# Patient Record
Sex: FEMALE | Race: WHITE | Employment: OTHER | URBAN - METROPOLITAN AREA
[De-identification: names, ages, dates, MRNs, and addresses within clinical notes are randomized per-mention and may not be internally consistent; named-entity substitution may affect disease eponyms.]

---

## 2017-01-12 RX ORDER — ALPRAZOLAM 0.5 MG/1
TABLET ORAL
Qty: 25 TAB | Refills: 0 | OUTPATIENT
Start: 2017-01-12 | End: 2017-03-13 | Stop reason: SDUPTHER

## 2017-01-12 NOTE — TELEPHONE ENCOUNTER
Last date seen:10/7/16  Last date filled:12/6/16     report was pulled on 60 y.o. Nicolas Bars, No discrepancies were found.

## 2017-01-19 RX ORDER — BUTALBITAL, ACETAMINOPHEN AND CAFFEINE 50; 325; 40 MG/1; MG/1; MG/1
TABLET ORAL
Qty: 60 TAB | Refills: 0 | OUTPATIENT
Start: 2017-01-19 | End: 2017-02-15 | Stop reason: SDUPTHER

## 2017-01-19 NOTE — TELEPHONE ENCOUNTER
From: Tanisha Bertrand  To: Mariangel Campbell MD  Sent: 1/19/2017 11:44 AM EST  Subject: Medication Renewal Request    Original authorizing provider: MD Tanisha Zhong would like a refill of the following medications:  butalbital-acetaminophen-caffeine (FIORICET, ESGIC) -40 mg per tablet Mariangel Campbell MD]    Preferred pharmacy: [address] [place] [address] [address] AT [address]    Comment:  Thought I would try filling through my chart since I have so much trouble filling through the pharmacy. [place] [address] [facility] [place] .  #[phone]

## 2017-01-19 NOTE — TELEPHONE ENCOUNTER
Reviewed report generated by the [facility]. Does not demonstrate aberrancies or inconsistencies with regard to the prescribing of controlled medications to this patient by other providers.   Last filled 12/22/16

## 2017-02-15 RX ORDER — BUTALBITAL, ACETAMINOPHEN AND CAFFEINE 50; 325; 40 MG/1; MG/1; MG/1
TABLET ORAL
Qty: 60 TAB | Refills: 0 | Status: SHIPPED | OUTPATIENT
Start: 2017-02-15 | End: 2017-03-13 | Stop reason: SDUPTHER

## 2017-03-02 RX ORDER — IPRATROPIUM BROMIDE 21 UG/1
2 SPRAY, METERED NASAL 3 TIMES DAILY
Qty: 30 ML | Refills: 5 | Status: SHIPPED | OUTPATIENT
Start: 2017-03-02 | End: 2018-10-27 | Stop reason: ALTCHOICE

## 2017-03-13 RX ORDER — BUTALBITAL, ACETAMINOPHEN AND CAFFEINE 50; 325; 40 MG/1; MG/1; MG/1
TABLET ORAL
Qty: 60 TAB | Refills: 0 | OUTPATIENT
Start: 2017-03-13 | End: 2017-04-11 | Stop reason: SDUPTHER

## 2017-03-13 RX ORDER — ALPRAZOLAM 0.5 MG/1
TABLET ORAL
Qty: 25 TAB | Refills: 0 | OUTPATIENT
Start: 2017-03-13 | End: 2017-04-28 | Stop reason: SDUPTHER

## 2017-03-13 NOTE — TELEPHONE ENCOUNTER
[facility] does not allow PAs to prescribe fioricet. Route to other or wait for Dr. Bonner Peer tomorrow.

## 2017-03-13 NOTE — TELEPHONE ENCOUNTER
Reviewed report generated by the [facility]. Does not demonstrate aberrancies or inconsistencies with regard to the prescribing of controlled medications to this patient by other providers. Per , Xanax last filled 1/13/2017. As per chart, Fioricet last prescribed 2/15/2017.

## 2017-04-06 ENCOUNTER — HOSPITAL ENCOUNTER (OUTPATIENT)
Dept: LAB | Age: 57
Discharge: HOME | End: 2017-04-06
Payer: COMMERCIAL

## 2017-04-06 ENCOUNTER — LAB ONLY (OUTPATIENT)
Dept: INTERNAL MEDICINE CLINIC | Age: 57
End: 2017-04-06

## 2017-04-06 DIAGNOSIS — E55.9 VITAMIN D DEFICIENCY: ICD-10-CM

## 2017-04-06 DIAGNOSIS — E78.5 HYPERLIPIDEMIA, UNSPECIFIED HYPERLIPIDEMIA TYPE: ICD-10-CM

## 2017-04-06 DIAGNOSIS — I10 ESSENTIAL HYPERTENSION: ICD-10-CM

## 2017-04-06 DIAGNOSIS — E78.5 HYPERLIPIDEMIA, UNSPECIFIED HYPERLIPIDEMIA TYPE: Primary | ICD-10-CM

## 2017-04-06 LAB
ALBUMIN SERPL BCP-MCNC: 4 G/DL (ref 3.4–5)
ALBUMIN/GLOB SERPL: 1.4 {RATIO} (ref 0.8–1.7)
ALP SERPL-CCNC: 80 U/L (ref 45–117)
ALT SERPL-CCNC: 138 U/L (ref 13–56)
ANION GAP BLD CALC-SCNC: 9 MMOL/L (ref 3–18)
AST SERPL W P-5'-P-CCNC: 70 U/L (ref 15–37)
BILIRUB SERPL-MCNC: 0.3 MG/DL (ref 0.2–1)
BUN SERPL-MCNC: 23 MG/DL (ref 7–18)
BUN/CREAT SERPL: 41 (ref 12–20)
CALCIUM SERPL-MCNC: 9.3 MG/DL (ref 8.5–10.1)
CHLORIDE SERPL-SCNC: 103 MMOL/L (ref 100–108)
CHOLEST SERPL-MCNC: 142 MG/DL
CO2 SERPL-SCNC: 30 MMOL/L (ref 21–32)
CREAT SERPL-MCNC: 0.56 MG/DL (ref 0.6–1.3)
GLOBULIN SER CALC-MCNC: 2.9 G/DL (ref 2–4)
GLUCOSE SERPL-MCNC: 86 MG/DL (ref 74–99)
HDLC SERPL-MCNC: 53 MG/DL (ref 40–60)
HDLC SERPL: 2.7 {RATIO} (ref 0–5)
LDLC SERPL CALC-MCNC: 75.8 MG/DL (ref 0–100)
LIPID PROFILE,FLP: NORMAL
POTASSIUM SERPL-SCNC: 3.8 MMOL/L (ref 3.5–5.5)
PROT SERPL-MCNC: 6.9 G/DL (ref 6.4–8.2)
SODIUM SERPL-SCNC: 142 MMOL/L (ref 136–145)
TRIGL SERPL-MCNC: 66 MG/DL (ref ?–150)
VLDLC SERPL CALC-MCNC: 13.2 MG/DL

## 2017-04-06 PROCEDURE — 80053 COMPREHEN METABOLIC PANEL: CPT | Performed by: INTERNAL MEDICINE

## 2017-04-06 PROCEDURE — 82306 VITAMIN D 25 HYDROXY: CPT | Performed by: INTERNAL MEDICINE

## 2017-04-06 PROCEDURE — 36415 COLL VENOUS BLD VENIPUNCTURE: CPT | Performed by: INTERNAL MEDICINE

## 2017-04-06 PROCEDURE — 80061 LIPID PANEL: CPT | Performed by: INTERNAL MEDICINE

## 2017-04-07 LAB — 25(OH)D3 SERPL-MCNC: 49.1 NG/ML (ref 30–100)

## 2017-04-11 NOTE — TELEPHONE ENCOUNTER
From: Maribel Danielle  To:  Yahaira Santo MD  Sent: 4/11/2017 9:14 AM EDT  Subject: Medication Renewal Request    Original authorizing provider: MD Maribel Fernando would like a refill of the following medications:  butalbital-acetaminophen-caffeine (FIORICET, ESGIC) -40 mg per tablet Yahaira Santo MD]    Preferred pharmacy: [address] [place] [address] RD AT [address]    Comment:

## 2017-04-12 RX ORDER — BUTALBITAL, ACETAMINOPHEN AND CAFFEINE 50; 325; 40 MG/1; MG/1; MG/1
TABLET ORAL
Qty: 60 TAB | Refills: 0 | OUTPATIENT
Start: 2017-04-12 | End: 2017-05-10 | Stop reason: SDUPTHER

## 2017-04-13 ENCOUNTER — TELEPHONE (OUTPATIENT)
Dept: INTERNAL MEDICINE CLINIC | Age: 57
End: 2017-04-13

## 2017-04-13 ENCOUNTER — OFFICE VISIT (OUTPATIENT)
Dept: INTERNAL MEDICINE CLINIC | Age: 57
End: 2017-04-13

## 2017-04-13 VITALS
TEMPERATURE: 98.4 F | DIASTOLIC BLOOD PRESSURE: 76 MMHG | BODY MASS INDEX: 22.4 KG/M2 | HEIGHT: 64 IN | SYSTOLIC BLOOD PRESSURE: 120 MMHG | HEART RATE: 80 BPM | OXYGEN SATURATION: 98 % | WEIGHT: 131.2 LBS

## 2017-04-13 DIAGNOSIS — R79.89 ELEVATED LFTS: ICD-10-CM

## 2017-04-13 DIAGNOSIS — R55 VASOMOTOR INSTABILITY: ICD-10-CM

## 2017-04-13 DIAGNOSIS — R51.9 CHRONIC NONINTRACTABLE HEADACHE, UNSPECIFIED HEADACHE TYPE: ICD-10-CM

## 2017-04-13 DIAGNOSIS — E78.5 HYPERLIPIDEMIA, UNSPECIFIED HYPERLIPIDEMIA TYPE: ICD-10-CM

## 2017-04-13 DIAGNOSIS — K52.9 COLITIS: ICD-10-CM

## 2017-04-13 DIAGNOSIS — R73.09 ABNORMAL GLUCOSE: ICD-10-CM

## 2017-04-13 DIAGNOSIS — E55.9 VITAMIN D DEFICIENCY: ICD-10-CM

## 2017-04-13 DIAGNOSIS — M48.061 LUMBAR SPINAL STENOSIS: ICD-10-CM

## 2017-04-13 DIAGNOSIS — G89.29 CHRONIC NONINTRACTABLE HEADACHE, UNSPECIFIED HEADACHE TYPE: ICD-10-CM

## 2017-04-13 DIAGNOSIS — G47.33 OBSTRUCTIVE SLEEP APNEA: ICD-10-CM

## 2017-04-13 DIAGNOSIS — I10 ESSENTIAL HYPERTENSION: Primary | ICD-10-CM

## 2017-04-13 RX ORDER — BUTALBITAL, ACETAMINOPHEN AND CAFFEINE 50; 325; 40 MG/1; MG/1; MG/1
TABLET ORAL
Qty: 60 TAB | Refills: 0 | OUTPATIENT
Start: 2017-04-13

## 2017-04-13 NOTE — MR AVS SNAPSHOT
Visit Information Date & Time Provider Department Dept. Phone Encounter #  
 4/13/2017 12:30 PM Eva Blanca MD Internist of [facility] [phone] [id number] Follow-up Instructions Return in about 6 months (around 10/13/2017), or if symptoms worsen or fail to improve. Your Appointments 5/11/2017  9:05 AM  
LAB with [facility] NURSE VISIT Internist of [facility] Appt Note: lab  
 [address], [address] [id number] [address] [place]  
  
   
 [address], [address]  
  
    
 10/12/2017  8:45 AM  
LAB with [facility] NURSE VISIT Internist of [facility] ([facility]) Appt Note: lab  
 [address], [address] [facility]  
[phone]  
  
    
 10/19/2017  9:30 AM  
Office Visit with Eva Blanca MD  
Internist of [facility] Appt Note: 6 month follow up  
 [address], [address] [id number] [address] [place]  
  
   
 [address], [address] Upcoming Health Maintenance Date Due Pneumococcal 19-64 Medium Risk (1 of 1 - PPSV23) 12/1/1977 PAP AKA CERVICAL CYTOLOGY 12/1/1979 COLONOSCOPY 6/3/2014 INFLUENZA AGE 9 TO ADULT 8/1/2016 BREAST CANCER SCRN MAMMOGRAM 11/16/2018 DTaP/Tdap/Td series (2 - Td) 10/7/2026 Allergies as of 4/13/2017  Review Complete On: 4/13/2017 By: Joshua Kirkpatrick Severity Noted Reaction Type Reactions Other Plant, Animal, Environmental  09/07/2016    Itching, Sneezing \"Everything but feathers and horses. \" Current Immunizations  Reviewed on 10/7/2016 Name Date Influenza Vaccine 10/8/2014 TD Vaccine 1/1/2006 Tdap 10/7/2016  1:04 PM  
  
 Not reviewed this visit Vitals BP Pulse Temp Height(growth percentile) Weight(growth percentile) LMP  
 120/76 80 98.4 °F (36.9 °C) (Oral) 5' 4\" (1.626 m) 131 lb 3.2 oz (59.5 kg) 01/10/2012 SpO2 BMI OB Status Smoking Status 98% 22.52 kg/m2 Postmenopausal Current Some Day Smoker Vitals History BMI and BSA Data Body Mass Index Body Surface Area  
 22.52 kg/m 2 1.64 m 2 Preferred Pharmacy Pharmacy Name Phone [place] [id number] [address], [address] [facility] [address] AT [address] [phone] Your Updated Medication List  
  
   
This list is accurate as of: 4/13/17  1:30 PM.  Always use your most recent med list.  
  
  
  
  
 ALPRAZolam 0.5 mg tablet Commonly known as:  XANAX  
TAKE 1 TABLET BY MOUTH THREE TIMES DAILY AS NEEDED FOR ANXIETY  
  
 amLODIPine 10 mg tablet Commonly known as:  Arthur [place] Take 1 Tab by mouth daily. butalbital-acetaminophen-caffeine -40 mg per tablet Commonly known as:  FIORICET, ESGIC  
TAKE 1 TABLET BY MOUTH EVERY 6 HOURS AS NEEDED. captopril 50 mg tablet Commonly known as:  CAPOTEN  
TAKE 1 TABLET BY MOUTH THREE TIMES DAILY Cholecalciferol (Vitamin D3) 2,000 unit Cap capsule Commonly known as:  VITAMIN D3 Take 4,000 Units by mouth daily. gabapentin 300 mg capsule Commonly known as:  NEURONTIN  
1 po in the am and 2 in the pm -- taper up as directed  Indications: NEUROPATHIC PAIN  
  
 hydroCHLOROthiazide 25 mg tablet Commonly known as:  HYDRODIURIL  
TAKE 1 TABLET BY MOUTH EVERY DAY  
  
 ipratropium 0.03 % nasal spray Commonly known as:  ATROVENT  
2 Sprays by Both Nostrils route three (3) times daily. multivitamin tablet Commonly known as:  ONE A DAY Take 1 Tab by mouth daily. OTHER Allergy shot weekly  
  
 potassium chloride 20 mEq tablet Commonly known as:  K-DUR, KLOR-CON Take 2 tablets po qday for 2 days, then 1 tablet po daily. rosuvastatin 10 mg tablet Commonly known as:  CRESTOR Take 1 Tab by mouth nightly. Indications: hyperlipidemia  
  
 venlafaxine-SR 75 mg capsule Commonly known as:  EFFEXOR-XR  
 TAKE 3 CAPSULES BY MOUTH DAILY  
  
 VITAMIN B-1 100 mg tablet Generic drug:  thiamine Take 50 mg by mouth daily. Follow-up Instructions Return in about 6 months (around 10/13/2017), or if symptoms worsen or fail to improve. Patient Instructions Stop rosuvastatin (Crestor) Advance Directives: Care Instructions Your Care Instructions An advance directive is a legal way to state your wishes at the end of your life. It tells your family and your doctor what to do if you can no longer say what you want. There are two main types of advance directives. You can change them any time that your wishes change. · A living will tells your family and your doctor your wishes about life support and other treatment. · A durable power of  for health care lets you name a person to make treatment decisions for you when you can't speak for yourself. This person is called a health care agent. If you do not have an advance directive, decisions about your medical care may be made by a doctor or a  who doesn't know you. It may help to think of an advance directive as a gift to the people who care for you. If you have one, they won't have to make tough decisions by themselves. Follow-up care is a key part of your treatment and safety. Be sure to make and go to all appointments, and call your doctor if you are having problems. It's also a good idea to know your test results and keep a list of the medicines you take. How can you care for yourself at home? · Discuss your wishes with your loved ones and your doctor. This way, there are no surprises. · Many states have a unique form. Or you might use a universal form that has been approved by many states. This kind of form can sometimes be completed and stored online. Your electronic copy will then be available wherever you have a connection to the Internet.  In most cases, doctors will respect your wishes even if you have a form from a different state. · You don't need a  to do an advance directive. But you may want to get legal advice. · Think about these questions when you prepare an advance directive: ¨ Who do you want to make decisions about your medical care if you are not able to? Many people choose a family member or close friend. ¨ Do you know enough about life support methods that might be used? If not, talk to your doctor so you understand. ¨ What are you most afraid of that might happen? You might be afraid of having pain, losing your independence, or being kept alive by machines. ¨ Where would you prefer to die? Choices include your home, a hospital, or a nursing home. ¨ Would you like to have information about hospice care to support you and your family? ¨ Do you want to donate organs when you die? ¨ Do you want certain Rastafari practices performed before you die? If so, put your wishes in the advance directive. · Read your advance directive every year, and make changes as needed. When should you call for help? Be sure to contact your doctor if you have any questions. Where can you learn more? Go to http://pooja-real.info/. Enter R264 in the search box to learn more about \"Advance Directives: Care Instructions. \" Current as of: November 17, 2016 Content Version: 11.2 © 1300-5495 [employer]. Care instructions adapted under license by [employer] (which disclaims liability or warranty for this information). If you have questions about a medical condition or this instruction, always ask your healthcare professional. [place] [address] any warranty or liability for your use of this information. Introducing [facility]! Dear Nasreen Arreguin: 
Thank you for requesting a [employer] account. Our records indicate that you already have an active [employer] account.   You can access your account anytime at [email or web address] Did you know that you can access your hospital and ER discharge instructions at any time in [employer]? You can also review all of your test results from your hospital stay or ER visit. Additional Information If you have questions, please visit the Frequently Asked Questions section of the [employer] website at [email or web address] Remember, [employer] is NOT to be used for urgent needs. For medical emergencies, dial 911. Now available from your iPhone and Android! Please provide this summary of care documentation to your next provider. Your primary care clinician is listed as Sajan Greco. If you have any questions after today's visit, please call [phone].

## 2017-04-13 NOTE — PROGRESS NOTES
1. Have you been to the ER, urgent care clinic or hospitalized since your last visit? YES.     2. Have you seen or consulted any other health care providers outside of the [address] [place] [address] since your last visit (Include any pap smears or colon screening)? NO      Do you have an Advanced Directive? NO    Would you like information on Advanced Directives? YES  [facility] index finger.

## 2017-04-13 NOTE — TELEPHONE ENCOUNTER
Patient stating the pharmacy did not receive the Rx for [id number]. She is asking us to call that in again for her.

## 2017-04-13 NOTE — PATIENT INSTRUCTIONS
Stop rosuvastatin (Crestor)      Advance Directives: Care Instructions  Your Care Instructions  An advance directive is a legal way to state your wishes at the end of your life. It tells your family and your doctor what to do if you can no longer say what you want. There are two main types of advance directives. You can change them any time that your wishes change. · A living will tells your family and your doctor your wishes about life support and other treatment. · A durable power of  for health care lets you name a person to make treatment decisions for you when you can't speak for yourself. This person is called a health care agent. If you do not have an advance directive, decisions about your medical care may be made by a doctor or a  who doesn't know you. It may help to think of an advance directive as a gift to the people who care for you. If you have one, they won't have to make tough decisions by themselves. Follow-up care is a key part of your treatment and safety. Be sure to make and go to all appointments, and call your doctor if you are having problems. It's also a good idea to know your test results and keep a list of the medicines you take. How can you care for yourself at home? · Discuss your wishes with your loved ones and your doctor. This way, there are no surprises. · Many states have a unique form. Or you might use a universal form that has been approved by many states. This kind of form can sometimes be completed and stored online. Your electronic copy will then be available wherever you have a connection to the Internet. In most cases, doctors will respect your wishes even if you have a form from a different state. · You don't need a  to do an advance directive. But you may want to get legal advice. · Think about these questions when you prepare an advance directive:  ¨ Who do you want to make decisions about your medical care if you are not able to?  Many people choose a family member or close friend. ¨ Do you know enough about life support methods that might be used? If not, talk to your doctor so you understand. ¨ What are you most afraid of that might happen? You might be afraid of having pain, losing your independence, or being kept alive by machines. ¨ Where would you prefer to die? Choices include your home, a hospital, or a nursing home. ¨ Would you like to have information about hospice care to support you and your family? ¨ Do you want to donate organs when you die? ¨ Do you want certain Pentecostal practices performed before you die? If so, put your wishes in the advance directive. · Read your advance directive every year, and make changes as needed. When should you call for help? Be sure to contact your doctor if you have any questions. Where can you learn more? Go to http://pooja-real.info/. Enter R264 in the search box to learn more about \"Advance Directives: Care Instructions. \"  Current as of: November 17, 2016  Content Version: 11.2  © 0915-7679 [employer]. Care instructions adapted under license by [employer] (which disclaims liability or warranty for this information). If you have questions about a medical condition or this instruction, always ask your healthcare professional. [address] any warranty or liability for your use of this information.

## 2017-04-16 ENCOUNTER — TELEPHONE (OUTPATIENT)
Dept: INTERNAL MEDICINE CLINIC | Age: 57
End: 2017-04-16

## 2017-04-16 PROBLEM — R79.89 ELEVATED LFTS: Status: ACTIVE | Noted: 2017-04-16

## 2017-04-17 NOTE — PROGRESS NOTES
HPI:   Janet Myers is a 60y.o. year old female who presents today for evaluation of hypertension, hyperlipidemia, obstructive sleep apnea, allergic rhinitis, vasomotor instability, and irritable bowel syndrome. She reports that she is doing well and is currently without complaints. She states that she has been following a low carbohydrate diet and has successfully lost fifteen pounds since 10/2016. On 8/2/2016, she was evaluated by PETERSON Ahmadi for left sided crampy abdominal pain, diarrhea, and hematochezia. She was treated for presumed diverticulitis with Cipro and Flagyl, and underwent evaluation showing WBC 11.2, ALT 59, alkaline phosphatase 121, and lipase 427. Abdominal CT scan (8/2/2016) showed localized bowel wall thickening with inflammatory change involving the splenic flexure and proximal descending colon; no colonic diverticuli are seen so the presence of diverticulitis is unlikely; would be a typical location for bowel ischemia, but the patient's vascular structures are essentially normal so bowel ischemia unlikely; favor infectious or inflammatory colitis. She was evaluated by Dr. Tian Yeager and admitted for bowel rest, IVF, and IV antibiotics. She improved significantly in 36 hours and was discharged home. She completed the course of oral antibiotics and states that her symptoms have completely resolved. She did not return for her follow-up appointment with Dr. Tian Yeager. She denies any abdominal pain, nausea, vomiting, melena, hematochezia, or change in bowel movements. She underwent screening colonoscopy by Dr. Alanis Mcclure in 6/2011, which found a polyp in the proximal descending colon (pathology: hyperplastic). Because she could not intubate the cecum, follow-up was recommended for 2 years. In 4/2015, she underwent repeat colonoscopy with Dr. Addi Veliz, which showed left diverticular disease, hypertrophied anal papilla, and no polyps. Follow-up was recommended for 5 years.      She has a history of hypertension, treated with amlodipine, hydrochlorothiazide, and captopril. She has resumed exercising and she purchased an elliptical machine for her home. She denies any chest pain, shortness of breath at rest or with exertion, palpitations, lightheadedness, or edema. She also has hyperlipidemia, treated with fenofibrate and fish oil in the past, but these were discontinued and she was changed to rosuvastatin in 6/2016. She states that she has tolerated this without any difficulty or side effects. She has a history of mild-severe obstructive sleep apnea, diagnosed by Dr. David Damon, and was prescribed nasal CPAP. She states that she is no longer using her machine because it was making her headaches worse. She suffers from chronic headaches, considered to be migraines in the past as they were associated with menstruation. Since she entered menopause, the severity of her headaches has decreased and the character has changed. She no longer considers them to be due to migraines, but rather they are secondary to allergies. She has severe allergic rhinitis, and awakens with morning headaches when her allergies are severe. She is followed by Dr. Lucinda Torres and has been receiving allergy shots with some improvement. She also uses ipratropium nasal spray, fexofenadine, and Fioricet as needed. She denies aura or visual changes associated with the headache. Denies cough or wheezing. She also has vasomotor instability, which has responded to venlafaxine. She attempted to wean from taking venlafaxine, but resumed due to life stressors. She states that she will attempt again in the near future. In 6/2016, she presented with complaints of right sciatica and she underwent a lumbar MRI (7/8/2016) showing multilevel mild disc bulges, mild facet arthropathy, and multilevel mild foraminal stenosis; L3-L4 with mild to moderate spinal canal stenosis, no more than mild spinal canal stenosis elsewhere, and no cord compression.  She was evaluated by Dr. Claudean Fries on 9/1/2016 and started on gabapentin. She also was referred to physical therapy and has been undergoing dry needling, which she reports has resulted in significant improvement. She reports today that she is no longer experiencing any pain in her back or right leg. She states that she is no longer taking the gabapentin as she did not find it to be helpful. She also has a history of an anterior cervical discectomy and fusion (C5-C6) in 2/2011 by Dr. Ismael Varghese for a herniated disc and right radiculopathy. She reports that her bilateral hand numbness has improved since her last visit. Past Medical History:   Diagnosis Date    Allergic rhinitis     Chronic headaches     Hx of colonic polyp 6/2011    Hyperplastic; Dr. Maxi Gonzalez.  Hyperlipidemia     Hypertension     IBS (irritable bowel syndrome)     Obstructive sleep apnea     Moderate-severe; not using nasal CPAP    S/P cervical discectomy 2/2011    Anterior C5-C6 discectomy for herniated disc and right radiculopathy; Dr. Ismael Varghese     Past Surgical History:   Procedure Laterality Date    ENDOSCOPY, COLON, DIAGNOSTIC      HX BACK SURGERY  02/23/11    neck surgery; ruptured cervical discs    HX COLONOSCOPY  2011 2015    HX GYN      dilation and curretage     Current Outpatient Prescriptions   Medication Sig    butalbital-acetaminophen-caffeine (FIORICET, ESGIC) -40 mg per tablet TAKE 1 TABLET BY MOUTH EVERY 6 HOURS AS NEEDED.  ALPRAZolam (XANAX) 0.5 mg tablet TAKE 1 TABLET BY MOUTH THREE TIMES DAILY AS NEEDED FOR ANXIETY    ipratropium (ATROVENT) 0.03 % nasal spray 2 Sprays by Both Nostrils route three (3) times daily.  rosuvastatin (CRESTOR) 10 mg tablet Take 1 Tab by mouth nightly. Indications: hyperlipidemia    potassium chloride (K-DUR, KLOR-CON) 20 mEq tablet Take 2 tablets po qday for 2 days, then 1 tablet po daily.  amLODIPine (NORVASC) 10 mg tablet Take 1 Tab by mouth daily.     captopril (CAPOTEN) 50 mg tablet TAKE 1 TABLET BY MOUTH THREE TIMES DAILY    hydrochlorothiazide (HYDRODIURIL) 25 mg tablet TAKE 1 TABLET BY MOUTH EVERY DAY    venlafaxine-SR (EFFEXOR-XR) 75 mg capsule TAKE 3 CAPSULES BY MOUTH DAILY    thiamine (VITAMIN B-1) 100 mg tablet Take 50 mg by mouth daily.  multivitamin (ONE A DAY) tablet Take 1 Tab by mouth daily.  OTHER Allergy shot weekly    Cholecalciferol, Vitamin D3, 2,000 unit cap Take 4,000 Units by mouth daily.  gabapentin (NEURONTIN) 300 mg capsule 1 po in the am and 2 in the pm -- taper up as directed  Indications: NEUROPATHIC PAIN     No current facility-administered medications for this visit. Allergies and Intolerances: Allergies   Allergen Reactions    Other Plant, Animal, Environmental Itching and Sneezing     \"Everything but feathers and horses. \"        Family History: Her mother had hypertension, but  from a melanoma. Her father  from a DVT. Her brother has CAD and underwent CABG at the age of 40. Family History   Problem Relation Age of Onset    Heart Disease Brother     Other Mother      melanoma    Arthritis-osteo Mother     Cancer Mother     Bleeding Prob Father     Heart Disease Father     Breast Cancer Other      grandmother    Stroke Other      family history     Social History:   She  reports that she has been smoking. She has been smoking about 0.50 packs per day. She has never used smokeless tobacco. She reports that she smoked 0.5 ppd for 30 years, stopping in . She is  and lives with her . She recently retired from the [employer] department of [employer]; her  sells eyeglass frames to [email or web address].     History   Alcohol Use    0.0 oz/week    0 Standard drinks or equivalent per week     Comment: Very Rarely     Immunization History:  Immunization History   Administered Date(s) Administered    Influenza Vaccine 10/08/2014    TD Vaccine 2006    Tdap 10/07/2016       Review of Systems:   As above included in HPI.  Otherwise 11 point review of systems negative including constitutional, skin, HENT, eyes, respiratory, cardiovascular, gastrointestinal, genitourinary, musculoskeletal, endo/heme/aller, neurological.    Physical:   Vitals:   BP: 120/76   HR: 80  WT: 131 lb 3.2 oz (59.5 kg)  BMI:  22.52 kg/m2    Exam: Pt appears well; alert and oriented x 3; appropriate affect. HEENT: PERRLA, anicteric, oropharynx clear, no JVD, adenopathy or thyromegaly. No carotid bruits or radiated murmur. Lungs: clear to auscultation, no wheezes, rhonchi, or rales. Heart: regular rate and rhythm. No murmur, rubs, gallops  Abdomen: soft, nontender, nondistended, normal bowel sounds, no hepatosplenomegaly or masses. Extremities: without edema. Pulses 1-2+ bilaterally. Review of Data:  Labs:  Hospital Outpatient Visit on 04/06/2017   Component Date Value Ref Range Status    Sodium 04/06/2017 142  136 - 145 mmol/L Final    Potassium 04/06/2017 3.8  3.5 - 5.5 mmol/L Final    Chloride 04/06/2017 103  100 - 108 mmol/L Final    CO2 04/06/2017 30  21 - 32 mmol/L Final    Anion gap 04/06/2017 9  3.0 - 18 mmol/L Final    Glucose 04/06/2017 86  74 - 99 mg/dL Final    BUN 04/06/2017 23* 7.0 - 18 MG/DL Final    Creatinine 04/06/2017 0.56* 0.6 - 1.3 MG/DL Final    BUN/Creatinine ratio 04/06/2017 41* 12 - 20   Final    GFR est AA 04/06/2017 >60  >60 ml/min/1.73m2 Final    GFR est non-AA 04/06/2017 >60  >60 ml/min/1.73m2 Final    Calcium 04/06/2017 9.3  8.5 - 10.1 MG/DL Final    Bilirubin, total 04/06/2017 0.3  0.2 - 1.0 MG/DL Final    ALT (SGPT) 04/06/2017 138* 13 - 56 U/L Final    AST (SGOT) 04/06/2017 70* 15 - 37 U/L Final    Alk.  phosphatase 04/06/2017 80  45 - 117 U/L Final    Protein, total 04/06/2017 6.9  6.4 - 8.2 g/dL Final    Albumin 04/06/2017 4.0  3.4 - 5.0 g/dL Final    Globulin 04/06/2017 2.9  2.0 - 4.0 g/dL Final    A-G Ratio 04/06/2017 1.4  0.8 - 1.7   Final    LIPID PROFILE 04/06/2017        Final    Cholesterol, total 04/06/2017 142  <200 MG/DL Final    Triglyceride 04/06/2017 66  <150 MG/DL Final    HDL Cholesterol 04/06/2017 53  40 - 60 MG/DL Final    LDL, calculated 04/06/2017 75.8  0 - 100 MG/DL Final    VLDL, calculated 04/06/2017 13.2  MG/DL Final    CHOL/HDL Ratio 04/06/2017 2.7  0 - 5.0   Final    Vitamin D 25-Hydroxy 04/06/2017 49.1  30 - 100 ng/mL Final       Health Maintenance:  Screening:    Mammogram: negative (11/2016)   PAP smear: well women exams by Dr. Yo Franz. Colorectal: colonoscopy (4/2015) no polyps. Dr. Yumiko Silverio. Due 2020. Depression: Uses Xanax prn for anxiety. On Effexor for vasomotor instability. DM (HbA1c/FPG): HbA1c 5.5 (9/2016)   Hepatitis C: negative (12/2015)   Falls: none   DEXA: N/A   Glaucoma: has regular eye exams with Dr. Elijah Mina (last 12/2015)   Smoking: stopped 2011; 15 pack year   Vitamin D: 49.1 (4/2017)   Medicare Wellness: N/A    Impression:  Patient Active Problem List   Diagnosis Code    Hypertension I10    Hyperlipidemia E78.5    Obstructive sleep apnea G47.33    Chronic headaches R51    IBS (irritable bowel syndrome) K58.9    Colon polyp K63.5    Vasomotor instability R55    Vitamin D deficiency E55.9    Sciatica of right side M54.31    Abnormal glucose R73.09    Colitis K52.9    Lumbar spinal stenosis M48.06    Neuritis of lower extremity G57.90    HNP (herniated nucleus pulposus), lumbar M51.26    Lumbar facet arthropathy (HCC) M12.88       Plan:  1. Hypertension. Blood pressure control significantly improved on current regimen of amlodipine 10 mg, hydrochlorothiazide, and captopril. Renal function remains normal with creatinine 0.56/ eGFR >60. Follow closely. 2. Hyperlipidemia. On moderate intensity dose statin with rosuvastatin 10 mg with LDL 75, indicative of excellent control. Emphasized importance of lifestyle modifications, including diet, exercise, and weight loss. 3. Elevated transaminases.  Review of record shows elevation noted since 8/2016. Iron studies, hepatitis panel, KIEL, SPEP, CK, aldolase all negative. Two results were given for anti-smooth muscle antibody (15 and 20). Unclear significance. RUQ ultrasound (10/2016) showing fatty infiltration of liver. Appears that elevation followed the initiation of rosuvastatin. Will give trial off statin to see if transaminases improve. Repeat in one month. 4. Colitis. Most likely bacterial in origin as improved rapidly with IV antibiotics and bowel rest. Reports no further symptoms. 4. Right leg sciatica. Evidence of lumbar spinal stenosis and facet arthropathy, and lumbar herniated disc. Significant improvement with dry needling therapy and reports no further pain. Continue to follow and follow-up with Dr. Epifanio Benz if recurs. 5. Abnormal glucose. Glucose normal today. Emphasized importance of lifestyle modifications, including diet, exercise, and weight loss. 6. Chronic headaches. Appear to be related to allergic rhinitis and sinus congestion. Currently being followed by Dr. Julia Shay and receiving allergy shots. Instructed to continue Allegra and Atrovent nasal spray. Fiorecet as needed. Follow. 7. Obstructive sleep apnea. Not using CPAP as exacerbated headaches. Follow. May have improved with weight loss. 8. Vasomotor dysfunction. Continuing on venlafaxine for now. Will attempt to decrease dose in future. Follow. 9. Health maintenance. Patient not wishing to obtain influenza vaccine. Received Tdap last visit. Well woman exam with Dr. Pavan Dalton in 11/2016. Will request report. Mammogram up to date. Continue regular eye exams with Dr. Sanya Lantigua. Vitamin D level normal. Continue maintenance dose supplement. Patient understands recommendations and agrees with plan. Follow-up in 6 months.

## 2017-04-17 NOTE — TELEPHONE ENCOUNTER
Please request last well woman exam and pap from Dr. Brittany Carvalho (patient seen 11/2016). Thank you.

## 2017-04-20 RX ORDER — AMLODIPINE BESYLATE 10 MG/1
10 TABLET ORAL DAILY
Qty: 30 TAB | Refills: 5 | Status: SHIPPED | OUTPATIENT
Start: 2017-04-20 | End: 2017-10-30 | Stop reason: SDUPTHER

## 2017-04-20 RX ORDER — AMLODIPINE BESYLATE 10 MG/1
TABLET ORAL
Qty: 90 TAB | Refills: 5 | OUTPATIENT
Start: 2017-04-20

## 2017-04-25 DIAGNOSIS — E87.6 HYPOKALEMIA: ICD-10-CM

## 2017-04-25 RX ORDER — POTASSIUM CHLORIDE 20 MEQ/1
TABLET, EXTENDED RELEASE ORAL
Qty: 96 TAB | Refills: 5 | Status: SHIPPED | OUTPATIENT
Start: 2017-04-25 | End: 2017-06-22 | Stop reason: SDUPTHER

## 2017-04-27 ENCOUNTER — OFFICE VISIT (OUTPATIENT)
Dept: SURGERY | Age: 57
End: 2017-04-27

## 2017-04-27 VITALS
RESPIRATION RATE: 16 BRPM | WEIGHT: 130 LBS | OXYGEN SATURATION: 97 % | BODY MASS INDEX: 22.2 KG/M2 | DIASTOLIC BLOOD PRESSURE: 88 MMHG | TEMPERATURE: 98.9 F | SYSTOLIC BLOOD PRESSURE: 132 MMHG | HEIGHT: 64 IN | HEART RATE: 80 BPM

## 2017-04-27 DIAGNOSIS — K55.9 ISCHEMIC COLITIS (HCC): Primary | ICD-10-CM

## 2017-04-27 RX ORDER — FEXOFENADINE HCL AND PSEUDOEPHEDRINE HCI 180; 240 MG/1; MG/1
1 TABLET, EXTENDED RELEASE ORAL DAILY
COMMUNITY
End: 2017-05-10

## 2017-04-28 ENCOUNTER — HOSPITAL ENCOUNTER (OUTPATIENT)
Dept: CT IMAGING | Age: 57
Discharge: HOME | End: 2017-04-28
Attending: COLON & RECTAL SURGERY
Payer: COMMERCIAL

## 2017-04-28 DIAGNOSIS — K55.9 ISCHEMIC COLITIS (HCC): ICD-10-CM

## 2017-04-28 PROCEDURE — 74177 CT ABD & PELVIS W/CONTRAST: CPT

## 2017-04-28 PROCEDURE — 74011636320 HC RX REV CODE- 636/320: Performed by: COLON & RECTAL SURGERY

## 2017-04-28 RX ORDER — ALPRAZOLAM 0.5 MG/1
TABLET ORAL
Qty: 25 TAB | Refills: 0 | OUTPATIENT
Start: 2017-04-28 | End: 2017-06-08 | Stop reason: SDUPTHER

## 2017-04-28 RX ADMIN — IOPAMIDOL 80 ML: 612 INJECTION, SOLUTION INTRAVENOUS at 13:14

## 2017-05-01 RX ORDER — CAPTOPRIL 50 MG/1
TABLET ORAL
Qty: 270 TAB | Refills: 3 | Status: SHIPPED | OUTPATIENT
Start: 2017-05-01 | End: 2017-06-06 | Stop reason: SDUPTHER

## 2017-05-03 NOTE — PROGRESS NOTES
Result noted. Will have patient come in for follow up appointment. May need workup to rule out embolic disease.

## 2017-05-10 RX ORDER — BUTALBITAL, ACETAMINOPHEN AND CAFFEINE 50; 325; 40 MG/1; MG/1; MG/1
TABLET ORAL
Qty: 60 TAB | Refills: 0 | OUTPATIENT
Start: 2017-05-10 | End: 2017-06-08 | Stop reason: SDUPTHER

## 2017-05-10 RX ORDER — HYDROCHLOROTHIAZIDE 25 MG/1
TABLET ORAL
Qty: 30 TAB | Refills: 11 | Status: SHIPPED | OUTPATIENT
Start: 2017-05-10 | End: 2018-06-05 | Stop reason: SDUPTHER

## 2017-05-10 NOTE — TELEPHONE ENCOUNTER
From: Claribel Su  To:  Richard Thomas MD  Sent: 5/10/2017 3:26 PM EDT  Subject: Medication Renewal Request    Original authorizing provider: MD Claribel Herman would like a refill of the following medications:  butalbital-acetaminophen-caffeine (FIORICET, ESGIC) -40 mg per tablet Richard Thomas MD]    Preferred pharmacy: [address] [place] [address] [address]    Comment:

## 2017-05-11 ENCOUNTER — TELEPHONE (OUTPATIENT)
Dept: INTERNAL MEDICINE CLINIC | Age: 57
End: 2017-05-11

## 2017-05-11 ENCOUNTER — HOSPITAL ENCOUNTER (OUTPATIENT)
Dept: LAB | Age: 57
Discharge: HOME | End: 2017-05-11
Payer: COMMERCIAL

## 2017-05-11 ENCOUNTER — OFFICE VISIT (OUTPATIENT)
Dept: SURGERY | Age: 57
End: 2017-05-11

## 2017-05-11 VITALS
WEIGHT: 130.07 LBS | HEIGHT: 64 IN | TEMPERATURE: 98.1 F | RESPIRATION RATE: 18 BRPM | HEART RATE: 80 BPM | BODY MASS INDEX: 22.21 KG/M2 | DIASTOLIC BLOOD PRESSURE: 72 MMHG | SYSTOLIC BLOOD PRESSURE: 124 MMHG

## 2017-05-11 DIAGNOSIS — Z87.19 HISTORY OF ISCHEMIC COLITIS: Primary | ICD-10-CM

## 2017-05-11 DIAGNOSIS — E78.5 HYPERLIPIDEMIA, UNSPECIFIED HYPERLIPIDEMIA TYPE: ICD-10-CM

## 2017-05-11 DIAGNOSIS — R79.89 ELEVATED LFTS: ICD-10-CM

## 2017-05-11 DIAGNOSIS — K55.9 ISCHEMIC COLITIS (HCC): Primary | ICD-10-CM

## 2017-05-11 LAB
ALBUMIN SERPL BCP-MCNC: 4.1 G/DL (ref 3.4–5)
ALBUMIN/GLOB SERPL: 1.4 {RATIO} (ref 0.8–1.7)
ALP SERPL-CCNC: 79 U/L (ref 45–117)
ALT SERPL-CCNC: 48 U/L (ref 13–56)
ANION GAP BLD CALC-SCNC: 8 MMOL/L (ref 3–18)
AST SERPL W P-5'-P-CCNC: 23 U/L (ref 15–37)
BILIRUB SERPL-MCNC: 0.3 MG/DL (ref 0.2–1)
BUN SERPL-MCNC: 25 MG/DL (ref 7–18)
BUN/CREAT SERPL: 42 (ref 12–20)
CALCIUM SERPL-MCNC: 9.4 MG/DL (ref 8.5–10.1)
CHLORIDE SERPL-SCNC: 103 MMOL/L (ref 100–108)
CHOLEST SERPL-MCNC: 211 MG/DL
CO2 SERPL-SCNC: 30 MMOL/L (ref 21–32)
CREAT SERPL-MCNC: 0.59 MG/DL (ref 0.6–1.3)
GLOBULIN SER CALC-MCNC: 2.9 G/DL (ref 2–4)
GLUCOSE SERPL-MCNC: 95 MG/DL (ref 74–99)
HDLC SERPL-MCNC: 56 MG/DL (ref 40–60)
HDLC SERPL: 3.8 {RATIO} (ref 0–5)
LDLC SERPL CALC-MCNC: 137.4 MG/DL (ref 0–100)
LIPID PROFILE,FLP: ABNORMAL
POTASSIUM SERPL-SCNC: 3.8 MMOL/L (ref 3.5–5.5)
PROT SERPL-MCNC: 7 G/DL (ref 6.4–8.2)
SODIUM SERPL-SCNC: 141 MMOL/L (ref 136–145)
TRIGL SERPL-MCNC: 88 MG/DL (ref ?–150)
VLDLC SERPL CALC-MCNC: 17.6 MG/DL

## 2017-05-11 PROCEDURE — 80061 LIPID PANEL: CPT | Performed by: INTERNAL MEDICINE

## 2017-05-11 PROCEDURE — 36415 COLL VENOUS BLD VENIPUNCTURE: CPT | Performed by: INTERNAL MEDICINE

## 2017-05-11 PROCEDURE — 80053 COMPREHEN METABOLIC PANEL: CPT | Performed by: INTERNAL MEDICINE

## 2017-05-11 PROCEDURE — 83516 IMMUNOASSAY NONANTIBODY: CPT | Performed by: INTERNAL MEDICINE

## 2017-05-11 RX ORDER — BUTALBITAL, ACETAMINOPHEN AND CAFFEINE 50; 325; 40 MG/1; MG/1; MG/1
TABLET ORAL
Qty: 60 TAB | Refills: 0 | OUTPATIENT
Start: 2017-05-11

## 2017-05-11 RX ORDER — ALPRAZOLAM 0.5 MG/1
TABLET ORAL
Qty: 25 TAB | Refills: 0 | OUTPATIENT
Start: 2017-05-11

## 2017-05-11 NOTE — PROGRESS NOTES
Subjective: She is seen here in follow-up. Her abdominal pain is essentially resolved although she has some minor epigastric discomfort. Past medical history and ROS were reviewed and unchanged. Abdomen: Mild epigastric discomfort, ND    CT personally visualized by me; minimal thickening of the distal transverse colon and jejunal loop      Assessment / Plan    Ischemic colitis, possible ischemic enteritis, resolved  No plan for surgery but would like to institute a hypercoagulable workup  We will order echo  Discussed with her primary care doctor, Dr. Chikis Zapata, who will complete this workup and referred to hematology as necessary  We will follow-up here as needed and for colonoscopy in 2020    A total of 15 minutes was spent with the patient, with >50% of time spent on counseling and coordination of care. The diagnoses and plan were discussed with patient. All questions answered. Plan of care agreed to by all concerned.

## 2017-05-11 NOTE — MR AVS SNAPSHOT
Visit Information Date & Time Provider Department Dept. Phone Encounter #  
 5/11/2017 11:30 AM Eusebio Doyle  [address] [id number] Follow-up Instructions Return if symptoms worsen or fail to improve. Follow-up and Disposition History Your Appointments 10/12/2017  8:45 AM  
LAB with IOC NURSE VISIT [facility] Appt Note: lab  
 [address], [address] [facility] [address]  
  
   
 [address], [address]  
  
    
 10/19/2017  9:30 AM  
Office Visit with Cynthia Dillon MD  
[facility]) Appt Note: 6 month follow up  
 [address], [address] Krystina [address]  
  
   
 [address], [address] Upcoming Health Maintenance Date Due Pneumococcal 19-64 Medium Risk (1 of 1 - PPSV23) [birth year]1/1977 INFLUENZA AGE 9 TO ADULT 8/1/2017 BREAST CANCER SCRN MAMMOGRAM 11/16/2018 PAP AKA CERVICAL CYTOLOGY 12/28/2019 COLONOSCOPY 4/17/2020 DTaP/Tdap/Td series (2 - Td) 10/7/2026 Allergies as of 5/11/2017  Review Complete On: 5/11/2017 By: Eusebio Doyle MD  
  
 Severity Noted Reaction Type Reactions Other Plant, Animal, Environmental  09/07/2016    Itching, Sneezing \"Everything but feathers and horses. \" Current Immunizations  Reviewed on 10/7/2016 Name Date Influenza Vaccine 10/8/2014 TD Vaccine 1/1/2006 Tdap 10/7/2016  1:04 PM  
  
 Not reviewed this visit You Were Diagnosed With   
  
 Codes Comments [facility]schemic [facility]    -  Primary ICD-10-CM: K55.9 ICD-9-CM: 557.9 Vitals BP Pulse Temp Resp Height(growth percentile) Weight(growth percentile) 124/72 80 98.1 °F (36.7 °C) 18 5' 4\" (1.626 m) 130 lb 1.1 oz (59 kg) LMP BMI OB Status Smoking Status 01/10/2012 22.33 kg/m2 Postmenopausal Current Some Day Smoker BMI and BSA Data Body Mass Index Body Surface Area  
 22.33 kg/m 2 1.63 m 2 Preferred Pharmacy Pharmacy Name Phone [facility] [phone] [address] [facility] [address] AT [address] [phone] Your Updated Medication List  
  
   
This list is accurate as of: 5/11/17  3:05 PM.  Always use your most recent med list.  
  
  
  
  
 ALPRAZolam 0.5 mg tablet Commonly known as:  XANAX  
TAKE 1 TABLET BY MOUTH THREE TIMES DAILY AS NEEDED FOR ANXIETY  
  
 amLODIPine 10 mg tablet Commonly known as:  Corinne Kevan Take 1 Tab by mouth daily. butalbital-acetaminophen-caffeine -40 mg per tablet Commonly known as:  FIORICET, ESGIC  
TAKE 1 TABLET BY MOUTH EVERY 6 HOURS AS NEEDED. captopril 50 mg tablet Commonly known as:  CAPOTEN  
TAKE 1 TABLET BY MOUTH THREE TIMES DAILY Cholecalciferol (Vitamin D3) 2,000 unit Cap capsule Commonly known as:  VITAMIN D3 Take 4,000 Units by mouth daily. GLUCOSAMINE-CHONDR (BOSWELLIA) PO Take 500 mg by mouth daily. hydroCHLOROthiazide 25 mg tablet Commonly known as:  HYDRODIURIL  
TAKE 1 TABLET BY MOUTH EVERY DAY  
  
 ipratropium 0.03 % nasal spray Commonly known as:  ATROVENT  
2 Sprays by Both Nostrils route three (3) times daily. multivitamin tablet Commonly known as:  ONE A DAY Take 1 Tab by mouth daily. OTHER Allergy shot weekly  
  
 potassium chloride 20 mEq tablet Commonly known as:  K-DUR, KLOR-CON  
TAKE 2 TABLETS BY MOUTH EVERY DAY FOR 2 DAYS THEN TAKE 1 TABLET BY MOUTH DAILY  
  
 rosuvastatin 10 mg tablet Commonly known as:  CRESTOR Take 1 Tab by mouth nightly. Indications: hyperlipidemia TURMERIC ROOT EXTRACT PO Take 1,000 mg by mouth. venlafaxine-SR 75 mg capsule Commonly known as:  EFFEXOR-XR  
TAKE 3 CAPSULES BY MOUTH DAILY  
  
 VITAMIN B-1 100 mg tablet Generic drug:  thiamine Take 50 mg by mouth daily. Follow-up Instructions Return if symptoms worsen or fail to improve. To-Do List   
 05/11/2017 ECHO:  2D ECHO COMPLETE ADULT (TTE) W OR WO CONTR Introducing [facility]! Dear Saul Bowers: 
Thank you for requesting a [facility] account. Our records indicate that you already have an active [facility] account. You can access your account anytime at [email or web address] Did you know that you can access your hospital and ER discharge instructions at any time in [facility]? You can also review all of your test results from your hospital stay or ER visit. Additional Information If you have questions, please visit the Frequently Asked Questions section of the [facility] website at [email or web address] Remember, [facility] is NOT to be used for urgent needs. For medical emergencies, dial 911. Now available from your iPhone and Android! Please provide this summary of care documentation to your next provider. Your primary care clinician is listed as Jose Luis Hancock. If you have any questions after today's visit, please call [phone].

## 2017-05-11 NOTE — TELEPHONE ENCOUNTER
Pt changed her mind, she wanted them called in, the xanax never was called in from 4/28 apparently.  I called in the fioricet and the xanax from 4/28

## 2017-05-12 LAB — ACTIN IGG SERPL-ACNC: 17 UNITS (ref 0–19)

## 2017-05-12 NOTE — TELEPHONE ENCOUNTER
Received call from Dr. Steve Pickens. Patient presented with recurrent abdominal pain and rectal bleeding, and abdominal CT scan with thickening in jejunum and transverse colon concerning for possible ischemic colitis +/- enteritis. Recommending hypercoaguable workup. Echocardiogram ordered by Dr. Steve Pickens. Will proceed with laboratory evaluation. Also, repeat hepatic panel with normalization of AST and ALT with discontinuation of rosuvastatin. Patient was always asymptomatic. Lipid panel improved with weight loss despite discontinuing statin. Calculated 10 year ASCVD risk is now 3.3 %, which does not place her in one of the four statin benefit groups as per new AHA/ACC guidelines. Thus, statin treatment not indicated at this time. Attempted to call patient to discuss lab results and need for hypercoaguable work up. Left message to call back.

## 2017-05-25 LAB
ADDITIONAL INFORMATION 480501: NORMAL
APCR PPP: 2.8 RATIO (ref 2–3.5)
APTT PPP: 26 SEC (ref 24–33)
AT III ACT/NOR PPP CHRO: 141 % (ref 75–135)
B2 GLYCOPROT1 IGG SER-ACNC: <9 GPI IGG UNITS (ref 0–20)
B2 GLYCOPROT1 IGM SER-ACNC: <9 GPI IGM UNITS (ref 0–32)
CARDIOLIPIN IGA SER IA-ACNC: <9 APL U/ML (ref 0–11)
CARDIOLIPIN IGG SER IA-ACNC: <9 GPL U/ML (ref 0–14)
CARDIOLIPIN IGM SER IA-ACNC: <9 MPL U/ML (ref 0–12)
COMMENT: NORMAL
F2 GENE MUT ANL BLD/T: NORMAL
F5 GENE MUT ANL BLD/T: NORMAL
LA PPP-IMP: NORMAL
PROT C ACT/NOR PPP CHRO: 162 %
PROT C AG ACT/NOR PPP IA: 143 % (ref 60–150)
PROT S ACT/NOR PPP: 90 % (ref 63–140)
PROT S AG ACT/NOR PPP IA: 146 % (ref 60–150)
PROT S FREE AG ACT/NOR PPP IA: 118 % (ref 57–157)
SCREEN APTT: 30.5 SEC (ref 0–43.6)
SCREEN DRVVT: 33.1 SEC (ref 0–47)

## 2017-05-26 ENCOUNTER — HOSPITAL ENCOUNTER (OUTPATIENT)
Dept: NON INVASIVE DIAGNOSTICS | Age: 57
Discharge: HOME | End: 2017-05-26
Attending: COLON & RECTAL SURGERY
Payer: COMMERCIAL

## 2017-05-26 DIAGNOSIS — K55.9 ISCHEMIC COLITIS (HCC): ICD-10-CM

## 2017-05-26 PROCEDURE — 93306 TTE W/DOPPLER COMPLETE: CPT

## 2017-05-29 ENCOUNTER — TELEPHONE (OUTPATIENT)
Dept: INTERNAL MEDICINE CLINIC | Age: 57
End: 2017-05-29

## 2017-05-30 NOTE — TELEPHONE ENCOUNTER
Please let the patient know that all of the blood work drawn did not find any evidence that she was at an increased risk of clotting. No abnormalities were found.

## 2017-06-06 RX ORDER — CAPTOPRIL 50 MG/1
TABLET ORAL
Qty: 270 TAB | Refills: 3 | Status: SHIPPED | OUTPATIENT
Start: 2017-06-06 | End: 2018-05-09 | Stop reason: SDUPTHER

## 2017-06-08 RX ORDER — ALPRAZOLAM 0.5 MG/1
TABLET ORAL
Qty: 25 TAB | Refills: 0 | OUTPATIENT
Start: 2017-06-08 | End: 2017-07-03 | Stop reason: SDUPTHER

## 2017-06-08 RX ORDER — BUTALBITAL, ACETAMINOPHEN AND CAFFEINE 50; 325; 40 MG/1; MG/1; MG/1
TABLET ORAL
Qty: 60 TAB | Refills: 0 | OUTPATIENT
Start: 2017-06-08 | End: 2017-07-03 | Stop reason: SDUPTHER

## 2017-06-08 NOTE — TELEPHONE ENCOUNTER
Reviewed report generated by the [facility]. Does not demonstrate aberrancies or inconsistencies with regard to the prescribing of controlled medications to this patient by other providers. Last filled Xanax 5/11/2017 per .

## 2017-06-08 NOTE — TELEPHONE ENCOUNTER
From: Kelly Parker  To:  Jose Luis Hancock MD  Sent: 6/8/2017 2:40 PM EDT  Subject: Medication Renewal Request    Original authorizing provider: MD Kelly Perez would like a refill of the following medications:  ALPRAZolam Cary Cull) 0.5 mg tablet Jose Luis Hancock MD]  butalbital-acetaminophen-caffeine (FIORICET, ESGIC) -40 mg per tablet Jose Luis Hancock MD]    Preferred pharmacy: [address] [place] [address] [address]    Comment:

## 2017-06-22 DIAGNOSIS — E87.6 HYPOKALEMIA: ICD-10-CM

## 2017-06-22 RX ORDER — POTASSIUM CHLORIDE 20 MEQ/1
TABLET, EXTENDED RELEASE ORAL
Qty: 30 TAB | Refills: 0 | Status: SHIPPED | OUTPATIENT
Start: 2017-06-22 | End: 2018-07-01 | Stop reason: SDUPTHER

## 2017-07-03 RX ORDER — BUTALBITAL, ACETAMINOPHEN AND CAFFEINE 50; 325; 40 MG/1; MG/1; MG/1
TABLET ORAL
Qty: 60 TAB | Refills: 0 | OUTPATIENT
Start: 2017-07-03 | End: 2017-07-27 | Stop reason: SDUPTHER

## 2017-07-03 RX ORDER — ALPRAZOLAM 0.5 MG/1
TABLET ORAL
Qty: 25 TAB | Refills: 0 | OUTPATIENT
Start: 2017-07-03 | End: 2017-08-10 | Stop reason: SDUPTHER

## 2017-07-03 NOTE — TELEPHONE ENCOUNTER
From: Rocael Browning  To: Babatunde Dalton MD  Sent: 7/3/2017 10:33 AM EDT  Subject: Medication Renewal Request    Original authorizing provider: Pegge Postin, MD Tyrus Aschoff.  Madison Constantino would like a refill of the following medications:  ALPRAZolam (XANAX) 0.5 mg tablet Babatunde Dalton MD]  butalbital-acetaminophen-caffeine (FIORICET, ESGIC) -40 mg per tablet Babatunde Dalton MD]    Preferred pharmacy: [address] [place] [address] [address] AT [address] [place] [address] [address]    Comment:

## 2017-07-27 RX ORDER — BUTALBITAL, ACETAMINOPHEN AND CAFFEINE 50; 325; 40 MG/1; MG/1; MG/1
TABLET ORAL
Qty: 60 TAB | Refills: 0 | OUTPATIENT
Start: 2017-07-27 | End: 2017-08-23 | Stop reason: SDUPTHER

## 2017-07-27 NOTE — TELEPHONE ENCOUNTER
From: Rocael Browning  To: Babatunde Dalton MD  Sent: 7/27/2017 7:28 AM EDT  Subject: Medication Renewal Request    Original authorizing provider: Pegge Postin, MD Tyrus Aschoff.  Madison Constantino would like a refill of the following medications:  butalbital-acetaminophen-caffeine (FIORICET, ESGIC) -40 mg per tablet Babatunde Dalton MD]    Preferred pharmacy: [address] [place] [address] [address] AT [address] [place] [address] [address]    Comment:

## 2017-08-10 RX ORDER — ALPRAZOLAM 0.5 MG/1
TABLET ORAL
Qty: 25 TAB | Refills: 0 | OUTPATIENT
Start: 2017-08-10 | End: 2017-09-11 | Stop reason: SDUPTHER

## 2017-08-10 NOTE — TELEPHONE ENCOUNTER
From: Tania Haynes  To: Sayda Robles MD  Sent: 8/10/2017 1:30 PM EDT  Subject: Medication Renewal Request    Original authorizing provider: MD Patricio Nesbitt.  Kandice Pierre would like a refill of the following medications:  ALPRAZolam (XANAX) 0.5 mg tablet Sayda Robles MD]    Preferred pharmacy: [address] [place] [address] [address] [place] [address] [address]    Comment:

## 2017-08-10 NOTE — TELEPHONE ENCOUNTER
Reviewed report generated by the [facility]. Does not demonstrate aberrancies or inconsistencies with regard to the prescribing of controlled medications to this patient by other providers. Last filled Xanax 7/3/2017 per .

## 2017-08-21 RX ORDER — VENLAFAXINE HYDROCHLORIDE 75 MG/1
CAPSULE, EXTENDED RELEASE ORAL
Qty: 270 CAP | Refills: 3 | Status: SHIPPED | OUTPATIENT
Start: 2017-08-21 | End: 2018-04-22 | Stop reason: ALTCHOICE

## 2017-08-23 RX ORDER — BUTALBITAL, ACETAMINOPHEN AND CAFFEINE 50; 325; 40 MG/1; MG/1; MG/1
TABLET ORAL
Qty: 60 TAB | Refills: 0 | OUTPATIENT
Start: 2017-08-23 | End: 2017-09-19 | Stop reason: SDUPTHER

## 2017-08-23 NOTE — TELEPHONE ENCOUNTER
From: Amanda Gaines  To: Brant Irby MD  Sent: 8/23/2017 8:00 AM EDT  Subject: Medication Renewal Request    Original authorizing provider: MD Angie Esqueda.  Mickie Escobar would like a refill of the following medications:  butalbital-acetaminophen-caffeine (FIORICET, ESGIC) -40 mg per tablet Brant Irby MD]    Preferred pharmacy: [address] [place] [address] [address] AT [address] [place] [address] RD [address]    Comment:

## 2017-09-12 RX ORDER — ALPRAZOLAM 0.5 MG/1
TABLET ORAL
Qty: 25 TAB | Refills: 0 | OUTPATIENT
Start: 2017-09-12 | End: 2017-10-16 | Stop reason: SDUPTHER

## 2017-09-12 NOTE — TELEPHONE ENCOUNTER
From: Sonja Ohara  To: Kristin Ibanez MD  Sent: 9/11/2017 9:13 PM EDT  Subject: Medication Renewal Request    Original authorizing provider: Kristin Ibanez MD    [facility].  Astrid Pilling would like a refill of the following medications:  ALPRAZolam (XANAX) 0.5 mg tablet Kristin Ibanez MD]    Preferred pharmacy: [address] [place] [address] [address] AT [address] [place] [address] [address]    Comment:

## 2017-09-12 NOTE — TELEPHONE ENCOUNTER
Reviewed report generated by the [facility]. Does not demonstrate aberrancies or inconsistencies with regard to the prescribing of controlled medications to this patient by other providers. Last filled Xanax 8/10/2017 per .

## 2017-09-19 RX ORDER — BUTALBITAL, ACETAMINOPHEN AND CAFFEINE 50; 325; 40 MG/1; MG/1; MG/1
TABLET ORAL
Qty: 60 TAB | Refills: 0 | OUTPATIENT
Start: 2017-09-19 | End: 2017-09-20 | Stop reason: SDUPTHER

## 2017-09-19 NOTE — TELEPHONE ENCOUNTER
From: Chelle Bourgeois  To: Ruiz Solitario MD  Sent: 9/19/2017 7:42 AM EDT  Subject: Medication Renewal Request    Original authorizing provider: MD Roger Tristan.  Anni Powell would like a refill of the following medications:  butalbital-acetaminophen-caffeine (FIORICET, ESGIC) -40 mg per tablet Ruiz Solitario MD]    Preferred pharmacy: [address] [place] [address] [address] AT [address] [place] [address] RD [address]    Comment:

## 2017-09-20 RX ORDER — BUTALBITAL, ACETAMINOPHEN AND CAFFEINE 50; 325; 40 MG/1; MG/1; MG/1
TABLET ORAL
Qty: 60 TAB | Refills: 0 | OUTPATIENT
Start: 2017-09-20 | End: 2017-10-16 | Stop reason: SDUPTHER

## 2017-10-12 ENCOUNTER — HOSPITAL ENCOUNTER (OUTPATIENT)
Dept: LAB | Age: 57
Discharge: HOME | End: 2017-10-12
Payer: COMMERCIAL

## 2017-10-12 DIAGNOSIS — E55.9 VITAMIN D DEFICIENCY: ICD-10-CM

## 2017-10-12 DIAGNOSIS — I10 ESSENTIAL HYPERTENSION: ICD-10-CM

## 2017-10-12 DIAGNOSIS — R79.89 ELEVATED LFTS: ICD-10-CM

## 2017-10-12 DIAGNOSIS — E78.5 HYPERLIPIDEMIA, UNSPECIFIED HYPERLIPIDEMIA TYPE: ICD-10-CM

## 2017-10-12 LAB
ALBUMIN SERPL-MCNC: 3.6 G/DL (ref 3.4–5)
ALBUMIN/GLOB SERPL: 1.2 {RATIO} (ref 0.8–1.7)
ALP SERPL-CCNC: 96 U/L (ref 45–117)
ALT SERPL-CCNC: 39 U/L (ref 13–56)
ANION GAP SERPL CALC-SCNC: 9 MMOL/L (ref 3–18)
APPEARANCE UR: CLEAR
AST SERPL-CCNC: 24 U/L (ref 15–37)
BACTERIA URNS QL MICRO: NEGATIVE /HPF
BASOPHILS # BLD: 0 K/UL (ref 0–0.06)
BASOPHILS NFR BLD: 0 % (ref 0–2)
BILIRUB SERPL-MCNC: 0.4 MG/DL (ref 0.2–1)
BILIRUB UR QL: NEGATIVE
BUN SERPL-MCNC: 24 MG/DL (ref 7–18)
BUN/CREAT SERPL: 50 (ref 12–20)
CALCIUM SERPL-MCNC: 9 MG/DL (ref 8.5–10.1)
CHLORIDE SERPL-SCNC: 104 MMOL/L (ref 100–108)
CHOLEST SERPL-MCNC: 199 MG/DL
CO2 SERPL-SCNC: 29 MMOL/L (ref 21–32)
COLOR UR: YELLOW
CREAT SERPL-MCNC: 0.48 MG/DL (ref 0.6–1.3)
DIFFERENTIAL METHOD BLD: ABNORMAL
EOSINOPHIL # BLD: 0.2 K/UL (ref 0–0.4)
EOSINOPHIL NFR BLD: 3 % (ref 0–5)
EPITH CASTS URNS QL MICRO: NORMAL /LPF (ref 0–5)
ERYTHROCYTE [DISTWIDTH] IN BLOOD BY AUTOMATED COUNT: 13.6 % (ref 11.6–14.5)
GLOBULIN SER CALC-MCNC: 3.1 G/DL (ref 2–4)
GLUCOSE SERPL-MCNC: 88 MG/DL (ref 74–99)
GLUCOSE UR STRIP.AUTO-MCNC: NEGATIVE MG/DL
HCT VFR BLD AUTO: 45.2 % (ref 35–45)
HDLC SERPL-MCNC: 52 MG/DL (ref 40–60)
HDLC SERPL: 3.8 {RATIO} (ref 0–5)
HGB BLD-MCNC: 14.9 G/DL (ref 12–16)
HGB UR QL STRIP: NEGATIVE
KETONES UR QL STRIP.AUTO: NEGATIVE MG/DL
LDLC SERPL CALC-MCNC: 123.2 MG/DL (ref 0–100)
LEUKOCYTE ESTERASE UR QL STRIP.AUTO: NEGATIVE
LIPID PROFILE,FLP: ABNORMAL
LYMPHOCYTES # BLD: 2.2 K/UL (ref 0.9–3.6)
LYMPHOCYTES NFR BLD: 33 % (ref 21–52)
MCH RBC QN AUTO: 30.4 PG (ref 24–34)
MCHC RBC AUTO-ENTMCNC: 33 G/DL (ref 31–37)
MCV RBC AUTO: 92.2 FL (ref 74–97)
MONOCYTES # BLD: 0.5 K/UL (ref 0.05–1.2)
MONOCYTES NFR BLD: 8 % (ref 3–10)
NEUTS SEG # BLD: 3.7 K/UL (ref 1.8–8)
NEUTS SEG NFR BLD: 56 % (ref 40–73)
NITRITE UR QL STRIP.AUTO: NEGATIVE
PH UR STRIP: 6.5 [PH] (ref 5–8)
PLATELET # BLD AUTO: 271 K/UL (ref 135–420)
PMV BLD AUTO: 10 FL (ref 9.2–11.8)
POTASSIUM SERPL-SCNC: 4.1 MMOL/L (ref 3.5–5.5)
PROT SERPL-MCNC: 6.7 G/DL (ref 6.4–8.2)
PROT UR STRIP-MCNC: NEGATIVE MG/DL
RBC # BLD AUTO: 4.9 M/UL (ref 4.2–5.3)
RBC #/AREA URNS HPF: NORMAL /HPF (ref 0–5)
SODIUM SERPL-SCNC: 142 MMOL/L (ref 136–145)
SP GR UR REFRACTOMETRY: 1.02 (ref 1–1.03)
TRIGL SERPL-MCNC: 119 MG/DL (ref ?–150)
TSH SERPL DL<=0.05 MIU/L-ACNC: 0.85 UIU/ML (ref 0.36–3.74)
UROBILINOGEN UR QL STRIP.AUTO: 1 EU/DL (ref 0.2–1)
VLDLC SERPL CALC-MCNC: 23.8 MG/DL
WBC # BLD AUTO: 6.7 K/UL (ref 4.6–13.2)
WBC URNS QL MICRO: NORMAL /HPF (ref 0–4)

## 2017-10-12 PROCEDURE — 82306 VITAMIN D 25 HYDROXY: CPT | Performed by: INTERNAL MEDICINE

## 2017-10-12 PROCEDURE — 84443 ASSAY THYROID STIM HORMONE: CPT | Performed by: INTERNAL MEDICINE

## 2017-10-12 PROCEDURE — 80053 COMPREHEN METABOLIC PANEL: CPT | Performed by: INTERNAL MEDICINE

## 2017-10-12 PROCEDURE — 80061 LIPID PANEL: CPT | Performed by: INTERNAL MEDICINE

## 2017-10-12 PROCEDURE — 85025 COMPLETE CBC W/AUTO DIFF WBC: CPT | Performed by: INTERNAL MEDICINE

## 2017-10-12 PROCEDURE — 36415 COLL VENOUS BLD VENIPUNCTURE: CPT | Performed by: INTERNAL MEDICINE

## 2017-10-12 PROCEDURE — 81001 URINALYSIS AUTO W/SCOPE: CPT | Performed by: INTERNAL MEDICINE

## 2017-10-13 LAB — 25(OH)D3 SERPL-MCNC: 40.9 NG/ML (ref 30–100)

## 2017-10-16 RX ORDER — ALPRAZOLAM 0.5 MG/1
TABLET ORAL
Qty: 25 TAB | Refills: 0 | OUTPATIENT
Start: 2017-10-16 | End: 2017-11-19 | Stop reason: SDUPTHER

## 2017-10-16 RX ORDER — BUTALBITAL, ACETAMINOPHEN AND CAFFEINE 50; 325; 40 MG/1; MG/1; MG/1
1 TABLET ORAL
Qty: 60 TAB | Refills: 0 | OUTPATIENT
Start: 2017-10-16 | End: 2017-11-13 | Stop reason: SDUPTHER

## 2017-10-16 NOTE — TELEPHONE ENCOUNTER
From: Saul Colon  To: Flores Whitfield MD  Sent: 10/16/2017 8:39 AM EDT  Subject: Medication Renewal Request    Original authorizing provider: MD Thong Philippe.  Paullidia Steven would like a refill of the following medications:  ALPRAZolam (XANAX) 0.5 mg tablet Flores Whitfield MD]  butalbital-acetaminophen-caffeine (FIORICET, ESGIC) -40 mg per tablet Flores Whitfield MD]    Preferred pharmacy: [address]  [address] [place] [address] [address]    Comment:

## 2017-10-19 ENCOUNTER — OFFICE VISIT (OUTPATIENT)
Dept: INTERNAL MEDICINE CLINIC | Age: 57
End: 2017-10-19

## 2017-10-19 VITALS
DIASTOLIC BLOOD PRESSURE: 82 MMHG | OXYGEN SATURATION: 99 % | BODY MASS INDEX: 22.88 KG/M2 | RESPIRATION RATE: 16 BRPM | SYSTOLIC BLOOD PRESSURE: 142 MMHG | TEMPERATURE: 98.3 F | WEIGHT: 134 LBS | HEIGHT: 64 IN | HEART RATE: 71 BPM

## 2017-10-19 DIAGNOSIS — R51.9 CHRONIC NONINTRACTABLE HEADACHE, UNSPECIFIED HEADACHE TYPE: ICD-10-CM

## 2017-10-19 DIAGNOSIS — E78.5 HYPERLIPIDEMIA, UNSPECIFIED HYPERLIPIDEMIA TYPE: ICD-10-CM

## 2017-10-19 DIAGNOSIS — M48.061 SPINAL STENOSIS OF LUMBAR REGION, UNSPECIFIED WHETHER NEUROGENIC CLAUDICATION PRESENT: ICD-10-CM

## 2017-10-19 DIAGNOSIS — G89.29 CHRONIC NONINTRACTABLE HEADACHE, UNSPECIFIED HEADACHE TYPE: ICD-10-CM

## 2017-10-19 DIAGNOSIS — I10 ESSENTIAL HYPERTENSION: ICD-10-CM

## 2017-10-19 DIAGNOSIS — M47.816 LUMBAR FACET ARTHROPATHY: ICD-10-CM

## 2017-10-19 DIAGNOSIS — Z00.01 ENCOUNTER FOR ROUTINE ADULT PHYSICAL EXAM WITH ABNORMAL FINDINGS: Primary | ICD-10-CM

## 2017-10-19 DIAGNOSIS — Z12.31 SCREENING MAMMOGRAM, ENCOUNTER FOR: ICD-10-CM

## 2017-10-19 DIAGNOSIS — R73.09 ABNORMAL GLUCOSE: ICD-10-CM

## 2017-10-19 DIAGNOSIS — G47.33 OBSTRUCTIVE SLEEP APNEA: ICD-10-CM

## 2017-10-19 DIAGNOSIS — R55 VASOMOTOR INSTABILITY: ICD-10-CM

## 2017-10-19 NOTE — MR AVS SNAPSHOT
Visit Information Date & Time Provider Department Dept. Phone Encounter #  
 10/19/2017  9:30 AM Misael Stewart MD [facility] [address] [place] [address] Road [phone] [id number] Follow-up Instructions Return in about 6 months (around 4/19/2018), or if symptoms worsen or fail to improve. Your Appointments 4/19/2018 10:30 AM  
Office Visit with Misael Stewart MD  
[facility]: 6 mo fu  
 [address], [address] [address] [address]  
  
   
 [address], [address] Upcoming Health Maintenance Date Due Pneumococcal 19-64 Medium Risk (1 of 1 - PPSV23) 12/1/1977 BREAST CANCER SCRN MAMMOGRAM 11/16/2018 PAP AKA CERVICAL CYTOLOGY 12/28/2019 COLONOSCOPY 4/17/2020 DTaP/Tdap/Td series (2 - Td) 10/7/2026 Allergies as of 10/19/2017  Review Complete On: 10/19/2017 By: Perez Moulton Severity Noted Reaction Type Reactions Other Plant, Animal, Environmental  09/07/2016    Itching, Sneezing \"Everything but feathers and horses. \" Current Immunizations  Reviewed on 10/7/2016 Name Date Influenza Vaccine 10/9/2017, 10/8/2014 TD Vaccine 1/1/2006 Tdap 10/7/2016  1:04 PM  
  
 Not reviewed this visit Vitals BP Pulse Temp Resp Height(growth percentile) Weight(growth percentile) 142/82 (BP 1 Location: Left arm, BP Patient Position: Sitting) 71 98.3 °F (36.8 °C) (Oral) 16 5' 4\" (1.626 m) 134 lb (60.8 kg) LMP SpO2 BMI OB Status Smoking Status 01/10/2012 99% 23 kg/m2 Postmenopausal Current Some Day Smoker Vitals History BMI and BSA Data Body Mass Index Body Surface Area  
 23 kg/m 2 1.66 m 2 Preferred Pharmacy Pharmacy Name Phone [phone] [address], [address] [facility] [address] AT [address] [phone] Your Updated Medication List  
  
   
 This list is accurate as of: 10/19/17 10:31 AM.  Always use your most recent med list.  
  
  
  
  
 ALPRAZolam 0.5 mg tablet Commonly known as:  XANAX  
TAKE 1 TABLET BY MOUTH THREE TIMES DAILY AS NEEDED FOR ANXIETY  
  
 amLODIPine 10 mg tablet Commonly known as:  Marta Harder Take 1 Tab by mouth daily. butalbital-acetaminophen-caffeine -40 mg per tablet Commonly known as:  Eric Harrison Take 1 Tab by mouth every six (6) hours as needed for Pain. Max Daily Amount: 4 Tabs. captopril 50 mg tablet Commonly known as:  CAPOTEN  
TAKE 1 TABLET BY MOUTH THREE TIMES DAILY Cholecalciferol (Vitamin D3) 2,000 unit Cap capsule Commonly known as:  VITAMIN D3 Take 2,000 Units by mouth daily. GLUCOSAMINE-CHONDR (BOSWELLIA) PO Take 500 mg by mouth daily. hydroCHLOROthiazide 25 mg tablet Commonly known as:  HYDRODIURIL  
TAKE 1 TABLET BY MOUTH EVERY DAY  
  
 ipratropium 0.03 % nasal spray Commonly known as:  ATROVENT  
2 Sprays by Both Nostrils route three (3) times daily. multivitamin tablet Commonly known as:  ONE A DAY Take 1 Tab by mouth daily. * OTHER Allergy shot weekly * OTHER Indications: boswella for arthritis  
  
 potassium chloride 20 mEq tablet Commonly known as:  K-DUR, KLOR-CON  
TAKE 2 TABLETS BY MOUTH EVERY DAY FOR 2 DAYS THEN TAKE 1 TABLET BY MOUTH DAILY  
  
 rosuvastatin 10 mg tablet Commonly known as:  CRESTOR Take 1 Tab by mouth nightly. Indications: hyperlipidemia TURMERIC ROOT EXTRACT PO Take 1,000 mg by mouth. venlafaxine-SR 75 mg capsule Commonly known as:  EFFEXOR-XR  
TAKE 3 CAPSULES BY MOUTH DAILY  
  
 VITAMIN B-1 100 mg tablet Generic drug:  thiamine Take 50 mg by mouth daily. * Notice: This list has 2 medication(s) that are the same as other medications prescribed for you. Read the directions carefully, and ask your doctor or other care provider to review them with you. Follow-up Instructions Return in about 6 months (around 4/19/2018), or if symptoms worsen or fail to improve. Patient Instructions Advance Directives: Care Instructions Your Care Instructions An advance directive is a legal way to state your wishes at the end of your life. It tells your family and your doctor what to do if you can no longer say what you want. There are two main types of advance directives. You can change them any time that your wishes change. · A living will tells your family and your doctor your wishes about life support and other treatment. · A durable power of  for health care lets you name a person to make treatment decisions for you when you can't speak for yourself. This person is called a health care agent. If you do not have an advance directive, decisions about your medical care may be made by a doctor or a  who doesn't know you. It may help to think of an advance directive as a gift to the people who care for you. If you have one, they won't have to make tough decisions by themselves. Follow-up care is a key part of your treatment and safety. Be sure to make and go to all appointments, and call your doctor if you are having problems. It's also a good idea to know your test results and keep a list of the medicines you take. How can you care for yourself at home? · Discuss your wishes with your loved ones and your doctor. This way, there are no surprises. · Many states have a unique form. Or you might use a universal form that has been approved by many states. This kind of form can sometimes be completed and stored online. Your electronic copy will then be available wherever you have a connection to the Internet. In most cases, doctors will respect your wishes even if you have a form from a different state. · You don't need a  to do an advance directive. But you may want to get legal advice. your wishes can make it easier for your loved ones. Making plans while you are still able may also ease your mind and make your final days less stressful and more meaningful. Follow-up care is a key part of your treatment and safety. Be sure to make and go to all appointments, and call your doctor if you are having problems. It's also a good idea to know your test results and keep a list of the medicines you take. What can you do to plan for the end of life? · You can bring these issues up with your doctor. You do not need to wait until your doctor starts the conversation. You might start with \"I would not be willing to live with . Kale Harper \" When you complete this sentence it helps your doctor understand your wishes. · Talk openly and honestly with your doctor. This is the best way to understand the decisions you will need to make as your health changes. Know that you can always change your mind. · Ask your doctor about commonly used life-support measures. These include tube feedings, breathing machines, and fluids given through a vein (IV). Understanding these treatments will help you decide whether you want them. · You may choose to have these life-supporting treatments for a limited time. This allows a trial period to see whether they will help you. You may also decide that you want your doctor to take only certain measures to keep you alive. It is important to spell out these conditions so that your doctor and family understand them. · Talk to your doctor about how long you are likely to live. He or she may be able to give you an idea of what usually happens with your specific illness. · Think about preparing papers that state your wishes. This way there will not be any confusion about what you want. You can change your instructions at any time. Which papers should you prepare?  
Advance directives are legal papers that tell doctors how you want to be cared for at the end of your life. You do not need a  to write these papers. Ask your doctor or your [facility] department for information on how to write your advance directives. They may have the forms for each of these types of papers. Make sure your doctor has a copy of these on file, and give a copy to a family member or close friend. · Consider a do-not-resuscitate order (DNR). This order asks that no extra treatments be done if your heart stops or you stop breathing. Extra treatments may include cardiopulmonary resuscitation (CPR), electrical shock to restart your heart, or a machine to breathe for you. If you decide to have a DNR order, ask your doctor to explain and write it. Place the order in your home where everyone can easily see it. · Consider a living will. A living will explains your wishes about life support and other treatments at the end of your life if you become unable to speak for yourself. Living liu tell doctors to use or not use treatments that would keep you alive. You must have one or two witnesses or a notary present when you sign this form. · Consider a durable power of  for health care. This allows you to name a person to make decisions about your care if you are not able to. Most people ask a close friend or family member. Talk to this person about the kinds of treatments you want and those that you do not want. Make sure this person understands your wishes. These legal papers are simple to change. Tell your doctor what you want to change, and ask him or her to make a note in your medical file. Give your family updated copies of the papers. Where can you learn more? Go to http://pooja-real.info/. Enter P184 in the search box to learn more about \"Advance Care Planning: Care Instructions. \" Current as of: November 17, 2016 Content Version: 11.3 © 6354-6831 [employer].  Care instructions adapted under license by [address] (which disclaims liability or warranty for this information). If you have questions about a medical condition or this instruction, always ask your healthcare professional. [address] any warranty or liability for your use of this information. Neck: Exercises Your Care Instructions Here are some examples of typical rehabilitation exercises for your condition. Start each exercise slowly. Ease off the exercise if you start to have pain. Your doctor or physical therapist will tell you when you can start these exercises and which ones will work best for you. How to do the exercises Note: Stretching should make you feel a gentle stretch, but no pain. Stop any strengthening exercise that makes pain worse. Neck stretch 1. This stretch works best if you keep your shoulder down as you lean away from it. To help you remember to do this, start by relaxing your shoulders and lightly holding on to your thighs or your chair. 2. Tilt your head toward your shoulder and hold for 15 to 30 seconds. Let the weight of your head stretch your muscles. 3. If you would like a little added stretch, use your hand to gently and steadily pull your head toward your shoulder. For example, keeping your right shoulder down, lean your head to the left. 4. Repeat 2 to 4 times toward each shoulder. Diagonal neck stretch 1. Turn your head slightly toward the direction you will be stretching, and tilt your head diagonally toward your chest and hold for 15 to 30 seconds. 2. If you would like a little added stretch, use your hand to gently and steadily pull your head forward on the diagonal. 
3. Repeat 2 to 4 times toward each side. Dorsal glide stretch 1. Sit or stand tall and look straight ahead. 2. Slowly tuck your chin as you glide your head backward over your body 3. Hold for a count of 6, and then relax for up to 10 seconds. 4. Repeat 8 to 12 times. Note: The dorsal glide stretches the back of the neck. If you feel pain, do not glide so far back. Some people find this exercise easier to do while lying on their backs with an ice pack on the neck. Chest and shoulder stretch 1. Sit or stand tall and glide your head backward as in the dorsal glide stretch. 2. Raise both arms so that your hands are next to your ears. 3. Take a deep breath, and as you breathe out, lower your elbows down and behind your back. You will feel your shoulder blades slide down and together, and at the same time you will feel a stretch across your chest and the front of your shoulders. 4. Hold for about 6 seconds, and then relax for up to 10 seconds. 5. Repeat 8 to 12 times. Strengthening: Hands on head 1. Move your head backward, forward, and side to side against gentle pressure from your hands, holding each position for about 6 seconds. 2. Repeat 8 to 12 times. Follow-up care is a key part of your treatment and safety. Be sure to make and go to all appointments, and call your doctor if you are having problems. It's also a good idea to know your test results and keep a list of the medicines you take. Where can you learn more? Go to http://pooja-real.info/. Enter P975 in the search box to learn more about \"Neck: Exercises. \" Current as of: March 21, 2017 Content Version: 11.3 © 6413-7057 [employer]. Care instructions adapted under license by [employer] (which disclaims liability or warranty for this information). If you have questions about a medical condition or this instruction, always ask your healthcare professional. [place] [address] any warranty or liability for your use of this information. Neck Pain: Care Instructions Your Care Instructions You can have neck pain anywhere from the bottom of your head to the top of your shoulders. It can spread to the upper back or arms.  Injuries, painting a ceiling, sleeping with your neck twisted, staying in one position for too long, and many other activities can cause neck pain. Most neck pain gets better with home care. Your doctor may recommend medicine to relieve pain or relax your muscles. He or she may suggest exercise and physical therapy to increase flexibility and relieve stress. You may need to wear a special (cervical) collar to support your neck for a day or two. Follow-up care is a key part of your treatment and safety. Be sure to make and go to all appointments, and call your doctor if you are having problems. It's also a good idea to know your test results and keep a list of the medicines you take. How can you care for yourself at home? · Try using a heating pad on a low or medium setting for 15 to 20 minutes every 2 or 3 hours. Try a warm shower in place of one session with the heating pad. · You can also try an ice pack for 10 to 15 minutes every 2 to 3 hours. Put a thin cloth between the ice and your skin. · Take pain medicines exactly as directed. ¨ If the doctor gave you a prescription medicine for pain, take it as prescribed. ¨ If you are not taking a prescription pain medicine, ask your doctor if you can take an over-the-counter medicine. · If your doctor recommends a cervical collar, wear it exactly as directed. When should you call for help? Call your doctor now or seek immediate medical care if: 
· You have new or worsening numbness in your arms, buttocks or legs. · You have new or worsening weakness in your arms or legs. (This could make it hard to stand up.) · You lose control of your bladder or bowels. Watch closely for changes in your health, and be sure to contact your doctor if: 
· Your neck pain is getting worse. · You are not getting better after 1 week. · You do not get better as expected. Where can you learn more? Go to http://pooja-real.info/. Enter [id number] in the search box to learn more about \"Neck Pain: Care Instructions. \" Current as of: March 21, 2017 Content Version: 11.3 © 1500-1207 [employer]. Care instructions adapted under license by [employer] (which disclaims liability or warranty for this information). If you have questions about a medical condition or this instruction, always ask your healthcare professional. [place] [address] any warranty or liability for your use of this information. Introducing [facility]! Dear Pricila Vazquez: 
Thank you for requesting a [employer] account. Our records indicate that you already have an active [employer] account. You can access your account anytime at [email or web address] Did you know that you can access your hospital and ER discharge instructions at any time in [employer]? You can also review all of your test results from your hospital stay or ER visit. Additional Information If you have questions, please visit the Frequently Asked Questions section of the [employer] website at [email or web address] Remember, [employer] is NOT to be used for urgent needs. For medical emergencies, dial 911. Now available from your iPhone and Android! Please provide this summary of care documentation to your next provider. Your primary care clinician is listed as Tanna Reynaga. If you have any questions after today's visit, please call [phone].

## 2017-10-19 NOTE — PROGRESS NOTES
Chief Complaint   Patient presents with    Hypertension     6 month follow up with lab review. Health Maintenance Due   Topic Date Due    Pneumococcal 19-64 Medium Risk (1 of 1 - PPSV23) 12/01/1979     1. Have you been to the ER, urgent care clinic or hospitalized since your last visit? YES. Patient states she went to [facility] due to he equilibrium being off about 3 weeks ago. 2. Have you seen or consulted any other health care providers outside of the [facility] since your last visit (Include any pap smears or colon screening)? NO      Do you have an Advanced Directive? NO    Would you like information on Advanced Directives?  YES

## 2017-10-19 NOTE — PATIENT INSTRUCTIONS
Advance Directives: Care Instructions  Your Care Instructions  An advance directive is a legal way to state your wishes at the end of your life. It tells your family and your doctor what to do if you can no longer say what you want. There are two main types of advance directives. You can change them any time that your wishes change. · A living will tells your family and your doctor your wishes about life support and other treatment. · A durable power of  for health care lets you name a person to make treatment decisions for you when you can't speak for yourself. This person is called a health care agent. If you do not have an advance directive, decisions about your medical care may be made by a doctor or a  who doesn't know you. It may help to think of an advance directive as a gift to the people who care for you. If you have one, they won't have to make tough decisions by themselves. Follow-up care is a key part of your treatment and safety. Be sure to make and go to all appointments, and call your doctor if you are having problems. It's also a good idea to know your test results and keep a list of the medicines you take. How can you care for yourself at home? · Discuss your wishes with your loved ones and your doctor. This way, there are no surprises. · Many states have a unique form. Or you might use a universal form that has been approved by many states. This kind of form can sometimes be completed and stored online. Your electronic copy will then be available wherever you have a connection to the Internet. In most cases, doctors will respect your wishes even if you have a form from a different state. · You don't need a  to do an advance directive. But you may want to get legal advice. · Think about these questions when you prepare an advance directive:  ¨ Who do you want to make decisions about your medical care if you are not able to?  Many people choose a family member or close friend. ¨ Do you know enough about life support methods that might be used? If not, talk to your doctor so you understand. ¨ What are you most afraid of that might happen? You might be afraid of having pain, losing your independence, or being kept alive by machines. ¨ Where would you prefer to die? Choices include your home, a hospital, or a nursing home. ¨ Would you like to have information about hospice care to support you and your family? ¨ Do you want to donate organs when you die? ¨ Do you want certain [employer] practices performed before you die? If so, put your wishes in the advance directive. · Read your advance directive every year, and make changes as needed. When should you call for help? Be sure to contact your doctor if you have any questions. Where can you learn more? Go to http://pooja-real.info/. Enter R264 in the search box to learn more about \"Advance Directives: Care Instructions. \"  Current as of: November 17, 2016  Content Version: 11.3  © 3139-0892 [employer]. Care instructions adapted under license by [email or web address] (which disclaims liability or warranty for this information). If you have questions about a medical condition or this instruction, always ask your healthcare professional. [address] any warranty or liability for your use of this information. Advance Care Planning: Care Instructions  Your Care Instructions  It can be hard to live with an illness that cannot be cured. But if your health is getting worse, you may want to make decisions about end-of-life care. Planning for the end of your life does not mean that you are giving up. It is a way to make sure that your wishes are met. Clearly stating your wishes can make it easier for your loved ones. Making plans while you are still able may also ease your mind and make your final days less stressful and more meaningful.   Follow-up care is a key part of your treatment and safety. Be sure to make and go to all appointments, and call your doctor if you are having problems. It's also a good idea to know your test results and keep a list of the medicines you take. What can you do to plan for the end of life? · You can bring these issues up with your doctor. You do not need to wait until your doctor starts the conversation. You might start with \"I would not be willing to live with . Geovany Graves \" When you complete this sentence it helps your doctor understand your wishes. · Talk openly and honestly with your doctor. This is the best way to understand the decisions you will need to make as your health changes. Know that you can always change your mind. · Ask your doctor about commonly used life-support measures. These include tube feedings, breathing machines, and fluids given through a vein (IV). Understanding these treatments will help you decide whether you want them. · You may choose to have these life-supporting treatments for a limited time. This allows a trial period to see whether they will help you. You may also decide that you want your doctor to take only certain measures to keep you alive. It is important to spell out these conditions so that your doctor and family understand them. · Talk to your doctor about how long you are likely to live. He or she may be able to give you an idea of what usually happens with your specific illness. · Think about preparing papers that state your wishes. This way there will not be any confusion about what you want. You can change your instructions at any time. Which papers should you prepare? Advance directives are legal papers that tell doctors how you want to be cared for at the end of your life. You do not need a  to write these papers. Ask your doctor or your state health department for information on how to write your advance directives. They may have the forms for each of these types of papers.  Make sure your doctor has a copy of these on file, and give a copy to a family member or close friend. · Consider a do-not-resuscitate order (DNR). This order asks that no extra treatments be done if your heart stops or you stop breathing. Extra treatments may include cardiopulmonary resuscitation (CPR), electrical shock to restart your heart, or a machine to breathe for you. If you decide to have a DNR order, ask your doctor to explain and write it. Place the order in your home where everyone can easily see it. · Consider a living will. A living will explains your wishes about life support and other treatments at the end of your life if you become unable to speak for yourself. Living liu tell doctors to use or not use treatments that would keep you alive. You must have one or two witnesses or a notary present when you sign this form. · Consider a durable power of  for health care. This allows you to name a person to make decisions about your care if you are not able to. Most people ask a close friend or family member. Talk to this person about the kinds of treatments you want and those that you do not want. Make sure this person understands your wishes. These legal papers are simple to change. Tell your doctor what you want to change, and ask him or her to make a note in your medical file. Give your family updated copies of the papers. Where can you learn more? Go to http://pooja-real.info/. Enter P184 in the search box to learn more about \"Advance Care Planning: Care Instructions. \"  Current as of: November 17, 2016  Content Version: 11.3  © 4763-0167 [employer]. Care instructions adapted under license by [employer] (which disclaims liability or warranty for this information).  If you have questions about a medical condition or this instruction, always ask your healthcare professional. [address] any warranty or liability for your use of this information. Neck: Exercises  Your Care Instructions  Here are some examples of typical rehabilitation exercises for your condition. Start each exercise slowly. Ease off the exercise if you start to have pain. Your doctor or physical therapist will tell you when you can start these exercises and which ones will work best for you. How to do the exercises  Note: Stretching should make you feel a gentle stretch, but no pain. Stop any strengthening exercise that makes pain worse. Neck stretch    1. This stretch works best if you keep your shoulder down as you lean away from it. To help you remember to do this, start by relaxing your shoulders and lightly holding on to your thighs or your chair. 2. Tilt your head toward your shoulder and hold for 15 to 30 seconds. Let the weight of your head stretch your muscles. 3. If you would like a little added stretch, use your hand to gently and steadily pull your head toward your shoulder. For example, keeping your right shoulder down, lean your head to the left. 4. Repeat 2 to 4 times toward each shoulder. Diagonal neck stretch    1. Turn your head slightly toward the direction you will be stretching, and tilt your head diagonally toward your chest and hold for 15 to 30 seconds. 2. If you would like a little added stretch, use your hand to gently and steadily pull your head forward on the diagonal.  3. Repeat 2 to 4 times toward each side. Dorsal glide stretch    1. Sit or stand tall and look straight ahead. 2. Slowly tuck your chin as you glide your head backward over your body  3. Hold for a count of 6, and then relax for up to 10 seconds. 4. Repeat 8 to 12 times. Note: The dorsal glide stretches the back of the neck. If you feel pain, do not glide so far back. Some people find this exercise easier to do while lying on their backs with an ice pack on the neck. Chest and shoulder stretch    1.  Sit or stand tall and glide your head backward as in the dorsal glide stretch. 2. Raise both arms so that your hands are next to your ears. 3. Take a deep breath, and as you breathe out, lower your elbows down and behind your back. You will feel your shoulder blades slide down and together, and at the same time you will feel a stretch across your chest and the front of your shoulders. 4. Hold for about 6 seconds, and then relax for up to 10 seconds. 5. Repeat 8 to 12 times. Strengthening: Hands on head    1. Move your head backward, forward, and side to side against gentle pressure from your hands, holding each position for about 6 seconds. 2. Repeat 8 to 12 times. Follow-up care is a key part of your treatment and safety. Be sure to make and go to all appointments, and call your doctor if you are having problems. It's also a good idea to know your test results and keep a list of the medicines you take. Where can you learn more? Go to http://pooja-real.info/. Enter P975 in the search box to learn more about \"Neck: Exercises. \"  Current as of: March 21, 2017  Content Version: 11.3  © 5232-6922 [employer]. Care instructions adapted under license by [employer] (which disclaims liability or warranty for this information). If you have questions about a medical condition or this instruction, always ask your healthcare professional. [address] any warranty or liability for your use of this information. Neck Pain: Care Instructions  Your Care Instructions  You can have neck pain anywhere from the bottom of your head to the top of your shoulders. It can spread to the upper back or arms. Injuries, painting a ceiling, sleeping with your neck twisted, staying in one position for too long, and many other activities can cause neck pain. Most neck pain gets better with home care. Your doctor may recommend medicine to relieve pain or relax your muscles.  He or she may suggest exercise and physical therapy to increase flexibility and relieve stress. You may need to wear a special (cervical) collar to support your neck for a day or two. Follow-up care is a key part of your treatment and safety. Be sure to make and go to all appointments, and call your doctor if you are having problems. It's also a good idea to know your test results and keep a list of the medicines you take. How can you care for yourself at home? · Try using a heating pad on a low or medium setting for 15 to 20 minutes every 2 or 3 hours. Try a warm shower in place of one session with the heating pad. · You can also try an ice pack for 10 to 15 minutes every 2 to 3 hours. Put a thin cloth between the ice and your skin. · Take pain medicines exactly as directed. ¨ If the doctor gave you a prescription medicine for pain, take it as prescribed. ¨ If you are not taking a prescription pain medicine, ask your doctor if you can take an over-the-counter medicine. · If your doctor recommends a cervical collar, wear it exactly as directed. When should you call for help? Call your doctor now or seek immediate medical care if:  · You have new or worsening numbness in your arms, buttocks or legs. · You have new or worsening weakness in your arms or legs. (This could make it hard to stand up.)  · You lose control of your bladder or bowels. Watch closely for changes in your health, and be sure to contact your doctor if:  · Your neck pain is getting worse. · You are not getting better after 1 week. · You do not get better as expected. Where can you learn more? Go to http://pooja-real.info/. Enter [id number] in the search box to learn more about \"Neck Pain: Care Instructions. \"  Current as of: March 21, 2017  Content Version: 11.3  © 8683-2048 [employer]. Care instructions adapted under license by [employer] (which disclaims liability or warranty for this information).  If you have questions about a medical condition or this instruction, always ask your healthcare professional. [place] [address] any warranty or liability for your use of this information.

## 2017-10-22 PROBLEM — R79.89 ELEVATED LFTS: Status: RESOLVED | Noted: 2017-04-16 | Resolved: 2017-10-22

## 2017-10-22 NOTE — PROGRESS NOTES
HPI:   Tanisha Bertrand is a 60y.o. year old female who presents today for evaluation of hypertension, hyperlipidemia, obstructive sleep apnea, allergic rhinitis, vasomotor instability, and irritable bowel syndrome. She reports that she is doing well and is currently without complaints. She was evaluated by Dr. Иван Chow in 4/2017 for recurrent abdominal pain, and abdominal and pelvic CT scan was obtained (4/28/2017) showing mild nonspecific thickening of the proximal jejunum and significant improvement in the distal transverse colon adjacent to the splenic flexure with a short segment with mild wall thickening. Her discomfort resolved, but concern was raised for possible ischemia as cause. An echocardiogram was obtained (5/26/2017) showing normal LV function (EF 65%), no RWMA, grade 1 diastolic dysfunction, and wall thickening upper limits of normal. She also underwent a hypercoaguable laboratory workup, which was negative. She reports today no further abdominal pain and is otherwise feeling well. On 8/2/2016, she was evaluated by PETERSON Ahmadi for left sided crampy abdominal pain, diarrhea, and hematochezia. She was treated for presumed diverticulitis with Cipro and Flagyl, and underwent evaluation showing WBC 11.2, ALT 59, alkaline phosphatase 121, and lipase 427. Abdominal CT scan (8/2/2016) showed localized bowel wall thickening with inflammatory change involving the splenic flexure and proximal descending colon; no colonic diverticuli are seen so the presence of diverticulitis is unlikely; would be a typical location for bowel ischemia, but the patient's vascular structures are essentially normal so bowel ischemia unlikely; favor infectious or inflammatory colitis. She was evaluated by Dr. Иван Chow and admitted for bowel rest, IVF, and IV antibiotics. She improved significantly in 36 hours and was discharged home.  She completed the course of oral antibiotics and states that her symptoms have completely resolved. She denies any abdominal pain, nausea, vomiting, melena, hematochezia, or change in bowel movements. She underwent screening colonoscopy by Dr. Kerry Mcmahon in 6/2011, which found a polyp in the proximal descending colon (pathology: hyperplastic). Because she could not intubate the cecum, follow-up was recommended for 2 years. In 4/2015, she underwent repeat colonoscopy with Dr. Genevieve Starr, which showed left diverticular disease, hypertrophied anal papilla, and no polyps. Follow-up was recommended for 5 years. She has a history of hypertension, treated with amlodipine, hydrochlorothiazide, and captopril. She has resumed exercising and she purchased an elliptical machine for her home. She denies any chest pain, shortness of breath at rest or with exertion, palpitations, lightheadedness, or edema. She also has hyperlipidemia, treated with fenofibrate and fish oil in the past, but these were discontinued and she was changed to rosuvastatin in 6/2016. However, due to mild to moderate elevation in AST/ALT, it was discontinued in 5/2017 with resolution of abnormal liver function tests. She has a history of mild-severe obstructive sleep apnea, diagnosed by Dr. Ani Lockhart, and was prescribed nasal CPAP. She states that she is no longer using her machine because it was making her headaches worse. She suffers from chronic headaches, considered to be migraines in the past as they were associated with menstruation. Since she entered menopause, the severity of her headaches has decreased and the character has changed. She no longer considers them to be due to migraines, but rather they are secondary to allergies. She has severe allergic rhinitis, and awakens with morning headaches when her allergies are severe. She is followed by Dr. Kenia White and has been receiving allergy shots with some improvement. She also uses ipratropium nasal spray, fexofenadine, and Fioricet as needed.  She denies aura or visual changes associated with the headache. Denies cough or wheezing. She also has vasomotor instability, which has responded to venlafaxine. She attempted to wean from taking venlafaxine, but resumed due to life stressors. She states that she will attempt again in the near future. In 2016, she presented with complaints of right sciatica and she underwent a lumbar MRI (2016) showing multilevel mild disc bulges, mild facet arthropathy, and multilevel mild foraminal stenosis; L3-L4 with mild to moderate spinal canal stenosis, no more than mild spinal canal stenosis elsewhere, and no cord compression. She was evaluated by Dr. Abdifatah Rich on 2016 and started on gabapentin. She also was referred to physical therapy and has been undergoing dry needling, which she reports has resulted in significant improvement. She reports today that she is no longer experiencing any pain in her back or right leg. She states that she is no longer taking the gabapentin as she did not find it to be helpful. She also has a history of an anterior cervical discectomy and fusion (C5-C6) in 2011 by Dr. Dakota Adrian for a herniated disc and right radiculopathy. She reports that her bilateral hand numbness has improved since her last visit. Past Medical History:   Diagnosis Date    Allergic rhinitis     Chronic headaches     Hx of colonic polyp 2011    Hyperplastic; Dr. Eve Castro.     Hyperlipidemia     Hypertension     IBS (irritable bowel syndrome)     Obstructive sleep apnea     Moderate-severe; not using nasal CPAP    S/P cervical discectomy 2011    Anterior C5-C6 discectomy for herniated disc and right radiculopathy; Dr. Dakota Adrian     Past Surgical History:   Procedure Laterality Date    ENDOSCOPY, COLON, DIAGNOSTIC      HX BACK SURGERY  11    neck surgery; ruptured cervical discs    HX COLONOSCOPY  2011    HX GYN      dilation and curretage    HX GYN           Current Outpatient Prescriptions   Medication Sig    OTHER Indications: boswella for arthritis    ALPRAZolam (XANAX) 0.5 mg tablet TAKE 1 TABLET BY MOUTH THREE TIMES DAILY AS NEEDED FOR ANXIETY    butalbital-acetaminophen-caffeine (FIORICET, ESGIC) -40 mg per tablet Take 1 Tab by mouth every six (6) hours as needed for Pain. Max Daily Amount: 4 Tabs.  venlafaxine-SR (EFFEXOR-XR) 75 mg capsule TAKE 3 CAPSULES BY MOUTH DAILY    potassium chloride (K-DUR, KLOR-CON) 20 mEq tablet TAKE 2 TABLETS BY MOUTH EVERY DAY FOR 2 DAYS THEN TAKE 1 TABLET BY MOUTH DAILY    captopril (CAPOTEN) 50 mg tablet TAKE 1 TABLET BY MOUTH THREE TIMES DAILY    hydroCHLOROthiazide (HYDRODIURIL) 25 mg tablet TAKE 1 TABLET BY MOUTH EVERY DAY    TURMERIC ROOT EXTRACT PO Take 1,000 mg by mouth.  amLODIPine (NORVASC) 10 mg tablet Take 1 Tab by mouth daily.  ipratropium (ATROVENT) 0.03 % nasal spray 2 Sprays by Both Nostrils route three (3) times daily.  thiamine (VITAMIN B-1) 100 mg tablet Take 50 mg by mouth daily.  multivitamin (ONE A DAY) tablet Take 1 Tab by mouth daily.  Cholecalciferol, Vitamin D3, 2,000 unit cap Take 2,000 Units by mouth daily.  GLUC/CHONDR-[facility]/C/DEENA/BORON (GLUCOSAMINE-CHONDR, BOSWELLIA, PO) Take 500 mg by mouth daily.  rosuvastatin (CRESTOR) 10 mg tablet Take 1 Tab by mouth nightly. Indications: hyperlipidemia    OTHER Allergy shot weekly     No current facility-administered medications for this visit. Allergies and Intolerances: Allergies   Allergen Reactions    Other Plant, Animal, Environmental Itching and Sneezing     \"Everything but feathers and horses. \"        Family History: Her mother had hypertension, but  from a melanoma. Her father  from a DVT. Her brother has CAD and underwent CABG at the age of 40.    Family History   Problem Relation Age of Onset    Heart Disease Brother     Other Mother      melanoma    Arthritis-osteo Mother     Cancer Mother     Bleeding Prob Father     Heart Disease Father     Breast Cancer Other      grandmother    Stroke Other      family history     Social History:   She  reports that she has been smoking. She has been smoking about 0.50 packs per day. She has never used smokeless tobacco. She reports that she smoked 0.5 ppd for 30 years, stopping in 2011. She is  and lives with her . She recently retired from the [employer] department of [id number]; her  sells eyeglass frames to [employer]. History   Alcohol Use    0.0 oz/week    0 Standard drinks or equivalent per week     Comment: Very Rarely     Immunization History:  Immunization History   Administered Date(s) Administered    Influenza Vaccine 10/08/2014, 10/09/2017    TD Vaccine 01/01/2006    Tdap 10/07/2016       Review of Systems:   As above included in HPI. Otherwise 11 point review of systems negative including constitutional, skin, HENT, eyes, respiratory, cardiovascular, gastrointestinal, genitourinary, musculoskeletal, endo/heme/aller, neurological.    Physical:   Vitals:   BP: 142/82   HR: 71  WT: 134 lb (60.8 kg)  BMI:  23.00 kg/m2    Exam: Patient appears in no apparent distress. Affect is appropriate. HEENT --Anicteric sclerae, tympanic membranes normal,  ear canals normal.  PERRL, EOMI, conjunctiva and lids normal.   Sinuses were nontender, turbinates normal, hearing normal.  Oropharynx without  erythema, normal tongue, oral mucosa and tonsils. No cervical lymphadenopathy. No thyromegaly, JVD, or bruits. Carotid pulses 2+ with normal upstroke. Lungs --Clear to auscultation. No wheezing or rales. Heart --Regular rate and rhythm, no murmurs, rubs, gallops, or clicks. Chest wall --Nontender to palpation. PMI normal.  Abdomen -- Soft and nontender, no hepatosplenomegaly or masses. Extremities -- Without cyanosis, clubbing, edema. 2+ pulses equally and bilaterally.   Normal looking digits, ROM intact  Neuro -- CN 2-12 intact, strength 5/5 with intact soft touch in all extremities  Derm  no obvious abnormalities noted, no rash    Review of Data:  Labs:  Hospital Outpatient Visit on 10/12/2017   Component Date Value Ref Range Status    WBC 10/12/2017 6.7  4.6 - 13.2 K/uL Final    RBC 10/12/2017 4.90  4.20 - 5.30 M/uL Final    HGB 10/12/2017 14.9  12.0 - 16.0 g/dL Final    HCT 10/12/2017 45.2* 35.0 - 45.0 % Final    MCV 10/12/2017 92.2  74.0 - 97.0 FL Final    MCH 10/12/2017 30.4  24.0 - 34.0 PG Final    MCHC 10/12/2017 33.0  31.0 - 37.0 g/dL Final    RDW 10/12/2017 13.6  11.6 - 14.5 % Final    PLATELET 35/37/2655 221  135 - 420 K/uL Final    MPV 10/12/2017 10.0  9.2 - 11.8 FL Final    NEUTROPHILS 10/12/2017 56  40 - 73 % Final    LYMPHOCYTES 10/12/2017 33  21 - 52 % Final    MONOCYTES 10/12/2017 8  3 - 10 % Final    EOSINOPHILS 10/12/2017 3  0 - 5 % Final    BASOPHILS 10/12/2017 0  0 - 2 % Final    ABS. NEUTROPHILS 10/12/2017 3.7  1.8 - 8.0 K/UL Final    ABS. LYMPHOCYTES 10/12/2017 2.2  0.9 - 3.6 K/UL Final    ABS. MONOCYTES 10/12/2017 0.5  0.05 - 1.2 K/UL Final    ABS. EOSINOPHILS 10/12/2017 0.2  0.0 - 0.4 K/UL Final    ABS.  BASOPHILS 10/12/2017 0.0  0.0 - 0.06 K/UL Final    DF 10/12/2017 AUTOMATED    Final    LIPID PROFILE 10/12/2017        Final    Cholesterol, total 10/12/2017 199  <200 MG/DL Final    Triglyceride 10/12/2017 119  <150 MG/DL Final    HDL Cholesterol 10/12/2017 52  40 - 60 MG/DL Final    LDL, calculated 10/12/2017 123.2* 0 - 100 MG/DL Final    VLDL, calculated 10/12/2017 23.8  MG/DL Final    CHOL/HDL Ratio 10/12/2017 3.8  0 - 5.0   Final    Sodium 10/12/2017 142  136 - 145 mmol/L Final    Potassium 10/12/2017 4.1  3.5 - 5.5 mmol/L Final    Chloride 10/12/2017 104  100 - 108 mmol/L Final    CO2 10/12/2017 29  21 - 32 mmol/L Final    Anion gap 10/12/2017 9  3.0 - 18 mmol/L Final    Glucose 10/12/2017 88  74 - 99 mg/dL Final    BUN 10/12/2017 24* 7.0 - 18 MG/DL Final    Creatinine 10/12/2017 0.48* 0.6 - 1.3 MG/DL Final    BUN/Creatinine ratio 10/12/2017 50* 12 - 20   Final    GFR est AA 10/12/2017 >60  >60 ml/min/1.73m2 Final    GFR est non-AA 10/12/2017 >60  >60 ml/min/1.73m2 Final    Calcium 10/12/2017 9.0  8.5 - 10.1 MG/DL Final    Bilirubin, total 10/12/2017 0.4  0.2 - 1.0 MG/DL Final    ALT (SGPT) 10/12/2017 39  13 - 56 U/L Final    AST (SGOT) 10/12/2017 24  15 - 37 U/L Final    Alk. phosphatase 10/12/2017 96  45 - 117 U/L Final    Protein, total 10/12/2017 6.7  6.4 - 8.2 g/dL Final    Albumin 10/12/2017 3.6  3.4 - 5.0 g/dL Final    Globulin 10/12/2017 3.1  2.0 - 4.0 g/dL Final    A-G Ratio 10/12/2017 1.2  0.8 - 1.7   Final    Color 10/12/2017 YELLOW    Final    Appearance 10/12/2017 CLEAR    Final    Specific gravity 10/12/2017 1.024  1.005 - 1.030   Final    pH (UA) 10/12/2017 6.5  5.0 - 8.0   Final    Protein 10/12/2017 NEGATIVE   NEG mg/dL Final    Glucose 10/12/2017 NEGATIVE   NEG mg/dL Final    Ketone 10/12/2017 NEGATIVE   NEG mg/dL Final    Bilirubin 10/12/2017 NEGATIVE   NEG   Final    Blood 10/12/2017 NEGATIVE   NEG   Final    Urobilinogen 10/12/2017 1.0  0.2 - 1.0 EU/dL Final    Nitrites 10/12/2017 NEGATIVE   NEG   Final    Leukocyte Esterase 10/12/2017 NEGATIVE   NEG   Final    WBC 10/12/2017 NONE  0 - 4 /hpf Final    RBC 10/12/2017 NONE  0 - 5 /hpf Final    Epithelial cells 10/12/2017 FEW  0 - 5 /lpf Final    Bacteria 10/12/2017 NEGATIVE   NEG /hpf Final    TSH 10/12/2017 0.85  0.36 - 3.74 uIU/mL Final    Vitamin D 25-Hydroxy 10/12/2017 40.9  30 - 100 ng/mL Final     Calculated 10 year ASCVD risk score: 4.5 %    Health Maintenance:  Screening:    Mammogram: negative (11/2016)   PAP smear: well women exams by Dr. Cammy Duke (last 12/2016)   Colorectal: colonoscopy (4/2015) no polyps. Dr. Pepper Nguyễn. Due 2020. Depression: Uses Xanax prn for anxiety. On Effexor for vasomotor instability.    DM (HbA1c/FPG): FPG 88 (10/2017)   Hepatitis C: negative (12/2015)   Falls: none   DEXA: N/A   Glaucoma: has regular eye exams with  Vladimir Res (last 12/2016)   Smoking: stopped 2011; 15 pack year   Vitamin D: 40.9 (10/2017)   Medicare Wellness: N/A    Impression:  Patient Active Problem List   Diagnosis Code    Hypertension I10    Hyperlipidemia E78.5    Obstructive sleep apnea G47.33    Chronic headaches R51    IBS (irritable bowel syndrome) K58.9    Colon polyp K63.5    Vasomotor instability R55    Vitamin D deficiency E55.9    Sciatica of right side M54.31    Abnormal glucose R73.09    Colitis K52.9    Lumbar spinal stenosis M48.061    HNP (herniated nucleus pulposus), lumbar M51.26    Lumbar facet arthropathy M12.88    Elevated LFTs R79.89       Plan:  1. Hypertension. Blood pressure somewhat elevated this morning, but patient states she took medications only 30 minutes ago. Control has been significantly improved on current regimen of amlodipine 10 mg, hydrochlorothiazide, and captopril. Renal function remains normal with creatinine 0.56/ eGFR >60. Follow closely. 2. Hyperlipidemia. Rosuvastatin discontinued in 5/2017 due to persistent elevation of transaminases with resolution after discontinued. Calculated 10 year ASCVD risk off statin is 4.5 %, which does not place her in one of the four statin benefit groups as per new AHA/ACC guidelines. Thus, would not recommend resumption of treatment with statin at this time. Emphasized importance of lifestyle modifications, including diet, exercise, and weight loss. Continue to follow. 3. Elevated transaminases. Now resolved since rosuvastatin discontinued. Review of record shows elevation noted since 8/2016 when initiated. Iron studies, hepatitis panel, KIEL, SPEP, CK, aldolase, and anti-smooth muscle antibody negative. RUQ ultrasound (10/2016) showing fatty infiltration of liver. 4. Colitis. Unclear etiology, but nonspecific bowel wall thickening in short segment of jejunum and sigmoid on repeat CT scan raised question of ischemic in origin. No further symptoms.  Hypercoaguable workup including lab studies and echocardiogram negative. Follow. 5. Right leg sciatica. Evidence of lumbar spinal stenosis and facet arthropathy, and lumbar herniated disc. Significant improvement with dry needling therapy and reports no further pain. Continue to follow and follow-up with Dr. Leandro Rosenbaum if recurs. 6. Abnormal glucose. Fasting glucose remains normal. Most likely due to weight loss. Emphasized importance of continued lifestyle modifications. 7. Chronic headaches. Appear to be related to allergic rhinitis and sinus congestion. Currently being followed by Dr. Eduar Buck and receiving allergy shots. Instructed to continue Allegra and Atrovent nasal spray. Fiorecet as needed. Also exercises for neck pain. Follow. 8. Obstructive sleep apnea. Not using CPAP as exacerbated headaches. Follow. May have improved with weight loss. 9. Vasomotor dysfunction. Continuing on venlafaxine for now. Will attempt to decrease dose in future. Follow. 10. Health maintenance. Patient already received influenza vaccine. Other immunizations up to date. Well woman exam with Dr. Misty Thayer in 12/2017. Mammogram due next month. Will order. Continue regular eye exams with Dr. Suman Cam. Vitamin D level normal. Continue maintenance dose supplement. Patient understands recommendations and agrees with plan. Follow-up in 6 months.

## 2017-11-13 RX ORDER — BUTALBITAL, ACETAMINOPHEN AND CAFFEINE 50; 325; 40 MG/1; MG/1; MG/1
1 TABLET ORAL
Qty: 60 TAB | Refills: 0 | OUTPATIENT
Start: 2017-11-13 | End: 2017-12-11 | Stop reason: SDUPTHER

## 2017-11-13 NOTE — TELEPHONE ENCOUNTER
From: Currie Felty  To: Mellisa Hall MD  Sent: 11/13/2017 8:54 AM EST  Subject: Medication Renewal Request    Original authorizing provider: MD Manuel Nunes.  Antonia Crawford would like a refill of the following medications:  butalbital-acetaminophen-caffeine (FIORICET, ESGIC) -40 mg per tablet Mellisa Hall MD]    Preferred pharmacy: [address] [place] [address] [address] AT [address] [place] [address] RD [address]    Comment:

## 2017-11-17 ENCOUNTER — HOSPITAL ENCOUNTER (OUTPATIENT)
Dept: MAMMOGRAPHY | Age: 57
Discharge: HOME | End: 2017-11-17
Attending: INTERNAL MEDICINE
Payer: COMMERCIAL

## 2017-11-17 DIAGNOSIS — Z12.31 SCREENING MAMMOGRAM, ENCOUNTER FOR: ICD-10-CM

## 2017-11-17 PROCEDURE — 77063 BREAST TOMOSYNTHESIS BI: CPT

## 2017-11-20 RX ORDER — ALPRAZOLAM 0.5 MG/1
TABLET ORAL
Qty: 25 TAB | Refills: 0 | OUTPATIENT
Start: 2017-11-20 | End: 2017-12-28 | Stop reason: SDUPTHER

## 2017-11-20 NOTE — TELEPHONE ENCOUNTER
From: Jean-Pierre Cole  To: Daljit Lowery MD  Sent: 11/19/2017 8:58 PM EST  Subject: Medication Renewal Request    Original authorizing provider: MD Julissa Saucedocheryl Rodriges would like a refill of the following medications:  ALPRAZolam (XANAX) 0.5 mg tablet Daljit Lowery MD]    Preferred pharmacy: [address] [place] [address] [address] AT [address] [place] [address] [address]    Comment:

## 2017-11-20 NOTE — TELEPHONE ENCOUNTER
Last OV: 10/19/2017  Last Fill: 10/16/2017    Reviewed report generated by the [address] [place] [address]. Does not demonstrate aberrancies or inconsistencies with regard to the prescribing of controlled medications to this patient by other providers.

## 2017-12-11 RX ORDER — BUTALBITAL, ACETAMINOPHEN AND CAFFEINE 50; 325; 40 MG/1; MG/1; MG/1
1 TABLET ORAL
Qty: 60 TAB | Refills: 0 | OUTPATIENT
Start: 2017-12-11 | End: 2018-01-09 | Stop reason: SDUPTHER

## 2017-12-11 NOTE — TELEPHONE ENCOUNTER
Last filled 11/13/17  Reviewed report generated by the [facility]. Does not demonstrate aberrancies or inconsistencies with regard to the prescribing of controlled medications to this patient by other providers.

## 2017-12-11 NOTE — TELEPHONE ENCOUNTER
From: Yudi Going  To: Donnie Sher MD  Sent: 12/11/2017 12:28 PM EST  Subject: Medication Renewal Request    Original authorizing provider: MD Joey Smith.  Monica Cuevas would like a refill of the following medications:  butalbital-acetaminophen-caffeine (FIORICET, ESGIC) -40 mg per tablet Donnie Sher MD]    Preferred pharmacy: [address] [place] [address] [address] AT [address] [place] [address] [address]    Comment:

## 2017-12-28 DIAGNOSIS — F41.9 ANXIETY: Primary | ICD-10-CM

## 2017-12-28 RX ORDER — ALPRAZOLAM 0.5 MG/1
0.5 TABLET ORAL
Qty: 25 TAB | Refills: 0 | OUTPATIENT
Start: 2017-12-28 | End: 2018-01-29 | Stop reason: SDUPTHER

## 2017-12-28 NOTE — TELEPHONE ENCOUNTER
PHONE IN [facility] reports the last fill date for Xanax as 11/20/2017 for a 8 d/s. There appears to be no inconsistencies in regards to the prescribing of this medication. Last Visit: 10/19/2017 per MD Jose De Jesus Hanson    Next Appointment: 04/19/2018 with MD Jose De Jesus Hanson   Previous Refill Encounters: 11/20/2017 per MD Jose De Jesus Hanson #[id number]    Requested Prescriptions     Pending Prescriptions Disp Refills    ALPRAZolam (XANAX) 0.5 mg tablet 25 Tab 0     Sig: Take 1 Tab by mouth three (3) times daily as needed for Anxiety.  Max Daily Amount: 1.5 mg.

## 2017-12-28 NOTE — TELEPHONE ENCOUNTER
From: Remy Harley  To: Braydon Dalal MD  Sent: 12/28/2017 7:26 AM EST  Subject: Medication Renewal Request    Original authorizing provider: MD Franchesca Andino.  Sandy Norton would like a refill of the following medications:  ALPRAZolam (XANAX) 0.5 mg tablet Braydon Dalal MD]    Preferred pharmacy: [address] [place] [address] [address] AT [address] [place] [address] [address]    Comment:

## 2018-01-10 RX ORDER — BUTALBITAL, ACETAMINOPHEN AND CAFFEINE 50; 325; 40 MG/1; MG/1; MG/1
1 TABLET ORAL
Qty: 60 TAB | Refills: 0 | OUTPATIENT
Start: 2018-01-10 | End: 2018-01-29 | Stop reason: SDUPTHER

## 2018-01-10 NOTE — TELEPHONE ENCOUNTER
From: Maximilian Middleton  To: Alejandra Hunter MD  Sent: 1/9/2018 5:56 PM EST  Subject: Medication Renewal Request    Original authorizing provider: MD Sha Aceves.  Phoebe Moscoso would like a refill of the following medications:  butalbital-acetaminophen-caffeine (FIORICET, ESGIC) -40 mg per tablet Alejandra Hunter MD]    Preferred pharmacy: [address] [place] [address] [address] AT [address] [place] [address] [address]    Comment:

## 2018-01-29 ENCOUNTER — TELEPHONE (OUTPATIENT)
Dept: INTERNAL MEDICINE CLINIC | Age: 58
End: 2018-01-29

## 2018-01-29 DIAGNOSIS — F41.9 ANXIETY: ICD-10-CM

## 2018-01-29 DIAGNOSIS — R19.7 DIARRHEA, UNSPECIFIED TYPE: ICD-10-CM

## 2018-01-29 DIAGNOSIS — R10.84 GENERALIZED ABDOMINAL PAIN: Primary | ICD-10-CM

## 2018-01-29 DIAGNOSIS — R11.2 NON-INTRACTABLE VOMITING WITH NAUSEA, UNSPECIFIED VOMITING TYPE: ICD-10-CM

## 2018-01-29 RX ORDER — BUTALBITAL, ACETAMINOPHEN AND CAFFEINE 50; 325; 40 MG/1; MG/1; MG/1
1 TABLET ORAL
Qty: 60 TAB | Refills: 0 | Status: SHIPPED | OUTPATIENT
Start: 2018-01-29 | End: 2018-02-18 | Stop reason: SDUPTHER

## 2018-01-29 RX ORDER — ALPRAZOLAM 0.5 MG/1
0.5 TABLET ORAL
Qty: 25 TAB | Refills: 0 | OUTPATIENT
Start: 2018-01-29 | End: 2018-02-18 | Stop reason: SDUPTHER

## 2018-01-29 NOTE — TELEPHONE ENCOUNTER
reviewed, last filled xanax 12/28/17 for a 8 day supply   esgic does not show on [place]   Reviewed report generated by the [facility]. Does not demonstrate aberrancies or inconsistencies with regard to the prescribing of controlled medications to this patient by other providers.

## 2018-01-29 NOTE — TELEPHONE ENCOUNTER
From: Ijeoma Ferreira  To: Aston Curry MD  Sent: 1/29/2018 8:20 AM EST  Subject: Medication Renewal Request    Original authorizing provider: MD Sharyn Adkins.  Najma Bonilla would like a refill of the following medications:  ALPRAZolam (XANAX) 0.5 mg tablet Aston Curry MD]  butalbital-acetaminophen-caffeine (FIORICET, ESGIC) -40 mg per tablet Aston Curry MD]    Preferred pharmacy: [address] [place] [address] [address]  [address] [place] [address] [address]    Comment:

## 2018-01-29 NOTE — TELEPHONE ENCOUNTER
Patient calling says she has IBS and had a attack over the weekend. Says her stomach is still cramping. Wanted to know if  could call something in for her.     Pls Advise

## 2018-01-29 NOTE — TELEPHONE ENCOUNTER
Called and spoke with patient. Reports that she was out having dinner on Saturday night and experienced an \"IBS episode\" characterized by flushing in the face, dry mouth, and severe abdominal cramping. When she arrived home, she experienced diarrhea and a single episode of vomiting. She states that she continues to have abdominal cramping and fatigue since Saturday, although she describes it as getting slowly better each day. She reports that she has had similar episodes of this dating back 30 years, and was diagnosed with IBS at that time. She states that the episodes used to resolve more quickly, although over the last year, she states that it now is taking several days before she recovers. Her last episode was in 4/ 2017, and abdominal/pelvic CT scan was obtained by Dr. Daxa Mi and showed mild nonspecific thickening of the proximal jejunum and significant improvement in the distal transverse colon adjacent to the splenic flexure with a short segment with mild wall thickening. He was concerned at that time for possible ischemia, and an echocardiogram and hypercoagulable blood test workup was performed, which was negative. She states that she does not have a family history of similar reactions or angioedema. She denies any urticaria or facial swelling with her attacks. She does take captopril, but has been taking this for many years. Discussed that episodes do not sound consistent with typical IBS presentation. Discussed that attacks could be secondary to hereditary angioedema as symptoms and presentation suspicious. Also would like to rule out other possible causes. Asked patient to have laboratory tests performed at [facility]. Will check CBC, CMP, amylase, lipase, complement levels and C1 esterase inhibitor levels in an attempt to diagnose. Will also refer to GI for evaluation. Patient agreeable with plan.

## 2018-02-18 DIAGNOSIS — G89.29 CHRONIC NONINTRACTABLE HEADACHE, UNSPECIFIED HEADACHE TYPE: Primary | ICD-10-CM

## 2018-02-18 DIAGNOSIS — F41.9 ANXIETY: ICD-10-CM

## 2018-02-18 DIAGNOSIS — R51.9 CHRONIC NONINTRACTABLE HEADACHE, UNSPECIFIED HEADACHE TYPE: Primary | ICD-10-CM

## 2018-02-19 RX ORDER — ALPRAZOLAM 0.5 MG/1
0.5 TABLET ORAL
Qty: 25 TAB | Refills: 0 | OUTPATIENT
Start: 2018-02-19 | End: 2018-03-27 | Stop reason: SDUPTHER

## 2018-02-19 RX ORDER — BUTALBITAL, ACETAMINOPHEN AND CAFFEINE 50; 325; 40 MG/1; MG/1; MG/1
1 TABLET ORAL
Qty: 60 TAB | Refills: 0 | OUTPATIENT
Start: 2018-02-19 | End: 2018-03-13 | Stop reason: SDUPTHER

## 2018-02-19 NOTE — TELEPHONE ENCOUNTER
From: Jaxon Lerma  To: Marsha Green MD  Sent: 2/18/2018 5:14 PM EST  Subject: Medication Renewal Request    Original authorizing provider: MD Bryn Goodman.  Claudell Bill would like a refill of the following medications:  ALPRAZolam (XANAX) 0.5 mg tablet Marsha Green MD]  butalbital-acetaminophen-caffeine (FIORICET, ESGIC) -40 mg per tablet Marsha Green MD]    Preferred pharmacy: [address] [place] [address] [address] AT [address] [place] [address] [address]    Comment:

## 2018-02-19 NOTE — TELEPHONE ENCOUNTER
Last OV: 10/19/2017  Last Fill: 01/29/2018    Per  Xanax picked up on 01/29/2018 and no information on Fioricet being picked up.

## 2018-02-26 ENCOUNTER — TELEPHONE (OUTPATIENT)
Dept: INTERNAL MEDICINE CLINIC | Age: 58
End: 2018-02-26

## 2018-03-13 DIAGNOSIS — G89.29 CHRONIC NONINTRACTABLE HEADACHE, UNSPECIFIED HEADACHE TYPE: ICD-10-CM

## 2018-03-13 DIAGNOSIS — R51.9 CHRONIC NONINTRACTABLE HEADACHE, UNSPECIFIED HEADACHE TYPE: ICD-10-CM

## 2018-03-13 RX ORDER — BUTALBITAL, ACETAMINOPHEN AND CAFFEINE 50; 325; 40 MG/1; MG/1; MG/1
1 TABLET ORAL
Qty: 60 TAB | Refills: 0 | OUTPATIENT
Start: 2018-03-13 | End: 2018-04-09 | Stop reason: SDUPTHER

## 2018-03-13 NOTE — TELEPHONE ENCOUNTER
From: Ruben Gomez  To: Kemi Cortes MD  Sent: 3/13/2018 8:10 AM EDT  Subject: Medication Renewal Request    Original authorizing provider: MD Karyna Lantigua.  Chalodeborah Fisher would like a refill of the following medications:  butalbital-acetaminophen-caffeine (FIORICET, ESGIC) -40 mg per tablet Kemi Cortes MD]    Preferred pharmacy: [address] [place] [address] [address] AT [address] [place] [address] [address]    Comment:

## 2018-03-27 DIAGNOSIS — F41.9 ANXIETY: ICD-10-CM

## 2018-03-27 RX ORDER — ALPRAZOLAM 0.5 MG/1
0.5 TABLET ORAL
Qty: 25 TAB | Refills: 0 | OUTPATIENT
Start: 2018-03-27 | End: 2018-05-01 | Stop reason: SDUPTHER

## 2018-03-27 NOTE — TELEPHONE ENCOUNTER
From: Jaxon Lerma  To: Marsha Green MD  Sent: 3/27/2018 12:18 PM EDT  Subject: Medication Renewal Request    Original authorizing provider: MD Bryn Goodman.  Claudell Bill would like a refill of the following medications:  ALPRAZolam (XANAX) 0.5 mg tablet Marsha Green MD]    Preferred pharmacy: [address] [place] [address] [address] AT [address] [place] [address] [address]    Comment:

## 2018-03-28 NOTE — TELEPHONE ENCOUNTER
Reviewed report generated by the [facility]. Does not demonstrate aberrancies or inconsistencies with regard to the prescribing of controlled medications to this patient by other providers. Last filled Xanax 2/19/2018 per .

## 2018-03-30 ENCOUNTER — TELEPHONE (OUTPATIENT)
Dept: INTERNAL MEDICINE CLINIC | Age: 58
End: 2018-03-30

## 2018-04-09 DIAGNOSIS — R51.9 CHRONIC NONINTRACTABLE HEADACHE, UNSPECIFIED HEADACHE TYPE: ICD-10-CM

## 2018-04-09 DIAGNOSIS — G89.29 CHRONIC NONINTRACTABLE HEADACHE, UNSPECIFIED HEADACHE TYPE: ICD-10-CM

## 2018-04-09 RX ORDER — BUTALBITAL, ACETAMINOPHEN AND CAFFEINE 50; 325; 40 MG/1; MG/1; MG/1
1 TABLET ORAL
Qty: 60 TAB | Refills: 0 | OUTPATIENT
Start: 2018-04-09 | End: 2018-05-01 | Stop reason: SDUPTHER

## 2018-04-09 NOTE — TELEPHONE ENCOUNTER
From: Amanda Gaines  To: Ranjana Cobos MD  Sent: 4/9/2018 11:06 AM EDT  Subject: Medication Renewal Request    Original authorizing provider: MD Angie Chilel Chi.  Mickie Escobar would like a refill of the following medications:  butalbital-acetaminophen-caffeine (FIORICET, ESGIC) -40 mg per tablet Ranjana Cobos MD]    Preferred pharmacy: [address] [place] [address] [address] AT [address] [place] [address] [address]    Comment:

## 2018-04-12 ENCOUNTER — HOSPITAL ENCOUNTER (OUTPATIENT)
Dept: LAB | Age: 58
Discharge: HOME | End: 2018-04-12
Payer: COMMERCIAL

## 2018-04-12 ENCOUNTER — APPOINTMENT (OUTPATIENT)
Dept: INTERNAL MEDICINE CLINIC | Age: 58
End: 2018-04-12

## 2018-04-12 DIAGNOSIS — R10.84 GENERALIZED ABDOMINAL PAIN: ICD-10-CM

## 2018-04-12 DIAGNOSIS — R11.2 NON-INTRACTABLE VOMITING WITH NAUSEA, UNSPECIFIED VOMITING TYPE: ICD-10-CM

## 2018-04-12 DIAGNOSIS — R19.7 DIARRHEA, UNSPECIFIED TYPE: ICD-10-CM

## 2018-04-12 LAB
ALBUMIN SERPL-MCNC: 4.2 G/DL (ref 3.4–5)
ALBUMIN/GLOB SERPL: 1.4 {RATIO} (ref 0.8–1.7)
ALP SERPL-CCNC: 85 U/L (ref 45–117)
ALT SERPL-CCNC: 37 U/L (ref 13–56)
AMYLASE SERPL-CCNC: 45 U/L (ref 25–115)
ANION GAP SERPL CALC-SCNC: 8 MMOL/L (ref 3–18)
AST SERPL-CCNC: 25 U/L (ref 15–37)
BASOPHILS # BLD: 0 K/UL (ref 0–0.06)
BASOPHILS NFR BLD: 0 % (ref 0–2)
BILIRUB SERPL-MCNC: 0.5 MG/DL (ref 0.2–1)
BUN SERPL-MCNC: 24 MG/DL (ref 7–18)
BUN/CREAT SERPL: 43 (ref 12–20)
CALCIUM SERPL-MCNC: 9.5 MG/DL (ref 8.5–10.1)
CHLORIDE SERPL-SCNC: 106 MMOL/L (ref 100–108)
CHOLEST SERPL-MCNC: 217 MG/DL
CO2 SERPL-SCNC: 27 MMOL/L (ref 21–32)
CREAT SERPL-MCNC: 0.56 MG/DL (ref 0.6–1.3)
DIFFERENTIAL METHOD BLD: ABNORMAL
EOSINOPHIL # BLD: 0.1 K/UL (ref 0–0.4)
EOSINOPHIL NFR BLD: 3 % (ref 0–5)
ERYTHROCYTE [DISTWIDTH] IN BLOOD BY AUTOMATED COUNT: 12.7 % (ref 11.6–14.5)
GLOBULIN SER CALC-MCNC: 2.9 G/DL (ref 2–4)
GLUCOSE SERPL-MCNC: 84 MG/DL (ref 74–99)
HCT VFR BLD AUTO: 46 % (ref 35–45)
HDLC SERPL-MCNC: 53 MG/DL (ref 40–60)
HDLC SERPL: 4.1 {RATIO} (ref 0–5)
HGB BLD-MCNC: 15.1 G/DL (ref 12–16)
LDLC SERPL CALC-MCNC: 143.8 MG/DL (ref 0–100)
LIPASE SERPL-CCNC: 160 U/L (ref 73–393)
LIPID PROFILE,FLP: ABNORMAL
LYMPHOCYTES # BLD: 2.4 K/UL (ref 0.9–3.6)
LYMPHOCYTES NFR BLD: 53 % (ref 21–52)
MAGNESIUM SERPL-MCNC: 2.2 MG/DL (ref 1.6–2.6)
MCH RBC QN AUTO: 30.6 PG (ref 24–34)
MCHC RBC AUTO-ENTMCNC: 32.8 G/DL (ref 31–37)
MCV RBC AUTO: 93.1 FL (ref 74–97)
MONOCYTES # BLD: 0.3 K/UL (ref 0.05–1.2)
MONOCYTES NFR BLD: 7 % (ref 3–10)
NEUTS SEG # BLD: 1.7 K/UL (ref 1.8–8)
NEUTS SEG NFR BLD: 37 % (ref 40–73)
PLATELET # BLD AUTO: 235 K/UL (ref 135–420)
PMV BLD AUTO: 10.2 FL (ref 9.2–11.8)
POTASSIUM SERPL-SCNC: 3.9 MMOL/L (ref 3.5–5.5)
PROT SERPL-MCNC: 7.1 G/DL (ref 6.4–8.2)
RBC # BLD AUTO: 4.94 M/UL (ref 4.2–5.3)
SODIUM SERPL-SCNC: 141 MMOL/L (ref 136–145)
TRIGL SERPL-MCNC: 101 MG/DL (ref ?–150)
VLDLC SERPL CALC-MCNC: 20.2 MG/DL
WBC # BLD AUTO: 4.5 K/UL (ref 4.6–13.2)

## 2018-04-12 PROCEDURE — 83735 ASSAY OF MAGNESIUM: CPT | Performed by: INTERNAL MEDICINE

## 2018-04-12 PROCEDURE — 86160 COMPLEMENT ANTIGEN: CPT | Performed by: INTERNAL MEDICINE

## 2018-04-12 PROCEDURE — 86161 COMPLEMENT/FUNCTION ACTIVITY: CPT | Performed by: INTERNAL MEDICINE

## 2018-04-12 PROCEDURE — 80061 LIPID PANEL: CPT | Performed by: INTERNAL MEDICINE

## 2018-04-12 PROCEDURE — 85025 COMPLETE CBC W/AUTO DIFF WBC: CPT | Performed by: INTERNAL MEDICINE

## 2018-04-12 PROCEDURE — 80053 COMPREHEN METABOLIC PANEL: CPT | Performed by: INTERNAL MEDICINE

## 2018-04-12 PROCEDURE — 36415 COLL VENOUS BLD VENIPUNCTURE: CPT | Performed by: INTERNAL MEDICINE

## 2018-04-12 PROCEDURE — 82150 ASSAY OF AMYLASE: CPT | Performed by: INTERNAL MEDICINE

## 2018-04-12 PROCEDURE — 83690 ASSAY OF LIPASE: CPT | Performed by: INTERNAL MEDICINE

## 2018-04-13 LAB — C1INH SERPL-MCNC: 29 MG/DL (ref 21–39)

## 2018-04-14 LAB — C4 SERPL-MCNC: 28 MG/DL (ref 14–44)

## 2018-04-17 LAB — C1Q SERPL-MCNC: 18.2 MG/DL (ref 11.8–24.4)

## 2018-04-18 LAB — C1INH FUNCTIONAL/C1INH TOTAL MFR SERPL: 88 %MEAN NORMAL

## 2018-04-19 ENCOUNTER — OFFICE VISIT (OUTPATIENT)
Dept: INTERNAL MEDICINE CLINIC | Age: 58
End: 2018-04-19

## 2018-04-19 VITALS
TEMPERATURE: 98.5 F | BODY MASS INDEX: 23.56 KG/M2 | RESPIRATION RATE: 14 BRPM | HEIGHT: 64 IN | SYSTOLIC BLOOD PRESSURE: 130 MMHG | OXYGEN SATURATION: 96 % | DIASTOLIC BLOOD PRESSURE: 80 MMHG | WEIGHT: 138 LBS | HEART RATE: 80 BPM

## 2018-04-19 DIAGNOSIS — K58.2 IRRITABLE BOWEL SYNDROME WITH BOTH CONSTIPATION AND DIARRHEA: ICD-10-CM

## 2018-04-19 DIAGNOSIS — E55.9 VITAMIN D DEFICIENCY: ICD-10-CM

## 2018-04-19 DIAGNOSIS — E78.5 HYPERLIPIDEMIA, UNSPECIFIED HYPERLIPIDEMIA TYPE: ICD-10-CM

## 2018-04-19 DIAGNOSIS — R51.9 CHRONIC DAILY HEADACHE: Primary | ICD-10-CM

## 2018-04-19 DIAGNOSIS — M51.26 HNP (HERNIATED NUCLEUS PULPOSUS), LUMBAR: ICD-10-CM

## 2018-04-19 DIAGNOSIS — M47.816 LUMBAR FACET ARTHROPATHY: ICD-10-CM

## 2018-04-19 DIAGNOSIS — K52.9 COLITIS: ICD-10-CM

## 2018-04-19 DIAGNOSIS — M54.31 SCIATICA OF RIGHT SIDE: ICD-10-CM

## 2018-04-19 DIAGNOSIS — G47.33 OBSTRUCTIVE SLEEP APNEA: ICD-10-CM

## 2018-04-19 DIAGNOSIS — R73.09 ABNORMAL GLUCOSE: ICD-10-CM

## 2018-04-19 DIAGNOSIS — I10 ESSENTIAL HYPERTENSION: ICD-10-CM

## 2018-04-19 NOTE — MR AVS SNAPSHOT
[address] [facility] [address] 
 
 
 [address], [address] [place] [address] [facility] 
[phone] Patient: Juani Lora MRN: [id number] Visit Information Date & Time Provider Department Dept. Phone Encounter #  
 2018 10:30 AM Kristy Gauthier MD [facility] [phone] [id number] Follow-up Instructions Return in about 8 weeks (around 2018), or if symptoms worsen or fail to improve. Your Appointments 2018 12:00 PM  
Office Visit with Kristy Gauthier MD  
[facility] Appt Note: ov 8wks sarris [address] [facility], [address] [place] [place]  
  
   
 [address], [address] Upcoming Health Maintenance Date Due  
 BREAST CANCER SCRN MAMMOGRAM 2019 PAP AKA CERVICAL CYTOLOGY 2019 COLONOSCOPY 2020 DTaP/Tdap/Td series (2 - Td) 10/7/2026 Allergies as of 2018  Review Complete On: 2018 By: Vladimir Rosales Severity Noted Reaction Type Reactions Other Plant, Animal, Environmental  2016    Itching, Sneezing \"Everything but feathers and horses. \" Current Immunizations  Reviewed on 10/7/2016 Name Date Influenza Vaccine 10/9/2017, 10/8/2014 TD Vaccine 2006 Tdap 10/7/2016  1:04 PM  
  
 Not reviewed this visit Vitals BP Pulse Temp Resp Height(growth percentile) Weight(growth percentile) 130/80 (BP 1 Location: Right arm, BP Patient Position: Sitting) 80 98.5 °F (36.9 °C) (Oral) 14 5' 4\" (1.626 m) 138 lb (62.6 kg) LMP SpO2 BMI OB Status Smoking Status 01/10/2012 96% 23.69 kg/m2 Postmenopausal Current Some Day Smoker Vitals History BMI and BSA Data Body Mass Index Body Surface Area  
 23.69 kg/m 2 1.68 m 2 Preferred Pharmacy Pharmacy Name Phone  [phone] [address] AT [place] [address] OF [address] [facility] [address] [facility] Your Updated Medication List  
  
   
This list is accurate as of 4/19/18 11:40 AM.  Always use your most recent med list.  
  
  
  
  
 ALPRAZolam 0.5 mg tablet Commonly known as:  Shantell Belling Take 1 Tab by mouth three (3) times daily as needed for Anxiety. Max Daily Amount: 1.5 mg.  
  
 amLODIPine 10 mg tablet Commonly known as:  Blake Parkherson TAKE 1 TABLET BY MOUTH DAILY  
  
 butalbital-acetaminophen-caffeine -40 mg per tablet Commonly known as:  Cesar Lambert Take 1 Tab by mouth every six (6) hours as needed for Pain. captopril 50 mg tablet Commonly known as:  CAPOTEN  
TAKE 1 TABLET BY MOUTH THREE TIMES DAILY Cholecalciferol (Vitamin D3) 2,000 unit Cap capsule Commonly known as:  VITAMIN D3 Take 2,000 Units by mouth daily. GLUCOSAMINE-CHONDR (BOSWELLIA) PO Take 500 mg by mouth daily. hydroCHLOROthiazide 25 mg tablet Commonly known as:  HYDRODIURIL  
TAKE 1 TABLET BY MOUTH EVERY DAY  
  
 ipratropium 0.03 % nasal spray Commonly known as:  ATROVENT  
2 Sprays by Both Nostrils route three (3) times daily. multivitamin tablet Commonly known as:  ONE A DAY Take 1 Tab by mouth daily. * OTHER Allergy shot weekly * OTHER Indications: boswella for arthritis  
  
 potassium chloride 20 mEq tablet Commonly known as:  K-DUR, KLOR-CON  
TAKE 2 TABLETS BY MOUTH EVERY DAY FOR 2 DAYS THEN TAKE 1 TABLET BY MOUTH DAILY  
  
 rosuvastatin 10 mg tablet Commonly known as:  CRESTOR Take 1 Tab by mouth nightly. Indications: hyperlipidemia TURMERIC ROOT EXTRACT PO Take 1,000 mg by mouth. venlafaxine-SR 75 mg capsule Commonly known as:  EFFEXOR-XR  
TAKE 3 CAPSULES BY MOUTH DAILY  
  
 VITAMIN B-1 100 mg tablet Generic drug:  thiamine Take 50 mg by mouth daily. * Notice:   This list has 2 medication(s) that are the same as other medications prescribed for you. Read the directions carefully, and ask your doctor or other care provider to review them with you. Follow-up Instructions Return in about 8 weeks (around 6/14/2018), or if symptoms worsen or fail to improve. Patient Instructions Discontinue captopril 2 weeks before your appointment. Please monitor and record your blood pressure every morning after discontinuing and bring record of readings to our office for review. Call if blood pressure greater than 150/90. Advance Care Planning: Care Instructions Your Care Instructions It can be hard to live with an illness that cannot be cured. But if your health is getting worse, you may want to make decisions about end-of-life care. Planning for the end of your life does not mean that you are giving up. It is a way to make sure that your wishes are met. Clearly stating your wishes can make it easier for your loved ones. Making plans while you are still able may also ease your mind and make your final days less stressful and more meaningful. Follow-up care is a key part of your treatment and safety. Be sure to make and go to all appointments, and call your doctor if you are having problems. It's also a good idea to know your test results and keep a list of the medicines you take. What can you do to plan for the end of life? · You can bring these issues up with your doctor. You do not need to wait until your doctor starts the conversation. You might start with \"I would not be willing to live with . Christiana Peaks Christiana Peaks Christiana Peaks \" When you complete this sentence it helps your doctor understand your wishes. · Talk openly and honestly with your doctor. This is the best way to understand the decisions you will need to make as your health changes. Know that you can always change your mind. · Ask your doctor about commonly used life-support measures. These include tube feedings, breathing machines, and fluids given through a vein (IV). Understanding these treatments will help you decide whether you want them. · You may choose to have these life-supporting treatments for a limited time. This allows a trial period to see whether they will help you. You may also decide that you want your doctor to take only certain measures to keep you alive. It is important to spell out these conditions so that your doctor and family understand them. · Talk to your doctor about how long you are likely to live. He or she may be able to give you an idea of what usually happens with your specific illness. · Think about preparing papers that state your wishes. This way there will not be any confusion about what you want. You can change your instructions at any time. Which papers should you prepare? Advance directives are legal papers that tell doctors how you want to be cared for at the end of your life. You do not need a  to write these papers. Ask your doctor or your state health department for information on how to write your advance directives. They may have the forms for each of these types of papers. Make sure your doctor has a copy of these on file, and give a copy to a family member or close friend. · Consider a do-not-resuscitate order (DNR). This order asks that no extra treatments be done if your heart stops or you stop breathing. Extra treatments may include cardiopulmonary resuscitation (CPR), electrical shock to restart your heart, or a machine to breathe for you. If you decide to have a DNR order, ask your doctor to explain and write it. Place the order in your home where everyone can easily see it. · Consider a living will. A living will explains your wishes about life support and other treatments at the end of your life if you become unable to speak for yourself. Living liu tell doctors to use or not use treatments that would keep you alive. You must have one or two witnesses or a notary present when you sign this form. · Consider a durable power of  for health care. This allows you to name a person to make decisions about your care if you are not able to. Most people ask a close friend or family member. Talk to this person about the kinds of treatments you want and those that you do not want. Make sure this person understands your wishes. These legal papers are simple to change. Tell your doctor what you want to change, and ask him or her to make a note in your medical file. Give your family updated copies of the papers. Where can you learn more? Go to http://pooja-real.info/. Enter P184 in the search box to learn more about \"Advance Care Planning: Care Instructions. \" Current as of: September 24, 2016 Content Version: 11.4 © 5599-6375 Shopl[employer]. Care instructions adapted under license by [employer] (which disclaims liability or warranty for this information). If you have questions about a medical condition or this instruction, always ask your healthcare professional. [place] [address] any warranty or liability for your use of this information. Advance Directives: Care Instructions Your Care Instructions An advance directive is a legal way to state your wishes at the end of your life. It tells your family and your doctor what to do if you can no longer say what you want. There are two main types of advance directives. You can change them any time that your wishes change. · A living will tells your family and your doctor your wishes about life support and other treatment. · A durable power of  for health care lets you name a person to make treatment decisions for you when you can't speak for yourself. This person is called a health care agent. If you do not have an advance directive, decisions about your medical care may be made by a doctor or a  who doesn't know you. It may help to think of an advance directive as a gift to the people who care for you. If you have one, they won't have to make tough decisions by themselves. Follow-up care is a key part of your treatment and safety. Be sure to make and go to all appointments, and call your doctor if you are having problems. It's also a good idea to know your test results and keep a list of the medicines you take. How can you care for yourself at home? · Discuss your wishes with your loved ones and your doctor. This way, there are no surprises. · Many states have a unique form. Or you might use a universal form that has been approved by many states. This kind of form can sometimes be completed and stored online. Your electronic copy will then be available wherever you have a connection to the Internet. In most cases, doctors will respect your wishes even if you have a form from a different state. · You don't need a  to do an advance directive. But you may want to get legal advice. · Think about these questions when you prepare an advance directive: ¨ Who do you want to make decisions about your medical care if you are not able to? Many people choose a family member or close friend. ¨ Do you know enough about life support methods that might be used? If not, talk to your doctor so you understand. ¨ What are you most afraid of that might happen? You might be afraid of having pain, losing your independence, or being kept alive by machines. ¨ Where would you prefer to die? Choices include your home, a hospital, or a nursing home. ¨ Would you like to have information about hospice care to support you and your family? ¨ Do you want to donate organs when you die? ¨ Do you want certain Mandaen practices performed before you die? If so, put your wishes in the advance directive. · Read your advance directive every year, and make changes as needed. When should you call for help? Be sure to contact your doctor if you have any questions. Where can you learn more? Go to http://pooja-real.info/. Enter R264 in the search box to learn more about \"Advance Directives: Care Instructions. \" Current as of: September 24, 2016 Content Version: 11.4 © 0836-7365 [employer]. Care instructions adapted under license by [employer] (which disclaims liability or warranty for this information). If you have questions about a medical condition or this instruction, always ask your healthcare professional. [address] any warranty or liability for your use of this information. Najma Rico 3[address] What is a living will? A living will is a legal form you use to write down the kind of care you want at the end of your life. It is used by the health professionals who will treat you if you aren't able to decide for yourself. If you put your wishes in writing, your loved ones and others will know what kind of care you want. They won't need to guess. This can ease your mind and be helpful to others. A living will is not the same as an estate or property will. An estate will explains what you want to happen with your money and property after you die. Is a living will a legal document? A living will is a legal document. Each state has its own laws about living liu. If you move to another state, make sure that your living will is legal in the state where you now live. Or you might use a universal form that has been approved by many states. This kind of form can sometimes be completed and stored online. Your electronic copy will then be available wherever you have a connection to the Internet. In most cases, doctors will respect your wishes even if you have a form from a different state. · You don't need an  to complete a living will.  But legal advice can be helpful if your state's laws are unclear, your health history is complicated, or your family can't agree on what should be in your living will. · You can change your living will at any time. Some people find that their wishes about end-of-life care change as their health changes. · In addition to making a living will, think about completing a medical power of  form. This form lets you name the person you want to make end-of-life treatment decisions for you (your \"health care agent\") if you're not able to. Many hospitals and nursing homes will give you the forms you need to complete a living will and a medical power of . · Your living will is used only if you can't make or communicate decisions for yourself anymore. If you become able to make decisions again, you can accept or refuse any treatment, no matter what you wrote in your living will. · Your state may offer an online registry. This is a place where you can store your living will online so the doctors and nurses who need to treat you can find it right away. What should you think about when creating a living will? Talk about your end-of-life wishes with your family members and your doctor. Let them know what you want. That way the people making decisions for you won't be surprised by your choices. Think about these questions as you make your living will: · Do you know enough about life support methods that might be used? If not, talk to your doctor so you know what might be done if you can't breathe on your own, your heart stops, or you're unable to swallow. · What things would you still want to be able to do after you receive life-support methods? Would you want to be able to walk? To speak? To eat on your own? To live without the help of machines? · If you have a choice, where do you want to be cared for? In your home? At a hospital or nursing home? · Do you want certain Jewish practices performed if you become very ill? · If you have a choice at the end of your life, where would you prefer to die? At home? In a hospital or nursing home? Somewhere else? · Would you prefer to be buried or cremated? · Do you want your organs to be donated after you die? What should you do with your living will? · Make sure that your family members and your health care agent have copies of your living will. · Give your doctor a copy of your living will to keep in your medical record. If you have more than one doctor, make sure that each one has a copy. · You may want to put a copy of your living will where it can be easily found. Where can you learn more? Go to http://pooja-real.info/. Enter K484 in the search box to learn more about \"Learning About Living Perroy. \" Current as of: September 24, 2016 Content Version: 11.4 © 8603-7676 [employer]. Care instructions adapted under license by [employer] (which disclaims liability or warranty for this information). If you have questions about a medical condition or this instruction, always ask your healthcare professional. [address] any warranty or liability for your use of this information. DASH Diet: Care Instructions Your Care Instructions The DASH diet is an eating plan that can help lower your blood pressure. DASH stands for Dietary Approaches to Stop Hypertension. Hypertension is high blood pressure. The DASH diet focuses on eating foods that are high in calcium, potassium, and magnesium. These nutrients can lower blood pressure. The foods that are highest in these nutrients are fruits, vegetables, low-fat dairy products, nuts, seeds, and legumes. But taking calcium, potassium, and magnesium supplements instead of eating foods that are high in those nutrients does not have the same effect. The DASH diet also includes whole grains, fish, and poultry. The DASH diet is one of several lifestyle changes your doctor may recommend to lower your high blood pressure. Your doctor may also want you to decrease the amount of sodium in your diet. Lowering sodium while following the DASH diet can lower blood pressure even further than just the DASH diet alone. Follow-up care is a key part of your treatment and safety. Be sure to make and go to all appointments, and call your doctor if you are having problems. It's also a good idea to know your test results and keep a list of the medicines you take. How can you care for yourself at home? Following the DASH diet · Eat 4 to 5 servings of fruit each day. A serving is 1 medium-sized piece of fruit, ½ cup chopped or canned fruit, 1/4 cup dried fruit, or 4 ounces (½ cup) of fruit juice. Choose fruit more often than fruit juice. · Eat 4 to 5 servings of vegetables each day. A serving is 1 cup of lettuce or raw leafy vegetables, ½ cup of chopped or cooked vegetables, or 4 ounces (½ cup) of vegetable juice. Choose vegetables more often than vegetable juice. · Get 2 to 3 servings of low-fat and fat-free dairy each day. A serving is 8 ounces of milk, 1 cup of yogurt, or 1 ½ ounces of cheese. · Eat 6 to 8 servings of grains each day. A serving is 1 slice of bread, 1 ounce of dry cereal, or ½ cup of cooked rice, pasta, or cooked cereal. Try to choose whole-grain products as much as possible. · Limit lean meat, poultry, and fish to 2 servings each day. A serving is 3 ounces, about the size of a deck of cards. · Eat 4 to 5 servings of nuts, seeds, and legumes (cooked dried beans, lentils, and split peas) each week. A serving is 1/3 cup of nuts, 2 tablespoons of seeds, or ½ cup of cooked beans or peas. · Limit fats and oils to 2 to 3 servings each day. A serving is 1 teaspoon of vegetable oil or 2 tablespoons of salad dressing. · Limit sweets and added sugars to 5 servings or less a week.  A serving is 1 tablespoon jelly or jam, ½ cup sorbet, or 1 cup of lemonade. · Eat less than 2,300 milligrams (mg) of sodium a day. If you limit your sodium to 1,500 mg a day, you can lower your blood pressure even more. Tips for success · Start small. Do not try to make dramatic changes to your diet all at once. You might feel that you are missing out on your favorite foods and then be more likely to not follow the plan. Make small changes, and stick with them. Once those changes become habit, add a few more changes. · Try some of the following: ¨ Make it a goal to eat a fruit or vegetable at every meal and at snacks. This will make it easy to get the recommended amount of fruits and vegetables each day. ¨ Try yogurt topped with fruit and nuts for a snack or healthy dessert. ¨ Add lettuce, tomato, cucumber, and onion to sandwiches. ¨ Combine a ready-made pizza crust with low-fat mozzarella cheese and lots of vegetable toppings. Try using tomatoes, squash, spinach, broccoli, carrots, cauliflower, and onions. ¨ Have a variety of cut-up vegetables with a low-fat dip as an appetizer instead of chips and dip. ¨ Sprinkle sunflower seeds or chopped almonds over salads. Or try adding chopped walnuts or almonds to cooked vegetables. ¨ Try some vegetarian meals using beans and peas. Add garbanzo or kidney beans to salads. Make burritos and tacos with mashed medrano beans or black beans. Where can you learn more? Go to http://pooja-real.info/. Enter U346 in the search box to learn more about \"DASH Diet: Care Instructions. \" Current as of: September 21, 2016 Content Version: 11.4 © 9418-6085 ZUCHEM. Care instructions adapted under license by [employer] (which disclaims liability or warranty for this information).  If you have questions about a medical condition or this instruction, always ask your healthcare professional. [employer] disclaims any warranty or liability for your use of this information. Introducing [facility]! Dear Azeb Steel: 
Thank you for requesting a Gigzolo account. Our records indicate that you already have an active Gigzolo account. You can access your account anytime at [email or web address]. [email or web address] Did you know that you can access your hospital and ER discharge instructions at any time in Gigzolo? You can also review all of your test results from your hospital stay or ER visit. Additional Information If you have questions, please visit the Frequently Asked Questions section of the Gigzolo website at [email or web address] Remember, Gigzolo is NOT to be used for urgent needs. For medical emergencies, dial 911. Now available from your iPhone and Android! Please provide this summary of care documentation to your next provider. Your primary care clinician is listed as Harshad Perez. If you have any questions after today's visit, please call [phone].

## 2018-04-19 NOTE — PATIENT INSTRUCTIONS
Discontinue captopril 2 weeks before your appointment. Please monitor and record your blood pressure every morning after discontinuing and bring record of readings to our office for review. Call if blood pressure greater than 150/90. Advance Care Planning: Care Instructions  Your Care Instructions    It can be hard to live with an illness that cannot be cured. But if your health is getting worse, you may want to make decisions about end-of-life care. Planning for the end of your life does not mean that you are giving up. It is a way to make sure that your wishes are met. Clearly stating your wishes can make it easier for your loved ones. Making plans while you are still able may also ease your mind and make your final days less stressful and more meaningful. Follow-up care is a key part of your treatment and safety. Be sure to make and go to all appointments, and call your doctor if you are having problems. It's also a good idea to know your test results and keep a list of the medicines you take. What can you do to plan for the end of life? · You can bring these issues up with your doctor. You do not need to wait until your doctor starts the conversation. You might start with \"I would not be willing to live with . Prince Ramos \" When you complete this sentence it helps your doctor understand your wishes. · Talk openly and honestly with your doctor. This is the best way to understand the decisions you will need to make as your health changes. Know that you can always change your mind. · Ask your doctor about commonly used life-support measures. These include tube feedings, breathing machines, and fluids given through a vein (IV). Understanding these treatments will help you decide whether you want them. · You may choose to have these life-supporting treatments for a limited time. This allows a trial period to see whether they will help you.  You may also decide that you want your doctor to take only certain measures to keep you alive. It is important to spell out these conditions so that your doctor and family understand them. · Talk to your doctor about how long you are likely to live. He or she may be able to give you an idea of what usually happens with your specific illness. · Think about preparing papers that state your wishes. This way there will not be any confusion about what you want. You can change your instructions at any time. Which papers should you prepare? Advance directives are legal papers that tell doctors how you want to be cared for at the end of your life. You do not need a  to write these papers. Ask your doctor or your [facility] department for information on how to write your advance directives. They may have the forms for each of these types of papers. Make sure your doctor has a copy of these on file, and give a copy to a family member or close friend. · Consider a do-not-resuscitate order (DNR). This order asks that no extra treatments be done if your heart stops or you stop breathing. Extra treatments may include cardiopulmonary resuscitation (CPR), electrical shock to restart your heart, or a machine to breathe for you. If you decide to have a DNR order, ask your doctor to explain and write it. Place the order in your home where everyone can easily see it. · Consider a living will. A living will explains your wishes about life support and other treatments at the end of your life if you become unable to speak for yourself. Living liu tell doctors to use or not use treatments that would keep you alive. You must have one or two witnesses or a notary present when you sign this form. · Consider a durable power of  for health care. This allows you to name a person to make decisions about your care if you are not able to. Most people ask a close friend or family member. Talk to this person about the kinds of treatments you want and those that you do not want.  Make sure this person understands your wishes. These legal papers are simple to change. Tell your doctor what you want to change, and ask him or her to make a note in your medical file. Give your family updated copies of the papers. Where can you learn more? Go to http://pooja-real.info/. Enter P184 in the search box to learn more about \"Advance Care Planning: Care Instructions. \"  Current as of: September 24, 2016  Content Version: 11.4  © 4525-3707 [employer]. Care instructions adapted under license by [employer] (which disclaims liability or warranty for this information). If you have questions about a medical condition or this instruction, always ask your healthcare professional. [address] any warranty or liability for your use of this information. Advance Directives: Care Instructions  Your Care Instructions  An advance directive is a legal way to state your wishes at the end of your life. It tells your family and your doctor what to do if you can no longer say what you want. There are two main types of advance directives. You can change them any time that your wishes change. · A living will tells your family and your doctor your wishes about life support and other treatment. · A durable power of  for health care lets you name a person to make treatment decisions for you when you can't speak for yourself. This person is called a health care agent. If you do not have an advance directive, decisions about your medical care may be made by a doctor or a  who doesn't know you. It may help to think of an advance directive as a gift to the people who care for you. If you have one, they won't have to make tough decisions by themselves. Follow-up care is a key part of your treatment and safety. Be sure to make and go to all appointments, and call your doctor if you are having problems.  It's also a good idea to know your test results and keep a list of the medicines you take. How can you care for yourself at home? · Discuss your wishes with your loved ones and your doctor. This way, there are no surprises. · Many states have a unique form. Or you might use a universal form that has been approved by many states. This kind of form can sometimes be completed and stored online. Your electronic copy will then be available wherever you have a connection to the Internet. In most cases, doctors will respect your wishes even if you have a form from a different state. · You don't need a  to do an advance directive. But you may want to get legal advice. · Think about these questions when you prepare an advance directive:  ¨ Who do you want to make decisions about your medical care if you are not able to? Many people choose a family member or close friend. ¨ Do you know enough about life support methods that might be used? If not, talk to your doctor so you understand. ¨ What are you most afraid of that might happen? You might be afraid of having pain, losing your independence, or being kept alive by machines. ¨ Where would you prefer to die? Choices include your home, a hospital, or a nursing home. ¨ Would you like to have information about hospice care to support you and your family? ¨ Do you want to donate organs when you die? ¨ Do you want certain [employer] practices performed before you die? If so, put your wishes in the advance directive. · Read your advance directive every year, and make changes as needed. When should you call for help? Be sure to contact your doctor if you have any questions. Where can you learn more? Go to http://pooja-real.info/. Enter R264 in the search box to learn more about \"Advance Directives: Care Instructions. \"  Current as of: September 24, 2016  Content Version: 11.4  © 8521-2355 [employer].  Care instructions adapted under license by [employer] (which disclaims liability or warranty for this information). If you have questions about a medical condition or this instruction, always ask your healthcare professional. [address] any warranty or liability for your use of this information. Learning About Living Hildred Faster  What is a living will? A living will is a legal form you use to write down the kind of care you want at the end of your life. It is used by the health professionals who will treat you if you aren't able to decide for yourself. If you put your wishes in writing, your loved ones and others will know what kind of care you want. They won't need to guess. This can ease your mind and be helpful to others. A living will is not the same as an estate or property will. An estate will explains what you want to happen with your money and property after you die. Is a living will a legal document? A living will is a legal document. Each state has its own laws about living liu. If you move to another state, make sure that your living will is legal in the state where you now live. Or you might use a universal form that has been approved by many states. This kind of form can sometimes be completed and stored online. Your electronic copy will then be available wherever you have a connection to the Internet. In most cases, doctors will respect your wishes even if you have a form from a different state. · You don't need an  to complete a living will. But legal advice can be helpful if your state's laws are unclear, your health history is complicated, or your family can't agree on what should be in your living will. · You can change your living will at any time. Some people find that their wishes about end-of-life care change as their health changes. · In addition to making a living will, think about completing a medical power of  form.  This form lets you name the person you want to make end-of-life treatment decisions for you (your \"health care agent\") if you're not able to. Many hospitals and nursing homes will give you the forms you need to complete a living will and a medical power of . · Your living will is used only if you can't make or communicate decisions for yourself anymore. If you become able to make decisions again, you can accept or refuse any treatment, no matter what you wrote in your living will. · Your state may offer an online registry. This is a place where you can store your living will online so the doctors and nurses who need to treat you can find it right away. What should you think about when creating a living will? Talk about your end-of-life wishes with your family members and your doctor. Let them know what you want. That way the people making decisions for you won't be surprised by your choices. Think about these questions as you make your living will:  · Do you know enough about life support methods that might be used? If not, talk to your doctor so you know what might be done if you can't breathe on your own, your heart stops, or you're unable to swallow. · What things would you still want to be able to do after you receive life-support methods? Would you want to be able to walk? To speak? To eat on your own? To live without the help of machines? · If you have a choice, where do you want to be cared for? In your home? At a hospital or nursing home? · Do you want certain [employer] practices performed if you become very ill? · If you have a choice at the end of your life, where would you prefer to die? At home? In a hospital or nursing home? Somewhere else? · Would you prefer to be buried or cremated? · Do you want your organs to be donated after you die? What should you do with your living will? · Make sure that your family members and your health care agent have copies of your living will. · Give your doctor a copy of your living will to keep in your medical record.  If you have more than one doctor, make sure that each one has a copy. · You may want to put a copy of your living will where it can be easily found. Where can you learn more? Go to http://pooja-real.info/. Enter M966 in the search box to learn more about \"Learning About Living Evie. \"  Current as of: September 24, 2016  Content Version: 11.4  © 0285-8033 [employer]. Care instructions adapted under license by [employer] (which disclaims liability or warranty for this information). If you have questions about a medical condition or this instruction, always ask your healthcare professional. [place] [address] any warranty or liability for your use of this information. DASH Diet: Care Instructions  Your Care Instructions    The DASH diet is an eating plan that can help lower your blood pressure. DASH stands for Dietary Approaches to Stop Hypertension. Hypertension is high blood pressure. The DASH diet focuses on eating foods that are high in calcium, potassium, and magnesium. These nutrients can lower blood pressure. The foods that are highest in these nutrients are fruits, vegetables, low-fat dairy products, nuts, seeds, and legumes. But taking calcium, potassium, and magnesium supplements instead of eating foods that are high in those nutrients does not have the same effect. The DASH diet also includes whole grains, fish, and poultry. The DASH diet is one of several lifestyle changes your doctor may recommend to lower your high blood pressure. Your doctor may also want you to decrease the amount of sodium in your diet. Lowering sodium while following the DASH diet can lower blood pressure even further than just the DASH diet alone. Follow-up care is a key part of your treatment and safety. Be sure to make and go to all appointments, and call your doctor if you are having problems.  It's also a good idea to know your test results and keep a list of the medicines you take. How can you care for yourself at home? Following the DASH diet  · Eat 4 to 5 servings of fruit each day. A serving is 1 medium-sized piece of fruit, ½ cup chopped or canned fruit, 1/4 cup dried fruit, or 4 ounces (½ cup) of fruit juice. Choose fruit more often than fruit juice. · Eat 4 to 5 servings of vegetables each day. A serving is 1 cup of lettuce or raw leafy vegetables, ½ cup of chopped or cooked vegetables, or 4 ounces (½ cup) of vegetable juice. Choose vegetables more often than vegetable juice. · Get 2 to 3 servings of low-fat and fat-free dairy each day. A serving is 8 ounces of milk, 1 cup of yogurt, or 1 ½ ounces of cheese. · Eat 6 to 8 servings of grains each day. A serving is 1 slice of bread, 1 ounce of dry cereal, or ½ cup of cooked rice, pasta, or cooked cereal. Try to choose whole-grain products as much as possible. · Limit lean meat, poultry, and fish to 2 servings each day. A serving is 3 ounces, about the size of a deck of cards. · Eat 4 to 5 servings of nuts, seeds, and legumes (cooked dried beans, lentils, and split peas) each week. A serving is 1/3 cup of nuts, 2 tablespoons of seeds, or ½ cup of cooked beans or peas. · Limit fats and oils to 2 to 3 servings each day. A serving is 1 teaspoon of vegetable oil or 2 tablespoons of salad dressing. · Limit sweets and added sugars to 5 servings or less a week. A serving is 1 tablespoon jelly or jam, ½ cup sorbet, or 1 cup of lemonade. · Eat less than 2,300 milligrams (mg) of sodium a day. If you limit your sodium to 1,500 mg a day, you can lower your blood pressure even more. Tips for success  · Start small. Do not try to make dramatic changes to your diet all at once. You might feel that you are missing out on your favorite foods and then be more likely to not follow the plan. Make small changes, and stick with them. Once those changes become habit, add a few more changes.   · Try some of the following:  ¨ Make it a goal to eat a fruit or vegetable at every meal and at snacks. This will make it easy to get the recommended amount of fruits and vegetables each day. ¨ Try yogurt topped with fruit and nuts for a snack or healthy dessert. ¨ Add lettuce, tomato, cucumber, and onion to sandwiches. ¨ Combine a ready-made pizza crust with low-fat mozzarella cheese and lots of vegetable toppings. Try using tomatoes, squash, spinach, broccoli, carrots, cauliflower, and onions. ¨ Have a variety of cut-up vegetables with a low-fat dip as an appetizer instead of chips and dip. ¨ Sprinkle sunflower seeds or chopped almonds over salads. Or try adding chopped walnuts or almonds to cooked vegetables. ¨ Try some vegetarian meals using beans and peas. Add garbanzo or kidney beans to salads. Make burritos and tacos with mashed medrano beans or black beans. Where can you learn more? Go to http://pooja-real.info/. Enter Z971 in the search box to learn more about \"DASH Diet: Care Instructions. \"  Current as of: September 21, 2016  Content Version: 11.4  © 5021-9547 Healthwise, Incorporated. Care instructions adapted under license by [employer] (which disclaims liability or warranty for this information). If you have questions about a medical condition or this instruction, always ask your healthcare professional. [place] [address] any warranty or liability for your use of this information.

## 2018-04-19 NOTE — PROGRESS NOTES
Chief Complaint   Patient presents with    Hypertension     6 month follow up with labs. 1. Have you been to the ER, urgent care clinic or hospitalized since your last visit? YES. Nov. 2017 lump on back removed. 2. Have you seen or consulted any other health care providers outside of the [facility] since your last visit (Include any pap smears or colon screening)? NO      Do you have an Advanced Directive? NO    Would you like information on Advanced Directives?  YES

## 2018-04-22 NOTE — PROGRESS NOTES
HPI:   Tavo Serrano is a 59y.o. year old female who presents today for evaluation of hypertension, hyperlipidemia, obstructive sleep apnea, allergic rhinitis, vasomotor instability, and irritable bowel syndrome. She reports that she is doing relatively well. On 1/29/2018, she called the office and reported she was out having dinner when she experienced an \"IBS episode\" characterized by flushing in the face, dry mouth, and severe abdominal cramping. When she arrived home, she experienced diarrhea and a single episode of vomiting. She reported that she continued to have abdominal cramping and fatigue following this episode, although described as getting slowly better each day. She reported that she had similar episodes to this dating back 30 years, and was diagnosed with IBS at that time. She stated that the episodes used to resolve more quickly, although over the last year, she states that it now is taking several days before she recovers. She was referred to Dr. Eloina Navarro, and started on Linzess for constipation. She states that she has only received one dose so is not clear if it is helping. She has not had a recurrence since episode in 1/2018. She also reports difficulty with chronic daily headaches, and takes Fioricet daily, as well as Excedrin migraine and Goody's powder. She states that she has difficulty with chronic sinus issues and migraine headaches, although is not clear regarding which is the cause of her headaches currently. She is no longer receiving immunotherapy from Dr. Maria Esther Jessica. She reports today that she is otherwise feeling well. On 8/2/2016, she was evaluated by PETERSON Ahmadi for left sided crampy abdominal pain, diarrhea, and hematochezia. She was treated for presumed diverticulitis with Cipro and Flagyl, and underwent evaluation showing WBC 11.2, ALT 59, alkaline phosphatase 121, and lipase 427.  Abdominal CT scan (8/2/2016) showed localized bowel wall thickening with inflammatory change involving the splenic flexure and proximal descending colon; no colonic diverticuli are seen so the presence of diverticulitis is unlikely; would be a typical location for bowel ischemia, but the patient's vascular structures are essentially normal so bowel ischemia unlikely; favor infectious or inflammatory colitis. She was evaluated by Dr. Ion Portillo and admitted for bowel rest, IVF, and IV antibiotics. She improved significantly in 36 hours and was discharged home. She completed the course of oral antibiotics and states that her symptoms have completely resolved. In 4/2017, she was again evaluated by Dr. Ion Portillo for recurrent abdominal pain, and abdominal and pelvic CT scan was obtained (4/28/2017) showing mild nonspecific thickening of the proximal jejunum and significant improvement in the distal transverse colon adjacent to the splenic flexure with a short segment with mild wall thickening. Her discomfort resolved, but concern was raised for possible ischemia as cause. An echocardiogram was obtained (5/26/2017) showing normal LV function (EF 65%), no RWMA, grade 1 diastolic dysfunction, and wall thickening upper limits of normal. She also underwent a hypercoaguable laboratory workup, which was negative. She underwent screening colonoscopy by Dr. Ernie Cano in 6/2011, which found a polyp in the proximal descending colon (pathology: hyperplastic). Because she could not intubate the cecum, follow-up was recommended for 2 years. In 4/2015, she underwent repeat colonoscopy with Dr. Arianna Berry, which showed left diverticular disease, hypertrophied anal papilla, and no polyps. Follow-up was recommended for 5 years. She has a history of hypertension, treated with amlodipine, hydrochlorothiazide, and captopril. She has resumed exercising and she purchased an "[employer] machine for her home. She denies any chest pain, shortness of breath at rest or with exertion, palpitations, lightheadedness, or edema.  She also has hyperlipidemia, treated with fenofibrate and fish oil in the past, but these were discontinued and she was changed to rosuvastatin in 6/2016. However, due to mild to moderate elevation in AST/ALT, it was discontinued in 5/2017 with resolution of abnormal liver function tests. She has a history of mild-severe obstructive sleep apnea, diagnosed by Dr. Georgina Garrison, and was prescribed nasal CPAP. She states that she is no longer using her machine because it was making her headaches worse. She suffers from chronic headaches, considered to be migraines in the past as they were associated with menstruation. Since she entered menopause, the severity of her headaches has decreased and the character has changed. She has severe allergic rhinitis, and awakens with morning headaches when her allergies are severe. She is followed by Dr. Denzel Vargas and had been receiving immunotherapy with some improvement. She also uses ipratropium nasal spray, fexofenadine, and Fioricet as needed. She denies aura or visual changes associated with the headache. Denies cough or wheezing. She also has vasomotor instability, which responded to venlafaxine and she has successfully weaned from taking it. In 6/2016, she presented with complaints of right sciatica and she underwent a lumbar MRI (7/8/2016) showing multilevel mild disc bulges, mild facet arthropathy, and multilevel mild foraminal stenosis; L3-L4 with mild to moderate spinal canal stenosis, no more than mild spinal canal stenosis elsewhere, and no cord compression. She was evaluated by Dr. Lucy Glez on 9/1/2016 and started on gabapentin. She also was referred to physical therapy and has been undergoing dry needling, which resulted in significant improvement. She also has a history of an anterior cervical discectomy and fusion (C5-C6) in 2/2011 by Dr. Guille Glynn for a herniated disc and right radiculopathy.  She does experience intermittent neck pain, and feels that it may be exacerbating her headaches. Past Medical History:   Diagnosis Date    Allergic rhinitis     Chronic headaches     Hx of colonic polyp 2011    Hyperplastic; Dr. Jenny Swift.  Hyperlipidemia     Hypertension     IBS (irritable bowel syndrome)     Obstructive sleep apnea     Moderate-severe; not using nasal CPAP    S/P cervical discectomy 2011    Anterior C5-C6 discectomy for herniated disc and right radiculopathy; Dr. Jillian Babb     Past Surgical History:   Procedure Laterality Date    ENDOSCOPY, COLON, DIAGNOSTIC      HX BACK SURGERY  11    neck surgery; ruptured cervical discs    HX COLONOSCOPY  2011    HX GYN      dilation and curretage    HX GYN           Current Outpatient Prescriptions   Medication Sig    butalbital-acetaminophen-caffeine (FIORICET, ESGIC) -40 mg per tablet Take 1 Tab by mouth every six (6) hours as needed for Pain.  ALPRAZolam (XANAX) 0.5 mg tablet Take 1 Tab by mouth three (3) times daily as needed for Anxiety. Max Daily Amount: 1.5 mg.    amLODIPine (NORVASC) 10 mg tablet TAKE 1 TABLET BY MOUTH DAILY    OTHER Indications: boswella for arthritis    potassium chloride (K-DUR, KLOR-CON) 20 mEq tablet TAKE 2 TABLETS BY MOUTH EVERY DAY FOR 2 DAYS THEN TAKE 1 TABLET BY MOUTH DAILY    captopril (CAPOTEN) 50 mg tablet TAKE 1 TABLET BY MOUTH THREE TIMES DAILY    hydroCHLOROthiazide (HYDRODIURIL) 25 mg tablet TAKE 1 TABLET BY MOUTH EVERY DAY    TURMERIC ROOT EXTRACT PO Take 1,000 mg by mouth.  thiamine (VITAMIN B-1) 100 mg tablet Take 50 mg by mouth daily.  multivitamin (ONE A DAY) tablet Take 1 Tab by mouth daily.  Cholecalciferol, Vitamin D3, 2,000 unit cap Take 2,000 Units by mouth daily.  venlafaxine-SR (EFFEXOR-XR) 75 mg capsule TAKE 3 CAPSULES BY MOUTH DAILY    GLUC/CHONDR-MSM#7/C/DEENA/BORON (GLUCOSAMINE-CHONDR, BOSWELLIA, PO) Take 500 mg by mouth daily.     ipratropium (ATROVENT) 0.03 % nasal spray 2 Sprays by Both Nostrils route three (3) times daily.    rosuvastatin (CRESTOR) 10 mg tablet Take 1 Tab by mouth nightly. Indications: hyperlipidemia    OTHER Allergy shot weekly     No current facility-administered medications for this visit. Allergies and Intolerances: Allergies   Allergen Reactions    Other Plant, Animal, Environmental Itching and Sneezing     \"Everything but feathers and horses. \"        Family History: Her mother had hypertension, but  from a melanoma. Her father  from a DVT. Her brother has CAD and underwent CABG at the age of 40. Family History   Problem Relation Age of Onset    Heart Disease Brother     Other Mother      melanoma    Arthritis-osteo Mother     Cancer Mother     Bleeding Prob Father     Heart Disease Father     Breast Cancer Other      grandmother    Stroke Other      family history     Social History:   She  reports that she has been smoking. She has been smoking about 0.50 packs per day. She has never used smokeless tobacco. She reports that she smoked 0.5 ppd for 30 years, stopping in . She is  and lives with her . She recently retired from the [employer] department of [employer]; her  sells eyeglass frames to [employer]. History   Alcohol Use    0.0 oz/week    0 Standard drinks or equivalent per week     Comment: Very Rarely     Immunization History:  Immunization History   Administered Date(s) Administered    Influenza Vaccine 10/08/2014, 10/09/2017    TD Vaccine 2006    Tdap 10/07/2016       Review of Systems:   As above included in HPI. Otherwise 11 point review of systems negative including constitutional, skin, HENT, eyes, respiratory, cardiovascular, gastrointestinal, genitourinary, musculoskeletal, endo/heme/aller, neurological.    Physical:   Vitals:   BP: 130/80   HR: 80  WT: 138 lb (62.6 kg)  BMI:  23.69 kg/m2    Pt appears well; alert and oriented x 3; appropriate affect.     HEENT: PERRLA, anicteric, oropharynx clear, no JVD, adenopathy or thyromegaly. No carotid bruits or radiated murmur. Lungs: clear to auscultation, no wheezes, rhonchi, or rales. Heart: regular rate and rhythm. No murmur, rubs, gallops  Abdomen: soft, nontender, nondistended, normal bowel sounds, no hepatosplenomegaly or masses. Extremities: without edema. Pulses 1-2+ bilaterally. Review of Data:  Labs:  Hospital Outpatient Visit on 04/12/2018   Component Date Value Ref Range Status    WBC 04/12/2018 4.5* 4.6 - 13.2 K/uL Final    RBC 04/12/2018 4.94  4.20 - 5.30 M/uL Final    HGB 04/12/2018 15.1  12.0 - 16.0 g/dL Final    HCT 04/12/2018 46.0* 35.0 - 45.0 % Final    MCV 04/12/2018 93.1  74.0 - 97.0 FL Final    MCH 04/12/2018 30.6  24.0 - 34.0 PG Final    MCHC 04/12/2018 32.8  31.0 - 37.0 g/dL Final    RDW 04/12/2018 12.7  11.6 - 14.5 % Final    PLATELET 50/50/1018 548  135 - 420 K/uL Final    MPV 04/12/2018 10.2  9.2 - 11.8 FL Final    NEUTROPHILS 04/12/2018 37* 40 - 73 % Final    LYMPHOCYTES 04/12/2018 53* 21 - 52 % Final    MONOCYTES 04/12/2018 7  3 - 10 % Final    EOSINOPHILS 04/12/2018 3  0 - 5 % Final    BASOPHILS 04/12/2018 0  0 - 2 % Final    ABS. NEUTROPHILS 04/12/2018 1.7* 1.8 - 8.0 K/UL Final    ABS. LYMPHOCYTES 04/12/2018 2.4  0.9 - 3.6 K/UL Final    ABS. MONOCYTES 04/12/2018 0.3  0.05 - 1.2 K/UL Final    ABS. EOSINOPHILS 04/12/2018 0.1  0.0 - 0.4 K/UL Final    ABS.  BASOPHILS 04/12/2018 0.0  0.0 - 0.06 K/UL Final    DF 04/12/2018 AUTOMATED    Final    Sodium 04/12/2018 141  136 - 145 mmol/L Final    Potassium 04/12/2018 3.9  3.5 - 5.5 mmol/L Final    Chloride 04/12/2018 106  100 - 108 mmol/L Final    CO2 04/12/2018 27  21 - 32 mmol/L Final    Anion gap 04/12/2018 8  3.0 - 18 mmol/L Final    Glucose 04/12/2018 84  74 - 99 mg/dL Final    BUN 04/12/2018 24* 7.0 - 18 MG/DL Final    Creatinine 04/12/2018 0.56* 0.6 - 1.3 MG/DL Final    BUN/Creatinine ratio 04/12/2018 43* 12 - 20   Final    GFR est AA 04/12/2018 >60  >60 ml/min/1.73m2 Final  GFR est non-AA 04/12/2018 >60  >60 ml/min/1.73m2 Final    Calcium 04/12/2018 9.5  8.5 - 10.1 MG/DL Final    Bilirubin, total 04/12/2018 0.5  0.2 - 1.0 MG/DL Final    ALT (SGPT) 04/12/2018 37  13 - 56 U/L Final    AST (SGOT) 04/12/2018 25  15 - 37 U/L Final    Alk. phosphatase 04/12/2018 85  45 - 117 U/L Final    Protein, total 04/12/2018 7.1  6.4 - 8.2 g/dL Final    Albumin 04/12/2018 4.2  3.4 - 5.0 g/dL Final    Globulin 04/12/2018 2.9  2.0 - 4.0 g/dL Final    A-G Ratio 04/12/2018 1.4  0.8 - 1.7   Final    Amylase 04/12/2018 45  25 - 115 U/L Final    Lipase 04/12/2018 160  73 - 393 U/L Final    C1 Esterase Inhib. Funct. 04/12/2018 88  %mean normal Final    C1 Esterase Inhibitor, Serum 04/12/2018 29  21 - 39 mg/dL Final    Complement C4 04/12/2018 28  14 - 44 mg/dL Final    Complement C1q, Qt 04/12/2018 18.2  11.8 - 24.4 mg/dL Final    LIPID PROFILE 04/12/2018        Final    Cholesterol, total 04/12/2018 217* <200 MG/DL Final    Triglyceride 04/12/2018 101  <150 MG/DL Final    HDL Cholesterol 04/12/2018 53  40 - 60 MG/DL Final    LDL, calculated 04/12/2018 143.8* 0 - 100 MG/DL Final    VLDL, calculated 04/12/2018 20.2  MG/DL Final    CHOL/HDL Ratio 04/12/2018 4.1  0 - 5.0   Final    Magnesium 04/12/2018 2.2  1.6 - 2.6 mg/dL Final     Calculated 10 year ASCVD risk score: 4.5 %    Health Maintenance:  Screening:    Mammogram: negative (11/2017)   PAP smear: well women exams by Dr. Kathleen Lugo (last 12/2017)   Colorectal: colonoscopy (4/2015) no polyps. Dr. Joy Dandy. Due 2020. Depression: Uses Xanax prn for anxiety. Weaned from Effexor (vasomotor instability).    DM (HbA1c/FPG): FPG 84 (4/2018)   Hepatitis C: negative (12/2015)   Falls: none   DEXA: N/A   Glaucoma: has regular eye exams with Dr. Mitchell Grant (last 12/2016)   Smoking: 15 pack year (stopped 2011)   Vitamin D: 40.9 (10/2017)   Medicare Wellness: N/A    Impression:  Patient Active Problem List   Diagnosis Code    Hypertension I10  Hyperlipidemia E78.5    Obstructive sleep apnea G47.33    Chronic headaches R51    IBS (irritable bowel syndrome) K58.9    Colon polyp K63.5    Vasomotor instability R55    Vitamin D deficiency E55.9    Sciatica of right side M54.31    Abnormal glucose R73.09    Colitis K52.9    Lumbar spinal stenosis M48.061    HNP (herniated nucleus pulposus), lumbar M51.26    Lumbar facet arthropathy (HCC) M46.96       Plan:  1. Hypertension. Blood pressure appears well controlled on current regimen of amlodipine 10 mg, hydrochlorothiazide, and captopril (50 mg tid). However, difficulty with regimen of tid dosing of captopril. In addition, unclear if abdominal attacks may be form of angioedema, so would want to discontinue Ace-I as could exacerbate. Patient will discontinue captopril after her upcoming vacations and begin to monitor blood pressure closely. Will bring readings to office for review to determine if additional medication needed. Renal function remains normal with creatinine 0.56/ eGFR >60. However, BUN/creatinine ratio significantly elevated to 43. Discussed importance of increasing fluid intake. Follow closely. 2. Hyperlipidemia. Rosuvastatin discontinued in 5/2017 due to persistent elevation of transaminases with resolution after discontinued. Calculated 10 year ASCVD risk off statin is 4.5 %, which does not place her in one of the four statin benefit groups as per new AHA/ACC guidelines. Thus, would not recommend resumption of treatment with statin at this time. Emphasized importance of lifestyle modifications, including diet, exercise, and weight loss. Continue to follow. 3. Elevated transaminases. Now resolved since rosuvastatin discontinued. Review of record shows elevation noted since 8/2016 when initiated. Iron studies, hepatitis panel, KIEL, SPEP, CK, aldolase, and anti-smooth muscle antibody negative. RUQ ultrasound (10/2016) showing fatty infiltration of liver. 4. Colitis.  Unclear etiology, but nonspecific bowel wall thickening in short segment of jejunum and sigmoid on repeat CT scan raised question of ischemic in origin. Hypercoaguable workup including lab studies and echocardiogram negative. Appears to have intermittent \"IBS\" episodes associated with facial flushing, dry mouth, and severe abdominal cramping followed by diarrhea +/- vomiting. Episodes linger for several days following. No evidence of urticaria seen. Interesting is the abnormalities noted on abdominal CT scan in two of her episodes, with nonspecific short segment thickening in various locations in small and large intestines. Obtained complement studies including C4, C1 esterase inhibtor, and C1q levels which were normal. However, instructed patient that if she developed another episode, would like to check C4 level during the episode. Will also need to discontinue captopril as may be exacerbating problem. Evaluation by GI felt to be IBS with constipation and started on Linzess. 5. Chronic daily headaches. Using daily Fioricet, Excedrin migraine, and Goody powder. Worsening headaches may be due to overmedication. Currently being followed by Dr. Barbara Jameson, although recently stopped immunotherapy. Will refer to Dr. Sourav Alonso to help with differentiating cause and to help with management. Follow. 6. Right leg sciatica. Evidence of lumbar spinal stenosis and facet arthropathy, and lumbar herniated disc. Significant improvement with dry needling therapy and reports no further pain. Continue to follow and follow-up with Dr. Joy Li if recurs. 7. Abnormal glucose. Fasting glucose remains normal. Most likely due to weight loss. Emphasized importance of continued lifestyle modifications. 8.  Obstructive sleep apnea. Not using CPAP as feels exacerbated headaches. Unclear if untreated sleep apnea may be contributing as headaches present in the morning. 9. Vasomotor dysfunction. Weaned successfully from venlafaxine. Follow.   10. Health maintenance. Patient already received influenza vaccine. Will discuss Shingrix at next visit. Other immunizations up to date. Well woman exam with Dr. Scarlett Cain in 12/2017. Mammogram due next month. Will order. Continue regular eye exams with Dr. Della Hook. Vitamin D level normal. Continue maintenance dose supplement. Total time: 40 minutes spent with the patient in face-to-face consultation of which greater than 50% was spent on counseling, answering questions and/or coordination of care. Complex medical review and management performed. Patient understands recommendations and agrees with plan. Follow-up in 8 weeks.

## 2018-05-01 DIAGNOSIS — G89.29 CHRONIC NONINTRACTABLE HEADACHE, UNSPECIFIED HEADACHE TYPE: ICD-10-CM

## 2018-05-01 DIAGNOSIS — R51.9 CHRONIC NONINTRACTABLE HEADACHE, UNSPECIFIED HEADACHE TYPE: ICD-10-CM

## 2018-05-01 DIAGNOSIS — F41.9 ANXIETY: ICD-10-CM

## 2018-05-01 RX ORDER — ALPRAZOLAM 0.5 MG/1
0.5 TABLET ORAL
Qty: 25 TAB | Refills: 0 | OUTPATIENT
Start: 2018-05-01 | End: 2018-05-30 | Stop reason: SDUPTHER

## 2018-05-01 RX ORDER — BUTALBITAL, ACETAMINOPHEN AND CAFFEINE 50; 325; 40 MG/1; MG/1; MG/1
1 TABLET ORAL
Qty: 60 TAB | Refills: 0 | Status: SHIPPED | OUTPATIENT
Start: 2018-05-01 | End: 2018-05-30 | Stop reason: SDUPTHER

## 2018-05-01 NOTE — TELEPHONE ENCOUNTER
Reviewed report generated by the [facility]. Does not demonstrate aberrancies or inconsistencies with regard to the prescribing of controlled medications to this patient by other providers.   Last filled xanax 3/28/18 for a 8 day supply

## 2018-05-01 NOTE — TELEPHONE ENCOUNTER
From: Parul Come  To: Demarcus Wade MD  Sent: 5/1/2018 8:43 AM EDT  Subject: Medication Renewal Request    Original authorizing provider: MD Angel Johnson.  Oma Barr would like a refill of the following medications:  ALPRAZolam (XANAX) 0.5 mg tablet Demarcus Wade MD]  butalbital-acetaminophen-caffeine (FIORICET, ESGIC) -40 mg per tablet Demarcus Wade MD]    Preferred pharmacy: [address] [place] [address] [address] AT [address] [place] [address] [address]    Comment:

## 2018-05-08 NOTE — TELEPHONE ENCOUNTER
Please check with patient regarding this refill request. Plan was to discontinue this medication and follow blood pressure to see if alternate needed. Please inquire why this was requested. Thanks.

## 2018-05-09 RX ORDER — CAPTOPRIL 50 MG/1
TABLET ORAL
Qty: 270 TAB | Refills: 0 | Status: SHIPPED | OUTPATIENT
Start: 2018-05-09 | End: 2018-06-14 | Stop reason: ALTCHOICE

## 2018-05-09 NOTE — TELEPHONE ENCOUNTER
Pt returning call said she is supposed to be taking this medication until 2 weeks prior to her appt on 6/14

## 2018-05-30 DIAGNOSIS — R51.9 CHRONIC NONINTRACTABLE HEADACHE, UNSPECIFIED HEADACHE TYPE: ICD-10-CM

## 2018-05-30 DIAGNOSIS — G89.29 CHRONIC NONINTRACTABLE HEADACHE, UNSPECIFIED HEADACHE TYPE: ICD-10-CM

## 2018-05-30 DIAGNOSIS — F41.9 ANXIETY: ICD-10-CM

## 2018-05-30 NOTE — TELEPHONE ENCOUNTER
From: Michelle Gutiérrez  To: Cheri Nguyễn MD  Sent: 5/30/2018 9:07 AM EDT  Subject: Medication Renewal Request    Original authorizing provider: MD Marlen Nunn.  Tatiana Candelario would like a refill of the following medications:  ALPRAZolam (XANAX) 0.5 mg tablet Cheri Nguyễn MD]  butalbital-acetaminophen-caffeine (FIORICET, ESGIC) -40 mg per tablet Cheri Nguyễn MD]    Preferred pharmacy: [address] [place] [address] [address]  [address] [place] [address] [address]    Comment:

## 2018-05-31 RX ORDER — BUTALBITAL, ACETAMINOPHEN AND CAFFEINE 50; 325; 40 MG/1; MG/1; MG/1
1 TABLET ORAL
Qty: 60 TAB | Refills: 0 | OUTPATIENT
Start: 2018-05-31 | End: 2018-06-26 | Stop reason: SDUPTHER

## 2018-05-31 RX ORDER — ALPRAZOLAM 0.5 MG/1
0.5 TABLET ORAL
Qty: 25 TAB | Refills: 0 | OUTPATIENT
Start: 2018-05-31 | End: 2018-06-26 | Stop reason: SDUPTHER

## 2018-05-31 NOTE — TELEPHONE ENCOUNTER
Reviewed report generated by the [facility]. Does not demonstrate aberrancies or inconsistencies with regard to the prescribing of controlled medications to this patient by other providers. Last filled Xanax 5/3/2018 per .

## 2018-06-01 ENCOUNTER — TELEPHONE (OUTPATIENT)
Dept: INTERNAL MEDICINE CLINIC | Age: 58
End: 2018-06-01

## 2018-06-14 ENCOUNTER — OFFICE VISIT (OUTPATIENT)
Dept: INTERNAL MEDICINE CLINIC | Age: 58
End: 2018-06-14

## 2018-06-14 ENCOUNTER — TELEPHONE (OUTPATIENT)
Dept: INTERNAL MEDICINE CLINIC | Age: 58
End: 2018-06-14

## 2018-06-14 VITALS
WEIGHT: 141.7 LBS | OXYGEN SATURATION: 97 % | HEIGHT: 64 IN | HEART RATE: 83 BPM | BODY MASS INDEX: 24.19 KG/M2 | TEMPERATURE: 99.1 F | SYSTOLIC BLOOD PRESSURE: 132 MMHG | DIASTOLIC BLOOD PRESSURE: 80 MMHG | RESPIRATION RATE: 14 BRPM

## 2018-06-14 DIAGNOSIS — R51.9 CHRONIC DAILY HEADACHE: ICD-10-CM

## 2018-06-14 DIAGNOSIS — E78.5 HYPERLIPIDEMIA, UNSPECIFIED HYPERLIPIDEMIA TYPE: ICD-10-CM

## 2018-06-14 DIAGNOSIS — G47.33 OBSTRUCTIVE SLEEP APNEA: ICD-10-CM

## 2018-06-14 DIAGNOSIS — M48.061 SPINAL STENOSIS OF LUMBAR REGION, UNSPECIFIED WHETHER NEUROGENIC CLAUDICATION PRESENT: ICD-10-CM

## 2018-06-14 DIAGNOSIS — I10 ESSENTIAL HYPERTENSION: Primary | ICD-10-CM

## 2018-06-14 DIAGNOSIS — M47.816 LUMBAR FACET ARTHROPATHY: ICD-10-CM

## 2018-06-14 DIAGNOSIS — K52.9 COLITIS: ICD-10-CM

## 2018-06-14 DIAGNOSIS — K58.1 IRRITABLE BOWEL SYNDROME WITH CONSTIPATION: ICD-10-CM

## 2018-06-14 DIAGNOSIS — E55.9 VITAMIN D DEFICIENCY: ICD-10-CM

## 2018-06-14 RX ORDER — HYDRALAZINE HYDROCHLORIDE 25 MG/1
25 TABLET, FILM COATED ORAL 3 TIMES DAILY
Qty: 90 TAB | Refills: 5 | Status: SHIPPED | OUTPATIENT
Start: 2018-06-14 | End: 2018-06-14 | Stop reason: SDUPTHER

## 2018-06-14 NOTE — MR AVS SNAPSHOT
[facility] 
 
 
 [address], [address] [place] [address] [place] [id number] Patient: Anabelle Garcia MRN: [id number] Visit Information Date & Time Provider Department Dept. Phone Encounter #  
 6/14/2018 12:00 PM Gloria Lund MD [facility] [id number] [phone] Follow-up Instructions Return if symptoms worsen or fail to improve. Your Appointments 10/17/2018  8:15 AM  
LAB with [facility] NURSE VISIT [facility] ([address] [place] [address]) Appt Note: lab  
 [address], [address] [address] [address] [place]  
  
   
 [address], [address] [phone]  
  
    
 10/23/2018 11:00 AM  
Office Visit with Gloria Lund MD  
[facility] [address] Appt Note: 4 month follow up  
 [address], [address] [address] [address] [place]  
  
   
 [address], [address] Upcoming Health Maintenance Date Due Influenza Age 5 to Adult 8/1/2018 BREAST CANCER SCRN MAMMOGRAM 11/17/2019 PAP AKA CERVICAL CYTOLOGY 12/28/2019 COLONOSCOPY 4/17/2020 DTaP/Tdap/Td series (2 - Td) 10/7/2026 Allergies as of 6/14/2018  Review Complete On: 6/14/2018 By: Skeeter Merlin Severity Noted Reaction Type Reactions Other Plant, Animal, Environmental  09/07/2016    Itching, Sneezing \"Everything but feathers and horses. \" Current Immunizations  Reviewed on 10/7/2016 Name Date Influenza Vaccine 10/9/2017, 10/8/2014 TD Vaccine 1/1/2006 Tdap 10/7/2016  1:04 PM  
  
 Not reviewed this visit Vitals BP Pulse Temp Resp Height(growth percentile) Weight(growth percentile) 132/80 (BP 1 Location: Left arm, BP Patient Position: Sitting) 83 99.1 °F (37.3 °C) (Oral) 14 5' 4\" (1.626 m) 141 lb 11.2 oz (64.3 kg) LMP SpO2 BMI OB Status Smoking Status 01/10/2012 97% 24.32 kg/m2 Postmenopausal Current Some Day Smoker Vitals History BMI and BSA Data Body Mass Index Body Surface Area  
 24.32 kg/m 2 1.7 m 2 Preferred Pharmacy Pharmacy Name Phone [facility] [phone], [address] [phone] Your Updated Medication List  
  
   
This list is accurate as of 6/14/18 12:35 PM.  Always use your most recent med list.  
  
  
  
  
 ALPRAZolam 0.5 mg tablet Commonly known as:  Will Joce Take 1 Tab by mouth three (3) times daily as needed for Anxiety. Max Daily Amount: 1.5 mg.  
  
 amLODIPine 10 mg tablet Commonly known as:  Jaiden Hunter TAKE 1 TABLET BY MOUTH DAILY  
  
 butalbital-acetaminophen-caffeine -40 mg per tablet Commonly known as:  [place] Take 1 Tab by mouth every six (6) hours as needed for Pain. captopril 50 mg tablet Commonly known as:  CAPOTEN  
TAKE 1 TABLET BY MOUTH THREE TIMES DAILY Cholecalciferol (Vitamin D3) 2,000 unit Cap capsule Commonly known as:  VITAMIN D3 Take 2,000 Units by mouth daily. GLUCOSAMINE-CHONDR (BOSWELLIA) PO Take 500 mg by mouth daily. hydroCHLOROthiazide 25 mg tablet Commonly known as:  HYDRODIURIL  
TAKE 1 TABLET BY MOUTH EVERY DAY  
  
 ipratropium 0.03 % nasal spray Commonly known as:  ATROVENT  
2 Sprays by Both Nostrils route three (3) times daily. multivitamin tablet Commonly known as:  ONE A DAY Take 1 Tab by mouth daily. potassium chloride 20 mEq tablet Commonly known as:  K-DUR, KLOR-CON  
TAKE 2 TABLETS BY MOUTH EVERY DAY FOR 2 DAYS THEN TAKE 1 TABLET BY MOUTH DAILY TURMERIC ROOT EXTRACT PO Take 1,000 mg by mouth.  
  
 varicella-zoster recombinant (PF) 50 mcg/0.5 mL Susr injection Commonly known as:  SHINGRIX  
0.5 mL by IntraMUSCular route once for 1 dose. Repeat x 1 dose in 2-6 months VITAMIN B-1 100 mg tablet Generic drug:  thiamine Take 50 mg by mouth daily. Prescriptions Printed Refills  
 varicella-zoster recombinant, PF, (SHINGRIX) 50 mcg/0.5 mL susr injection 1 Si.5 mL by IntraMUSCular route once for 1 dose. Repeat x 1 dose in 2-6 months Class: Print Route: IntraMUSCular Follow-up Instructions Return if symptoms worsen or fail to improve. Patient Instructions DASH Diet: Care Instructions Your Care Instructions The DASH diet is an eating plan that can help lower your blood pressure. DASH stands for Dietary Approaches to Stop Hypertension. Hypertension is high blood pressure. The DASH diet focuses on eating foods that are high in calcium, potassium, and magnesium. These nutrients can lower blood pressure. The foods that are highest in these nutrients are fruits, vegetables, low-fat dairy products, nuts, seeds, and legumes. But taking calcium, potassium, and magnesium supplements instead of eating foods that are high in those nutrients does not have the same effect. The DASH diet also includes whole grains, fish, and poultry. The DASH diet is one of several lifestyle changes your doctor may recommend to lower your high blood pressure. Your doctor may also want you to decrease the amount of sodium in your diet. Lowering sodium while following the DASH diet can lower blood pressure even further than just the DASH diet alone. Follow-up care is a key part of your treatment and safety. Be sure to make and go to all appointments, and call your doctor if you are having problems. It's also a good idea to know your test results and keep a list of the medicines you take. How can you care for yourself at home? Following the DASH diet · Eat 4 to 5 servings of fruit each day. A serving is 1 medium-sized piece of fruit, ½ cup chopped or canned fruit, 1/4 cup dried fruit, or 4 ounces (½ cup) of fruit juice. Choose fruit more often than fruit juice. · Eat 4 to 5 servings of vegetables each day. A serving is 1 cup of lettuce or raw leafy vegetables, ½ cup of chopped or cooked vegetables, or 4 ounces (½ cup) of vegetable juice. Choose vegetables more often than vegetable juice. · Get 2 to 3 servings of low-fat and fat-free dairy each day. A serving is 8 ounces of milk, 1 cup of yogurt, or 1 ½ ounces of cheese. · Eat 6 to 8 servings of grains each day. A serving is 1 slice of bread, 1 ounce of dry cereal, or ½ cup of cooked rice, pasta, or cooked cereal. Try to choose whole-grain products as much as possible. · Limit lean meat, poultry, and fish to 2 servings each day. A serving is 3 ounces, about the size of a deck of cards. · Eat 4 to 5 servings of nuts, seeds, and legumes (cooked dried beans, lentils, and split peas) each week. A serving is 1/3 cup of nuts, 2 tablespoons of seeds, or ½ cup of cooked beans or peas. · Limit fats and oils to 2 to 3 servings each day. A serving is 1 teaspoon of vegetable oil or 2 tablespoons of salad dressing. · Limit sweets and added sugars to 5 servings or less a week. A serving is 1 tablespoon jelly or jam, ½ cup sorbet, or 1 cup of lemonade. · Eat less than 2,300 milligrams (mg) of sodium a day. If you limit your sodium to 1,500 mg a day, you can lower your blood pressure even more. Tips for success · Start small. Do not try to make dramatic changes to your diet all at once. You might feel that you are missing out on your favorite foods and then be more likely to not follow the plan. Make small changes, and stick with them. Once those changes become habit, add a few more changes. · Try some of the following: ¨ Make it a goal to eat a fruit or vegetable at every meal and at snacks. This will make it easy to get the recommended amount of fruits and vegetables each day. ¨ Try yogurt topped with fruit and nuts for a snack or healthy dessert. ¨ Add lettuce, tomato, cucumber, and onion to sandwiches. ¨ Combine a ready-made pizza crust with low-fat mozzarella cheese and lots of vegetable toppings. Try using tomatoes, squash, spinach, broccoli, carrots, cauliflower, and onions. ¨ Have a variety of cut-up vegetables with a low-fat dip as an appetizer instead of chips and dip. ¨ Sprinkle sunflower seeds or chopped almonds over salads. Or try adding chopped walnuts or almonds to cooked vegetables. ¨ Try some vegetarian meals using beans and peas. Add garbanzo or kidney beans to salads. Make burritos and tacos with mashed medrano beans or black beans. Where can you learn more? Go to http://pooja-real.info/. Enter U498 in the search box to learn more about \"DASH Diet: Care Instructions. \" Current as of: September 21, 2016 Content Version: 11.4 © 4943-5038 [employer]. Care instructions adapted under license by [employer] (which disclaims liability or warranty for this information). If you have questions about a medical condition or this instruction, always ask your healthcare professional. [place] [address] any warranty or liability for your use of this information. Introducing [facility]! [address]: 
Thank you for requesting a Mayvenn account. Our records indicate that you already have an active Mayvenn account. You can access your account anytime at [email or web address] Did you know that you can access your hospital and ER discharge instructions at any time in Mayvenn? You can also review all of your test results from your hospital stay or ER visit. Additional Information If you have questions, please visit the Frequently Asked Questions section of the Mayvenn website at [email or web address] Remember, Mayvenn is NOT to be used for urgent needs. For medical emergencies, dial 911. Now available from your iPhone and Android! Please provide this summary of care documentation to your next provider. Your primary care clinician is listed as Estephania Lima. If you have any questions after today's visit, please call [phone].

## 2018-06-14 NOTE — PATIENT INSTRUCTIONS
Begin hydralazine 25 mg three times per day. Please monitor and record your blood pressure every morning for the next 2 weeks two hours after taking your medications. Please deliver record of readings to our office for review. Call if greater than 150/90. DASH Diet: Care Instructions  Your Care Instructions    The DASH diet is an eating plan that can help lower your blood pressure. DASH stands for Dietary Approaches to Stop Hypertension. Hypertension is high blood pressure. The DASH diet focuses on eating foods that are high in calcium, potassium, and magnesium. These nutrients can lower blood pressure. The foods that are highest in these nutrients are fruits, vegetables, low-fat dairy products, nuts, seeds, and legumes. But taking calcium, potassium, and magnesium supplements instead of eating foods that are high in those nutrients does not have the same effect. The DASH diet also includes whole grains, fish, and poultry. The DASH diet is one of several lifestyle changes your doctor may recommend to lower your high blood pressure. Your doctor may also want you to decrease the amount of sodium in your diet. Lowering sodium while following the DASH diet can lower blood pressure even further than just the DASH diet alone. Follow-up care is a key part of your treatment and safety. Be sure to make and go to all appointments, and call your doctor if you are having problems. It's also a good idea to know your test results and keep a list of the medicines you take. How can you care for yourself at home? Following the DASH diet  · Eat 4 to 5 servings of fruit each day. A serving is 1 medium-sized piece of fruit, ½ cup chopped or canned fruit, 1/4 cup dried fruit, or 4 ounces (½ cup) of fruit juice. Choose fruit more often than fruit juice. · Eat 4 to 5 servings of vegetables each day.  A serving is 1 cup of lettuce or raw leafy vegetables, ½ cup of chopped or cooked vegetables, or 4 ounces (½ cup) of vegetable juice. Choose vegetables more often than vegetable juice. · Get 2 to 3 servings of low-fat and fat-free dairy each day. A serving is 8 ounces of milk, 1 cup of yogurt, or 1 ½ ounces of cheese. · Eat 6 to 8 servings of grains each day. A serving is 1 slice of bread, 1 ounce of dry cereal, or ½ cup of cooked rice, pasta, or cooked cereal. Try to choose whole-grain products as much as possible. · Limit lean meat, poultry, and fish to 2 servings each day. A serving is 3 ounces, about the size of a deck of cards. · Eat 4 to 5 servings of nuts, seeds, and legumes (cooked dried beans, lentils, and split peas) each week. A serving is 1/3 cup of nuts, 2 tablespoons of seeds, or ½ cup of cooked beans or peas. · Limit fats and oils to 2 to 3 servings each day. A serving is 1 teaspoon of vegetable oil or 2 tablespoons of salad dressing. · Limit sweets and added sugars to 5 servings or less a week. A serving is 1 tablespoon jelly or jam, ½ cup sorbet, or 1 cup of lemonade. · Eat less than 2,300 milligrams (mg) of sodium a day. If you limit your sodium to 1,500 mg a day, you can lower your blood pressure even more. Tips for success  · Start small. Do not try to make dramatic changes to your diet all at once. You might feel that you are missing out on your favorite foods and then be more likely to not follow the plan. Make small changes, and stick with them. Once those changes become habit, add a few more changes. · Try some of the following:  ¨ Make it a goal to eat a fruit or vegetable at every meal and at snacks. This will make it easy to get the recommended amount of fruits and vegetables each day. ¨ Try yogurt topped with fruit and nuts for a snack or healthy dessert. ¨ Add lettuce, tomato, cucumber, and onion to sandwiches. ¨ Combine a ready-made pizza crust with low-fat mozzarella cheese and lots of vegetable toppings. Try using tomatoes, squash, spinach, broccoli, carrots, cauliflower, and onions.   ¨ Have a variety of cut-up vegetables with a low-fat dip as an appetizer instead of chips and dip. ¨ Sprinkle sunflower seeds or chopped almonds over salads. Or try adding chopped walnuts or almonds to cooked vegetables. ¨ Try some vegetarian meals using beans and peas. Add garbanzo or kidney beans to salads. Make burritos and tacos with mashed medrano beans or black beans. Where can you learn more? Go to http://pooja-real.info/. Enter O762 in the search box to learn more about \"DASH Diet: Care Instructions. \"  Current as of: September 21, 2016  Content Version: 11.4  © 1758-4042 [employer]. Care instructions adapted under license by [employer] (which disclaims liability or warranty for this information). If you have questions about a medical condition or this instruction, always ask your healthcare professional. [facility] any warranty or liability for your use of this information.

## 2018-06-18 NOTE — PROGRESS NOTES
HPI:   Temi López is a 59y.o. year old female who presents today for evaluation of hypertension, hyperlipidemia, obstructive sleep apnea, allergic rhinitis, vasomotor instability, and irritable bowel syndrome. She reports that she is doing relatively well. She reports continued difficulty with chronic daily headaches, and takes Fioricet daily, as well as Excedrin migraine and Goody's powder. She states that she is scheduled with Dr. Sathish Olson for evaluation in 7/2018. She states that she did discontinue captopril, but noticed her blood pressure increased to 150-160/ 90-95, so restarted it. She reports today that she is otherwise feeling well and without complaints. On 1/29/2018, she called the office and reported she was out having dinner when she experienced an \"IBS episode\" characterized by flushing in the face, dry mouth, and severe abdominal cramping. When she arrived home, she experienced diarrhea and a single episode of vomiting. She reported that she continued to have abdominal cramping and fatigue following this episode, although described as getting slowly better each day. She reported that she had similar episodes to this dating back 30 years, and was diagnosed with IBS at that time. She stated that the episodes used to resolve more quickly, although over the last year, she states that it now is taking several days before she recovers. She was referred to Dr. Sheba Hernandez, and started on Linzess for constipation. She states that she has noted some improvement in constipation, but has diarrhea intermittently. She has not had a recurret episode since 1/2018. On 8/2/2016, she was evaluated by PETERSON Ahmadi for left sided crampy abdominal pain, diarrhea, and hematochezia. She was treated for presumed diverticulitis with Cipro and Flagyl, and underwent evaluation showing WBC 11.2, ALT 59, alkaline phosphatase 121, and lipase 427.  Abdominal CT scan (8/2/2016) showed localized bowel wall thickening with inflammatory change involving the splenic flexure and proximal descending colon; no colonic diverticuli are seen so the presence of diverticulitis is unlikely; would be a typical location for bowel ischemia, but the patient's vascular structures are essentially normal so bowel ischemia unlikely; favor infectious or inflammatory colitis. She was evaluated by Dr. Elaine Rizo and admitted for bowel rest, IVF, and IV antibiotics. She improved significantly in 36 hours and was discharged home. She completed the course of oral antibiotics and states that her symptoms have completely resolved. In 4/2017, she was again evaluated by Dr. Elaine Rizo for recurrent abdominal pain, and abdominal and pelvic CT scan was obtained (4/28/2017) showing mild nonspecific thickening of the proximal jejunum and significant improvement in the distal transverse colon adjacent to the splenic flexure with a short segment with mild wall thickening. Her discomfort resolved, but concern was raised for possible ischemia as cause. An echocardiogram was obtained (5/26/2017) showing normal LV function (EF 65%), no RWMA, grade 1 diastolic dysfunction, and wall thickening upper limits of normal. She also underwent a hypercoaguable laboratory workup, which was negative. She underwent screening colonoscopy by Dr. Kyra Hernandez in 6/2011, which found a polyp in the proximal descending colon (pathology: hyperplastic). Because she could not intubate the cecum, follow-up was recommended for 2 years. In 4/2015, she underwent repeat colonoscopy with Dr. Samantha Malin, which showed left diverticular disease, hypertrophied anal papilla, and no polyps. Follow-up was recommended for 5 years. She has a history of hypertension, treated with amlodipine, hydrochlorothiazide, and captopril. She has resumed exercising and she purchased an Memopal machine for her home.  She denies any chest pain, shortness of breath at rest or with exertion, palpitations, lightheadedness, or edema. She also has hyperlipidemia, treated with fenofibrate and fish oil in the past, but these were discontinued and she was changed to rosuvastatin in 6/2016. However, due to mild to moderate elevation in AST/ALT, it was discontinued in 5/2017 with resolution of abnormal liver function tests. She has a history of mild-severe obstructive sleep apnea, diagnosed by Dr. Nmia Harrell, and was prescribed nasal CPAP. She states that she is no longer using her machine because it was making her headaches worse. She suffers from chronic headaches, considered to be migraines in the past as they were associated with menstruation. Since she entered menopause, the severity of her headaches has decreased and the character has changed. She has severe allergic rhinitis, and awakens with morning headaches when her allergies are severe. She is followed by Dr. Mariposa Barrera and had been receiving immunotherapy with some improvement. She also uses ipratropium nasal spray, fexofenadine, and Fioricet as needed. She denies aura or visual changes associated with the headache. Denies cough or wheezing. She also has vasomotor instability, which responded to venlafaxine and she has successfully weaned from taking it. In 6/2016, she presented with complaints of right sciatica and she underwent a lumbar MRI (7/8/2016) showing multilevel mild disc bulges, mild facet arthropathy, and multilevel mild foraminal stenosis; L3-L4 with mild to moderate spinal canal stenosis, no more than mild spinal canal stenosis elsewhere, and no cord compression. She was evaluated by Dr. Qing Garzon on 9/1/2016 and started on gabapentin. She also was referred to physical therapy and received dry needling, which resulted in significant improvement. She also has a history of an anterior cervical discectomy and fusion (C5-C6) in 2/2011 by Dr. Jillian Babb for a herniated disc and right radiculopathy.  She does experience intermittent neck pain, and feels that it may be exacerbating her headaches. Past Medical History:   Diagnosis Date    Allergic rhinitis     Chronic headaches     Hx of colonic polyp 2011    Hyperplastic; Dr. Oneyda Root.  Hyperlipidemia     Hypertension     IBS (irritable bowel syndrome)     Obstructive sleep apnea     Moderate-severe; not using nasal CPAP    S/P cervical discectomy 2011    Anterior C5-C6 discectomy for herniated disc and right radiculopathy; Dr. Carmelo Corrales     Past Surgical History:   Procedure Laterality Date    ENDOSCOPY, COLON, DIAGNOSTIC      HX BACK SURGERY  11    neck surgery; ruptured cervical discs    HX COLONOSCOPY  2011    HX GYN      dilation and curretage    HX GYN           Current Outpatient Prescriptions   Medication Sig    hydroCHLOROthiazide (HYDRODIURIL) 25 mg tablet TAKE 1 TABLET BY MOUTH EVERY DAY    ALPRAZolam (XANAX) 0.5 mg tablet Take 1 Tab by mouth three (3) times daily as needed for Anxiety. Max Daily Amount: 1.5 mg.    butalbital-acetaminophen-caffeine (FIORICET, ESGIC) -40 mg per tablet Take 1 Tab by mouth every six (6) hours as needed for Pain.  amLODIPine (NORVASC) 10 mg tablet TAKE 1 TABLET BY MOUTH DAILY    potassium chloride (K-DUR, KLOR-CON) 20 mEq tablet TAKE 2 TABLETS BY MOUTH EVERY DAY FOR 2 DAYS THEN TAKE 1 TABLET BY MOUTH DAILY    TURMERIC ROOT EXTRACT PO Take 1,000 mg by mouth.  GLUC/CHONDR-MSM#7/C/DEENA/BORON (GLUCOSAMINE-CHONDR, BOSWELLIA, PO) Take 500 mg by mouth daily.  thiamine (VITAMIN B-1) 100 mg tablet Take 50 mg by mouth daily.  multivitamin (ONE A DAY) tablet Take 1 Tab by mouth daily.  Cholecalciferol, Vitamin D3, 2,000 unit cap Take 2,000 Units by mouth daily.  hydrALAZINE (APRESOLINE) 25 mg tablet TAKE 1 TABLET BY MOUTH THREE TIMES DAILY    ipratropium (ATROVENT) 0.03 % nasal spray 2 Sprays by Both Nostrils route three (3) times daily. No current facility-administered medications for this visit. Allergies and Intolerances:    Allergies Allergen Reactions    Other Plant, Animal, Environmental Itching and Sneezing     \"Everything but feathers and horses. \"        Family History: Her mother had hypertension, but  from a melanoma. Her father  from a DVT. Her brother has CAD and underwent CABG at the age of 40. Family History   Problem Relation Age of Onset    Heart Disease Brother     Other Mother      melanoma    Arthritis-osteo Mother     Cancer Mother     Bleeding Prob Father     Heart Disease Father     Breast Cancer Other      grandmother    Stroke Other      family history     Social History:   She  reports that she has been smoking. She has been smoking about 0.50 packs per day. She has never used smokeless tobacco. She reports that she smoked 0.5 ppd for 30 years, stopping in . She is  and lives with her . She recently retired from the [employer] department of [employer]; her  sells eyeglass frames to [employer]. History   Alcohol Use    0.0 oz/week    0 Standard drinks or equivalent per week     Comment: Very Rarely     Immunization History:  Immunization History   Administered Date(s) Administered    Influenza Vaccine 10/08/2014, 10/09/2017    TD Vaccine 2006    Tdap 10/07/2016       Review of Systems:   As above included in HPI. Otherwise 11 point review of systems negative including constitutional, skin, HENT, eyes, respiratory, cardiovascular, gastrointestinal, genitourinary, musculoskeletal, endo/heme/aller, neurological.    Physical:   Vitals:   BP: 132/80   HR: 83  WT: 141 lb 11.2 oz (64.3 kg)  BMI:  24.32 kg/m2    Pt appears well; alert and oriented x 3; appropriate affect. HEENT: PERRLA, anicteric, oropharynx clear, no JVD, adenopathy or thyromegaly. No carotid bruits or radiated murmur. Lungs: clear to auscultation, no wheezes, rhonchi, or rales. Heart: regular rate and rhythm.  No murmur, rubs, gallops  Abdomen: soft, nontender, nondistended, normal bowel sounds, no hepatosplenomegaly or masses. Extremities: without edema. Pulses 1-2+ bilaterally. Review of Data:  Labs:  No visits with results within 1 Month(s) from this visit. Latest known visit with results is:    Hospital Outpatient Visit on 04/12/2018   Component Date Value Ref Range Status    WBC 04/12/2018 4.5* 4.6 - 13.2 K/uL Final    RBC 04/12/2018 4.94  4.20 - 5.30 M/uL Final    HGB 04/12/2018 15.1  12.0 - 16.0 g/dL Final    HCT 04/12/2018 46.0* 35.0 - 45.0 % Final    MCV 04/12/2018 93.1  74.0 - 97.0 FL Final    MCH 04/12/2018 30.6  24.0 - 34.0 PG Final    MCHC 04/12/2018 32.8  31.0 - 37.0 g/dL Final    RDW 04/12/2018 12.7  11.6 - 14.5 % Final    PLATELET 13/39/8895 311  135 - 420 K/uL Final    MPV 04/12/2018 10.2  9.2 - 11.8 FL Final    NEUTROPHILS 04/12/2018 37* 40 - 73 % Final    LYMPHOCYTES 04/12/2018 53* 21 - 52 % Final    MONOCYTES 04/12/2018 7  3 - 10 % Final    EOSINOPHILS 04/12/2018 3  0 - 5 % Final    BASOPHILS 04/12/2018 0  0 - 2 % Final    ABS. NEUTROPHILS 04/12/2018 1.7* 1.8 - 8.0 K/UL Final    ABS. LYMPHOCYTES 04/12/2018 2.4  0.9 - 3.6 K/UL Final    ABS. MONOCYTES 04/12/2018 0.3  0.05 - 1.2 K/UL Final    ABS. EOSINOPHILS 04/12/2018 0.1  0.0 - 0.4 K/UL Final    ABS.  BASOPHILS 04/12/2018 0.0  0.0 - 0.06 K/UL Final    DF 04/12/2018 AUTOMATED    Final    Sodium 04/12/2018 141  136 - 145 mmol/L Final    Potassium 04/12/2018 3.9  3.5 - 5.5 mmol/L Final    Chloride 04/12/2018 106  100 - 108 mmol/L Final    CO2 04/12/2018 27  21 - 32 mmol/L Final    Anion gap 04/12/2018 8  3.0 - 18 mmol/L Final    Glucose 04/12/2018 84  74 - 99 mg/dL Final    BUN 04/12/2018 24* 7.0 - 18 MG/DL Final    Creatinine 04/12/2018 0.56* 0.6 - 1.3 MG/DL Final    BUN/Creatinine ratio 04/12/2018 43* 12 - 20   Final    GFR est AA 04/12/2018 >60  >60 ml/min/1.73m2 Final    GFR est non-AA 04/12/2018 >60  >60 ml/min/1.73m2 Final    Calcium 04/12/2018 9.5  8.5 - 10.1 MG/DL Final    Bilirubin, total 04/12/2018 0.5  0.2 - 1.0 MG/DL Final    ALT (SGPT) 04/12/2018 37  13 - 56 U/L Final    AST (SGOT) 04/12/2018 25  15 - 37 U/L Final    Alk. phosphatase 04/12/2018 85  45 - 117 U/L Final    Protein, total 04/12/2018 7.1  6.4 - 8.2 g/dL Final    Albumin 04/12/2018 4.2  3.4 - 5.0 g/dL Final    Globulin 04/12/2018 2.9  2.0 - 4.0 g/dL Final    A-G Ratio 04/12/2018 1.4  0.8 - 1.7   Final    Amylase 04/12/2018 45  25 - 115 U/L Final    Lipase 04/12/2018 160  73 - 393 U/L Final    C1 Esterase Inhib. Funct. 04/12/2018 88  %mean normal Final    C1 Esterase Inhibitor, Serum 04/12/2018 29  21 - 39 mg/dL Final    Complement C4 04/12/2018 28  14 - 44 mg/dL Final    Complement C1q, Qt 04/12/2018 18.2  11.8 - 24.4 mg/dL Final    LIPID PROFILE 04/12/2018        Final    Cholesterol, total 04/12/2018 217* <200 MG/DL Final    Triglyceride 04/12/2018 101  <150 MG/DL Final    HDL Cholesterol 04/12/2018 53  40 - 60 MG/DL Final    LDL, calculated 04/12/2018 143.8* 0 - 100 MG/DL Final    VLDL, calculated 04/12/2018 20.2  MG/DL Final    CHOL/HDL Ratio 04/12/2018 4.1  0 - 5.0   Final    Magnesium 04/12/2018 2.2  1.6 - 2.6 mg/dL Final     Calculated 10 year ASCVD risk score: 4.5 %    Health Maintenance:  Screening:    Mammogram: negative (11/2017)   PAP smear: well women exams by Dr. Cori Fitzpatrick (last 12/2017)   Colorectal: colonoscopy (4/2015) no polyps. Dr. Dre Lake. Due 2020. Depression: Uses Xanax prn for anxiety. Weaned from Effexor (vasomotor instability).    DM (HbA1c/FPG): FPG 84 (4/2018)   Hepatitis C: negative (12/2015)   Falls: none   DEXA: N/A   Glaucoma: has regular eye exams with Dr. Palma Holland (last 12/2016)   Smoking: 15 pack year (stopped 2011)   Vitamin D: 40.9 (10/2017)   Medicare Wellness: N/A    Impression:  Patient Active Problem List   Diagnosis Code    Hypertension I10    Hyperlipidemia E78.5    Obstructive sleep apnea G47.33    Chronic daily headache R51    IBS (irritable bowel syndrome) K58.9  Colon polyp K63.5    Vasomotor instability R55    Vitamin D deficiency E55.9    Sciatica of right side M54.31    Abnormal glucose R73.09    Colitis K52.9    Lumbar spinal stenosis M48.061    HNP (herniated nucleus pulposus), lumbar M51.26    Lumbar facet arthropathy (HCC) M46.96       Plan:  1. Hypertension. Blood pressure appears well controlled on current regimen of amlodipine 10 mg, hydrochlorothiazide, and captopril (50 mg tid). Unclear if abdominal attacks may be form of angioedema, so would want to discontinue Ace-I as could exacerbate. Patient attempted a trial of discontinuing captopril, but did not contact office when readings were elevated so that alternate therapy could be prescribed. Discussed at length today, and patient agreeable to discontinue captopril and begin hydralazine 25 mg tid. Will monitor and send readings to office for review so dose can be titrated. Renal function remains normal with creatinine 0.56/ eGFR >60. However, BUN/creatinine ratio significantly elevated to 43. Discussed importance of increasing fluid intake. Follow closely. 2. Hyperlipidemia. Rosuvastatin discontinued in 5/2017 due to persistent elevation of transaminases with resolution after discontinued. Calculated 10 year ASCVD risk off statin is 4.5 %, which does not place her in one of the four statin benefit groups as per new AHA/ACC guidelines. Thus, would not recommend resumption of treatment with statin at this time. Emphasized importance of lifestyle modifications, including diet, exercise, and weight loss. Continue to follow. 3. Elevated transaminases. Now resolved since rosuvastatin discontinued. Review of record shows elevation noted since 8/2016 when initiated. Iron studies, hepatitis panel, KIEL, SPEP, CK, aldolase, and anti-smooth muscle antibody negative. RUQ ultrasound (10/2016) showing fatty infiltration of liver. 4. Colitis.  Unclear etiology, but nonspecific bowel wall thickening in short segment of jejunum and sigmoid on repeat CT scan raised question of ischemic in origin. Hypercoaguable workup including lab studies and echocardiogram negative. Appears to have intermittent \"IBS\" episodes associated with facial flushing, dry mouth, and severe abdominal cramping followed by diarrhea +/- vomiting. Episodes linger for several days following. No evidence of urticaria seen. Interesting is the abnormalities noted on abdominal CT scan in two of her episodes, with nonspecific short segment thickening in various locations in small and large intestines. Obtained complement studies including C4, C1 esterase inhibtor, and C1q levels which were normal. However, instructed patient that if she developed another episode, would like to check C4 level during the episode. In the process of discontinuing captopril as may be exacerbating problem. Evaluated by GI and felt to be IBS with constipation and started on Linzess. Follow. 5. Chronic daily headaches. Using daily Fioricet, Excedrin migraine, and Goody powder. Worsening headaches may be due to overmedication. Currently being followed by Dr. Saw Montaño, although recently stopped immunotherapy. Upcoming appointment in 7/2018 with Dr. Melba Styles to help with differentiating cause and help with management. Follow. 6. Right leg sciatica. Evidence of lumbar spinal stenosis and facet arthropathy, and lumbar herniated disc. Significant improvement with dry needling therapy and reports no further pain. Continue to follow and follow-up with Dr. Jovanni Jerez if recurs. 7. Abnormal glucose. Fasting glucose remains normal. Most likely due to weight loss. Emphasized importance of continued lifestyle modifications. 8.  Obstructive sleep apnea. Not using CPAP as feels exacerbated headaches. Unclear if untreated sleep apnea may be contributing as headaches present in the morning. 9. Vasomotor dysfunction. Weaned successfully from venlafaxine. Follow. 10. Health maintenance.   Given script for Shingrix vaccine. Other immunizations up to date. Well woman exam with Dr. Niurka Cristobal in 12/2017. Mammogram up to date. Continue regular eye exams with Dr. Pacheco. Vitamin D level normal. Continue maintenance dose supplement. Patient understands recommendations and agrees with plan. Follow-up with blood pressure readings in 2 weeks.   Follow-up for appointment in 10/2018 for physical.

## 2018-06-26 DIAGNOSIS — F41.9 ANXIETY: ICD-10-CM

## 2018-06-26 DIAGNOSIS — R51.9 CHRONIC NONINTRACTABLE HEADACHE, UNSPECIFIED HEADACHE TYPE: ICD-10-CM

## 2018-06-26 DIAGNOSIS — G89.29 CHRONIC NONINTRACTABLE HEADACHE, UNSPECIFIED HEADACHE TYPE: ICD-10-CM

## 2018-06-26 RX ORDER — BUTALBITAL, ACETAMINOPHEN AND CAFFEINE 50; 325; 40 MG/1; MG/1; MG/1
1 TABLET ORAL
Qty: 60 TAB | Refills: 0 | Status: CANCELLED | OUTPATIENT
Start: 2018-06-26

## 2018-06-26 RX ORDER — BUTALBITAL, ACETAMINOPHEN AND CAFFEINE 50; 325; 40 MG/1; MG/1; MG/1
TABLET ORAL
Qty: 60 TAB | Refills: 0 | Status: SHIPPED | OUTPATIENT
Start: 2018-06-26 | End: 2018-07-20 | Stop reason: SDUPTHER

## 2018-06-27 RX ORDER — ALPRAZOLAM 0.5 MG/1
0.5 TABLET ORAL
Qty: 25 TAB | Refills: 0 | OUTPATIENT
Start: 2018-06-27 | End: 2018-10-18 | Stop reason: SDUPTHER

## 2018-06-27 NOTE — TELEPHONE ENCOUNTER
Reviewed report generated by the [facility]. Does not demonstrate aberrancies or inconsistencies with regard to the prescribing of controlled medications to this patient by other providers. Last filled Xanax 6/2/2018 per .

## 2018-06-27 NOTE — TELEPHONE ENCOUNTER
From: Juani Lora  To: Kristy Gauthier MD  Sent: 6/26/2018 1:22 PM EDT  Subject: Medication Renewal Request    Original authorizing provider: MD Mauricio Cardoza.  Georgette Glover would like a refill of the following medications:  ALPRAZolam (XANAX) 0.5 mg tablet Kristy Gauthier MD]  butalbital-acetaminophen-caffeine (FIORICET, ESGIC) -40 mg per tablet Kristy Gauthier MD]    Preferred pharmacy: [address] [place] [address] [address]  [address] [place] [address] [address]    Comment:

## 2018-07-15 ENCOUNTER — PATIENT MESSAGE (OUTPATIENT)
Dept: INTERNAL MEDICINE CLINIC | Age: 58
End: 2018-07-15

## 2018-07-16 NOTE — TELEPHONE ENCOUNTER
From: Jo Alcazar  To: Curly Baum MD  Sent: 7/15/2018 10:58 PM EDT  Subject: Non-Urgent Medical Question    Hi Dr Laura Pablo, you have asked me to keep a log of my blood pressure due to change of medication. Below is the dates and numbers of my blood pressure.   6/27. 137/89. 7/8. 138/86  6/28. 137/89. 7/9. 148/88  6/29. 151/88. 7/10. 164/92  7/1. 155/93. 7/11. 134/85  7/4. 130/80. 7/12. 151/76  7/5. 168//94. 7/13. 131/84  7/6. 135/87. 7/15. 143/90

## 2018-07-17 RX ORDER — HYDRALAZINE HYDROCHLORIDE 25 MG/1
TABLET, FILM COATED ORAL
Qty: 270 TAB | Refills: 2 | Status: SHIPPED | OUTPATIENT
Start: 2018-07-17 | End: 2018-09-07 | Stop reason: DRUGHIGH

## 2018-07-17 RX ORDER — HYDRALAZINE HYDROCHLORIDE 50 MG/1
TABLET, FILM COATED ORAL
Qty: 270 TAB | Refills: 2 | Status: SHIPPED | OUTPATIENT
Start: 2018-07-17 | End: 2018-09-07 | Stop reason: DRUGHIGH

## 2018-07-20 DIAGNOSIS — G89.29 CHRONIC NONINTRACTABLE HEADACHE, UNSPECIFIED HEADACHE TYPE: ICD-10-CM

## 2018-07-20 DIAGNOSIS — R51.9 CHRONIC NONINTRACTABLE HEADACHE, UNSPECIFIED HEADACHE TYPE: ICD-10-CM

## 2018-07-20 RX ORDER — BUTALBITAL, ACETAMINOPHEN AND CAFFEINE 50; 325; 40 MG/1; MG/1; MG/1
1 TABLET ORAL
Qty: 60 TAB | Refills: 0 | Status: SHIPPED | OUTPATIENT
Start: 2018-07-20 | End: 2018-08-17 | Stop reason: SDUPTHER

## 2018-07-20 NOTE — TELEPHONE ENCOUNTER
From: Little Martin  To: Linda Garcia MD  Sent: 7/20/2018 10:11 AM EDT  Subject: Medication Renewal Request    Original authorizing provider: MD Sonya Quinn.  Bandar Correia would like a refill of the following medications:  butalbital-acetaminophen-caffeine (FIORICET, ESGIC) -40 mg per tablet Linda Garcia MD]    Preferred pharmacy: [address] [place] [address] [address] AT [address] [place] [address] [address]    Comment:

## 2018-08-07 ENCOUNTER — HOSPITAL ENCOUNTER (OUTPATIENT)
Dept: GENERAL RADIOLOGY | Age: 58
Discharge: HOME | End: 2018-08-07
Attending: NURSE PRACTITIONER
Payer: COMMERCIAL

## 2018-08-07 DIAGNOSIS — R19.7 DIARRHEA: ICD-10-CM

## 2018-08-07 DIAGNOSIS — K62.5 RECTAL BLEED: ICD-10-CM

## 2018-08-07 PROCEDURE — 74250 X-RAY XM SM INT 1CNTRST STD: CPT

## 2018-08-07 PROCEDURE — 74011000255 HC RX REV CODE- 255

## 2018-08-07 RX ADMIN — BARIUM SULFATE 352 G: 960 POWDER, FOR SUSPENSION ORAL at 11:00

## 2018-08-08 ENCOUNTER — APPOINTMENT (OUTPATIENT)
Dept: VASCULAR SURGERY | Age: 58
End: 2018-08-08
Attending: NURSE PRACTITIONER
Payer: COMMERCIAL

## 2018-08-08 ENCOUNTER — HOSPITAL ENCOUNTER (OUTPATIENT)
Age: 58
Discharge: HOME | End: 2018-08-08
Attending: PSYCHIATRY & NEUROLOGY
Payer: COMMERCIAL

## 2018-08-08 DIAGNOSIS — G44.89 OTHER HEADACHE SYNDROME: ICD-10-CM

## 2018-08-08 LAB — CREAT UR-MCNC: 1 MG/DL (ref 0.6–1.3)

## 2018-08-08 PROCEDURE — 70544 MR ANGIOGRAPHY HEAD W/O DYE: CPT

## 2018-08-08 PROCEDURE — 74011250636 HC RX REV CODE- 250/636: Performed by: PSYCHIATRY & NEUROLOGY

## 2018-08-08 PROCEDURE — A9575 INJ GADOTERATE MEGLUMI 0.1ML: HCPCS | Performed by: PSYCHIATRY & NEUROLOGY

## 2018-08-08 PROCEDURE — 70553 MRI BRAIN STEM W/O & W/DYE: CPT

## 2018-08-08 PROCEDURE — 82565 ASSAY OF CREATININE: CPT

## 2018-08-08 RX ORDER — GADOTERATE MEGLUMINE 376.9 MG/ML
13 INJECTION INTRAVENOUS
Status: COMPLETED | OUTPATIENT
Start: 2018-08-08 | End: 2018-08-08

## 2018-08-08 RX ADMIN — GADOTERATE MEGLUMINE 13 ML: 376.9 INJECTION INTRAVENOUS at 14:00

## 2018-08-16 ENCOUNTER — HOSPITAL ENCOUNTER (OUTPATIENT)
Dept: VASCULAR SURGERY | Age: 58
Discharge: HOME | End: 2018-08-16
Attending: NURSE PRACTITIONER
Payer: COMMERCIAL

## 2018-08-16 DIAGNOSIS — R19.7 DIARRHEA: ICD-10-CM

## 2018-08-16 DIAGNOSIS — K62.5 RECTAL BLEED: ICD-10-CM

## 2018-08-16 PROCEDURE — 93975 VASCULAR STUDY: CPT

## 2018-08-17 DIAGNOSIS — R51.9 CHRONIC NONINTRACTABLE HEADACHE, UNSPECIFIED HEADACHE TYPE: ICD-10-CM

## 2018-08-17 DIAGNOSIS — G89.29 CHRONIC NONINTRACTABLE HEADACHE, UNSPECIFIED HEADACHE TYPE: ICD-10-CM

## 2018-08-17 LAB
CELIAC EDV: 41.21 CM/S
CELIAC PSV: 121.56 CM/S
DIST SMA EDV: 0 CM/S
DIST SMA PSV: 85.82 CM/S
IMA EDV: 15.85 CM/S
IMA PSV: 125.83 CM/S
MID SMA EDV: 27.4 CM/S
MID SMA PSV: 194.58 CM/S
PROX SMA EDV: 21.04 CM/S
PROX SMA PSV: 220.38 CM/S

## 2018-08-17 RX ORDER — BUTALBITAL, ACETAMINOPHEN AND CAFFEINE 50; 325; 40 MG/1; MG/1; MG/1
1 TABLET ORAL
Qty: 60 TAB | Refills: 0 | Status: SHIPPED | OUTPATIENT
Start: 2018-08-17 | End: 2018-09-13 | Stop reason: SDUPTHER

## 2018-08-17 NOTE — TELEPHONE ENCOUNTER
From: Prosper Horowitz  To: Anahi Gonsalves MD  Sent: 8/17/2018 8:48 AM EDT  Subject: Medication Renewal Request    Original authorizing provider: MD Ricardo Hutchins.  Gama Denise would like a refill of the following medications:  butalbital-acetaminophen-caffeine (FIORICET, ESGIC) -40 mg per tablet Anahi Gonsalves MD]    Preferred pharmacy: [address] [place] [address] [address] AT [address] [place] [address] [address]    Comment:

## 2018-09-06 ENCOUNTER — TELEPHONE (OUTPATIENT)
Dept: INTERNAL MEDICINE CLINIC | Age: 58
End: 2018-09-06

## 2018-09-06 NOTE — TELEPHONE ENCOUNTER
----- Message from Sj Ham. Will Hetiasha sent at 9/6/2018 12:07 PM EDT -----  Regarding: Non-Urgent Medical Question  Contact: [phone]  Hi Dr Anibal Ortiz,  you changed my blood pressure medication. The lower dose was not working. I have been taking 125 mg 3 times a day. My readings as follow. 8/22 120/79. Do not know why this reading is lower than others  8/23. 144/87  8/25.  122/70.                           9/3.     143/83  8/27.  137/80.                           9/5.   My morning rdg  153/ 83. Dr Ranjana Reich reading yesterday was 152/84  8/29.   137/82.                          9/6.     149/84  8/30.    143/84  8/31.   146/78  9/1      154/81    I only have a week left of medication not sure if you want me to add another 25mg to the 125 mg = 150mg 3 x a day. If I do that will only have a few days worth of medication.

## 2018-09-07 RX ORDER — HYDRALAZINE HYDROCHLORIDE 100 MG/1
100 TABLET, FILM COATED ORAL 3 TIMES DAILY
Qty: 270 TAB | Refills: 2 | Status: SHIPPED | OUTPATIENT
Start: 2018-09-07 | End: 2019-06-05 | Stop reason: SDUPTHER

## 2018-09-07 NOTE — TELEPHONE ENCOUNTER
Please let the patient know that the maximum dose of hydralazine is 100 mg tid. She should not be taking more than this. This is what was instructed in [address] email on 8/17/2018. Please ask her to change dosing to 100 mg tid, and I will send new script for 100 mg tablet to [facility]. Please ask her to continue to monitor for one week on correct dose and send readings to office. Please emphasize need to sit quietly for 5-10 minutes before checking blood pressure. Also should check blood pressure 2-3 hours after taking medication. Thanks.

## 2018-09-13 DIAGNOSIS — R51.9 CHRONIC NONINTRACTABLE HEADACHE, UNSPECIFIED HEADACHE TYPE: ICD-10-CM

## 2018-09-13 DIAGNOSIS — G89.29 CHRONIC NONINTRACTABLE HEADACHE, UNSPECIFIED HEADACHE TYPE: ICD-10-CM

## 2018-09-13 RX ORDER — BUTALBITAL, ACETAMINOPHEN AND CAFFEINE 50; 325; 40 MG/1; MG/1; MG/1
1 TABLET ORAL
Qty: 60 TAB | Refills: 0 | Status: SHIPPED | OUTPATIENT
Start: 2018-09-13 | End: 2018-10-18 | Stop reason: SDUPTHER

## 2018-09-13 NOTE — TELEPHONE ENCOUNTER
From: Currie Felty  To: Mellisa Hall MD  Sent: 9/13/2018 8:53 AM EDT  Subject: Medication Renewal Request    Original authorizing provider: MD Manuel Nunes.  Antonia Crawford would like a refill of the following medications:  butalbital-acetaminophen-caffeine (FIORICET, ESGIC) -40 mg per tablet Mellisa Hall MD]    Preferred pharmacy: [address] [place] [address] [address] AT [address] [place] [address] [address]    Comment:

## 2018-10-12 ENCOUNTER — HOSPITAL ENCOUNTER (OUTPATIENT)
Dept: PHYSICAL THERAPY | Age: 58
Discharge: HOME | End: 2018-10-12
Payer: COMMERCIAL

## 2018-10-12 PROCEDURE — 97112 NEUROMUSCULAR REEDUCATION: CPT

## 2018-10-12 PROCEDURE — 97162 PT EVAL MOD COMPLEX 30 MIN: CPT

## 2018-10-12 NOTE — PROGRESS NOTES
[address]  Kenia [address] [place], [address] 
[phone] fax Plan of Care/ Statement of Necessity for Physical Therapy Services Patient name: Luz Almonte Start of Care: 10/12/2018 Referral source: Nereida Sheikh MD : 1960 Medical Diagnosis: Neck pain [M54.2] Onset Date:chronic Treatment Diagnosis: C/S pain Prior Hospitalization: see medical history Provider#: [id number] Medications: Verified on Patient summary List  
 Comorbidities: C6C7 fusion ; HTN 
 Prior Level of Function: Able to perform daily activities without left C/S tightening The Plan of Care and following information is based on the information from the initial evaluation. Assessment/ key information: 59y.o. year old female presents with CC of left sided tightness and pain in C/S that is rather chronic in nature. Impairments include: poor upper C/S mobility, as patient hinges at C7 during ext and flexion; poor C/S rotation to the left; poor T/S and rib mobility as well as decreased scapular strength and postural awareness. Patient will benefit from physical therapy to address deficits, and ultimately to return patient to prior level of function. Evaluation Complexity History MEDIUM  Complexity : 1-2 comorbidities / personal factors will impact the outcome/ POC ; Examination MEDIUM Complexity : 3 Standardized tests and measures addressing body structure, function, activity limitation and / or participation in recreation  ;Presentation MEDIUM Complexity : Evolving with changing characteristics  ; Clinical Decision Making LOW Complexity : FOTO score of  Overall Complexity Rating: MEDIUM Problem List: pain affecting function, decrease ROM, decrease strength, decrease ADL/ functional abilitiies, decrease activity tolerance and decrease flexibility/ joint mobility Treatment Plan may include any combination of the following: Therapeutic exercise, Neuromuscular re-education, Physical agent/modality, Manual therapy and Patient education Patient / Family readiness to learn indicated by: asking questions, trying to perform skills and interest 
Persons(s) to be included in education: patient (P) Barriers to Learning/Limitations: None Patient Goal (s): to relax my muscles Patient Self Reported Health Status: good Rehabilitation Potential: good Short Term Goals: To be accomplished in 2 weeks: 1. I and compliant with HEP for self management of symptoms. Long Term Goals: To be accomplished in 4 weeks: 
1. Increase C/S ext by 10 degrees to enable patient to look up to see a bird. 2. Increase left C/S rotation to = right to increase ease with driving. 3. Increase B scapular strength to grossly 4/5 to improve stability for postural awareness. Frequency / Duration: Patient to be seen 2 times per week for 4 weeks. Patient/ Caregiver education and instruction: Diagnosis, prognosis, exercises 
 [x]  Plan of care has been reviewed with EARNESTINE Davey, PT 10/12/2018 12:46 PM 
 
________________________________________________________________________ I certify that the above Therapy Services are being furnished while the patient is under my care. I agree with the treatment plan and certify that this therapy is necessary. [de-identified] Signature:____________Date:_________TIME:________ 
 
[employer], Date and Time must be completed for valid certification ** Please sign and return to [address] [place], [address] 
[phone] fax

## 2018-10-12 NOTE — PROGRESS NOTES
PT DAILY TREATMENT NOTE  Patient Name: Fatoumata Dumont Date:10/12/2018 : 1960 [x]  Patient  Verified Payor: [employer] / Plan: [address] [place] [address] / Product Type: PPO / In time:1108  Out time:1200 Total Treatment Time (min): 52 
1:1 52' Visit #: 1 of 8 Treatment Area: Neck pain [M54.2] SUBJECTIVE Pain Level (0-10 scale): 2-3 Any medication changes, allergies to medications, adverse drug reactions, diagnosis change, or new procedure performed?: [x] No    [] Yes (see summary sheet for update) Subjective functional status/changes:   [] No changes reported See eval 
 
OBJECTIVE Modality rationale: decrease pain to improve the patients ability to tolerate post needle soreness Min Type Additional Details  
 [] Estim:  []Unatt       []IFC  []Premod []Other:  []w/ice   []w/heat Position: Location:  
 [] Estim: []Att    []TENS instruct  []NMES []Other:  []w/US   []w/ice   []w/heat Position: Location:  
 []  Traction: [] Cervical       []Lumbar 
                     [] Prone          []Supine []Intermittent   []Continuous Lbs: 
[] before manual 
[] after manual  
 []  Ultrasound: []Continuous   [] Pulsed []1MHz   []3MHz W/cm2: 
Location:  
 []  Iontophoresis with dexamethasone Location: [] Take home patch  
[] In clinic  
10 [x]  Ice     []  heat 
[]  Ice massage 
[]  Laser  
[]  Anodyne Position:prone Location:C/s  
 []  Laser with stim 
[]  Other:  Position: Location:  
 []  Vasopneumatic Device Pressure:       [] lo [] med [] hi  
Temperature: [] lo [] med [] hi  
[] Skin assessment post-treatment:  []intact []redness- no adverse reaction 
  []redness  adverse reaction:  
 
10 min [x]Eval                  []Re-Eval  
 
  
32 min Neuromuscular Re-education:  []  See flow sheet :  
Rationale: increase ROM and increase strength  to improve the patients ability to perform daily activities Dry Needling Procedure Note Procedure: An intramuscular manual therapy (dry needling) and a neuro-muscular re-education treatment was done to deactivate myofascial trigger points with a 30 gauge filament needle under aseptic technique. Indications: 
[] Myofascial pain and dysfunction [] Muscled spasms [] Myalgia/myositis   [] Muscle cramps 
[] Muscle imbalances  [] Temporomandibular Dysfunction 
[] Other: 
 
Chart reviewed for the following: 
Meggan WADE PT, have reviewed the medical history, summary list and precautions/contraindications for [employer]. TIME OUT performed immediately prior to start of procedure: 
Meggan WADE PT, have performed the following [facility] prior to the start of the session:     
[x] Verified patient identification by name and date of birth   
[x] Agreement on all muscles being treated was verified  
[x] Purpose of dry needling, side effects, possible complications, risks and benefits were explained to the patient [x] Procedure site(s) verified 
[x] Patient was positioned for comfort and draped for privacy [x] Informed Consent was signed (initial visit) and verified verbally (subsequent visits) [x] Patient was instructed on the need to report the use of blood thinners and/or immunosuppressant medications [x] How to respond to possible adverse effects of treatment 
[x] Self treatment of post needling soreness: ice, heat (moist heat, heat wraps) and stretching 
[x] Opportunity was given to ask any questions, all questions were answered Time: 1130 Date of procedure: 10/12/2018 Treatment: The following muscles were treated today with intramuscular dry needling 
[] Left [] Right Masster 
[] Left [] Right Temporalis 
[] Left [] Right Zygomaticus Major / Minor 
[] Left [] Right Lateral Pterygoid 
[] Left [] Right Medial Pterygoid 
[] Left [] Right Digastric Post / Anterior Belly [] Left [] Right Sternocleidomastoid 
[] Left [] Right Scalene Anterior / Medial / Posterior 
[] Left [] Right Extra Laryngeal Muscles [x] Left [] Right Upper Trapezius 
[] Left [] Right Middle Trapezius 
[] Left [] Right Lower Trapezius 
[] Left [] Right Oblique Capitis Inferior [x] Left [x] Right Splenius Capitis / Cervicis [x] Left [x] Right Semispinalis: Capitis / Cervicis [x] Left [x] Right Multifidi / Rotatores Cervicis / Thoracic 
[] Left [] Right Longissimus Thoracis / Illiocostalis 
[] Left [] Right Levator Scapulae 
[] Left [] Right Supraspinatus / Infraspinatus 
[] Left [] Right Teres Major / Minor 
[] Left [] Right Rhomboids / Serratus posterior superior 
[] Left [] Right Pectoralis Major / Minor 
[] Left [] Right Serratus Anterior 
[] Left [] Right Latissimus Dorsi 
[] Left [] Right Subscapularis 
[] Left [] Right Coracobrachialis 
[] Left [] Right Biceps Brachii 
[] Left [] Right Deltoid: Anterior / Medial / Posterior 
[] Left [] Right Brachialis 
[] Left [] Right Triceps 
[] Left [] Right Brachioradialis 
[] Left [] Right Extensor Carpi Radialis Brevis / Extensor Carpi Radialis Longus    [] Left [] Right  Extensor digitorum 
[] Left [] Right Supinator / Pronator Teres 
[] Left [] Right Flexor Carpi Radialis/ Flexor Carpi Ulnaris  
[] Left [] Right  Flexor Digitorum Superficialis/ Flexor Digitorum Profundus 
[] Left [] Right Flexor Pollicis Longus / Flexor Pollicis Brevis / Palmaris Longus 
[] Left [] Right Abductor Pollicis Longus / Abductor Pollicis Brevis 
[] Left [] Right Opponens Pollicis / Adductor Pollicis 
[] Left [] Right Dorsal / Palmar Interossei / Lumbricalis 
[] Left [] Right Abductor Digiti Minimi / Opponens Digiti Minimi Patient's response to today's treatment: 
[x] Latent Twitch Response     [x] Muscle relaxation [x] Pain Relief 
[x] Post needling soreness    [x] without complications [x] Increased Range of Motion   [] Decreased headaches   
[] Decreased Tinnitus [] Other: Performed by: Walt Caldwell PT With 
 [] TE 
 [] TA 
 [] neuro 
 [] other: Patient Education: [x] Review HEP [] Progressed/Changed HEP based on:  
[] positioning   [] body mechanics   [] transfers   [] heat/ice application   
[] other:   
 
Other Objective/Functional Measures:   
 
Pain Level (0-10 scale) post treatment: 0 
 
ASSESSMENT/Changes in Function: see POC Patient will continue to benefit from skilled PT services to modify and progress therapeutic interventions, address functional mobility deficits, address ROM deficits, address strength deficits, analyze and address soft tissue restrictions, analyze and cue movement patterns and assess and modify postural abnormalities to attain remaining goals. [x]  See Plan of Care 
[]  See progress note/recertification 
[]  See Discharge Summary Progress towards goals / Updated goals: 
Short Term Goals: To be accomplished in 2 weeks: 1. I and compliant with HEP for self management of symptoms. Long Term Goals: To be accomplished in 4 weeks: 
1. Increase C/S ext by 10 degrees to enable patient to look up to see a bird. 2. Increase left C/S rotation to = right to increase ease with driving. 3. Increase B scapular strength to grossly 4/5 to improve stability for postural awareness PLAN 
[]  Upgrade activities as tolerated     [x]  Continue plan of care 
[]  Update interventions per flow sheet      
[]  Discharge due to:_ 
[]  Other:_ Walt Caldwell PT 10/12/2018  1:09 PM 
 
Future Appointments Date Time Provider Carolina Palumbo 10/17/2018 8:15 AM [facility] NURSE VISIT [facility]  
10/17/2018 2:30 PM [facility]  
10/19/2018 3:00 PM [facility]  
10/23/2018 11:00 AM Maikel Cummings MD [facility] [address]  
10/24/2018 1:00 PM [facility]  
10/26/2018 3:00 PM [facility]  
10/31/2018 1:00 PM [facility]  
 11/2/2018 12:00 PM Mi Howe, PT [facility]  
11/6/2018 10:30 AM Mi Howe PT [facility]

## 2018-10-17 ENCOUNTER — HOSPITAL ENCOUNTER (OUTPATIENT)
Dept: PHYSICAL THERAPY | Age: 58
Discharge: HOME | End: 2018-10-17
Payer: COMMERCIAL

## 2018-10-17 ENCOUNTER — HOSPITAL ENCOUNTER (OUTPATIENT)
Dept: LAB | Age: 58
Discharge: HOME | End: 2018-10-17
Payer: COMMERCIAL

## 2018-10-17 ENCOUNTER — APPOINTMENT (OUTPATIENT)
Dept: INTERNAL MEDICINE CLINIC | Age: 58
End: 2018-10-17

## 2018-10-17 DIAGNOSIS — E55.9 VITAMIN D DEFICIENCY: ICD-10-CM

## 2018-10-17 DIAGNOSIS — I10 ESSENTIAL HYPERTENSION: ICD-10-CM

## 2018-10-17 DIAGNOSIS — E78.5 HYPERLIPIDEMIA, UNSPECIFIED HYPERLIPIDEMIA TYPE: ICD-10-CM

## 2018-10-17 LAB
ALBUMIN SERPL-MCNC: 4.3 G/DL (ref 3.4–5)
ALBUMIN/GLOB SERPL: 1.5 {RATIO} (ref 0.8–1.7)
ALP SERPL-CCNC: 95 U/L (ref 45–117)
ALT SERPL-CCNC: 29 U/L (ref 13–56)
ANION GAP SERPL CALC-SCNC: 9 MMOL/L (ref 3–18)
APPEARANCE UR: CLEAR
AST SERPL-CCNC: 13 U/L (ref 15–37)
BACTERIA URNS QL MICRO: NEGATIVE /HPF
BASOPHILS # BLD: 0 K/UL (ref 0–0.1)
BASOPHILS NFR BLD: 0 % (ref 0–2)
BILIRUB SERPL-MCNC: 0.4 MG/DL (ref 0.2–1)
BILIRUB UR QL: NEGATIVE
BUN SERPL-MCNC: 33 MG/DL (ref 7–18)
BUN/CREAT SERPL: 53 (ref 12–20)
CALCIUM SERPL-MCNC: 9.9 MG/DL (ref 8.5–10.1)
CAOX CRY URNS QL MICRO: ABNORMAL
CHLORIDE SERPL-SCNC: 104 MMOL/L (ref 100–108)
CHOLEST SERPL-MCNC: 235 MG/DL
CO2 SERPL-SCNC: 28 MMOL/L (ref 21–32)
COLOR UR: YELLOW
CREAT SERPL-MCNC: 0.62 MG/DL (ref 0.6–1.3)
DIFFERENTIAL METHOD BLD: ABNORMAL
EOSINOPHIL # BLD: 0.1 K/UL (ref 0–0.4)
EOSINOPHIL NFR BLD: 2 % (ref 0–5)
EPITH CASTS URNS QL MICRO: ABNORMAL /LPF (ref 0–5)
ERYTHROCYTE [DISTWIDTH] IN BLOOD BY AUTOMATED COUNT: 12.8 % (ref 11.6–14.5)
GLOBULIN SER CALC-MCNC: 2.8 G/DL (ref 2–4)
GLUCOSE SERPL-MCNC: 96 MG/DL (ref 74–99)
GLUCOSE UR STRIP.AUTO-MCNC: NEGATIVE MG/DL
HCT VFR BLD AUTO: 43.5 % (ref 35–45)
HDLC SERPL-MCNC: 50 MG/DL (ref 40–60)
HDLC SERPL: 4.7 {RATIO} (ref 0–5)
HGB BLD-MCNC: 14.2 G/DL (ref 12–16)
HGB UR QL STRIP: NEGATIVE
KETONES UR QL STRIP.AUTO: NEGATIVE MG/DL
LDLC SERPL CALC-MCNC: 141.8 MG/DL (ref 0–100)
LEUKOCYTE ESTERASE UR QL STRIP.AUTO: NEGATIVE
LIPID PROFILE,FLP: ABNORMAL
LYMPHOCYTES # BLD: 2.4 K/UL (ref 0.9–3.6)
LYMPHOCYTES NFR BLD: 53 % (ref 21–52)
MCH RBC QN AUTO: 29.8 PG (ref 24–34)
MCHC RBC AUTO-ENTMCNC: 32.6 G/DL (ref 31–37)
MCV RBC AUTO: 91.4 FL (ref 74–97)
MONOCYTES # BLD: 0.4 K/UL (ref 0.05–1.2)
MONOCYTES NFR BLD: 8 % (ref 3–10)
NEUTS SEG # BLD: 1.7 K/UL (ref 1.8–8)
NEUTS SEG NFR BLD: 37 % (ref 40–73)
NITRITE UR QL STRIP.AUTO: NEGATIVE
PH UR STRIP: 5.5 [PH] (ref 5–8)
PLATELET # BLD AUTO: 249 K/UL (ref 135–420)
PMV BLD AUTO: 9.8 FL (ref 9.2–11.8)
POTASSIUM SERPL-SCNC: 3.7 MMOL/L (ref 3.5–5.5)
PROT SERPL-MCNC: 7.1 G/DL (ref 6.4–8.2)
PROT UR STRIP-MCNC: NEGATIVE MG/DL
RBC # BLD AUTO: 4.76 M/UL (ref 4.2–5.3)
RBC #/AREA URNS HPF: 0 /HPF (ref 0–5)
SODIUM SERPL-SCNC: 141 MMOL/L (ref 136–145)
SP GR UR REFRACTOMETRY: >1.03 (ref 1–1.03)
TRIGL SERPL-MCNC: 216 MG/DL (ref ?–150)
TSH SERPL DL<=0.05 MIU/L-ACNC: 1 UIU/ML (ref 0.36–3.74)
UROBILINOGEN UR QL STRIP.AUTO: 0.2 EU/DL (ref 0.2–1)
VLDLC SERPL CALC-MCNC: 43.2 MG/DL
WBC # BLD AUTO: 4.6 K/UL (ref 4.6–13.2)
WBC URNS QL MICRO: ABNORMAL /HPF (ref 0–4)

## 2018-10-17 PROCEDURE — 80061 LIPID PANEL: CPT | Performed by: INTERNAL MEDICINE

## 2018-10-17 PROCEDURE — 36415 COLL VENOUS BLD VENIPUNCTURE: CPT | Performed by: INTERNAL MEDICINE

## 2018-10-17 PROCEDURE — 82306 VITAMIN D 25 HYDROXY: CPT | Performed by: INTERNAL MEDICINE

## 2018-10-17 PROCEDURE — 84443 ASSAY THYROID STIM HORMONE: CPT | Performed by: INTERNAL MEDICINE

## 2018-10-17 PROCEDURE — 80053 COMPREHEN METABOLIC PANEL: CPT | Performed by: INTERNAL MEDICINE

## 2018-10-17 PROCEDURE — 81001 URINALYSIS AUTO W/SCOPE: CPT | Performed by: INTERNAL MEDICINE

## 2018-10-17 PROCEDURE — 97112 NEUROMUSCULAR REEDUCATION: CPT

## 2018-10-17 PROCEDURE — 85025 COMPLETE CBC W/AUTO DIFF WBC: CPT | Performed by: INTERNAL MEDICINE

## 2018-10-17 PROCEDURE — 97110 THERAPEUTIC EXERCISES: CPT

## 2018-10-17 NOTE — PROGRESS NOTES
PT DAILY TREATMENT NOTE 10-18 Patient Name: Jocelyn Pang Date:10/17/2018 : 1960 [x]  Patient  Verified Payor: [employer] / Plan: [address] [place] [address] / Product Type: PPO / In time:2:00  Out time:2:55 Total Treatment Time (min): 55 Visit #: 2 of 8 Medicare/BCBS Only Total Timed Codes (min):  45 1:1 Treatment Time:  45  
 
 
Treatment Area: Neck pain [M54.2] SUBJECTIVE Pain Level (0-10 scale): .5/10 Any medication changes, allergies to medications, adverse drug reactions, diagnosis change, or new procedure performed?: [x] No    [] Yes (see summary sheet for update) Subjective functional status/changes:   [] No changes reported \"I missed a few days with the HEP. \" OBJECTIVE Modality rationale: decrease pain to improve the patients ability to ease soreness after dry needling Type Additional Details  
[] Estim:  []Unatt       []IFC  []Premod []Other:  []w/ice   []w/heat Position: Location:  
[] Estim: []Att    []TENS instruct  []NMES []Other:  []w/US   []w/ice   []w/heat Position: Location:  
[]  Traction: [] Cervical       []Lumbar 
                     [] Prone          []Supine []Intermittent   []Continuous Lbs: 
[] before manual 
[] after manual  
[]  Ultrasound: []Continuous   [] Pulsed []1MHz   []3MHz Location: 
W/cm2:  
[]  Iontophoresis with dexamethasone Location: [] Take home patch  
[] In clinic [x]  Ice     []  heat 
[]  Ice massage 
[]  Laser  
[]  Anodyne Position: prone Location: c/s and left UT  
[]  Laser with stim 
[]  Other: Position: Location:  
[]  Vasopneumatic Device Pressure:       [] lo [] med [] hi  
Temperature: [] lo [] med [] hi  
[] Skin assessment post-treatment:  []intact []redness- no adverse reaction 
  []redness  adverse reaction:  
 
 
10 min Therapeutic Exercise:  [x] See flow sheet :  
 Rationale: increase ROM, increase strength and improve coordination to improve the patients ability to increase ease with ADLs 35 min Neuromuscular Re-education:  [x]  See flow sheet :  
Rationale: increase ROM, increase strength, improve coordination and increase proprioception  to improve organization of head, neck, and shoulders Dry Needling Procedure Note Procedure: An intramuscular manual therapy (dry needling) and a neuro-muscular re-education treatment was done to deactivate myofascial trigger points with a 30 gauge filament needle under aseptic technique. Indications: 
[x] Myofascial pain and dysfunction [] Muscled spasms 
[x] Myalgia/myositis   [] Muscle cramps [x] Muscle imbalances  [] Temporomandibular Dysfunction 
[] Other: 
 
Chart reviewed for the following: 
Anabela WADE PT, have reviewed the medical history, summary list and precautions/contraindications for [employer]. TIME OUT performed immediately prior to start of procedure: 
Anabela WADE PT, have performed the following reviews on [employer] prior to the start of the session:     
[x] Verified patient identification by name and date of birth   
[x] Agreement on all muscles being treated was verified  
[x] Purpose of dry needling, side effects, possible complications, risks and benefits were explained to the patient [x] Procedure site(s) verified 
[x] Patient was positioned for comfort and draped for privacy [x] Informed Consent was signed (initial visit) and verified verbally (subsequent visits) [x] Patient was instructed on the need to report the use of blood thinners and/or immunosuppressant medications [x] How to respond to possible adverse effects of treatment 
[x] Self treatment of post needling soreness: ice, heat (moist heat, heat wraps) and stretching 
[x] Opportunity was given to ask any questions, all questions were answered Time: 233 Date of procedure: 10/17/2018 Treatment: The following muscles were treated today with intramuscular dry needling 
[] Left [] Right Masster 
[] Left [] Right Temporalis 
[] Left [] Right Zygomaticus Major / Minor 
[] Left [] Right Lateral Pterygoid 
[] Left [] Right Medial Pterygoid 
[] Left [] Right Digastric Post / Anterior Belly 
[] Left [] Right Sternocleidomastoid 
[] Left [] Right Scalene Anterior / Medial / Posterior 
[] Left [] Right Extra Laryngeal Muscles [x] Left [] Right Upper Trapezius 
[] Left [] Right Middle Trapezius 
[] Left [] Right Lower Trapezius 
[] Left [] Right Oblique Capitis Inferior 
[] Left [x] Right Splenius Capitis / Cervicis 
[] Left [x] Right Semispinalis: Capitis / Cervicis 
[] Left [x] Right Multifidi / Rotatores Cervicis / Thoracic 
[] Left [] Right Longissimus Thoracis / Illiocostalis [x] Left [] Right Levator Scapulae 
[] Left [] Right Supraspinatus / Infraspinatus 
[] Left [] Right Teres Major / Minor 
[] Left [] Right Rhomboids / Serratus posterior superior 
[] Left [] Right Pectoralis Major / Minor 
[] Left [] Right Serratus Anterior 
[] Left [] Right Latissimus Dorsi 
[] Left [] Right Subscapularis 
[] Left [] Right Coracobrachialis 
[] Left [] Right Biceps Brachii 
[] Left [] Right Deltoid: Anterior / Medial / Posterior 
[] Left [] Right Brachialis 
[] Left [] Right Triceps 
[] Left [] Right Brachioradialis 
[] Left [] Right Extensor Carpi Radialis Brevis / Extensor Carpi Radialis Longus    [] Left [] Right  Extensor digitorum 
[] Left [] Right Supinator / Pronator Teres 
[] Left [] Right Flexor Carpi Radialis/ Flexor Carpi Ulnaris  
[] Left [] Right  Flexor Digitorum Superficialis/ Flexor Digitorum Profundus 
[] Left [] Right Flexor Pollicis Longus / Flexor Pollicis Brevis / Palmaris Longus 
[] Left [] Right Abductor Pollicis Longus / Abductor Pollicis Brevis 
[] Left [] Right Opponens Pollicis / Adductor Pollicis 
[] Left [] Right Dorsal / Palmar Interossei / Lumbricalis [] Left [] Right Abductor Digiti Minimi / Opponens Digiti Minimi Patient's response to today's treatment: 
[x] Latent Twitch Response     [x] Muscle relaxation [x] Pain Relief 
[x] Post needling soreness    [x] without complications 
[] Increased Range of Motion   [] Decreased headaches   
[] Decreased Tinnitus [] Other:  
 
Performed by: Tangela Rockwell, PT With 
 [] TE 
 [] TA 
 [] neuro 
 [] other: Patient Education: [x] Review HEP [] Progressed/Changed HEP based on:  
[] positioning   [] body mechanics   [] transfers   [] heat/ice application   
[] other:   
 
Other Objective/Functional Measures:  
First follow up session---cues sequencing and correct form throughout Pain Level (0-10 scale) post treatment: 0 
 
ASSESSMENT/Changes in Function:  
Initiated POC per flowsheet. Patient puts forth good effort with exercises and is progressing towards HEP compliance. Cueing to decrease nod range to avoid over recruitment of SCM and UT. Significant rib flare with end range shoulder ROM. Patient will continue to benefit from skilled PT services to modify and progress therapeutic interventions, address functional mobility deficits, address ROM deficits, address strength deficits, analyze and address soft tissue restrictions, analyze and cue movement patterns, analyze and modify body mechanics/ergonomics and assess and modify postural abnormalities to attain remaining goals. []  See Plan of Care 
[]  See progress note/recertification 
[]  See Discharge Summary Progress towards goals / Updated goals: 
Short Term Goals: To be accomplished in 2 weeks: 
                       9. I and compliant with HEP for self management of symptoms. Progressing per patient report (10/17/2018) Long Term Goals: To be accomplished in 4 weeks: 
1. Increase C/S ext by 10 degrees to enable patient to look up to see a bird. 2. Increase left C/S rotation to = right to increase ease with driving. 3. Increase B scapular strength to grossly 4/5 to improve stability for postural awareness PLAN 
[]  Upgrade activities as tolerated     [x]  Continue plan of care 
[]  Update interventions per flow sheet      
[]  Discharge due to:_ 
[]  Other:_   
 
Albino Deyanira 10/17/2018  1:58 PM 
 
Future Appointments Date Time Provider Carolina Palumbo 10/17/2018  2:30 PM [facility]  
10/19/2018  3:00 PM [facility] Pay [facility]  
10/23/2018 11:00 AM Valentin Salcedo MD [facility]  
10/24/2018  1:00 PM Christos GOMEZ, PT [facility]  
10/26/2018  3:00 PM [facility]  
10/31/2018  1:00 PM Shahriar Cope, PT [facility]  
11/2/2018 12:00 PM Shahriar Cope PT [facility]  
11/6/2018 10:30 AM Shahriar Cope, GIOVANI [facility]

## 2018-10-18 DIAGNOSIS — G89.29 CHRONIC NONINTRACTABLE HEADACHE, UNSPECIFIED HEADACHE TYPE: ICD-10-CM

## 2018-10-18 DIAGNOSIS — F41.9 ANXIETY: ICD-10-CM

## 2018-10-18 DIAGNOSIS — R51.9 CHRONIC NONINTRACTABLE HEADACHE, UNSPECIFIED HEADACHE TYPE: ICD-10-CM

## 2018-10-18 LAB — 25(OH)D3 SERPL-MCNC: 40.2 NG/ML (ref 30–100)

## 2018-10-18 RX ORDER — ALPRAZOLAM 0.5 MG/1
0.5 TABLET ORAL
Qty: 25 TAB | Refills: 0 | OUTPATIENT
Start: 2018-10-18 | End: 2019-02-02

## 2018-10-18 RX ORDER — BUTALBITAL, ACETAMINOPHEN AND CAFFEINE 50; 325; 40 MG/1; MG/1; MG/1
1 TABLET ORAL
Qty: 60 TAB | Refills: 0 | Status: SHIPPED | OUTPATIENT
Start: 2018-10-18 | End: 2018-12-03 | Stop reason: SDUPTHER

## 2018-10-18 NOTE — TELEPHONE ENCOUNTER
PHONE IN [facility]      NO RECENT [facility] FOUND ON [facility]. Last Visit: 06/14/2018 with MD Ashlie Aguilar    Next Appointment: 10/23/2018 with MD Waters   Previous Refill Encounters: 09/13/2018 per Pierre Fuller #[phone]; 06/27/2018 per MD Ashlie Aguilar Xanax #[phone]    Requested Prescriptions     Pending Prescriptions Disp Refills    ALPRAZolam (XANAX) 0.5 mg tablet 25 Tab 0     Sig: Take 1 Tab by mouth three (3) times daily as needed for Anxiety. Max Daily Amount: 1.5 mg.    butalbital-acetaminophen-caffeine (FIORICET, ESGIC) -40 mg per tablet 60 Tab 0     Sig: Take 1 Tab by mouth every six (6) hours as needed for Pain.

## 2018-10-19 ENCOUNTER — APPOINTMENT (OUTPATIENT)
Dept: PHYSICAL THERAPY | Age: 58
End: 2018-10-19
Payer: COMMERCIAL

## 2018-10-23 ENCOUNTER — OFFICE VISIT (OUTPATIENT)
Dept: INTERNAL MEDICINE CLINIC | Age: 58
End: 2018-10-23

## 2018-10-23 VITALS
BODY MASS INDEX: 23.39 KG/M2 | RESPIRATION RATE: 14 BRPM | OXYGEN SATURATION: 98 % | DIASTOLIC BLOOD PRESSURE: 74 MMHG | TEMPERATURE: 97.6 F | WEIGHT: 137 LBS | HEIGHT: 64 IN | HEART RATE: 71 BPM | SYSTOLIC BLOOD PRESSURE: 134 MMHG

## 2018-10-23 DIAGNOSIS — E78.5 HYPERLIPIDEMIA, UNSPECIFIED HYPERLIPIDEMIA TYPE: ICD-10-CM

## 2018-10-23 DIAGNOSIS — Z00.01 ENCOUNTER FOR ROUTINE ADULT PHYSICAL EXAM WITH ABNORMAL FINDINGS: Primary | ICD-10-CM

## 2018-10-23 DIAGNOSIS — Z23 ENCOUNTER FOR IMMUNIZATION: ICD-10-CM

## 2018-10-23 DIAGNOSIS — M54.2 NECK PAIN: ICD-10-CM

## 2018-10-23 DIAGNOSIS — M50.30 DEGENERATIVE DISC DISEASE, CERVICAL: ICD-10-CM

## 2018-10-23 DIAGNOSIS — G47.33 OBSTRUCTIVE SLEEP APNEA: ICD-10-CM

## 2018-10-23 DIAGNOSIS — I10 ESSENTIAL HYPERTENSION: ICD-10-CM

## 2018-10-23 DIAGNOSIS — K58.1 IRRITABLE BOWEL SYNDROME WITH CONSTIPATION: ICD-10-CM

## 2018-10-23 DIAGNOSIS — M51.26 HNP (HERNIATED NUCLEUS PULPOSUS), LUMBAR: ICD-10-CM

## 2018-10-23 DIAGNOSIS — K52.9 COLITIS: ICD-10-CM

## 2018-10-23 DIAGNOSIS — R51.9 CHRONIC DAILY HEADACHE: ICD-10-CM

## 2018-10-23 RX ORDER — TIZANIDINE 4 MG/1
4 TABLET ORAL
Qty: 30 TAB | Refills: 2 | Status: SHIPPED | OUTPATIENT
Start: 2018-10-23 | End: 2019-01-14 | Stop reason: SDUPTHER

## 2018-10-23 RX ORDER — AMITRIPTYLINE HYDROCHLORIDE 10 MG/1
TABLET, FILM COATED ORAL
Refills: 3 | COMMUNITY
Start: 2018-09-26 | End: 2020-02-08 | Stop reason: SDDI

## 2018-10-23 RX ORDER — PRAVASTATIN SODIUM 10 MG/1
10 TABLET ORAL
Qty: 90 TAB | Refills: 1 | Status: SHIPPED | OUTPATIENT
Start: 2018-10-23 | End: 2019-04-13 | Stop reason: SDUPTHER

## 2018-10-23 RX ORDER — TIZANIDINE 4 MG/1
TABLET ORAL
Refills: 1 | COMMUNITY
Start: 2018-09-17 | End: 2018-10-23 | Stop reason: SDUPTHER

## 2018-10-23 RX ORDER — CELECOXIB 100 MG/1
CAPSULE ORAL
Refills: 1 | COMMUNITY
Start: 2018-09-24 | End: 2018-10-23 | Stop reason: SDUPTHER

## 2018-10-23 RX ORDER — CELECOXIB 100 MG/1
100 CAPSULE ORAL
Qty: 45 CAP | Refills: 2 | Status: SHIPPED | OUTPATIENT
Start: 2018-10-23 | End: 2019-02-02

## 2018-10-23 NOTE — PROGRESS NOTES
Chief Complaint Patient presents with  Hypertension 4 month follow up with labs. Health Maintenance Due Topic Date Due  Shingrix Vaccine Age 50> (1 of 2) 12/01/2008  Influenza Age 5 to Adult  08/01/2018 Patient given influenza vaccine, FLUARIX Quadrivalent, in left deltoid, per verbal order from Dr. Sheri Bean. Instructed patient to sit and wait 10-20 minutes before leaving the premises so that we can watch for any complications or adverse reactions. Patient given vaccine information statement handout before vaccine was given. Patient tolerated well without adverse reactions or complications. 1. Have you been to the ER, urgent care clinic or hospitalized since your last visit? NO.  
 
2. Have you seen or consulted any other health care providers outside of the [facility] since your last visit (Include any pap smears or colon screening)? NO Do you have an Advanced Directive? NO Would you like information on Advanced Directives?  YES

## 2018-10-23 NOTE — PATIENT INSTRUCTIONS
Advance Care Planning: Care Instructions Your Care Instructions It can be hard to live with an illness that cannot be cured. But if your health is getting worse, you may want to make decisions about end-of-life care. Planning for the end of your life does not mean that you are giving up. It is a way to make sure that your wishes are met. Clearly stating your wishes can make it easier for your loved ones. Making plans while you are still able may also ease your mind and make your final days less stressful and more meaningful. Follow-up care is a key part of your treatment and safety. Be sure to make and go to all appointments, and call your doctor if you are having problems. It's also a good idea to know your test results and keep a list of the medicines you take. What can you do to plan for the end of life? · You can bring these issues up with your doctor. You do not need to wait until your doctor starts the conversation. You might start with \"I would not be willing to live with . Lovetta Blaze Lovetta Blaze Lovetta Blaze \" When you complete this sentence it helps your doctor understand your wishes. · Talk openly and honestly with your doctor. This is the best way to understand the decisions you will need to make as your health changes. Know that you can always change your mind. · Ask your doctor about commonly used life-support measures. These include tube feedings, breathing machines, and fluids given through a vein (IV). Understanding these treatments will help you decide whether you want them. · You may choose to have these life-supporting treatments for a limited time. This allows a trial period to see whether they will help you. You may also decide that you want your doctor to take only certain measures to keep you alive. It is important to spell out these conditions so that your doctor and family understand them. · Talk to your doctor about how long you are likely to live.  He or she may be able to give you an idea of what usually happens with your specific illness. · Think about preparing papers that state your wishes. This way there will not be any confusion about what you want. You can change your instructions at any time. Which papers should you prepare? Advance directives are legal papers that tell doctors how you want to be cared for at the end of your life. You do not need a  to write these papers. Ask your doctor or your state [facility] department for information on how to write your advance directives. They may have the forms for each of these types of papers. Make sure your doctor has a copy of these on file, and give a copy to a family member or close friend. · Consider a do-not-resuscitate order (DNR). This order asks that no extra treatments be done if your heart stops or you stop breathing. Extra treatments may include cardiopulmonary resuscitation (CPR), electrical shock to restart your heart, or a machine to breathe for you. If you decide to have a DNR order, ask your doctor to explain and write it. Place the order in your home where everyone can easily see it. · Consider a living will. A living will explains your wishes about life support and other treatments at the end of your life if you become unable to speak for yourself. Living liu tell doctors to use or not use treatments that would keep you alive. You must have one or two witnesses or a notary present when you sign this form. · Consider a durable power of  for health care. This allows you to name a person to make decisions about your care if you are not able to. Most people ask a close friend or family member. Talk to this person about the kinds of treatments you want and those that you do not want. Make sure this person understands your wishes. These legal papers are simple to change. Tell your doctor what you want to change, and ask him or her to make a note in your medical file.  Give your family updated copies of the papers. Where can you learn more? Go to http://pooja-real.info/. Enter P184 in the search box to learn more about \"Advance Care Planning: Care Instructions. \" Current as of: April 19, 2018 Content Version: 11.8 © 0873-9740 [employer]. Care instructions adapted under license by [employer] (which disclaims liability or warranty for this information). If you have questions about a medical condition or this instruction, always ask your healthcare professional. [address] any warranty or liability for your use of this information. Hyperlipidemia: After Your Visit Your Care Instructions Hyperlipidemia is too much fat in your blood. The body has several kinds of fat, including cholesterol and triglycerides. Your body needs fat for many things, such as making new cells. But too much fat in your blood increases your chances of having a heart attack or stroke. You may be able to lower your cholesterol and triglycerides with a heart-healthy diet, exercise, and if needed, medicine. Your doctor may want you to try lifestyle changes first to see whether they lower the fat in your blood. You may need to take medicine if lifestyle changes do not lower the fat in your blood enough. Follow-up care is a key part of your treatment and safety. Be sure to make and go to all appointments, and call your doctor if you are having problems. Its also a good idea to know your test results and keep a list of the medicines you take. How can you care for yourself at home? Take your medicines · Take your medicines exactly as prescribed. Call your doctor if you think you are having a problem with your medicine. · If you take medicine to lower your cholesterol, go to follow-up visits. You will need to have blood tests.  
· Do not take large doses of niacin, which is a B vitamin, while taking medicine called statins. It may increase the chance of muscle pain and liver problems. · Talk to your doctor about avoiding grapefruit juice if you are taking statins. Grapefruit juice can raise the level of this medicine in your blood. This could increase side effects. Eat more fruits, vegetables, and fiber · Fruits and vegetables have lots of nutrients that help protect against heart disease, and they have littleif anyfat. Try to eat at least five servings a day. Dark green, deep orange, or yellow fruits and vegetables are healthy choices. · Keep carrots, celery, and other veggies handy for snacks. Buy fruit that is in season and store it where you can see it so that you will be tempted to eat it. Cook dishes that have a lot of veggies in them, such as stir-fries and soups. · Foods high in fiber may reduce your cholesterol and provide important vitamins and minerals. High-fiber foods include whole-grain cereals and breads, oatmeal, beans, brown rice, citrus fruits, and apples. · Buy whole-grain breads and cereals instead of white bread and pastries. Limit saturated fat · Read food labels and try to avoid saturated fat and trans fat. They increase your risk of heart disease. · Use olive or canola oil when you cook. Try cholesterol-lowering spreads, such as Benecol or Take Control. · Bake, broil, grill, or steam foods instead of frying them. · Limit the amount of high-fat meats you eat, including hot dogs and sausages. Cut out all visible fat when you prepare meat. · Eat fish, skinless poultry, and soy products such as tofu instead of high-fat meats. Soybeans may be especially good for your heart. Eat at least two servings of fish a week. Certain fish, such as salmon, contain omega-3 fatty acids, which may help reduce your risk of heart attack. · Choose low-fat or fat-free milk and dairy products. Get exercise, limit alcohol, and quit smoking · Get more exercise. Work with your doctor to set up an exercise program. Even if you can do only a small amount, exercise will help you get stronger, have more energy, and manage your weight and your stress. Walking is an easy way to get exercise. Gradually increase the amount you walk every day. Aim for at least 30 minutes on most days of the week. You also may want to swim, bike, or do other activities. · Limit alcohol to no more than 2 drinks a day for men and 1 drink a day for women. · Do not smoke. If you need help quitting, talk to your doctor about stop-smoking programs and medicines. These can increase your chances of quitting for good. When should you call for help? Call 911 anytime you think you may need emergency care. For example, call if: 
· You have symptoms of a heart attack. These may include: ¨ Chest pain or pressure, or a strange feeling in the chest. 
¨ Sweating. ¨ Shortness of breath. ¨ Nausea or vomiting. ¨ Pain, pressure, or a strange feeling in the back, neck, jaw, or upper belly or in one or both shoulders or arms. ¨ Lightheadedness or sudden weakness. ¨ A fast or irregular heartbeat. After you call 911, the  may tell you to chew 1 adult-strength or 2 to 4 low-dose aspirin. Wait for an ambulance. Do not try to drive yourself. · You have signs of a stroke. These may include: 
¨ Sudden numbness, paralysis, or weakness in your face, arm, or leg, especially on only one side of your body. ¨ New problems with walking or balance. ¨ Sudden vision changes. ¨ Drooling or slurred speech. ¨ New problems speaking or understanding simple statements, or feeling confused. ¨ A sudden, severe headache that is different from past headaches. · You passed out (lost consciousness). Call your doctor now or seek immediate medical care if: 
· You have muscle pain or weakness. Watch closely for changes in your health, and be sure to contact your doctor if: 
· You are very tired. · You have an upset stomach, gas, constipation, or belly pain or cramps. Where can you learn more? Go to [place].be Enter C406 in the search box to learn more about \"Hyperlipidemia: After Your Visit. \"  
© 7970-0410 [employer]. Care instructions adapted under license by [employer] (which disclaims liability or warranty for this information). This care instruction is for use with your licensed healthcare professional. If you have questions about a medical condition or this instruction, always ask your healthcare professional. [address] any warranty or liability for your use of this information. Content Version: 5.1.742369; Last Revised: October 13, 2011

## 2018-10-24 ENCOUNTER — HOSPITAL ENCOUNTER (OUTPATIENT)
Dept: PHYSICAL THERAPY | Age: 58
Discharge: HOME | End: 2018-10-24
Payer: COMMERCIAL

## 2018-10-24 PROCEDURE — 97112 NEUROMUSCULAR REEDUCATION: CPT

## 2018-10-24 NOTE — PROGRESS NOTES
PT DAILY TREATMENT NOTE 10-18 Patient Name: Hari Hannon Date:10/24/2018 : 1960 [x]  Patient  Verified Payor: BLUE CROSS / Plan: [address] [place] [address] / Product Type: PPO / In time:1258  Out time:146 Total Treatment Time (min): 48 Visit #: 3 of 8 Medicare/BCBS Only Total Timed Codes (min):  6748 1:1 Treatment Time:  38 Treatment Area: Neck pain [M54.2] SUBJECTIVE Pain Level (0-10 scale): 0 Any medication changes, allergies to medications, adverse drug reactions, diagnosis change, or new procedure performed?: [x] No    [] Yes (see summary sheet for update) Subjective functional status/changes:   [x] No changes reported OBJECTIVE Modality rationale: decrease pain to improve the patients ability to tolerate post needle soreness Type Additional Details  
[] Estim:  []Unatt       []IFC  []Premod []Other:  []w/ice   []w/heat Position: Location:  
[] Estim: []Att    []TENS instruct  []NMES []Other:  []w/US   []w/ice   []w/heat Position: Location:  
[]  Traction: [] Cervical       []Lumbar 
                     [] Prone          []Supine []Intermittent   []Continuous Lbs: 
[] before manual 
[] after manual  
[]  Ultrasound: []Continuous   [] Pulsed []1MHz   []3MHz W/cm2: 
Location:  
[]  Iontophoresis with dexamethasone Location: [] Take home patch  
[] In clinic [x]  Ice     []  heat 
[]  Ice massage 
[]  Laser  
[]  Anodyne Position:prone Location:C/S  
[]  Laser with stim 
[]  Other:  Position: Location:  
[]  Vasopneumatic Device Pressure:       [] lo [] med [] hi  
Temperature: [] lo [] med [] hi  
[] Skin assessment post-treatment:  []intact []redness- no adverse reaction 38 min Neuromuscular Re-education:  []  See flow sheet :  
Rationale: increase ROM, increase strength and improve coordination  to improve the patients ability to recruit ant and post chain appropriately Dry Needling Procedure Note Procedure: An intramuscular manual therapy (dry needling) and a neuro-muscular re-education treatment was done to deactivate myofascial trigger points with a 30 gauge filament needle under aseptic technique. Indications: 
[] Myofascial pain and dysfunction [] Muscled spasms [] Myalgia/myositis   [] Muscle cramps 
[] Muscle imbalances  [] Temporomandibular Dysfunction 
[] Other: 
 
Chart reviewed for the following: 
Darrius WADE PT, have reviewed the medical history, summary list and precautions/contraindications for [employer]. TIME OUT performed immediately prior to start of procedure: 
Darrius WADE PT, have performed the following reviews on [employer] prior to the start of the session:     
[x] Verified patient identification by name and date of birth   
[x] Agreement on all muscles being treated was verified  
[x] Purpose of dry needling, side effects, possible complications, risks and benefits were explained to the patient [x] Procedure site(s) verified 
[x] Patient was positioned for comfort and draped for privacy [x] Informed Consent was signed (initial visit) and verified verbally (subsequent visits) [x] Patient was instructed on the need to report the use of blood thinners and/or immunosuppressant medications [x] How to respond to possible adverse effects of treatment 
[x] Self treatment of post needling soreness: ice, heat (moist heat, heat wraps) and stretching 
[x] Opportunity was given to ask any questions, all questions were answered Time: 6021 Date of procedure: 10/24/2018 Treatment: The following muscles were treated today with intramuscular dry needling 
[] Left [] Right Masster 
[] Left [] Right Temporalis 
[] Left [] Right Zygomaticus Major / Minor 
[] Left [] Right Lateral Pterygoid 
[] Left [] Right Medial Pterygoid [] Left [] Right Digastric Post / Anterior Belly 
[] Left [] Right Sternocleidomastoid 
[] Left [] Right Scalene Anterior / Medial / Posterior 
[] Left [] Right Extra Laryngeal Muscles [x] Left [] Right Upper Trapezius 
[] Left [] Right Middle Trapezius 
[] Left [] Right Lower Trapezius 
[] Left [] Right Oblique Capitis Inferior [x] Left [x] Right Splenius Capitis / Cervicis [x] Left [x] Right Semispinalis: Capitis / Cervicis [x] Left [x] Right Multifidi / Rotatores Cervicis / Thoracic 
[] Left [] Right Longissimus Thoracis / Illiocostalis 
[] Left [] Right Levator Scapulae 
[] Left [] Right Supraspinatus / Infraspinatus 
[] Left [] Right Teres Major / Minor 
[] Left [] Right Rhomboids / Serratus posterior superior 
[] Left [] Right Pectoralis Major / Minor 
[] Left [] Right Serratus Anterior 
[] Left [] Right Latissimus Dorsi 
[] Left [] Right Subscapularis 
[] Left [] Right Coracobrachialis 
[] Left [] Right Biceps Brachii 
[] Left [] Right Deltoid: Anterior / Medial / Posterior 
[] Left [] Right Brachialis 
[] Left [] Right Triceps 
[] Left [] Right Brachioradialis 
[] Left [] Right Extensor Carpi Radialis Brevis / Extensor Carpi Radialis Longus    [] Left [] Right  Extensor digitorum 
[] Left [] Right Supinator / Pronator Teres 
[] Left [] Right Flexor Carpi Radialis/ Flexor Carpi Ulnaris  
[] Left [] Right  Flexor Digitorum Superficialis/ Flexor Digitorum Profundus 
[] Left [] Right Flexor Pollicis Longus / Flexor Pollicis Brevis / Palmaris Longus 
[] Left [] Right Abductor Pollicis Longus / Abductor Pollicis Brevis 
[] Left [] Right Opponens Pollicis / Adductor Pollicis 
[] Left [] Right Dorsal / Palmar Interossei / Lumbricalis 
[] Left [] Right Abductor Digiti Minimi / Opponens Digiti Minimi Patient's response to today's treatment: 
[x] Latent Twitch Response     [x] Muscle relaxation [x] Pain Relief 
[x] Post needling soreness    [x] without complications [] Increased Range of Motion   [x] Decreased headaches   
[] Decreased Tinnitus [] Other:  
 
Performed by: Darrin Cummings, PT With 
 [] TE 
 [] TA 
 [] neuro 
 [] other: Patient Education: [x] Review HEP [] Progressed/Changed HEP based on:  
[] positioning   [] body mechanics   [] transfers   [] heat/ice application   
[] other:   
 
Other Objective/Functional Measures:  B C/S rot = 
 
Pain Level (0-10 scale) post treatment: 0 
 
ASSESSMENT/Changes in Function: 50% improvement with mm restrictions and TrPs. Patient will continue to benefit from skilled PT services to modify and progress therapeutic interventions, address functional mobility deficits, address ROM deficits, address strength deficits, analyze and address soft tissue restrictions, analyze and cue movement patterns and assess and modify postural abnormalities to attain remaining goals. []  See Plan of Care 
[]  See progress note/recertification 
[]  See Discharge Summary Progress towards goals / Updated goals: 
Short Term Goals: To be accomplished in 2 weeks: 
                       1. I and compliant with HEP for self management of symptoms. Progressing per patient report (10/17/2018) Long Term Goals: To be accomplished in 4 weeks: 
1. Increase C/S ext by 10 degrees to enable patient to look up to see a bird. 2. Increase left C/S rotation to = right to increase ease with driving. Goal met 10/24/18 
3. Increase B scapular strength to grossly 4/5 to improve stability for postural awareness PLAN 
[]  Upgrade activities as tolerated     [x]  Continue plan of care 
[]  Update interventions per flow sheet      
[]  Discharge due to:_ 
[]  Other:_ Darrin Cummings, PT 10/24/2018  1:03 PM 
 
Future Appointments Date Time Provider Carolina Palumbo 10/26/2018  3:00 PM Aries Tejada [facility]  
10/31/2018  1:00 PM Palak Rosales PT [facility]  
11/2/2018 12:00 PM Palak Rosales PT [facility]  
 11/6/2018 10:30 AM Aly Merritt, PT [facility]  
1/23/2019  8:30 AM [facility] NURSE VISIT [facility] SCH[facility]  
1/29/2019 10:30 AM Nicolás Waters MD [facility]

## 2018-10-26 ENCOUNTER — HOSPITAL ENCOUNTER (OUTPATIENT)
Dept: PHYSICAL THERAPY | Age: 58
Discharge: HOME | End: 2018-10-26
Payer: COMMERCIAL

## 2018-10-26 PROCEDURE — 97140 MANUAL THERAPY 1/> REGIONS: CPT

## 2018-10-26 PROCEDURE — 97112 NEUROMUSCULAR REEDUCATION: CPT

## 2018-10-26 NOTE — PROGRESS NOTES
PT DAILY TREATMENT NOTE 10-18 Patient Name: Shania Scott Date:10/26/2018 : 1960 [x]  Patient  Verified Payor: BLUE CROSS / Plan: [address] [place] [address] / Product Type: PPO / In time:3:00  Out time:3:30 Total Treatment Time (min): 30 Visit #: 3 of 8 Medicare/BCBS Only Total Timed Codes (min):  30 1:1 Treatment Time:  21 Treatment Area: Neck pain [M54.2] SUBJECTIVE Pain Level (0-10 scale): 0 Any medication changes, allergies to medications, adverse drug reactions, diagnosis change, or new procedure performed?: [x] No    [] Yes (see summary sheet for update) Subjective functional status/changes:   [] No changes reported \"I can tell that I'm able to move my neck more. \" OBJECTIVE 22 min Neuromuscular Re-education:  [x]  See flow sheet :  
Rationale: increase ROM, increase strength and increase proprioception  to improve the patients ability to improve organization of head, neck, and shoulders and improve c/s mobility 8 min Manual Therapy:   to lower c/s and t/s Rationale: decrease pain, increase ROM and increase tissue extensibility to improve mobility With 
 [] TE 
 [] TA 
 [] neuro 
 [] other: Patient Education: [x] Review HEP [] Progressed/Changed HEP based on:  
[] positioning   [] body mechanics   [] transfers   [] heat/ice application   
[] other:   
 
Other Objective/Functional Measures: Added QP activities to challenge neutral spine against gravity Pain Level (0-10 scale) post treatment: 0 
 
ASSESSMENT/Changes in Function:  
Patient making steady progress towards goals. She reports subjective improvement and did fairly well with QP positioning--some SCM over recruitment towards the end of reps secondary to fatigue.   
 
Patient will continue to benefit from skilled PT services to modify and progress therapeutic interventions, address functional mobility deficits, address ROM deficits, address strength deficits, analyze and address soft tissue restrictions, analyze and cue movement patterns, analyze and modify body mechanics/ergonomics and assess and modify postural abnormalities to attain remaining goals. []  See Plan of Care 
[]  See progress note/recertification 
[]  See Discharge Summary Progress towards goals / Updated goals: 
Short Term Goals: To be accomplished in 2 weeks: 
                       4. I and compliant with HEP for self management of symptoms. Progressing per patient report (10/17/2018) Long Term Goals: To be accomplished in 4 weeks: 
1. Increase C/S ext by 10 degrees to enable patient to look up to see a bird. 2. Increase left C/S rotation to = right to increase ease with driving. Goal met 10/24/18 
3. Increase B scapular strength to grossly 4/5 to improve stability for postural awareness PLAN 
[]  Upgrade activities as tolerated     [x]  Continue plan of care 
[]  Update interventions per flow sheet      
[]  Discharge due to:_ 
[]  Other:_   
 
Taryn Palma 10/26/2018  2:59 PM 
 
Future Appointments Date Time Provider Carolina Palumbo 10/26/2018  3:00 PM Lyle Varghese [facility]  
10/31/2018  1:00 PM Josh Lewis PT [facility]  
11/2/2018 12:00 PM Josh Lewis PT [facility]  
11/6/2018 10:30 AM Josh Lewis PT [facility]  
1/23/2019  8:30 AM [facility] NURSE VISIT [facility] GARVIN  
1/29/2019 10:30 AM Jorge Waters MD [facility]

## 2018-10-27 PROBLEM — M50.30 DEGENERATIVE DISC DISEASE, CERVICAL: Status: ACTIVE | Noted: 2018-10-27

## 2018-10-27 PROBLEM — M54.2 NECK PAIN: Status: ACTIVE | Noted: 2018-10-27

## 2018-10-27 NOTE — PROGRESS NOTES
HPI:  
Hari Hannon is a 59y.o. year old female who presents today for a physical exam. She has a history of hypertension, hyperlipidemia, chronic daily headaches, obstructive sleep apnea, allergic rhinitis, vasomotor instability, and irritable bowel syndrome. She reports that she is doing relatively well. She underwent evaluation for her chronic daily headaches by Dr. Kaycee Fatima, and evaluation included brain MRI and MRA (8/8/2018) showing small nonspecific subcortical white matter hyperintensity left insula region,otherwise unremarkable MRI; and MRA negative. She underwent sleep study evaluation and was diagnosed with moderate obstructive sleep apnea, and was started successfully on CPAP. She was prescribed a Medrol dose pack to allow her to withdraw from daily use of analgesics for her headaches. She was then started on amitriptyine 10 mg daily and instructed to use Excedrin migraine for moderate headaches, and Fioricet only for severe headaches. Celebrex and Zanaflex were prescribed to manage her chronic neck pain, and she is also undergoing physical therapy to address her neck pain. She reports today that her headaches have resolved and she feels that physical therapy is helping her neck pain. She also underwent evaluation by Dr. Crispin Davis and Dr. Vicente Davis and had a small bowel series (8/7/2018) and abdominal duplex scan (8/16/2018), which were negative. She was instructed to collect a 24 hour urine for 5-HIAA, but has not yet performed. She has noted an improvement in her abdominal pain and diarrheal episodes, and reports that they are not occurring as often. She is no longer taking captopril, and her blood pressure therapy has been changed to hydralazine. She reports today that she is otherwise feeling well and without complaints.   
 
On 1/29/2018, she called the office and reported she was out having dinner when she experienced an \"IBS episode\" characterized by flushing in the face, dry mouth, and severe abdominal cramping. When she arrived home, she experienced diarrhea and a single episode of vomiting. She reported that she continued to have abdominal cramping and fatigue following this episode, although described as getting slowly better each day. She reported that she had similar episodes to this dating back 30 years, and was diagnosed with IBS at that time. She stated that the episodes used to resolve more quickly, although over the last year, she states that it now is taking several days before she recovers. She was referred to Dr. Siena Jaime, and started on Linzess for constipation. However, she states that this was not effective and she is no longer taking it. She has not had a recurrent episode since 1/2018. On 8/2/2016, she was evaluated by PETERSON Ahmadi for left sided crampy abdominal pain, diarrhea, and hematochezia. She was treated for presumed diverticulitis with Cipro and Flagyl, and underwent evaluation showing WBC 11.2, ALT 59, alkaline phosphatase 121, and lipase 427. Abdominal CT scan (8/2/2016) showed localized bowel wall thickening with inflammatory change involving the splenic flexure and proximal descending colon; no colonic diverticuli are seen so the presence of diverticulitis is unlikely; would be a typical location for bowel ischemia, but the patient's vascular structures are essentially normal so bowel ischemia unlikely; favor infectious or inflammatory colitis. She was evaluated by Dr. Maggi Baca and admitted for bowel rest, IVF, and IV antibiotics. She improved significantly in 36 hours and was discharged home. She completed the course of oral antibiotics and states that her symptoms have completely resolved.  In 4/2017, she was again evaluated by Dr. Maggi Baca for recurrent abdominal pain, and abdominal and pelvic CT scan was obtained (4/28/2017) showing mild nonspecific thickening of the proximal jejunum and significant improvement in the distal transverse colon adjacent to the splenic flexure with a short segment with mild wall thickening. Her discomfort resolved, but concern was raised for possible ischemia as cause. An echocardiogram was obtained (5/26/2017) showing normal LV function (EF 65%), no RWMA, grade 1 diastolic dysfunction, and wall thickening upper limits of normal. She also underwent a hypercoaguable laboratory workup, which was negative. She underwent screening colonoscopy by Dr. Dixon Thakkar in 6/2011, which found a polyp in the proximal descending colon (pathology: hyperplastic). Because she could not intubate the cecum, follow-up was recommended for 2 years. In 4/2015, she underwent repeat colonoscopy with Dr. Van Campbell, which showed left diverticular disease, hypertrophied anal papilla, and no polyps. Follow-up was recommended for 5 years. She has a history of hypertension, treated with amlodipine, hydrochlorothiazide, and captopril. She has resumed exercising and she purchased an [employer] machine for her home. She denies any chest pain, shortness of breath at rest or with exertion, palpitations, lightheadedness, or edema. She also has hyperlipidemia, treated with fenofibrate and fish oil in the past, but these were discontinued and she was changed to rosuvastatin in 6/2016. However, due to mild to moderate elevation in AST/ALT, it was discontinued in 5/2017 with resolution of abnormal liver function tests. She has a history of mild-severe obstructive sleep apnea, diagnosed by Dr. Theresa Macario, and was prescribed nasal CPAP. She states that she is no longer using her machine because it was making her headaches worse. She suffers from chronic headaches, considered to be migraines in the past as they were associated with menstruation. Since she entered menopause, the severity of her headaches has decreased and the character has changed.  She has severe allergic rhinitis, and awakens with morning headaches when her allergies are severe. She is followed by Dr. Long Grant and had been receiving immunotherapy with some improvement. She also has vasomotor instability, which responded to venlafaxine and she has successfully weaned from taking it. In 2016, she presented with complaints of right sciatica and she underwent a lumbar MRI (2016) showing multilevel mild disc bulges, mild facet arthropathy, and multilevel mild foraminal stenosis; L3-L4 with mild to moderate spinal canal stenosis, no more than mild spinal canal stenosis elsewhere, and no cord compression. She was evaluated by Dr. Manasa Guy on 2016 and started on gabapentin. She also was referred to physical therapy and received dry needling, which resulted in significant improvement. She also has a history of an anterior cervical discectomy and fusion (C5-C6) in 2011 by Dr. Nancy Zavaleta for a herniated disc and right radiculopathy. She does experience intermittent neck pain, and feels that it may be exacerbating her headaches. Past Medical History:  
Diagnosis Date  Allergic rhinitis  Chronic headaches  Hx of colonic polyp 2011 Hyperplastic; Dr. Ruth Mireles.  Hyperlipidemia  Hypertension  IBS (irritable bowel syndrome)  Obstructive sleep apnea Moderate-severe; not using nasal CPAP  
 S/P cervical discectomy 2011 Anterior C5-C6 discectomy for herniated disc and right radiculopathy; Dr. Nancy Zavaleta Past Surgical History:  
Procedure Laterality Date  ENDOSCOPY, COLON, DIAGNOSTIC    
 HX BACK SURGERY  11  
 neck surgery; ruptured cervical discs  HX COLONOSCOPY  2011  HX GYN    
 dilation and curretage  HX GYN    
  Current Outpatient Medications Medication Sig  pravastatin (PRAVACHOL) 10 mg tablet Take 1 Tab by mouth nightly.   
 celecoxib (CELEBREX) 100 mg capsule Take 1 Cap by mouth two (2) times daily as needed for Pain.  tiZANidine (ZANAFLEX) 4 mg tablet Take 1 Tab by mouth nightly.  ALPRAZolam (XANAX) 0.5 mg tablet Take 1 Tab by mouth three (3) times daily as needed for Anxiety. Max Daily Amount: 1.5 mg.  
 butalbital-acetaminophen-caffeine (FIORICET, ESGIC) -40 mg per tablet Take 1 Tab by mouth every six (6) hours as needed for Pain.  hydrALAZINE (APRESOLINE) 100 mg tablet Take 1 Tab by mouth three (3) times daily.  potassium chloride (K-DUR, KLOR-CON) 20 mEq tablet Take 1 Tab by mouth daily.  hydroCHLOROthiazide (HYDRODIURIL) 25 mg tablet TAKE 1 TABLET BY MOUTH EVERY DAY  amLODIPine (NORVASC) 10 mg tablet TAKE 1 TABLET BY MOUTH DAILY  TURMERIC ROOT EXTRACT PO Take 1,000 mg by mouth.  GLUC/CHONDR-MSM#7/C/DEENA/BORON (GLUCOSAMINE-CHONDR, BOSWELLIA, PO) Take 500 mg by mouth daily.  thiamine (VITAMIN B-1) 100 mg tablet Take 50 mg by mouth daily.  multivitamin (ONE A DAY) tablet Take 1 Tab by mouth daily.  Cholecalciferol, Vitamin D3, 2,000 unit cap Take 2,000 Units by mouth daily.  amitriptyline (ELAVIL) 10 mg tablet TK 1/2 T PO QHS FOR 1 WEEK AND THEN 1 T QHS THEREAFTER  
 ipratropium (ATROVENT) 0.03 % nasal spray 2 Sprays by Both Nostrils route three (3) times daily. No current facility-administered medications for this visit. Allergies and Intolerances: Allergies Allergen Reactions  Other Plant, Animal, Environmental Itching and Sneezing \"Everything but feathers and horses. \"  
  
 
Family History: Her mother had hypertension, but  from a melanoma. Her father  from a DVT. Her brother has CAD and underwent CABG at the age of 40. Family History Problem Relation Age of Onset  Heart Disease Brother  Other Mother   
     melanoma Brisa.Baba Arthritis-osteo Mother  Cancer Mother  Bleeding Prob Father  Heart Disease Father  Breast Cancer Other   
     grandmother  Stroke Other   
     family history Social History: She  reports that she has been smoking. She has been smoking about 0.50 packs per day. she has never used smokeless tobacco. She reports that she smoked 0.5 ppd for 30 years, stopping in 2011. She is  and lives with her . She recently retired from the [employer] of [id number]; her  sells eyeglass frames to opticians. Social History Substance and Sexual Activity Alcohol Use Yes  Alcohol/week: 0.0 oz  
 Comment: Very Rarely Immunization History: 
Immunization History Administered Date(s) Administered  Influenza Vaccine 10/08/2014, 10/09/2017  Influenza Vaccine (Quad) PF 10/23/2018  TD Vaccine 01/01/2006  Tdap 10/07/2016 Review of Systems: As above included in HPI. Otherwise 11 point review of systems negative including constitutional, skin, HENT, eyes, respiratory, cardiovascular, gastrointestinal, genitourinary, musculoskeletal, endo/heme/aller, neurological. 
 
Physical:  
Vitals:  
BP: 134/74 HR: 71 
WT: 137 lb (62.1 kg) BMI:  23.52 kg/m2 Patient appears in no apparent distress. Affect is appropriate. HEENT --Anicteric sclerae, tympanic membranes normal,  ear canals normal. 
PERRL, EOMI, conjunctiva and lids normal.  
Sinuses were nontender, turbinates normal, hearing normal.  Oropharynx without 
erythema, normal tongue, oral mucosa and tonsils. No cervical lymphadenopathy. No thyromegaly, JVD, or bruits. Carotid pulses 2+ with normal upstroke. Lungs --Clear to auscultation. No wheezing or rales. Heart --Regular rate and rhythm, no murmurs, rubs, gallops, or clicks. Chest wall --Nontender to palpation. PMI normal. 
Abdomen -- Soft and nontender, no hepatosplenomegaly or masses. Extremities -- Without cyanosis, clubbing, edema. 2+ pulses equally and bilaterally. Normal looking digits, ROM intact Neuro -- CN 2-12 intact, strength 5/5 with intact soft touch in all extremities Derm  no obvious abnormalities noted, no rash Review of Data: 
Labs: Hospital Outpatient Visit on 10/17/2018 Component Date Value Ref Range Status  WBC 10/17/2018 4.6  4.6 - 13.2 K/uL Final  
 RBC 10/17/2018 4.76  4.20 - 5.30 M/uL Final  
 HGB 10/17/2018 14.2  12.0 - 16.0 g/dL Final  
 HCT 10/17/2018 43.5  35.0 - 45.0 % Final  
 MCV 10/17/2018 91.4  74.0 - 97.0 FL Final  
 MCH 10/17/2018 29.8  24.0 - 34.0 PG Final  
 MCHC 10/17/2018 32.6  31.0 - 37.0 g/dL Final  
 RDW 10/17/2018 12.8  11.6 - 14.5 % Final  
 PLATELET 16/76/5196 650  135 - 420 K/uL Final  
 MPV 10/17/2018 9.8  9.2 - 11.8 FL Final  
 NEUTROPHILS 10/17/2018 37* 40 - 73 % Final  
 LYMPHOCYTES 10/17/2018 53* 21 - 52 % Final  
 MONOCYTES 10/17/2018 8  3 - 10 % Final  
 EOSINOPHILS 10/17/2018 2  0 - 5 % Final  
 BASOPHILS 10/17/2018 0  0 - 2 % Final  
 ABS. NEUTROPHILS 10/17/2018 1.7* 1.8 - 8.0 K/UL Final  
 ABS. LYMPHOCYTES 10/17/2018 2.4  0.9 - 3.6 K/UL Final  
 ABS. MONOCYTES 10/17/2018 0.4  0.05 - 1.2 K/UL Final  
 ABS. EOSINOPHILS 10/17/2018 0.1  0.0 - 0.4 K/UL Final  
 ABS.  BASOPHILS 10/17/2018 0.0  0.0 - 0.1 K/UL Final  
 DF 10/17/2018 AUTOMATED    Final  
 LIPID PROFILE 10/17/2018        Final  
 Cholesterol, total 10/17/2018 235* <200 MG/DL Final  
 Triglyceride 10/17/2018 216* <150 MG/DL Final  
 HDL Cholesterol 10/17/2018 50  40 - 60 MG/DL Final  
 LDL, calculated 10/17/2018 141.8* 0 - 100 MG/DL Final  
 VLDL, calculated 10/17/2018 43.2  MG/DL Final  
 CHOL/HDL Ratio 10/17/2018 4.7  0 - 5.0   Final  
 Sodium 10/17/2018 141  136 - 145 mmol/L Final  
 Potassium 10/17/2018 3.7  3.5 - 5.5 mmol/L Final  
 Chloride 10/17/2018 104  100 - 108 mmol/L Final  
 CO2 10/17/2018 28  21 - 32 mmol/L Final  
 Anion gap 10/17/2018 9  3.0 - 18 mmol/L Final  
 Glucose 10/17/2018 96  74 - 99 mg/dL Final  
 BUN 10/17/2018 33* 7.0 - 18 MG/DL Final  
 Creatinine 10/17/2018 0.62  0.6 - 1.3 MG/DL Final  
  BUN/Creatinine ratio 10/17/2018 53* 12 - 20   Final  
 GFR est AA 10/17/2018 >60  >60 ml/min/1.73m2 Final  
 GFR est non-AA 10/17/2018 >60  >60 ml/min/1.73m2 Final  
 Calcium 10/17/2018 9.9  8.5 - 10.1 MG/DL Final  
 Bilirubin, total 10/17/2018 0.4  0.2 - 1.0 MG/DL Final  
 ALT (SGPT) 10/17/2018 29  13 - 56 U/L Final  
 AST (SGOT) 10/17/2018 13* 15 - 37 U/L Final  
 Alk. phosphatase 10/17/2018 95  45 - 117 U/L Final  
 Protein, total 10/17/2018 7.1  6.4 - 8.2 g/dL Final  
 Albumin 10/17/2018 4.3  3.4 - 5.0 g/dL Final  
 Globulin 10/17/2018 2.8  2.0 - 4.0 g/dL Final  
 A-G Ratio 10/17/2018 1.5  0.8 - 1.7   Final  
 Color 10/17/2018 YELLOW    Final  
 Appearance 10/17/2018 CLEAR    Final  
 Specific gravity 10/17/2018 >1.030* 1.005 - 1.030 Final  
 pH (UA) 10/17/2018 5.5  5.0 - 8.0   Final  
 Protein 10/17/2018 NEGATIVE   NEG mg/dL Final  
 Glucose 10/17/2018 NEGATIVE   NEG mg/dL Final  
 Ketone 10/17/2018 NEGATIVE   NEG mg/dL Final  
 Bilirubin 10/17/2018 NEGATIVE   NEG   Final  
 Blood 10/17/2018 NEGATIVE   NEG   Final  
 Urobilinogen 10/17/2018 0.2  0.2 - 1.0 EU/dL Final  
 Nitrites 10/17/2018 NEGATIVE   NEG   Final  
 Leukocyte Esterase 10/17/2018 NEGATIVE   NEG   Final  
 WBC 10/17/2018 0 to 2  0 - 4 /hpf Final  
 RBC 10/17/2018 0  0 - 5 /hpf Final  
 Epithelial cells 10/17/2018 FEW  0 - 5 /lpf Final  
 Bacteria 10/17/2018 NEGATIVE   NEG /hpf Final  
 CA Oxalate crystals 10/17/2018 FEW* NEG   Final  
 TSH 10/17/2018 1.00  0.36 - 3.74 uIU/mL Final  
 Vitamin D 25-Hydroxy 10/17/2018 40.2  30 - 100 ng/mL Final  
 
Calculated 10 year ASCVD risk score: 5.4 % Health Maintenance: 
Screening:  
 Mammogram: negative (11/2017) PAP smear: well women exams by Dr. Erma Hernandez (last 12/2017) Colorectal: colonoscopy (4/2015) no polyps. Dr. Burrows Favors. Due 2020. Depression: Uses Xanax prn for anxiety. Weaned from Effexor (vasomotor instability). DM (HbA1c/FPG): FPG 96 (10/2018) Hepatitis C: negative (12/2015) Falls: none DEXA: N/A Glaucoma: has regular eye exams with Dr. Kaleb Cagle (last 12/2016) Smoking: 15 pack year (stopped 2011) Vitamin D: 40.2 (10/2018) Medicare Wellness: N/A Impression: 
Patient Active Problem List  
Diagnosis Code  Hypertension I10  
 Hyperlipidemia E78.5  Obstructive sleep apnea G47.33  
 Chronic daily headache R51  
 IBS (irritable bowel syndrome) K58.9  Colon polyp K63.5  Vasomotor instability R55  Vitamin D deficiency E55.9  Sciatica of right side M54.31  
 Colitis K52.9  Lumbar spinal stenosis M48.061  
 HNP (herniated nucleus pulposus), lumbar M51.26  
 Lumbar facet arthropathy M47.816 Plan: 1. Hypertension. Blood pressure appears well controlled on current regimen of hydralazine 100 mg tid, amlodipine 10 mg daily, and hydrochlorothiazide 25 mg daily. Successfully discontinued treatment with captopril since unclear if abdominal attacks may have been a form of angioedema. Renal function remains normal with creatinine 0.62/ eGFR >60. However, BUN/creatinine ratio remains significantly elevated to 53. Discussed importance of increasing fluid intake, particularly given treatment with hydrochlorothiazide. Follow closely. 2. Hyperlipidemia. Began treatment with rosuvastatin in 6/2016, and elevation in transaminases first noted in 8/2016. Discontinued in 5/2017 due to persistent elevation of transaminases, and resolved after discontinuation. Calculated 10 year ASCVD risk off statin is 5.4 %, which does not place her in one of the four statin benefit groups as per new AHA/ACC guidelines. However, , , and HDL 50, and patient with strong family history of heart disease. She is anxious about not being treated, and agreeable to try therapy with a different statin. Will begin pravastatin 10 mg daily.  Will recheck lipid panel and hepatic panel in 3 months. Emphasized importance of lifestyle modifications, including diet, exercise, and weight loss. Continue to follow. 3. Elevated transaminases. Now resolved since rosuvastatin discontinued. Review of record shows elevation noted since 8/2016 when initiated. Iron studies, hepatitis panel, KIEL, SPEP, CK, aldolase, and anti-smooth muscle antibody negative. RUQ ultrasound (10/2016) showing fatty infiltration of liver. Advised patient to avoid alcohol. 4. Colitis. Unclear etiology, but nonspecific bowel wall thickening in short segment of jejunum and sigmoid on repeat CT scan raised question of ischemic in origin. Hypercoaguable workup including lab studies and echocardiogram negative. Appears to have intermittent \"IBS\" episodes associated with facial flushing, dry mouth, and severe abdominal cramping followed by diarrhea +/- vomiting. Episodes linger for several days following. No evidence of urticaria seen. Interesting is the abnormalities noted on abdominal CT scan in two of her episodes, with nonspecific short segment thickening in various locations in small and large intestines. Obtained complement studies including C4, C1 esterase inhibtor, and C1q levels which were normal. However, instructed patient that if she developed another episode, would like to check C4 level during the episode. Discontinued captopril and has not had recurrence since doing so. Evaluated by GI had negative small bowel series and duplex ultrasound. Planning for 24 hour urine for 5-HIAA level. If negative, considering porphyria workup. Follow. 5. Chronic daily headaches. Evaluated by Dr. Susi Valenzuela and weaned from daily Fioricet and Excedrin migraine. Headaches now improved with daily amitriptyline and trying to use Excedrin migraine and Fioricet only occasionally for moderate and severe headaches, respectively.  Does have chronic neck pain related to her anterior neck fusion, and is finding Celebrex and Zanaflex to be helpful. Receiving physical therapy currently. Continuing to follow-up with Dr. Leonardo Brown. 6. Right leg sciatica. Evidence of lumbar spinal stenosis and facet arthropathy, and lumbar herniated disc. Significant improvement with dry needling therapy and reports no further pain. Continue to follow and follow-up with Dr. Yo Oconnor if recurs. 7. Abnormal glucose. Fasting glucose remains normal. Most likely due to weight loss. Emphasized importance of continued lifestyle modifications. 8.  Obstructive sleep apnea. Underwent reevaluation by Dr. Jovan Torres, and repeat sleep study showed moderate PEGGY. Started on CPAP and noting improvement. Continue to follow. 9. Vasomotor dysfunction. Weaned successfully from venlafaxine. Follow. 10. Health maintenance. Will give influenza vaccine today. Given script for Shingrix vaccine. Other immunizations up to date. Well woman exam with Dr. Shai Maciel in 12/2017, and follow-up scheduled. Mammogram up to date. Continue regular eye exams with Dr. Isma Hickey. Vitamin D level normal. Continue maintenance dose supplement. Patient understands recommendations and agrees with plan. Follow-up in 3 months.

## 2018-10-31 ENCOUNTER — HOSPITAL ENCOUNTER (OUTPATIENT)
Dept: PHYSICAL THERAPY | Age: 58
Discharge: HOME | End: 2018-10-31
Payer: COMMERCIAL

## 2018-10-31 PROCEDURE — 97112 NEUROMUSCULAR REEDUCATION: CPT

## 2018-10-31 PROCEDURE — 97140 MANUAL THERAPY 1/> REGIONS: CPT

## 2018-10-31 NOTE — PROGRESS NOTES
PT DAILY TREATMENT NOTE 10-18 Patient Name: Miguel Angel Rojas Date:10/31/2018 : 1960 [x]  Patient  Verified Payor: [employer] / Plan: [address] [place] [address] / Product Type: PPO / In time:100  Out time:141 Total Treatment Time (min): 41 Visit #: 5 of 8 Medicare/BCBS Only Total Timed Codes (min):  41 1:1 Treatment Time:  41  
 
 
Treatment Area: Neck pain [M54.2] SUBJECTIVE Pain Level (0-10 scale): 0 Any medication changes, allergies to medications, adverse drug reactions, diagnosis change, or new procedure performed?: [x] No    [] Yes (see summary sheet for update) Subjective functional status/changes:   [] No changes reported I don't think I need to get needled today; I'm feeling good. OBJECTIVE 33 min Neuromuscular Re-education:  []  See flow sheet :  
Rationale: increase ROM and increase strength  to improve the patients ability to recruit ant and post chain appropriately; improve overall T/S mobility for daily activities 8 min Manual Therapy:  Per flow sheet Rationale: decrease pain, increase ROM and increase tissue extensibility to improve rib and T/S mobility for daily activities With 
 [] TE 
 [] TA 
 [] neuro 
 [] other: Patient Education: [x] Review HEP [] Progressed/Changed HEP based on:  
[] positioning   [] body mechanics   [] transfers   [] heat/ice application   
[] other:   
 
Other Objective/Functional Measures: added T/S mobility exercises Pain Level (0-10 scale) post treatment: 0 
 
ASSESSMENT/Changes in Function: Patient demonstrates increased T/S mobility with right rot and lateral flex compared to left. Felt \"more open\" throughout rib cage and chest post treatment.   
 
Patient will continue to benefit from skilled PT services to modify and progress therapeutic interventions, address functional mobility deficits, address ROM deficits, address strength deficits, analyze and address soft tissue restrictions, analyze and cue movement patterns and assess and modify postural abnormalities to attain remaining goals. []  See Plan of Care 
[]  See progress note/recertification 
[]  See Discharge Summary Progress towards goals / Updated goals: 
Short Term Goals: To be accomplished in 2 weeks: 
                       8. I and compliant with HEP for self management of symptoms. Progressing per patient report (10/17/2018) Long Term Goals: To be accomplished in 4 weeks: 
1. Increase C/S ext by 10 degrees to enable patient to look up to see a bird. 2. Increase left C/S rotation to = right to increase ease with driving. Goal met 49/15/99 
3. Increase B scapular strength to grossly 4/5 to improve stability for postural awareness. PLAN 
[]  Upgrade activities as tolerated     [x]  Continue plan of care 
[]  Update interventions per flow sheet      
[]  Discharge due to:_ 
[]  Other:_ Toby York, PT 10/31/2018  1:10 PM 
 
Future Appointments Date Time Provider Carolina Yui 11/2/2018 12:00 [facility]  
11/6/2018 10:30 AM [facility]  
1/23/2019  8:30 AM [facility] NURSE VISIT [facility] SWETA SCHED  
1/29/2019 10:30 AM Ghanshyam Waters MD [facility]

## 2018-11-02 ENCOUNTER — HOSPITAL ENCOUNTER (OUTPATIENT)
Dept: PHYSICAL THERAPY | Age: 58
Discharge: HOME | End: 2018-11-02
Payer: COMMERCIAL

## 2018-11-02 PROCEDURE — 97112 NEUROMUSCULAR REEDUCATION: CPT

## 2018-11-02 PROCEDURE — 97140 MANUAL THERAPY 1/> REGIONS: CPT

## 2018-11-02 NOTE — PROGRESS NOTES
PT DAILY TREATMENT NOTE 10-18 Patient Name: Ame Jacobo Date:2018 : 1960 [x]  Patient  Verified Payor: BLUE CROSS / Plan: [address] [place] [address] / Product Type: PPO / In time:227  Out time:306 Total Treatment Time (min): 39 Visit #: 6 of 8 Medicare/BCBS Only Total Timed Codes (min):  29 1:1 Treatment Time:  28 Treatment Area: Neck pain [M54.2] SUBJECTIVE Pain Level (0-10 scale): 4-5 Any medication changes, allergies to medications, adverse drug reactions, diagnosis change, or new procedure performed?: [x] No    [] Yes (see summary sheet for update) Subjective functional status/changes:   [] No changes reported I didn't like lying on the 1/2 foam.  It made it worse. Reports left side isn't painful, but just tight. OBJECTIVE Modality rationale: decrease pain to improve the patients ability to tolerate post needle soreness Min Type Additional Details  
 [] Estim:  []Unatt       []IFC  []Premod []Other:  []w/ice   []w/heat Position: Location:  
 [] Estim: []Att    []TENS instruct  []NMES []Other:  []w/US   []w/ice   []w/heat Position: Location:  
 []  Traction: [] Cervical       []Lumbar 
                     [] Prone          []Supine []Intermittent   []Continuous Lbs: 
[] before manual 
[] after manual  
 []  Ultrasound: []Continuous   [] Pulsed []1MHz   []3MHz W/cm2: 
Location:  
 []  Iontophoresis with dexamethasone Location: [] Take home patch  
[] In clinic  
10 [x]  Ice     []  heat 
[]  Ice massage 
[]  Laser  
[]  Anodyne Position:seated Location:left UT  
 []  Laser with stim 
[]  Other:  Position: Location:  
 []  Vasopneumatic Device Pressure:       [] lo [] med [] hi  
Temperature: [] lo [] med [] hi  
[] Skin assessment post-treatment:  []intact []redness- no adverse reaction 21 min Neuromuscular Re-education:  []  See flow sheet :  
Rationale: increase ROM and increase strength  to improve the patients postural awareness and stability 8 min Manual Therapy:  Reassessment Dry Needling Procedure Note Procedure: An intramuscular manual therapy (dry needling) and a neuro-muscular re-education treatment was done to deactivate myofascial trigger points with a 30 gauge filament needle under aseptic technique. Indications: 
[x] Myofascial pain and dysfunction [] Muscled spasms 
[x] Myalgia/myositis   [] Muscle cramps [x] Muscle imbalances  [] Temporomandibular Dysfunction 
[] Other: 
 
Chart reviewed for the following: 
Lakia WADE PT, have reviewed the medical history, summary list and precautions/contraindications for [employer]. TIME OUT performed immediately prior to start of procedure: 
Lakia WADE PT, have performed the following reviews on [employer] prior to the start of the session:     
[x] Verified patient identification by name and date of birth   
[x] Agreement on all muscles being treated was verified  
[x] Purpose of dry needling, side effects, possible complications, risks and benefits were explained to the patient [x] Procedure site(s) verified 
[x] Patient was positioned for comfort and draped for privacy [x] Informed Consent was signed (initial visit) and verified verbally (subsequent visits) [x] Patient was instructed on the need to report the use of blood thinners and/or immunosuppressant medications [x] How to respond to possible adverse effects of treatment 
[x] Self treatment of post needling soreness: ice, heat (moist heat, heat wraps) and stretching 
[x] Opportunity was given to ask any questions, all questions were answered Time: 227 Date of procedure: 11/2/2018 Treatment: The following muscles were treated today with intramuscular dry needling 
[] Left [] Right Masster 
[] Left [] Right Temporalis [] Left [] Right Zygomaticus Major / Minor 
[] Left [] Right Lateral Pterygoid 
[] Left [] Right Medial Pterygoid 
[] Left [] Right Digastric Post / Anterior Belly 
[] Left [] Right Sternocleidomastoid 
[] Left [] Right Scalene Anterior / Medial / Posterior 
[] Left [] Right Extra Laryngeal Muscles [x] Left [] Right Upper Trapezius 
[] Left [] Right Middle Trapezius 
[] Left [] Right Lower Trapezius 
[] Left [] Right Oblique Capitis Inferior 
[] Left [] Right Splenius Capitis / Cervicis 
[] Left [] Right Semispinalis: Capitis / Cervicis 
[] Left [] Right Multifidi / Rotatores Cervicis / Thoracic 
[] Left [] Right Longissimus Thoracis / Illiocostalis 
[] Left [] Right Levator Scapulae 
[] Left [] Right Supraspinatus / Infraspinatus 
[] Left [] Right Teres Major / Minor 
[] Left [] Right Rhomboids / Serratus posterior superior 
[] Left [] Right Pectoralis Major / Minor 
[] Left [] Right Serratus Anterior 
[] Left [] Right Latissimus Dorsi 
[] Left [] Right Subscapularis 
[] Left [] Right Coracobrachialis 
[] Left [] Right Biceps Brachii 
[] Left [] Right Deltoid: Anterior / Medial / Posterior 
[] Left [] Right Brachialis 
[] Left [] Right Triceps 
[] Left [] Right Brachioradialis 
[] Left [] Right Extensor Carpi Radialis Brevis / Extensor Carpi Radialis Longus    [] Left [] Right  Extensor digitorum 
[] Left [] Right Supinator / Pronator Teres 
[] Left [] Right Flexor Carpi Radialis/ Flexor Carpi Ulnaris  
[] Left [] Right  Flexor Digitorum Superficialis/ Flexor Digitorum Profundus 
[] Left [] Right Flexor Pollicis Longus / Flexor Pollicis Brevis / Palmaris Longus 
[] Left [] Right Abductor Pollicis Longus / Abductor Pollicis Brevis 
[] Left [] Right Opponens Pollicis / Adductor Pollicis 
[] Left [] Right Dorsal / Palmar Interossei / Lumbricalis 
[] Left [] Right Abductor Digiti Minimi / Opponens Digiti Minimi Patient's response to today's treatment: [x] Latent Twitch Response     [x] Muscle relaxation [x] Pain Relief 
[x] Post needling soreness    [x] without complications [x] Increased Range of Motion   [] Decreased headaches   
[] Decreased Tinnitus [] Other:  
 
Performed by: Amanda Patel PT With 
 [] TE 
 [] TA 
 [] neuro 
 [] other: Patient Education: [x] Review HEP [] Progressed/Changed HEP based on:  
[] positioning   [] body mechanics   [] transfers   [] heat/ice application   
[] other:   
 
Other Objective/Functional Measures: FOTO 96; C/S ext 55 Pain Level (0-10 scale) post treatment: 0 
 
ASSESSMENT/Changes in Function: Left UT significantly restricted prior to treatment. Full C/S extension noted without pain or hinging at C7. D/C to extensive HEP next visit. Patient will continue to benefit from skilled PT services to modify and progress therapeutic interventions, address functional mobility deficits, address ROM deficits, address strength deficits, analyze and address soft tissue restrictions and analyze and cue movement patterns to attain remaining goals. []  See Plan of Care 
[]  See progress note/recertification 
[]  See Discharge Summary Progress towards goals / Updated goals: 
Short Term Goals: To be accomplished in 2 weeks: 
                       8. I and compliant with HEP for self management of symptoms. Progressing per patient report (10/17/2018) Long Term Goals: To be accomplished in 4 weeks: 
1. Increase C/S ext by 10 degrees to enable patient to look up to see a bird. Goal met 11/2/18 2. Increase left C/S rotation to = right to increase ease with driving. Goal met 77/92/72 
3. Increase B scapular strength to grossly 4/5 to improve stability for postural awareness. PLAN 
[]  Upgrade activities as tolerated     [x]  Continue plan of care 
[]  Update interventions per flow sheet      
[]  Discharge due to:_ 
[]  Other:_ Amanda Patel PT 11/2/2018  2:43 PM 
 
Future Appointments Date Time Provider Carolina Palumbo 11/6/2018 10:30 AM Kiel GOMEZ, PT [facility]  
11/19/2018 12:15 PM [facility] YULISSA INDRA  [facility]  
1/23/2019  8:30 AM [facility] NURSE VISIT [facility] SCHED  
1/29/2019 10:30 AM Zaira Waters MD [facility]

## 2018-11-06 ENCOUNTER — HOSPITAL ENCOUNTER (OUTPATIENT)
Dept: PHYSICAL THERAPY | Age: 58
Discharge: HOME | End: 2018-11-06
Payer: COMMERCIAL

## 2018-11-06 PROCEDURE — 97110 THERAPEUTIC EXERCISES: CPT

## 2018-11-06 NOTE — PROGRESS NOTES
PT DISCHARGE DAILY NOTE AND [id number] Date:2018 Patient name: Danii Mcfadden Start of Care: 10/12/18 Referral source: Fanny Shrestha MD : 1960 Medical/Treatment Diagnosis: Neck pain [M54.2] Onset Date:chronic Prior Hospitalization: see medical history Provider#: [id number] Medications: Verified on Patient Summary List   
Comorbidities: C6C7 fusion ; HTN 
 Prior Level of Function: Able to perform daily activities without left C/S tightening Visits from Start of Care: 7    Missed Visits: 0 Reporting Period : 10/12/18 to 18 [x]  Patient  Verified Payor: BLUE CROSS / Plan: [address] [place] [address] / Product Type: PPO / In time:1030  Out time:1108 Total Treatment Time (min): 38 Total Timed Codes (min): 38 
1:1 Treatment Time ([facility] only): 38 Visit #: 7 of 8 SUBJECTIVE Pain Level (0-10 scale): 0 Any medication changes, allergies to medications, adverse drug reactions, diagnosis change, or new procedure performed?: [x] No    [] Yes (see summary sheet for update) Subjective functional status/changes:   [] No changes reported I'm feeling great. OBJECTIVE 38 min Therapeutic Exercise:  [] See flow sheet :  
Rationale: increase ROM, increase strength and improve coordination to improve the patients ability to perform daily activities With 
 [] TE 
 [] TA 
 [] neuro 
 [] other: Patient Education: [x] Review HEP [] Progressed/Changed HEP based on:  
[] positioning   [] body mechanics   [] transfers   [] heat/ice application   
[] other:   
 
Other Objective/Functional Measures: B scap strength grossly 4/5 Pain Level (0-10 scale) post treatment: 0 Summary of Care: 
Short Term Goals: To be accomplished in 2 weeks: 
                       5. I and compliant with HEP for self management of symptoms. Progressing per patient report (10/17/2018) Long Term Goals: To be accomplished in 4 weeks: 1. Increase C/S ext by 10 degrees to enable patient to look up to see a bird. Goal met 11/2/18 2. Increase left C/S rotation to = right to increase ease with driving. Goal met 88/10/49 
3. Increase B scapular strength to grossly 4/5 to improve stability for postural awareness goal met 11/6/18 ASSESSMENT/Changes in Function: Patient progressed extremely well with PT; D/C to HEP. Thank you for this referral! 
  
PLAN [x]Discontinue therapy: [x]Patient has reached or is progressing toward set goals []Patient is non-compliant or has abdicated 
    []Due to lack of appreciable progress towards set goals Anabela Santos, PT 11/6/2018  1:43 PM

## 2018-11-21 ENCOUNTER — HOSPITAL ENCOUNTER (OUTPATIENT)
Dept: MAMMOGRAPHY | Age: 58
Discharge: HOME | End: 2018-11-21
Attending: INTERNAL MEDICINE
Payer: COMMERCIAL

## 2018-11-21 DIAGNOSIS — Z12.31 VISIT FOR SCREENING MAMMOGRAM: ICD-10-CM

## 2018-11-21 PROCEDURE — 77063 BREAST TOMOSYNTHESIS BI: CPT

## 2018-12-03 DIAGNOSIS — G89.29 CHRONIC NONINTRACTABLE HEADACHE, UNSPECIFIED HEADACHE TYPE: ICD-10-CM

## 2018-12-03 DIAGNOSIS — R51.9 CHRONIC NONINTRACTABLE HEADACHE, UNSPECIFIED HEADACHE TYPE: ICD-10-CM

## 2018-12-03 RX ORDER — BUTALBITAL, ACETAMINOPHEN AND CAFFEINE 50; 325; 40 MG/1; MG/1; MG/1
1 TABLET ORAL
Qty: 60 TAB | Refills: 0 | Status: SHIPPED | OUTPATIENT
Start: 2018-12-03 | End: 2019-01-07 | Stop reason: SDUPTHER

## 2019-01-07 DIAGNOSIS — R51.9 CHRONIC NONINTRACTABLE HEADACHE, UNSPECIFIED HEADACHE TYPE: ICD-10-CM

## 2019-01-07 DIAGNOSIS — G89.29 CHRONIC NONINTRACTABLE HEADACHE, UNSPECIFIED HEADACHE TYPE: ICD-10-CM

## 2019-01-07 RX ORDER — BUTALBITAL, ACETAMINOPHEN AND CAFFEINE 50; 325; 40 MG/1; MG/1; MG/1
1 TABLET ORAL
Qty: 60 TAB | Refills: 0 | Status: SHIPPED | OUTPATIENT
Start: 2019-01-07 | End: 2019-02-10 | Stop reason: SDUPTHER

## 2019-01-23 ENCOUNTER — APPOINTMENT (OUTPATIENT)
Dept: INTERNAL MEDICINE CLINIC | Age: 59
End: 2019-01-23

## 2019-01-23 ENCOUNTER — HOSPITAL ENCOUNTER (OUTPATIENT)
Dept: LAB | Age: 59
Discharge: HOME | End: 2019-01-23
Payer: COMMERCIAL

## 2019-01-23 DIAGNOSIS — E78.5 HYPERLIPIDEMIA, UNSPECIFIED HYPERLIPIDEMIA TYPE: ICD-10-CM

## 2019-01-23 LAB
ALBUMIN SERPL-MCNC: 4.1 G/DL (ref 3.4–5)
ALBUMIN/GLOB SERPL: 1.3 {RATIO} (ref 0.8–1.7)
ALP SERPL-CCNC: 98 U/L (ref 45–117)
ALT SERPL-CCNC: 42 U/L (ref 13–56)
ANION GAP SERPL CALC-SCNC: 7 MMOL/L (ref 3–18)
AST SERPL-CCNC: 19 U/L (ref 15–37)
BILIRUB SERPL-MCNC: 0.4 MG/DL (ref 0.2–1)
BUN SERPL-MCNC: 22 MG/DL (ref 7–18)
BUN/CREAT SERPL: 37 (ref 12–20)
CALCIUM SERPL-MCNC: 9.8 MG/DL (ref 8.5–10.1)
CHLORIDE SERPL-SCNC: 101 MMOL/L (ref 100–108)
CHOLEST SERPL-MCNC: 168 MG/DL
CO2 SERPL-SCNC: 32 MMOL/L (ref 21–32)
CREAT SERPL-MCNC: 0.59 MG/DL (ref 0.6–1.3)
GLOBULIN SER CALC-MCNC: 3.2 G/DL (ref 2–4)
GLUCOSE SERPL-MCNC: 86 MG/DL (ref 74–99)
HDLC SERPL-MCNC: 54 MG/DL (ref 40–60)
HDLC SERPL: 3.1 {RATIO} (ref 0–5)
LDLC SERPL CALC-MCNC: 92.2 MG/DL (ref 0–100)
LIPID PROFILE,FLP: NORMAL
POTASSIUM SERPL-SCNC: 3.7 MMOL/L (ref 3.5–5.5)
PROT SERPL-MCNC: 7.3 G/DL (ref 6.4–8.2)
SODIUM SERPL-SCNC: 140 MMOL/L (ref 136–145)
TRIGL SERPL-MCNC: 109 MG/DL (ref ?–150)
VLDLC SERPL CALC-MCNC: 21.8 MG/DL

## 2019-01-23 PROCEDURE — 80053 COMPREHEN METABOLIC PANEL: CPT

## 2019-01-23 PROCEDURE — 36415 COLL VENOUS BLD VENIPUNCTURE: CPT

## 2019-01-23 PROCEDURE — 80061 LIPID PANEL: CPT

## 2019-01-29 ENCOUNTER — TELEPHONE (OUTPATIENT)
Dept: INTERNAL MEDICINE CLINIC | Age: 59
End: 2019-01-29

## 2019-01-29 ENCOUNTER — OFFICE VISIT (OUTPATIENT)
Dept: INTERNAL MEDICINE CLINIC | Age: 59
End: 2019-01-29

## 2019-01-29 VITALS
RESPIRATION RATE: 16 BRPM | OXYGEN SATURATION: 95 % | TEMPERATURE: 98.4 F | WEIGHT: 137 LBS | SYSTOLIC BLOOD PRESSURE: 122 MMHG | HEIGHT: 64 IN | HEART RATE: 67 BPM | DIASTOLIC BLOOD PRESSURE: 78 MMHG | BODY MASS INDEX: 23.39 KG/M2

## 2019-01-29 DIAGNOSIS — I10 ESSENTIAL HYPERTENSION: Primary | ICD-10-CM

## 2019-01-29 DIAGNOSIS — M50.30 DEGENERATIVE DISC DISEASE, CERVICAL: ICD-10-CM

## 2019-01-29 DIAGNOSIS — R51.9 CHRONIC DAILY HEADACHE: ICD-10-CM

## 2019-01-29 DIAGNOSIS — K52.9 COLITIS: ICD-10-CM

## 2019-01-29 DIAGNOSIS — K58.1 IRRITABLE BOWEL SYNDROME WITH CONSTIPATION: ICD-10-CM

## 2019-01-29 DIAGNOSIS — M48.061 SPINAL STENOSIS OF LUMBAR REGION, UNSPECIFIED WHETHER NEUROGENIC CLAUDICATION PRESENT: ICD-10-CM

## 2019-01-29 DIAGNOSIS — E78.5 HYPERLIPIDEMIA, UNSPECIFIED HYPERLIPIDEMIA TYPE: ICD-10-CM

## 2019-01-29 DIAGNOSIS — E55.9 VITAMIN D DEFICIENCY: ICD-10-CM

## 2019-01-29 DIAGNOSIS — M54.2 NECK PAIN: ICD-10-CM

## 2019-01-29 DIAGNOSIS — M47.816 LUMBAR FACET ARTHROPATHY: ICD-10-CM

## 2019-01-29 DIAGNOSIS — G47.33 OBSTRUCTIVE SLEEP APNEA: ICD-10-CM

## 2019-01-29 DIAGNOSIS — M25.512 ACUTE PAIN OF LEFT SHOULDER: ICD-10-CM

## 2019-01-29 DIAGNOSIS — N83.201 RIGHT OVARIAN CYST: ICD-10-CM

## 2019-01-29 NOTE — TELEPHONE ENCOUNTER
Please request recent eye exam from Dr. Ousmane Cho . Patient reports being seen in 11/2018 . Thank you.

## 2019-01-29 NOTE — PROGRESS NOTES
Chief Complaint Patient presents with  Hypertension 3 month follow up with lab results. Health Maintenance Due Topic Date Due  Shingrix Vaccine Age 50> (1 of 2) 12/01/2008 1. Have you been to the ER, urgent care clinic or hospitalized since your last visit? NO.  
 
2. Have you seen or consulted any other health care providers outside of the [address] [place] [address] since your last visit (Include any pap smears or colon screening)? YES, patient states she went to get her eyes checked October 2018 with Dr. Madi Gonzalez, in [facility] off [facility].

## 2019-01-29 NOTE — PATIENT INSTRUCTIONS
DASH Diet: Care Instructions Your Care Instructions The DASH diet is an eating plan that can help lower your blood pressure. DASH stands for Dietary Approaches to Stop Hypertension. Hypertension is high blood pressure. The DASH diet focuses on eating foods that are high in calcium, potassium, and magnesium. These nutrients can lower blood pressure. The foods that are highest in these nutrients are fruits, vegetables, low-fat dairy products, nuts, seeds, and legumes. But taking calcium, potassium, and magnesium supplements instead of eating foods that are high in those nutrients does not have the same effect. The DASH diet also includes whole grains, fish, and poultry. The DASH diet is one of several lifestyle changes your doctor may recommend to lower your high blood pressure. Your doctor may also want you to decrease the amount of sodium in your diet. Lowering sodium while following the DASH diet can lower blood pressure even further than just the DASH diet alone. Follow-up care is a key part of your treatment and safety. Be sure to make and go to all appointments, and call your doctor if you are having problems. It's also a good idea to know your test results and keep a list of the medicines you take. How can you care for yourself at home? Following the DASH diet · Eat 4 to 5 servings of fruit each day. A serving is 1 medium-sized piece of fruit, ½ cup chopped or canned fruit, 1/4 cup dried fruit, or 4 ounces (½ cup) of fruit juice. Choose fruit more often than fruit juice. · Eat 4 to 5 servings of vegetables each day. A serving is 1 cup of lettuce or raw leafy vegetables, ½ cup of chopped or cooked vegetables, or 4 ounces (½ cup) of vegetable juice. Choose vegetables more often than vegetable juice. · Get 2 to 3 servings of low-fat and fat-free dairy each day. A serving is 8 ounces of milk, 1 cup of yogurt, or 1 ½ ounces of cheese. · Eat 6 to 8 servings of grains each day. A serving is 1 slice of bread, 1 ounce of dry cereal, or ½ cup of cooked rice, pasta, or cooked cereal. Try to choose whole-grain products as much as possible. · Limit lean meat, poultry, and fish to 2 servings each day. A serving is 3 ounces, about the size of a deck of cards. · Eat 4 to 5 servings of nuts, seeds, and legumes (cooked dried beans, lentils, and split peas) each week. A serving is 1/3 cup of nuts, 2 tablespoons of seeds, or ½ cup of cooked beans or peas. · Limit fats and oils to 2 to 3 servings each day. A serving is 1 teaspoon of vegetable oil or 2 tablespoons of salad dressing. · Limit sweets and added sugars to 5 servings or less a week. A serving is 1 tablespoon jelly or jam, ½ cup sorbet, or 1 cup of lemonade. · Eat less than 2,300 milligrams (mg) of sodium a day. If you limit your sodium to 1,500 mg a day, you can lower your blood pressure even more. Tips for success · Start small. Do not try to make dramatic changes to your diet all at once. You might feel that you are missing out on your favorite foods and then be more likely to not follow the plan. Make small changes, and stick with them. Once those changes become habit, add a few more changes. · Try some of the following: ? Make it a goal to eat a fruit or vegetable at every meal and at snacks. This will make it easy to get the recommended amount of fruits and vegetables each day. ? Try yogurt topped with fruit and nuts for a snack or healthy dessert. ? Add lettuce, tomato, cucumber, and onion to sandwiches. ? Combine a ready-made pizza crust with low-fat mozzarella cheese and lots of vegetable toppings. Try using tomatoes, squash, spinach, broccoli, carrots, cauliflower, and onions. ? Have a variety of cut-up vegetables with a low-fat dip as an appetizer instead of chips and dip. ? Sprinkle sunflower seeds or chopped almonds over salads.  Or try adding chopped walnuts or almonds to cooked vegetables. ? Try some vegetarian meals using beans and peas. Add garbanzo or kidney beans to salads. Make burritos and tacos with mashed medrano beans or black beans. Where can you learn more? Go to http://pooja-real.info/. Enter C820 in the search box to learn more about \"DASH Diet: Care Instructions. \" Current as of: July 22, 2018 Content Version: 11.9 © 8527-0400 [employer]. Care instructions adapted under license by [employer]@ (which disclaims liability or warranty for this information). If you have questions about a medical condition or this instruction, always ask your healthcare professional. [address] any warranty or liability for your use of this information.

## 2019-02-02 PROBLEM — M25.512 ACUTE PAIN OF LEFT SHOULDER: Status: ACTIVE | Noted: 2019-02-02

## 2019-02-02 PROBLEM — N83.201 RIGHT OVARIAN CYST: Status: ACTIVE | Noted: 2019-02-02

## 2019-02-02 NOTE — PROGRESS NOTES
HPI:  
Tete Dodge is a 59y.o. year old female who presents today for a physical exam. She has a history of hypertension, hyperlipidemia, chronic daily headaches, obstructive sleep apnea, allergic rhinitis, vasomotor instability, and irritable bowel syndrome. She reports that she is doing well. She reports today that her headaches have improved significantly, and she is continuing to perform the exercises that she was shown in physical therapy to control her neck pain. She is continuing to use Fioricet when she develops a headache, but she only is needing to take one with good response. She also is continuing to take Zanaflex at bedtime and is also taking amitriptyline, although she is no longer needing Celebrex. She does describe difficulty with left shoulder pain, particularly when attempting to raise her arm or reach behind. She states that this has been a problem for the last 4-5 months. She denies any known trauma. She also reports that at her routine visit with Dr. Yobany Thomas, it was noted that a known right ovarian cyst had enlarged slightly and changed in appearance, and she was referred to Dr. Jackie Shukla for evaluation. She is otherwise feeling well and without complaints. In 7/2018, she was referred to Dr. Arline Hankins for her chronic daily headaches, and evaluation included brain MRI and MRA (8/8/2018) showing small nonspecific subcortical white matter hyperintensity left insula region,otherwise unremarkable MRI; and MRA negative. She underwent sleep study evaluation and was diagnosed with moderate obstructive sleep apnea, and was referred to Dr. Ghislaine Ware and successfully started on CPAP. She was prescribed a Medrol dose pack to allow her to withdraw from daily use of analgesics for her headaches. She was then started on amitriptyine 10 mg daily and instructed to use Excedrin migraine for moderate headaches, and Fioricet only for severe headaches.  Celebrex and Zanaflex were prescribed to manage her chronic neck pain, and she continues to take one Zanaflex nightly. She also completed physical therapy, including dry needling, for her neck pain with significant improvement. On 1/29/2018, she called the office and reported she was out having dinner when she experienced an \"IBS episode\" characterized by flushing in the face, dry mouth, and severe abdominal cramping. When she arrived home, she experienced diarrhea and a single episode of vomiting. She reported that she continued to have abdominal cramping and fatigue following this episode, although described as getting slowly better each day. She reported that she had similar episodes to this dating back 30 years, and was diagnosed with IBS at that time. She stated that the episodes used to resolve more quickly, although over the last year, she states that it now is taking several days before she recovers. She was referred to Dr. Tammy Novoa, and started on Linzess for constipation. However, she states that it was not effective. She underwent evaluation by Dr. Belem Canas and Dr. Placido Thomas and had a small bowel series (8/7/2018) and abdominal duplex scan (8/16/2018), which were negative. She also collected a 24 hour urine for 5-HIAA, which was negative. She reports that she has noted an improvement in her abdominal pain and diarrheal episodes, and reports that she has not had any episodes since discontinuing captopril. She currently denies any abdominal pain, nausea, vomiting, melena, hematochezia, or change in bowel movements. On 8/2/2016, she was evaluated by PETERSON Ahmadi for left sided crampy abdominal pain, diarrhea, and hematochezia. She was treated for presumed diverticulitis with Cipro and Flagyl, and underwent evaluation showing WBC 11.2, ALT 59, alkaline phosphatase 121, and lipase 427.  Abdominal CT scan (8/2/2016) showed localized bowel wall thickening with inflammatory change involving the splenic flexure and proximal descending colon; no colonic diverticuli are seen so the presence of diverticulitis is unlikely; would be a typical location for bowel ischemia, but the patient's vascular structures are essentially normal so bowel ischemia unlikely; favor infectious or inflammatory colitis. She was evaluated by Dr. Iraida Zee and admitted for bowel rest, IVF, and IV antibiotics. She improved significantly in 36 hours and was discharged home. She completed the course of oral antibiotics and states that her symptoms have completely resolved. In 4/2017, she was again evaluated by Dr. Iraida Zee for recurrent abdominal pain, and abdominal and pelvic CT scan was obtained (4/28/2017) showing mild nonspecific thickening of the proximal jejunum and significant improvement in the distal transverse colon adjacent to the splenic flexure with a short segment with mild wall thickening. Her discomfort resolved, but concern was raised for possible ischemia as cause. An echocardiogram was obtained (5/26/2017) showing normal LV function (EF 65%), no RWMA, grade 1 diastolic dysfunction, and wall thickening upper limits of normal. She also underwent a hypercoaguable laboratory workup, which was negative. She underwent screening colonoscopy by Dr. Jenny Swift in 6/2011, which found a polyp in the proximal descending colon (pathology: hyperplastic). Because she could not intubate the cecum, follow-up was recommended for 2 years. In 4/2015, she underwent repeat colonoscopy with Dr. Xochitl Alcantara, which showed left diverticular disease, hypertrophied anal papilla, and no polyps. Follow-up was recommended for 5 years. She has a history of hypertension, treated with amlodipine, hydrochlorothiazide, and hydralazine. She has resumed exercising and she purchased an [employer] machine for her home. She denies any chest pain, shortness of breath at rest or with exertion, palpitations, lightheadedness, or edema.  She also has hyperlipidemia, treated with fenofibrate and fish oil in the past, but these were discontinued and she was changed to rosuvastatin in 6/2016. However, due to mild to moderate elevation in AST/ALT, it was discontinued in 5/2017 with resolution of abnormal liver function tests. She was subsequently started on pravastatin in 10/2018, and reports no difficulties with it today. She has a history of mild-severe obstructive sleep apnea, diagnosed by Dr. Nikolas Kebede, and was prescribed nasal CPAP. She states that she is no longer using her machine because it was making her headaches worse. She suffers from chronic headaches, considered to be migraines in the past as they were associated with menstruation. Since she entered menopause, the severity of her headaches has decreased and the character has changed. She has severe allergic rhinitis, and awakens with morning headaches when her allergies are severe. She is followed by Dr. Barbara Jameson and had been receiving immunotherapy with some improvement. She also has vasomotor instability, which responded to venlafaxine and she has successfully weaned from taking it. In 6/2016, she presented with complaints of right sciatica and she underwent a lumbar MRI (7/8/2016) showing multilevel mild disc bulges, mild facet arthropathy, and multilevel mild foraminal stenosis; L3-L4 with mild to moderate spinal canal stenosis, no more than mild spinal canal stenosis elsewhere, and no cord compression. She was evaluated by Dr. Foreman on 9/1/2016 and started on gabapentin. She also was referred to physical therapy and received dry needling, which resulted in significant improvement. She also has a history of an anterior cervical discectomy and fusion (C5-C6) in 2/2011 by Dr. Daren Rodriguez for a herniated disc and right radiculopathy. She does experience intermittent neck pain, and feels that it may be exacerbating her headaches. Past Medical History:  
Diagnosis Date  Allergic rhinitis  Chronic headaches  Hx of colonic polyp 2011 Hyperplastic; Dr. Moustapha Sorensen.  Hyperlipidemia  Hypertension  IBS (irritable bowel syndrome)  Obstructive sleep apnea Moderate-severe; not using nasal CPAP  
 S/P cervical discectomy 2011 Anterior C5-C6 discectomy for herniated disc and right radiculopathy; Dr. Belem Motta Past Surgical History:  
Procedure Laterality Date  ENDOSCOPY, COLON, DIAGNOSTIC    
 HX BACK SURGERY  11  
 neck surgery; ruptured cervical discs  HX COLONOSCOPY  2011  HX GYN    
 dilation and curretage  HX GYN    
  Current Outpatient Medications Medication Sig  tiZANidine (ZANAFLEX) 4 mg tablet TAKE 1 TABLET BY MOUTH EVERY NIGHT  butalbital-acetaminophen-caffeine (FIORICET, ESGIC) -40 mg per tablet Take 1 Tab by mouth every six (6) hours as needed for Pain.  amLODIPine (NORVASC) 10 mg tablet TAKE 1 TABLET BY MOUTH DAILY  pravastatin (PRAVACHOL) 10 mg tablet Take 1 Tab by mouth nightly.  amitriptyline (ELAVIL) 10 mg tablet TK 1/2 T PO QHS FOR 1 WEEK AND THEN 1 T QHS THEREAFTER  hydrALAZINE (APRESOLINE) 100 mg tablet Take 1 Tab by mouth three (3) times daily.  potassium chloride (K-DUR, KLOR-CON) 20 mEq tablet Take 1 Tab by mouth daily.  hydroCHLOROthiazide (HYDRODIURIL) 25 mg tablet TAKE 1 TABLET BY MOUTH EVERY DAY  TURMERIC ROOT EXTRACT PO Take 1,000 mg by mouth.  GLUC/CHONDR-MSM#7/C/DEENA/BORON (GLUCOSAMINE-CHONDR, BOSWELLIA, PO) Take 500 mg by mouth daily.  thiamine (VITAMIN B-1) 100 mg tablet Take 50 mg by mouth daily.  multivitamin (ONE A DAY) tablet Take 1 Tab by mouth daily.  Cholecalciferol, Vitamin D3, 2,000 unit cap Take 2,000 Units by mouth daily. No current facility-administered medications for this visit. Allergies and Intolerances: Allergies Allergen Reactions  Other Plant, Animal, Environmental Itching and Sneezing \"Everything but feathers and horses. \"  
  
 
 Family History: Her mother had hypertension, but  from a melanoma. Her father  from a DVT. Her brother has CAD and underwent CABG at the age of 40. Family History Problem Relation Age of Onset  Heart Disease Brother  Other Mother   
     melanoma [facility] Arthritis-osteo Mother  Cancer Mother  Bleeding Prob Father  Heart Disease Father  Breast Cancer Other   
     grandmother  Stroke Other   
     family history Social History: She  reports that she has been smoking. She has been smoking about 0.50 packs per day. she has never used smokeless tobacco. She reports that she smoked 0.5 ppd for 30 years, stopping in . She is  and lives with her . She recently retired from the [employer] department of [employer]; her  sells eyeglass frames to [employer]. Social History Substance and Sexual Activity Alcohol Use Yes  Alcohol/week: 0.0 oz  
 Comment: Very Rarely Immunization History: 
Immunization History Administered Date(s) Administered  Influenza Vaccine 10/08/2014, 10/09/2017  Influenza Vaccine (Quad) PF 10/23/2018  TD Vaccine 2006  Tdap 10/07/2016 Review of Systems: As above included in HPI. Otherwise 11 point review of systems negative including constitutional, skin, HENT, eyes, respiratory, cardiovascular, gastrointestinal, genitourinary, musculoskeletal, endo/heme/aller, neurological. 
 
Physical:  
Vitals:  
BP: 122/78 HR: 67 
WT: 137 lb (62.1 kg) BMI:  23.52 kg/m2 Patient appears in no apparent distress. Affect is appropriate. HEENT: PERRLA, anicteric, oropharynx clear, no JVD, adenopathy or thyromegaly. No carotid bruits or radiated murmur. Lungs: clear to auscultation, no wheezes, rhonchi, or rales. Heart: regular rate and rhythm. No murmur, rubs, gallops Abdomen: soft, nontender, nondistended, normal bowel sounds, no hepatosplenomegaly or masses. Extremities: without edema. Pulses 1-2+ bilaterally. Left shoulder with pain reproduced on abduction to 45 degrees. Passive range of motion intact without pain. Review of Data: 
Labs: Hospital Outpatient Visit on 01/23/2019 Component Date Value Ref Range Status  LIPID PROFILE 01/23/2019        Final  
 Cholesterol, total 01/23/2019 168  <200 MG/DL Final  
 Triglyceride 01/23/2019 109  <150 MG/DL Final  
 HDL Cholesterol 01/23/2019 54  40 - 60 MG/DL Final  
 LDL, calculated 01/23/2019 92.2  0 - 100 MG/DL Final  
 VLDL, calculated 01/23/2019 21.8  MG/DL Final  
 CHOL/HDL Ratio 01/23/2019 3.1  0 - 5.0   Final  
 Sodium 01/23/2019 140  136 - 145 mmol/L Final  
 Potassium 01/23/2019 3.7  3.5 - 5.5 mmol/L Final  
 Chloride 01/23/2019 101  100 - 108 mmol/L Final  
 CO2 01/23/2019 32  21 - 32 mmol/L Final  
 Anion gap 01/23/2019 7  3.0 - 18 mmol/L Final  
 Glucose 01/23/2019 86  74 - 99 mg/dL Final  
 BUN 01/23/2019 22* 7.0 - 18 MG/DL Final  
 Creatinine 01/23/2019 0.59* 0.6 - 1.3 MG/DL Final  
 BUN/Creatinine ratio 01/23/2019 37* 12 - 20   Final  
 GFR est AA 01/23/2019 >60  >60 ml/min/1.73m2 Final  
 GFR est non-AA 01/23/2019 >60  >60 ml/min/1.73m2 Final  
 Calcium 01/23/2019 9.8  8.5 - 10.1 MG/DL Final  
 Bilirubin, total 01/23/2019 0.4  0.2 - 1.0 MG/DL Final  
 ALT (SGPT) 01/23/2019 42  13 - 56 U/L Final  
 AST (SGOT) 01/23/2019 19  15 - 37 U/L Final  
 Alk. phosphatase 01/23/2019 98  45 - 117 U/L Final  
 Protein, total 01/23/2019 7.3  6.4 - 8.2 g/dL Final  
 Albumin 01/23/2019 4.1  3.4 - 5.0 g/dL Final  
 Globulin 01/23/2019 3.2  2.0 - 4.0 g/dL Final  
 A-G Ratio 01/23/2019 1.3  0.8 - 1.7   Final  
 
Calculated 10 year ASCVD risk score: 5.4 % Health Maintenance: 
Screening:  
 Mammogram: negative (11/2018) PAP smear: well women exams by Dr. Telly Soto (last 12/2018) Colorectal: colonoscopy (4/2015) no polyps. Dr. Kathy Francois. Due 2020. Depression: Uses Xanax prn for anxiety. Weaned from Effexor (vasomotor instability). DM (HbA1c/FPG): FPG 96 (10/2018) Hepatitis C: negative (12/2015) Falls: none DEXA: N/A Glaucoma: has regular eye exams with Dr. Kaylynn Bruno (last 11/2018) Smoking: 15 pack year (stopped 2011) Vitamin D: 40.2 (10/2018) Medicare Wellness: N/A Impression: 
Patient Active Problem List  
Diagnosis Code  Hypertension I10  
 Hyperlipidemia E78.5  Obstructive sleep apnea G47.33  
 Chronic daily headache R51  
 IBS (irritable bowel syndrome) K58.9  Colon polyp K63.5  Vitamin D deficiency E55.9  Colitis K52.9  Lumbar spinal stenosis M48.061  
 HNP (herniated nucleus pulposus), lumbar M51.26  
 Lumbar facet arthropathy M47.816  Neck pain M54.2  Degenerative disc disease, cervical M50.30  Acute pain of left shoulder M25.512  Right ovarian cyst N83.201 Plan: 1. Hypertension. Blood pressure appears well controlled on current regimen of hydralazine 100 mg tid, amlodipine 10 mg daily, and hydrochlorothiazide 25 mg daily. Successfully discontinued treatment with captopril since unclear if abdominal attacks may have been a form of angioedema. Renal function remains normal with creatinine 0.59/ eGFR >60. However, BUN/creatinine ratio remains significantly elevated to 37 although improved. Discussed importance of increasing fluid intake, particularly given treatment with hydrochlorothiazide. Follow closely. 2. Hyperlipidemia. Began treatment with rosuvastatin in 6/2016, and elevation in transaminases first noted in 8/2016. Discontinued in 5/2017 due to persistent elevation of transaminases, and resolved after discontinuation. Calculated 10 year ASCVD risk off statin is 5.4 %, which does not place her in one of the four statin benefit groups as per new AHA/ACC guidelines. However, , , and HDL 50, and patient with strong family history of heart disease.  She started treatment with pravastatin 10 mg daily in 10/2018, and LDL today improved to 92 with HDL 54. Liver function tests remain normal. Will continue to follow. 3. Elevated transaminases. Now resolved since rosuvastatin discontinued. Review of record shows elevation noted since 8/2016 when initiated. Iron studies, hepatitis panel, KIEL, SPEP, CK, aldolase, and anti-smooth muscle antibody negative. RUQ ultrasound (10/2016) showing fatty infiltration of liver. Advised patient to avoid alcohol. 4. Colitis. Unclear etiology, but nonspecific bowel wall thickening in short segment of jejunum and sigmoid on repeat CT scan raised question of ischemic in origin. Hypercoaguable workup including lab studies and echocardiogram negative. Appears to have intermittent \"IBS\" episodes associated with facial flushing, dry mouth, and severe abdominal cramping followed by diarrhea +/- vomiting. Episodes linger for several days following. No evidence of urticaria seen. Interesting is the abnormalities noted on abdominal CT scan in two of her episodes, with nonspecific short segment thickening in various locations in small and large intestines. Obtained complement studies including C4, C1 esterase inhibtor, and C1q levels which were normal. However, instructed patient that if she developed another episode, would like to check C4 level during the episode. Discontinued captopril and has not had recurrence since doing so. Evaluated by GI and had negative small bowel series, duplex ultrasound, and 24 hour urine for 5-HIAA level. Continue to follow. 5. Chronic daily headaches. Evaluated by Dr. Allyssa Burch and weaned from daily Fioricet and Excedrin migraine. Headaches now improved with daily amitriptyline and trying to use Excedrin migraine and Fioricet only occasionally for moderate and severe headaches, respectively.  Does have chronic neck pain related to her anterior neck fusion, and is finding Zanaflex and regular exercises to be helpful. Continuing to follow-up with Dr. Riana Isaac. 6. Right leg sciatica. Evidence of lumbar spinal stenosis and facet arthropathy, and lumbar herniated disc. Significant improvement with dry needling therapy and reports no further pain. Continue to follow and follow-up with Dr. Jovanni Jerez if recurs. 7. Abnormal glucose. Fasting glucose remains normal. Most likely due to weight loss. Emphasized importance of continued lifestyle modifications. 8.  Obstructive sleep apnea. Underwent reevaluation by Dr. Farooq Manning, and repeat sleep study showed moderate PEGGY. Started on CPAP and noting improvement. Continue to follow. 9. Left shoulder pain. Will refer to Dr. Sharifa Whitehead for evaluation. 10. Right ovarian cyst. Followed by Dr. Sheryl Aviles and noted to have enlarged slightly and changed in appearance. Referred to Dr. Suzette Geronimo for evaluation with upcoming appointment. 11. Health maintenance. Already received influenza vaccine. Given script for Shingrix vaccine. Other immunizations up to date. Well woman exam with Dr. Sheryl Aviles in 12/2018. Mammogram up to date. Continue regular eye exams with Dr. Chichi Jordan. Vitamin D level normal. Continue maintenance dose supplement. Patient understands recommendations and agrees with plan.  
Follow-up in 6 months for physical.

## 2019-02-05 ENCOUNTER — OFFICE VISIT (OUTPATIENT)
Dept: ORTHOPEDIC SURGERY | Facility: CLINIC | Age: 59
End: 2019-02-05

## 2019-02-05 VITALS
HEART RATE: 67 BPM | HEIGHT: 64 IN | DIASTOLIC BLOOD PRESSURE: 63 MMHG | RESPIRATION RATE: 18 BRPM | BODY MASS INDEX: 23.15 KG/M2 | TEMPERATURE: 97 F | WEIGHT: 135.6 LBS | SYSTOLIC BLOOD PRESSURE: 121 MMHG | OXYGEN SATURATION: 98 %

## 2019-02-05 DIAGNOSIS — M75.102 ROTATOR CUFF SYNDROME, LEFT: Primary | ICD-10-CM

## 2019-02-05 NOTE — PROGRESS NOTES
Patient: Nicky Paula                MRN: [id number]       SSN: [id number] YOB: 1960        AGE: 59 y.o. SEX: female Body mass index is 23.28 kg/m². PCP: Rachana Doyle MD 
02/05/19 Chief Complaint: Left shoulder pain HISTORY OF PRESENT ILLNESS:   Keyona Ortiz is a 59year-old right hand dominant female who comes in today with left shoulder pain for five or six months. She denies any specific injury or trauma. She has been active and playing with her 3year-old grandchild. She describes pain with overhead activities, as well as carrying and lifting. The pain is deep in the shoulder. It is worse at night and does wake her up at sleep at times. She has not had any imaging of it. No formal physical therapy or injections. Past Medical History:  
Diagnosis Date  Allergic rhinitis  Chronic headaches  Hx of colonic polyp 6/2011 Hyperplastic; Dr. Cj Morris.  Hyperlipidemia  Hypertension  IBS (irritable bowel syndrome)  Obstructive sleep apnea Moderate-severe; not using nasal CPAP  
 S/P cervical discectomy 2/2011 Anterior C5-C6 discectomy for herniated disc and right radiculopathy; Dr. Salem Sandhoff Family History Problem Relation Age of Onset  Heart Disease Brother  Other Mother   
     melanoma [facility] Arthritis-osteo Mother  Cancer Mother  Bleeding Prob Father  Heart Disease Father  Breast Cancer Other   
     grandmother  Stroke Other   
     family history Current Outpatient Medications Medication Sig Dispense Refill  butalbital-acetaminophen-caffeine (FIORICET, ESGIC) -40 mg per tablet Take 1 Tab by mouth every six (6) hours as needed for Pain. 60 Tab 0  
 amLODIPine (NORVASC) 10 mg tablet TAKE 1 TABLET BY MOUTH DAILY 90 Tab 2  pravastatin (PRAVACHOL) 10 mg tablet Take 1 Tab by mouth nightly.  90 Tab 1  
 amitriptyline (ELAVIL) 10 mg tablet TK 1/2 T PO QHS FOR 1 WEEK AND THEN 1 T QHS THEREAFTER  3  
 hydrALAZINE (APRESOLINE) 100 mg tablet Take 1 Tab by mouth three (3) times daily. 270 Tab 2  potassium chloride (K-DUR, KLOR-CON) 20 mEq tablet Take 1 Tab by mouth daily. 90 Tab 2  
 hydroCHLOROthiazide (HYDRODIURIL) 25 mg tablet TAKE 1 TABLET BY MOUTH EVERY DAY 90 Tab 2  TURMERIC ROOT EXTRACT PO Take 1,000 mg by mouth.  GLUC/CHONDR-MSM#7/C/DEENA/BORON (GLUCOSAMINE-CHONDR, BOSWELLIA, PO) Take 500 mg by mouth daily.  thiamine (VITAMIN B-1) 100 mg tablet Take 50 mg by mouth daily.  multivitamin (ONE A DAY) tablet Take 1 Tab by mouth daily.  Cholecalciferol, Vitamin D3, 2,000 unit cap Take 2,000 Units by mouth daily.  tiZANidine (ZANAFLEX) 4 mg tablet TAKE 1 TABLET BY MOUTH EVERY NIGHT 90 Tab 1 Allergies Allergen Reactions  Other Plant, Animal, Environmental Itching and Sneezing \"Everything but feathers and horses. \"  
 
 
Past Surgical History:  
Procedure Laterality Date  ENDOSCOPY, COLON, DIAGNOSTIC    
 HX BACK SURGERY  11  
 neck surgery; ruptured cervical discs  HX COLONOSCOPY  2011  HX GYN    
 dilation and curretage  HX GYN    
  Social History Socioeconomic History  Marital status:  Spouse name: Not on file  Number of children: Not on file  Years of education: Not on file  Highest education level: Not on file Social Needs  Financial resource strain: Not on file  Food insecurity - worry: Not on file  Food insecurity - inability: Not on file  Transportation needs - medical: Not on file  Transportation needs - non-medical: Not on file Occupational History  Not on file Tobacco Use  Smoking status: Current Some Day Smoker Packs/day: 0.50  Smokeless tobacco: Never Used  Tobacco comment: quit smoking in  (approx) Substance and Sexual Activity  Alcohol use: Yes Alcohol/week: 0.0 oz  
  Comment: Very Rarely  Drug use:  No  
  Sexual activity: Not Currently Other Topics Concern  Not on file Social History Narrative  Not on file REVIEW OF SYSTEMS:   
 
CON: negative for recent weight loss/gain, fever, or chills EYE: negative for double or blurry vision ENT: negative for hoarseness RS:   negative for cough, URI, SOB 
CV:  negative for chest pain, palpitations GI:    negative for blood in stool, nausea/vomiting :  negative for blood in urine MS: As per HPI Other systems reviewed and noted below. PHYSICAL EXAMINATION: 
Visit Vitals /63 Pulse 67 Temp 97 °F (36.1 °C) (Oral) Resp 18 Ht 5' 4\" (1.626 m) Wt 135 lb 9.6 oz (61.5 kg) LMP 01/10/2012 SpO2 98% BMI 23.28 kg/m² Body mass index is 23.28 kg/m². GENERAL: Alert and oriented x3, in no acute distress, well-developed, well-nourished. HEENT: Normocephalic, atraumatic. RESP: Non labored breathing with equal chest rise on inspiration. CV: Well perfused extremities. No cyanosis or clubbing noted. ABDOMEN: Soft, non-tender, non-distended. PHYSICAL EXAMINATION:   Physical exam of the left shoulder with full shoulder range of motion. She does have some limitations in internal rotation up the back, but otherwise full range of motion with pain. She has pain with supraspinatus testing. Minimal pain and no weakness with infraspinatus or belly press testing. No pain with biceps stress testing. Nontender over the [facility] joint. Neurovascularly intact distally. ASSESSMENT/PLAN:   Astrid Lackey is a 59year-old female with left shoulder rotator cuff tendinitis. I recommended physical therapy. I ordered it today and I will see her back in six weeks.    
 
  
 
 
 
 
Electronically signed by: Joey May MD

## 2019-02-10 DIAGNOSIS — R51.9 CHRONIC NONINTRACTABLE HEADACHE, UNSPECIFIED HEADACHE TYPE: ICD-10-CM

## 2019-02-10 DIAGNOSIS — G89.29 CHRONIC NONINTRACTABLE HEADACHE, UNSPECIFIED HEADACHE TYPE: ICD-10-CM

## 2019-02-11 RX ORDER — BUTALBITAL, ACETAMINOPHEN AND CAFFEINE 50; 325; 40 MG/1; MG/1; MG/1
1 TABLET ORAL
Qty: 60 TAB | Refills: 0 | Status: SHIPPED | OUTPATIENT
Start: 2019-02-11 | End: 2019-03-20 | Stop reason: SDUPTHER

## 2019-02-11 NOTE — PROGRESS NOTES
[address], [address] [place], [address] [facility] 
[address] [place] [address], [address] [place], [address] 
 [phone] Brennen Harrington  Patient ID: 
Name:  Marjorie Marin MRN:  [id number] :  1960/58 y.o. Date:  2/15/2019 HISTORY OF PRESENT ILLNESS: 
Marjorie Marin is a 59 y.o.   postmenopausal female referred by Dr. Breana Quinn for right adnexal cyst. Pt initially evaluated in 2017 for elevated LFTs with an incidental finding of 1.4 simple cyst found on CT. She underwent TVUS x 2 in 2017 with no change. TVUS on 2019 showed right ovarian cyst now 1.7 cm with debris and 1.4 x 1.2 cm intramural myoma. Currently, Denies Vaginal bleeding, change in vaginal discharge, new abdominopelvic pain, change in bladder/bowel habits Labs: 
19 : 10.5 Imaging 19 TVUS Uterus: 2.5 x 3.5 x 2.7 cm. Cervical length 2.5 cm. Midline. Retroverted. Endometrium 2 mm. Atrophic uterus. A 1.4 x 1.2 cm intramural myoma is seen. Right ovary: 1.7 x 1.5 x 1.6 cm. Right adnexal cyst w/ debris posterior wall of the cyst.  
Left ovary: Not seen. No free fluid is present. ROS:  
As above Patient Active Problem List  
 Diagnosis Date Noted  Acute pain of left shoulder 2019  Right ovarian cyst 2019  Neck pain 10/27/2018  Degenerative disc disease, cervical 10/27/2018  Lumbar spinal stenosis 2016  
 HNP (herniated nucleus pulposus), lumbar 2016  Lumbar facet arthropathy 2016  Colitis 2016  Vitamin D deficiency 2013  Colon polyp 2012  Hypertension  Hyperlipidemia  Obstructive sleep apnea  Chronic daily headache  IBS (irritable bowel syndrome) Past Medical History:  
Diagnosis Date  Allergic rhinitis  Chronic headaches  Hx of colonic polyp 2011 Hyperplastic; Dr. Maurizio Brown.  Hyperlipidemia  Hypertension  IBS (irritable bowel syndrome)  Obstructive sleep apnea Moderate-severe; not using nasal CPAP  
 S/P cervical discectomy 2011 Anterior C5-C6 discectomy for herniated disc and right radiculopathy; Dr. Fernando Cunningham Past Surgical History:  
Procedure Laterality Date  ENDOSCOPY, COLON, DIAGNOSTIC    
 HX BACK SURGERY  11  
 neck surgery; ruptured cervical discs  HX COLONOSCOPY  2011  HX GYN    
 dilation and curretage  HX GYN    
  OB History  Para Term  AB Living 2 2 2     2 SAB TAB Ectopic Molar Multiple Live Births  
           0  
  
 
Social History Tobacco Use  Smoking status: Former Smoker Packs/day: 0.50 Last attempt to quit:  Years since quittin.1  Smokeless tobacco: Never Used Substance Use Topics  Alcohol use: Yes Alcohol/week: 0.0 oz  
  Comment: Very Rarely Family History Problem Relation Age of Onset  Heart Disease Brother  Other Mother   
     melanoma [facility] Arthritis-osteo Mother  Bleeding Prob Father  Heart Disease Father  Heart Attack Father  Stroke Maternal Uncle  Heart Attack Maternal Uncle  Breast Cancer Maternal Grandmother Current Outpatient Medications Medication Sig  butalbital-acetaminophen-caffeine (FIORICET, ESGIC) -40 mg per tablet Take 1 Tab by mouth every six (6) hours as needed for Pain.  hydroCHLOROthiazide (HYDRODIURIL) 25 mg tablet TAKE 1 TABLET BY MOUTH EVERY DAY  tiZANidine (ZANAFLEX) 4 mg tablet TAKE 1 TABLET BY MOUTH EVERY NIGHT  amLODIPine (NORVASC) 10 mg tablet TAKE 1 TABLET BY MOUTH DAILY  pravastatin (PRAVACHOL) 10 mg tablet Take 1 Tab by mouth nightly.  amitriptyline (ELAVIL) 10 mg tablet TK 1/2 T PO QHS FOR 1 WEEK AND THEN 1 T QHS THEREAFTER  hydrALAZINE (APRESOLINE) 100 mg tablet Take 1 Tab by mouth three (3) times daily.  potassium chloride (K-DUR, KLOR-CON) 20 mEq tablet Take 1 Tab by mouth daily.  TURMERIC ROOT EXTRACT PO Take 1,000 mg by mouth.  GLUC/CHONDR-MSM#7/C/DEENA/BORON (GLUCOSAMINE-CHONDR, BOSWELLIA, PO) Take 500 mg by mouth daily.  thiamine (VITAMIN B-1) 100 mg tablet Take 50 mg by mouth daily.  multivitamin (ONE A DAY) tablet Take 1 Tab by mouth daily.  Cholecalciferol, Vitamin D3, 2,000 unit cap Take 2,000 Units by mouth daily. No current facility-administered medications for this visit. Allergies Allergen Reactions  Other Plant, Animal, Environmental Itching and Sneezing \"Everything but feathers and horses. \" OBJECTIVE: 
 
Physical Exam 
VITAL SIGNS: Visit Vitals /70 (BP 1 Location: Left arm, BP Patient Position: Sitting) Pulse 79 Temp 98.7 °F (37.1 °C) (Oral) Resp 16 Ht 5' 4\" (1.626 m) Wt 61.7 kg (136 lb) LMP 01/10/2012 (LMP Unknown) SpO2 97% BMI 23.34 kg/m² GENERAL REY: in no apparent distress and well developed and well nourished MUSCULOSKEL: no joint tenderness, deformity or swelling INTEGUMENT:  warm and dry, no rashes or lesions ABDOMEN . soft, NT, ND, No masses appreciated EXTREMITIES: extremities normal, atraumatic, no cyanosis or edema PELVIC: Vulva and vagina appear normal. Bimanual exam reveals normal uterus and adnexa. RECTAL: deferred GEOFF SURVEY: Cervical, supraclavicular, axillary and inguinal nodes normal.  
NEURO: Grossly normal  
 
 
 
IMPRESSION/PLAN: 
1. Complex right ovarian cyst 
 -reviewed her imaging and bloodwork 
 -explained likely benign but given it has changed appearance and persisted would recommend surgical evaluation 
 -discussed plan for laparoscopic bso, possible hyst/staging if malignant 
 -pt  Understands and in agreement with plan Risks, benefits and alternatives of surgery discussed in detail.   Risks including bleeding, infection, blood clot, injury to nearby organs including bladder, bowel, ureters and blood vessels. The total time spent was 60 minutes regarding this patients diagnosis of complex right ovarian cyst and >50% of this time was spent counseling and coordinating care Irene Ricardo DO Gynecologic Oncology 2/29/051212:80 AM

## 2019-02-11 NOTE — H&P (VIEW-ONLY)
[address], [address] [facility] [place], [address] [facility] 
[address] [place] [address], [address] [place], [address] 
 [phone] Leonel Olguin DO Patient ID: 
Name:  Vandana Rivero MRN:  [id number] :  1960/58 y.o. Date:  2/15/2019 HISTORY OF PRESENT ILLNESS: 
Vandana Rivero is a 59 y.o.   postmenopausal female referred by Dr. Yasmin Newsome for right adnexal cyst. Pt initially evaluated in 2017 for elevated LFTs with an incidental finding of 1.4 simple cyst found on CT. She underwent TVUS x 2 in 2017 with no change. TVUS on 2019 showed right ovarian cyst now 1.7 cm with debris and 1.4 x 1.2 cm intramural myoma. Currently, Denies Vaginal bleeding, change in vaginal discharge, new abdominopelvic pain, change in bladder/bowel habits Labs: 
19 : 10.5 Imaging 19 TVUS Uterus: 2.5 x 3.5 x 2.7 cm. Cervical length 2.5 cm. Midline. Retroverted. Endometrium 2 mm. Atrophic uterus. A 1.4 x 1.2 cm intramural myoma is seen. Right ovary: 1.7 x 1.5 x 1.6 cm. Right adnexal cyst w/ debris posterior wall of the cyst.  
Left ovary: Not seen. No free fluid is present. ROS:  
As above Patient Active Problem List  
 Diagnosis Date Noted  Acute pain of left shoulder 2019  Right ovarian cyst 2019  Neck pain 10/27/2018  Degenerative disc disease, cervical 10/27/2018  Lumbar spinal stenosis 2016  
 HNP (herniated nucleus pulposus), lumbar 2016  Lumbar facet arthropathy 2016  Colitis 2016  Vitamin D deficiency 2013  Colon polyp 2012  Hypertension  Hyperlipidemia  Obstructive sleep apnea  Chronic daily headache  IBS (irritable bowel syndrome) Past Medical History:  
Diagnosis Date  Allergic rhinitis  Chronic headaches  Hx of colonic polyp 2011 Hyperplastic; Dr. Brayan Damon.  Hyperlipidemia  Hypertension  IBS (irritable bowel syndrome)  Obstructive sleep apnea Moderate-severe; not using nasal CPAP  
 S/P cervical discectomy 2011 Anterior C5-C6 discectomy for herniated disc and right radiculopathy; Dr. Justino Carbajal Past Surgical History:  
Procedure Laterality Date  ENDOSCOPY, COLON, DIAGNOSTIC    
 HX BACK SURGERY  11  
 neck surgery; ruptured cervical discs  HX COLONOSCOPY  2011  HX GYN    
 dilation and curretage  HX GYN    
  OB History  Para Term  AB Living 2 2 2     2 SAB TAB Ectopic Molar Multiple Live Births  
           0  
  
 
Social History Tobacco Use  Smoking status: Former Smoker Packs/day: 0.50 Last attempt to quit:  Years since quittin.1  Smokeless tobacco: Never Used Substance Use Topics  Alcohol use: Yes Alcohol/week: 0.0 oz  
  Comment: Very Rarely Family History Problem Relation Age of Onset  Heart Disease Brother  Other Mother   
     melanoma [facility] Arthritis-osteo Mother  Bleeding Prob Father  Heart Disease Father  Heart Attack Father  Stroke Maternal Uncle  Heart Attack Maternal Uncle  Breast Cancer Maternal Grandmother Current Outpatient Medications Medication Sig  butalbital-acetaminophen-caffeine (FIORICET, ESGIC) -40 mg per tablet Take 1 Tab by mouth every six (6) hours as needed for Pain.  hydroCHLOROthiazide (HYDRODIURIL) 25 mg tablet TAKE 1 TABLET BY MOUTH EVERY DAY  tiZANidine (ZANAFLEX) 4 mg tablet TAKE 1 TABLET BY MOUTH EVERY NIGHT  amLODIPine (NORVASC) 10 mg tablet TAKE 1 TABLET BY MOUTH DAILY  pravastatin (PRAVACHOL) 10 mg tablet Take 1 Tab by mouth nightly.  amitriptyline (ELAVIL) 10 mg tablet TK 1/2 T PO QHS FOR 1 WEEK AND THEN 1 T QHS THEREAFTER  hydrALAZINE (APRESOLINE) 100 mg tablet Take 1 Tab by mouth three (3) times daily.  potassium chloride (K-DUR, KLOR-CON) 20 mEq tablet Take 1 Tab by mouth daily.  TURMERIC ROOT EXTRACT PO Take 1,000 mg by mouth.  GLUC/CHONDR-MSM#7/C/DEENA/BORON (GLUCOSAMINE-CHONDR, BOSWELLIA, PO) Take 500 mg by mouth daily.  thiamine (VITAMIN B-1) 100 mg tablet Take 50 mg by mouth daily.  multivitamin (ONE A DAY) tablet Take 1 Tab by mouth daily.  Cholecalciferol, Vitamin D3, 2,000 unit cap Take 2,000 Units by mouth daily. No current facility-administered medications for this visit. Allergies Allergen Reactions  Other Plant, Animal, Environmental Itching and Sneezing \"Everything but feathers and horses. \" OBJECTIVE: 
 
Physical Exam 
VITAL SIGNS: Visit Vitals /70 (BP 1 Location: Left arm, BP Patient Position: Sitting) Pulse 79 Temp 98.7 °F (37.1 °C) (Oral) Resp 16 Ht 5' 4\" (1.626 m) Wt 61.7 kg (136 lb) LMP 01/10/2012 (LMP Unknown) SpO2 97% BMI 23.34 kg/m² GENERAL REY: in no apparent distress and well developed and well nourished MUSCULOSKEL: no joint tenderness, deformity or swelling INTEGUMENT:  warm and dry, no rashes or lesions ABDOMEN . soft, NT, ND, No masses appreciated EXTREMITIES: extremities normal, atraumatic, no cyanosis or edema PELVIC: Vulva and vagina appear normal. Bimanual exam reveals normal uterus and adnexa. RECTAL: deferred GEOFF SURVEY: Cervical, supraclavicular, axillary and inguinal nodes normal.  
NEURO: Grossly normal  
 
 
 
IMPRESSION/PLAN: 
1. Complex right ovarian cyst 
 -reviewed her imaging and bloodwork 
 -explained likely benign but given it has changed appearance and persisted would recommend surgical evaluation 
 -discussed plan for laparoscopic bso, possible hyst/staging if malignant 
 -pt  Understands and in agreement with plan Risks, benefits and alternatives of surgery discussed in detail.   Risks including bleeding, infection, blood clot, injury to nearby organs including bladder, bowel, ureters and blood vessels. The total time spent was 60 minutes regarding this patients diagnosis of complex right ovarian cyst and >50% of this time was spent counseling and coordinating care Kenneth Lees DO Gynecologic Oncology 1/91/137965:20 AM

## 2019-02-15 ENCOUNTER — OFFICE VISIT (OUTPATIENT)
Dept: ONCOLOGY | Age: 59
End: 2019-02-15

## 2019-02-15 ENCOUNTER — TELEPHONE (OUTPATIENT)
Dept: ONCOLOGY | Age: 59
End: 2019-02-15

## 2019-02-15 VITALS
RESPIRATION RATE: 16 BRPM | TEMPERATURE: 98.7 F | SYSTOLIC BLOOD PRESSURE: 121 MMHG | DIASTOLIC BLOOD PRESSURE: 70 MMHG | WEIGHT: 136 LBS | HEIGHT: 64 IN | OXYGEN SATURATION: 97 % | BODY MASS INDEX: 23.22 KG/M2 | HEART RATE: 79 BPM

## 2019-02-15 DIAGNOSIS — N83.201 CYST OF RIGHT OVARY: Primary | ICD-10-CM

## 2019-02-15 DIAGNOSIS — Z01.818 PREOPERATIVE TESTING: Primary | ICD-10-CM

## 2019-02-15 NOTE — PROGRESS NOTES
Juani Lora, a 59 y.o. female,  is here for Chief Complaint Patient presents with  New Patient  
  referred by Dr. Anton Piña for right complex cyst  
 
 
Visit Vitals /70 (BP 1 Location: Left arm, BP Patient Position: Sitting) Pulse 79 Temp 98.7 °F (37.1 °C) (Oral) Resp 16 Ht 5' 4\" (1.626 m) Wt 61.7 kg (136 lb) SpO2 97% BMI 23.34 kg/m² Patient denies any persistent or worsening abdominal or pelvic pain. Denies any unusual vaginal bleeding, discharge, irritation, or odor. Denies experiencing any constipation or diarrhea. No burning, discomfort, or irritation with urination.

## 2019-02-15 NOTE — PROGRESS NOTES
Per Dr. Cristina Nielson with verbal readback, the following preoperative order's were placed:  CBC, CMP, EKG, and Type and Screen  Associated Diagnosis: Preoperative Testing

## 2019-02-15 NOTE — TELEPHONE ENCOUNTER
Left message encouraging patient to call and schedule her surgical counseling appointment for 2/21/2019 or 2/25/2019.

## 2019-02-15 NOTE — PATIENT INSTRUCTIONS

## 2019-02-18 NOTE — TELEPHONE ENCOUNTER
Surgical counseling has been scheduled for 2/25/2019 @ 0767. Patient was also informed of labwork that she needs to have completed prior to at [facility]. Patient expressed understanding and had no further questions or concerns, encouraged to call back if she develops any.

## 2019-02-25 ENCOUNTER — HOSPITAL ENCOUNTER (OUTPATIENT)
Dept: PREADMISSION TESTING | Age: 59
Discharge: HOME | End: 2019-02-25
Payer: COMMERCIAL

## 2019-02-25 ENCOUNTER — OFFICE VISIT (OUTPATIENT)
Dept: ONCOLOGY | Age: 59
End: 2019-02-25

## 2019-02-25 VITALS
SYSTOLIC BLOOD PRESSURE: 114 MMHG | OXYGEN SATURATION: 99 % | BODY MASS INDEX: 23.47 KG/M2 | HEIGHT: 64 IN | DIASTOLIC BLOOD PRESSURE: 69 MMHG | TEMPERATURE: 97.7 F | HEART RATE: 80 BPM | WEIGHT: 137.5 LBS | RESPIRATION RATE: 16 BRPM

## 2019-02-25 DIAGNOSIS — Z71.89 ENCOUNTER FOR SURGICAL COUNSELING: Primary | ICD-10-CM

## 2019-02-25 DIAGNOSIS — Z01.818 PREOPERATIVE TESTING: ICD-10-CM

## 2019-02-25 LAB
ABO + RH BLD: NORMAL
ALBUMIN SERPL-MCNC: 3.9 G/DL (ref 3.4–5)
ALBUMIN/GLOB SERPL: 1.1 {RATIO} (ref 0.8–1.7)
ALP SERPL-CCNC: 106 U/L (ref 45–117)
ALT SERPL-CCNC: 53 U/L (ref 13–56)
ANION GAP SERPL CALC-SCNC: 3 MMOL/L (ref 3–18)
AST SERPL-CCNC: 24 U/L (ref 15–37)
ATRIAL RATE: 71 BPM
BASOPHILS # BLD: 0 K/UL (ref 0–0.1)
BASOPHILS NFR BLD: 1 % (ref 0–2)
BILIRUB SERPL-MCNC: 0.3 MG/DL (ref 0.2–1)
BLOOD GROUP ANTIBODIES SERPL: NORMAL
BUN SERPL-MCNC: 22 MG/DL (ref 7–18)
BUN/CREAT SERPL: 29 (ref 12–20)
CALCIUM SERPL-MCNC: 9.2 MG/DL (ref 8.5–10.1)
CALCULATED P AXIS, ECG09: 70 DEGREES
CALCULATED R AXIS, ECG10: 7 DEGREES
CALCULATED T AXIS, ECG11: 62 DEGREES
CHLORIDE SERPL-SCNC: 103 MMOL/L (ref 100–108)
CO2 SERPL-SCNC: 32 MMOL/L (ref 21–32)
CREAT SERPL-MCNC: 0.77 MG/DL (ref 0.6–1.3)
DIAGNOSIS, 93000: NORMAL
DIFFERENTIAL METHOD BLD: ABNORMAL
EOSINOPHIL # BLD: 0.1 K/UL (ref 0–0.4)
EOSINOPHIL NFR BLD: 2 % (ref 0–5)
ERYTHROCYTE [DISTWIDTH] IN BLOOD BY AUTOMATED COUNT: 12.2 % (ref 11.6–14.5)
GLOBULIN SER CALC-MCNC: 3.4 G/DL (ref 2–4)
GLUCOSE SERPL-MCNC: 81 MG/DL (ref 74–99)
HCT VFR BLD AUTO: 41.9 % (ref 35–45)
HGB BLD-MCNC: 14.5 G/DL (ref 12–16)
LYMPHOCYTES # BLD: 2.4 K/UL (ref 0.9–3.6)
LYMPHOCYTES NFR BLD: 58 % (ref 21–52)
MCH RBC QN AUTO: 29.9 PG (ref 24–34)
MCHC RBC AUTO-ENTMCNC: 34.6 G/DL (ref 31–37)
MCV RBC AUTO: 86.4 FL (ref 74–97)
MONOCYTES # BLD: 0.3 K/UL (ref 0.05–1.2)
MONOCYTES NFR BLD: 6 % (ref 3–10)
NEUTS SEG # BLD: 1.4 K/UL (ref 1.8–8)
NEUTS SEG NFR BLD: 33 % (ref 40–73)
P-R INTERVAL, ECG05: 192 MS
PLATELET # BLD AUTO: 212 K/UL (ref 135–420)
PMV BLD AUTO: 10 FL (ref 9.2–11.8)
POTASSIUM SERPL-SCNC: 4 MMOL/L (ref 3.5–5.5)
PROT SERPL-MCNC: 7.3 G/DL (ref 6.4–8.2)
Q-T INTERVAL, ECG07: 428 MS
QRS DURATION, ECG06: 96 MS
QTC CALCULATION (BEZET), ECG08: 465 MS
RBC # BLD AUTO: 4.85 M/UL (ref 4.2–5.3)
SODIUM SERPL-SCNC: 138 MMOL/L (ref 136–145)
SPECIMEN EXP DATE BLD: NORMAL
VENTRICULAR RATE, ECG03: 71 BPM
WBC # BLD AUTO: 4.1 K/UL (ref 4.6–13.2)

## 2019-02-25 PROCEDURE — 86900 BLOOD TYPING SEROLOGIC ABO: CPT

## 2019-02-25 PROCEDURE — 93005 ELECTROCARDIOGRAM TRACING: CPT

## 2019-02-25 PROCEDURE — 80053 COMPREHEN METABOLIC PANEL: CPT

## 2019-02-25 PROCEDURE — 85025 COMPLETE CBC W/AUTO DIFF WBC: CPT

## 2019-02-25 NOTE — PATIENT INSTRUCTIONS
Laparoscopically Assisted Vaginal Hysterectomy: Before Your Surgery  What is a laparoscopically assisted vaginal hysterectomy? A hysterectomy is surgery to take out the uterus. In some cases, the ovaries and fallopian tubes are taken out at the same time. The doctor makes one or more small cuts in the belly. These cuts are called incisions. They let the doctor insert tools to do the surgery. One of these tools is a tube with a light on it. It's called a laparoscope, or scope. The scope and the other tools allow the doctor to free the uterus. Then the doctor makes a small cut in the vagina. The uterus is taken out through this cut. After the surgery, you will not have periods. You will not be able to get pregnant. If there is a chance that you want to have a baby, talk to your doctor about treatment options. Some women go home the day of surgery and some will stay in the hospital 1 to 2 days after surgery. You will need about 4 to 6 weeks to fully recover. The recovery time may be less for some patients. Follow-up care is a key part of your treatment and safety. Be sure to make and go to all appointments, and call your doctor if you are having problems. It's also a good idea to know your test results and keep a list of the medicines you take. What happens before surgery?   Surgery can be stressful. This information will help you understand what you can expect. And it will help you safely prepare for surgery.   Preparing for surgery    · Understand exactly what surgery is planned, along with the risks, benefits, and other options. · Tell your doctors ALL the medicines, vitamins, supplements, and herbal remedies you take. Some of these can increase the risk of bleeding or interact with anesthesia.     · If you take blood thinners, such as warfarin (Coumadin), clopidogrel (Plavix), or aspirin, be sure to talk to your doctor.  He or she will tell you if you should stop taking these medicines before your surgery. Make sure that you understand exactly what your doctor wants you to do.     · Your doctor will tell you which medicines to take or stop before your surgery. You may need to stop taking certain medicines a week or more before surgery. So talk to your doctor as soon as you can.     · If you have an advance directive, let your doctor know. It may include a living will and a durable power of  for health care. Bring a copy to the hospital. If you don't have one, you may want to prepare one. It lets your doctor and loved ones know your health care wishes. Doctors advise that everyone prepare these papers before any type of surgery or procedure. What happens on the day of surgery? · Follow the instructions exactly about when to stop eating and drinking. If you don't, your surgery may be canceled. If your doctor told you to take your medicines on the day of surgery, please take them with only a sip of water.     · Take a bath or shower before you come in for your surgery. Do not apply lotions, perfumes, deodorants, or nail polish.     · Do not shave the surgical site yourself.     · Take off all jewelry and piercings. And take out contact lenses, if you wear them.    At the hospital or surgery center   · Bring a picture ID.     · You will be kept comfortable and safe by your anesthesia provider. You will be asleep during the surgery.     · The surgery will take about 2 to 4 hours. Going home   · Be sure you have someone to drive you home. Anesthesia and pain medicine make it unsafe for you to drive.     · You will be given more specific instructions about recovering from your surgery. They will cover things like diet, wound care, follow-up care, driving, and getting back to your normal routine. When should you call your doctor? · You have questions or concerns.     · You do not understand how to prepare for your surgery.     · You become ill before surgery (such as fever, flu, or a cold).      · You need to reschedule or have changed your mind about having the surgery. Where can you learn more? Go to http://pooja-real.info/. Enter D669 in the search box to learn more about \"Laparoscopically Assisted Vaginal Hysterectomy: Before Your Surgery. \"  Current as of: May 14, 2018  Content Version: 11.9  © 0517-2757 [employer]. Care instructions adapted under license by [employer] (which disclaims liability or warranty for this information). If you have questions about a medical condition or this instruction, always ask your healthcare professional. [place] [address] any warranty or liability for your use of this information. Laparoscopically Assisted Vaginal Hysterectomy: What to Expect at [address] [place] [address] can expect to feel better and stronger each day, although you may need pain medicine for a week or two. You may get tired easily or have less energy than usual. This may last for several weeks after surgery. You will probably notice that your belly is swollen and puffy. This is common. The swelling will take several weeks to go down. It may take about 4 to 6 weeks to fully recover. The recovery time may be less for some patients. You may have some light vaginal bleeding. Don't have sex until the doctor says it is okay. Don't douche or put anything into your vagina, such as a tampon, until your doctor says it is okay. It is important to avoid lifting while you are recovering so that you can heal.  This care sheet gives you a general idea about how long it will take for you to recover. But each person recovers at a different pace. Follow the steps below to get better as quickly as possible. How can you care for yourself at home? Activity    · Rest when you feel tired. Getting enough sleep will help you recover.     · Try to walk each day. Start by walking a little more than you did the day before.  Bit by bit, increase the amount you walk. Walking boosts blood flow and helps prevent pneumonia and constipation.     · Avoid lifting anything that would make you strain. This may include heavy grocery bags and milk containers, a heavy briefcase or backpack, cat litter or dog food bags, a vacuum , or a child.     · Avoid strenuous activities, such as biking, jogging, weight lifting, or aerobic exercise, until your doctor says it is okay.     · You may shower. Pat the cut (incision) dry. Do not take a bath for the first 2 weeks, or until your doctor tells you it is okay.     · Ask your doctor when you can drive again.     · You will probably need to take about 2 weeks off from work. It depends on the type of work you do and how you feel.     · Your doctor will tell you when you can have sex again. Diet    · You can eat your normal diet. If your stomach is upset, try bland, low-fat foods like plain rice, broiled chicken, toast, and yogurt.     · Drink plenty of fluids (unless your doctor tells you not to).     · You may notice that your bowel movements are not regular right after your surgery. This is common. Try to avoid constipation and straining with bowel movements. You may want to take a fiber supplement every day. If you have not had a bowel movement after a couple of days, ask your doctor about taking a mild laxative. Medicines    · Your doctor will tell you if and when you can restart your medicines. He or she will also give you instructions about taking any new medicines.     · If you take blood thinners, such as warfarin (Coumadin), clopidogrel (Plavix), or aspirin, be sure to talk to your doctor. He or she will tell you if and when to start taking those medicines again. Make sure that you understand exactly what your doctor wants you to do.     · Be safe with medicines. Take pain medicines exactly as directed. ? If the doctor gave you a prescription medicine for pain, take it as prescribed.   ? If you are not taking a prescription pain medicine, ask your doctor if you can take an over-the-counter medicine.     · If you think your pain medicine is making you sick to your stomach:  ? Take your medicine after meals (unless your doctor has told you not to). ? Ask your doctor for a different pain medicine.     · If your doctor prescribed antibiotics, take them as directed. Do not stop taking them just because you feel better. You need to take the full course of antibiotics. Incision care    · You may have stitches over the cuts (incisions) the doctor made in your belly. If you have strips of tape on the incisions the doctor made, leave the tape on for a week or until it falls off. Or follow your doctor's instructions for removing the tape.     · Wash the area daily with warm, soapy water, and pat it dry. Don't use hydrogen peroxide or alcohol, which can slow healing. You may cover the area with a gauze bandage if it weeps or rubs against clothing. Change the bandage every day.     · Keep the area clean and dry. Other instructions    · You may have some light vaginal bleeding. Wear sanitary pads if needed. Do not douche or use tampons. Follow-up care is a key part of your treatment and safety. Be sure to make and go to all appointments, and call your doctor if you are having problems. It's also a good idea to know your test results and keep a list of the medicines you take. When should you call for help? Call 911 anytime you think you may need emergency care.  For example, call if:    · You passed out (lost consciousness).     · You have chest pain, are short of breath, or cough up blood.    Call your doctor now or seek immediate medical care if:    · You have pain that does not get better after you take pain medicine.     · You cannot pass stools or gas.     · You have vaginal discharge that has increased in amount or smells bad.     · You are sick to your stomach or cannot drink fluids.     · You have loose stitches, or your incision comes open.     · Bright red blood has soaked through the bandage over your incision.     · You have signs of infection, such as:  ? Increased pain, swelling, warmth, or redness. ? Red streaks leading from the incision. ? Pus draining from the incision. ? A fever.     · You have bright red vaginal bleeding that soaks one or more pads in an hour, or you have large clots.     · You have signs of a blood clot in your leg (called a deep vein thrombosis), such as:  ? Pain in your calf, back of the knee, thigh, or groin. ? Redness and swelling in your leg.    Watch closely for changes in your health, and be sure to contact your doctor if you have any problems. Where can you learn more? Go to http://pooja-real.info/. Enter O389 in the search box to learn more about \"Laparoscopically Assisted Vaginal Hysterectomy: What to Expect at Home. \"  Current as of: May 14, 2018  Content Version: 11.9  © 0480-2294 [employer]. Care instructions adapted under license by [employer] (which disclaims liability or warranty for this information). If you have questions about a medical condition or this instruction, always ask your healthcare professional. [place] [address] any warranty or liability for your use of this information.

## 2019-02-25 NOTE — PROGRESS NOTES
Amanda Gaines, a 59 y.o. female,  is here for   Chief Complaint   Patient presents with    Patient Education     surgical counseling       Visit Vitals  /69 (BP 1 Location: Left arm, BP Patient Position: Sitting)   Pulse 80   Temp 97.7 °F (36.5 °C) (Oral)   Resp 16   Ht 5' 4\" (1.626 m)   Wt 62.4 kg (137 lb 8 oz)   SpO2 99%   BMI 23.60 kg/m²        Reviewed consent form for [facility] and information packet for a laparoscopic bilateral salpingo-oophorectomy, possible hysterectomy, and staging scheduled on 2/28/2019  at 1230 with an arrival time of 1030. Patient was given information packet that included pre-op and post-op instructions as well as the scheduled date, start time, arrival time, and length of the surgery and signed the consent form. Patient expressed understanding and had no further questions. Patient was encouraged to call if they have questions later. 1. Have you been to the ER, urgent care clinic since your last visit? Hospitalized since your last visit? No    2. Have you seen or consulted any other health care providers outside of the [address] [place] [address] since your last visit? Include any pap smears or colon screening.  No

## 2019-02-27 ENCOUNTER — TELEPHONE (OUTPATIENT)
Dept: ONCOLOGY | Age: 59
End: 2019-02-27

## 2019-02-27 ENCOUNTER — ANESTHESIA EVENT (OUTPATIENT)
Dept: SURGERY | Age: 59
End: 2019-02-27
Payer: COMMERCIAL

## 2019-02-28 ENCOUNTER — HOSPITAL ENCOUNTER (OUTPATIENT)
Age: 59
Setting detail: OUTPATIENT SURGERY
Discharge: HOME | End: 2019-02-28
Attending: OBSTETRICS & GYNECOLOGY | Admitting: OBSTETRICS & GYNECOLOGY
Payer: COMMERCIAL

## 2019-02-28 ENCOUNTER — ANESTHESIA (OUTPATIENT)
Dept: SURGERY | Age: 59
End: 2019-02-28
Payer: COMMERCIAL

## 2019-02-28 VITALS
RESPIRATION RATE: 20 BRPM | OXYGEN SATURATION: 96 % | HEIGHT: 64 IN | BODY MASS INDEX: 23.22 KG/M2 | WEIGHT: 136 LBS | SYSTOLIC BLOOD PRESSURE: 122 MMHG | HEART RATE: 66 BPM | TEMPERATURE: 96.8 F | DIASTOLIC BLOOD PRESSURE: 61 MMHG

## 2019-02-28 DIAGNOSIS — G89.18 POST-OPERATIVE PAIN: Primary | ICD-10-CM

## 2019-02-28 PROCEDURE — 77030019927 HC TBNG IRR CYSTO BAXT -A: Performed by: OBSTETRICS & GYNECOLOGY

## 2019-02-28 PROCEDURE — 76010000138 HC OR TIME 0.5 TO 1 HR: Performed by: OBSTETRICS & GYNECOLOGY

## 2019-02-28 PROCEDURE — 74011250636 HC RX REV CODE- 250/636: Performed by: NURSE ANESTHETIST, CERTIFIED REGISTERED

## 2019-02-28 PROCEDURE — 74011250636 HC RX REV CODE- 250/636: Performed by: OBSTETRICS & GYNECOLOGY

## 2019-02-28 PROCEDURE — 77030035220 HC TRCR ENDOSC BLNTPRT ANCHR COVD -B: Performed by: OBSTETRICS & GYNECOLOGY

## 2019-02-28 PROCEDURE — 77030035048 HC TRCR ENDOSC OPTCL COVD -B: Performed by: OBSTETRICS & GYNECOLOGY

## 2019-02-28 PROCEDURE — 77030019605: Performed by: OBSTETRICS & GYNECOLOGY

## 2019-02-28 PROCEDURE — 74011250636 HC RX REV CODE- 250/636

## 2019-02-28 PROCEDURE — 77030008683 HC TU ET CUF COVD -A: Performed by: NURSE ANESTHETIST, CERTIFIED REGISTERED

## 2019-02-28 PROCEDURE — 88307 TISSUE EXAM BY PATHOLOGIST: CPT

## 2019-02-28 PROCEDURE — 77030039266 HC ADH SKN EXOFIN S2SG -A: Performed by: OBSTETRICS & GYNECOLOGY

## 2019-02-28 PROCEDURE — 76210000006 HC OR PH I REC 0.5 TO 1 HR: Performed by: OBSTETRICS & GYNECOLOGY

## 2019-02-28 PROCEDURE — 77030037892: Performed by: OBSTETRICS & GYNECOLOGY

## 2019-02-28 PROCEDURE — 77030020255 HC SOL INJ LR 1000ML BG: Performed by: OBSTETRICS & GYNECOLOGY

## 2019-02-28 PROCEDURE — 77030014063 HC TIP MANIP UTER COOP -B: Performed by: OBSTETRICS & GYNECOLOGY

## 2019-02-28 PROCEDURE — 77030034850: Performed by: OBSTETRICS & GYNECOLOGY

## 2019-02-28 PROCEDURE — 76060000032 HC ANESTHESIA 0.5 TO 1 HR: Performed by: OBSTETRICS & GYNECOLOGY

## 2019-02-28 PROCEDURE — 74011000250 HC RX REV CODE- 250

## 2019-02-28 PROCEDURE — 88112 CYTOPATH CELL ENHANCE TECH: CPT

## 2019-02-28 PROCEDURE — 74011250637 HC RX REV CODE- 250/637: Performed by: NURSE ANESTHETIST, CERTIFIED REGISTERED

## 2019-02-28 PROCEDURE — 77030018836 HC SOL IRR NACL ICUM -A: Performed by: OBSTETRICS & GYNECOLOGY

## 2019-02-28 PROCEDURE — 77030032490 HC SLV COMPR SCD KNE COVD -B: Performed by: OBSTETRICS & GYNECOLOGY

## 2019-02-28 PROCEDURE — 77030013079 HC BLNKT BAIR HGGR 3M -A: Performed by: NURSE ANESTHETIST, CERTIFIED REGISTERED

## 2019-02-28 PROCEDURE — 88305 TISSUE EXAM BY PATHOLOGIST: CPT

## 2019-02-28 PROCEDURE — 76210000021 HC REC RM PH II 0.5 TO 1 HR: Performed by: OBSTETRICS & GYNECOLOGY

## 2019-02-28 PROCEDURE — 74011000250 HC RX REV CODE- 250: Performed by: OBSTETRICS & GYNECOLOGY

## 2019-02-28 RX ORDER — HEPARIN SODIUM 5000 [USP'U]/ML
5000 INJECTION, SOLUTION INTRAVENOUS; SUBCUTANEOUS ONCE
Status: COMPLETED | OUTPATIENT
Start: 2019-02-28 | End: 2019-02-28

## 2019-02-28 RX ORDER — BUPIVACAINE HYDROCHLORIDE AND EPINEPHRINE 5; 5 MG/ML; UG/ML
INJECTION, SOLUTION EPIDURAL; INTRACAUDAL; PERINEURAL AS NEEDED
Status: DISCONTINUED | OUTPATIENT
Start: 2019-02-28 | End: 2019-02-28 | Stop reason: HOSPADM

## 2019-02-28 RX ORDER — ONDANSETRON 2 MG/ML
INJECTION INTRAMUSCULAR; INTRAVENOUS AS NEEDED
Status: DISCONTINUED | OUTPATIENT
Start: 2019-02-28 | End: 2019-02-28 | Stop reason: HOSPADM

## 2019-02-28 RX ORDER — NEOSTIGMINE METHYLSULFATE 5 MG/5 ML
SYRINGE (ML) INTRAVENOUS AS NEEDED
Status: DISCONTINUED | OUTPATIENT
Start: 2019-02-28 | End: 2019-02-28 | Stop reason: HOSPADM

## 2019-02-28 RX ORDER — SODIUM CHLORIDE 0.9 % (FLUSH) 0.9 %
5-40 SYRINGE (ML) INJECTION EVERY 8 HOURS
Status: DISCONTINUED | OUTPATIENT
Start: 2019-02-28 | End: 2019-02-28 | Stop reason: HOSPADM

## 2019-02-28 RX ORDER — DEXMEDETOMIDINE HYDROCHLORIDE 4 UG/ML
INJECTION, SOLUTION INTRAVENOUS AS NEEDED
Status: DISCONTINUED | OUTPATIENT
Start: 2019-02-28 | End: 2019-02-28 | Stop reason: HOSPADM

## 2019-02-28 RX ORDER — FENTANYL CITRATE 50 UG/ML
INJECTION, SOLUTION INTRAMUSCULAR; INTRAVENOUS AS NEEDED
Status: DISCONTINUED | OUTPATIENT
Start: 2019-02-28 | End: 2019-02-28 | Stop reason: HOSPADM

## 2019-02-28 RX ORDER — SODIUM CHLORIDE 0.9 % (FLUSH) 0.9 %
5-40 SYRINGE (ML) INJECTION AS NEEDED
Status: DISCONTINUED | OUTPATIENT
Start: 2019-02-28 | End: 2019-02-28 | Stop reason: HOSPADM

## 2019-02-28 RX ORDER — HYDROMORPHONE HYDROCHLORIDE 2 MG/ML
0.5 INJECTION, SOLUTION INTRAMUSCULAR; INTRAVENOUS; SUBCUTANEOUS
Status: DISCONTINUED | OUTPATIENT
Start: 2019-02-28 | End: 2019-02-28 | Stop reason: HOSPADM

## 2019-02-28 RX ORDER — LIDOCAINE HYDROCHLORIDE 20 MG/ML
INJECTION, SOLUTION EPIDURAL; INFILTRATION; INTRACAUDAL; PERINEURAL
Status: DISCONTINUED | OUTPATIENT
Start: 2019-02-28 | End: 2019-02-28 | Stop reason: HOSPADM

## 2019-02-28 RX ORDER — PROPOFOL 10 MG/ML
INJECTION, EMULSION INTRAVENOUS AS NEEDED
Status: DISCONTINUED | OUTPATIENT
Start: 2019-02-28 | End: 2019-02-28 | Stop reason: HOSPADM

## 2019-02-28 RX ORDER — FAMOTIDINE 20 MG/1
20 TABLET, FILM COATED ORAL ONCE
Status: COMPLETED | OUTPATIENT
Start: 2019-02-28 | End: 2019-02-28

## 2019-02-28 RX ORDER — GLYCOPYRROLATE 0.2 MG/ML
INJECTION INTRAMUSCULAR; INTRAVENOUS AS NEEDED
Status: DISCONTINUED | OUTPATIENT
Start: 2019-02-28 | End: 2019-02-28 | Stop reason: HOSPADM

## 2019-02-28 RX ORDER — KETOROLAC TROMETHAMINE 30 MG/ML
INJECTION, SOLUTION INTRAMUSCULAR; INTRAVENOUS AS NEEDED
Status: DISCONTINUED | OUTPATIENT
Start: 2019-02-28 | End: 2019-02-28 | Stop reason: HOSPADM

## 2019-02-28 RX ORDER — SUCCINYLCHOLINE CHLORIDE 20 MG/ML
INJECTION INTRAMUSCULAR; INTRAVENOUS AS NEEDED
Status: DISCONTINUED | OUTPATIENT
Start: 2019-02-28 | End: 2019-02-28 | Stop reason: HOSPADM

## 2019-02-28 RX ORDER — FENTANYL CITRATE 50 UG/ML
25 INJECTION, SOLUTION INTRAMUSCULAR; INTRAVENOUS AS NEEDED
Status: DISCONTINUED | OUTPATIENT
Start: 2019-02-28 | End: 2019-02-28 | Stop reason: HOSPADM

## 2019-02-28 RX ORDER — ROCURONIUM BROMIDE 10 MG/ML
INJECTION, SOLUTION INTRAVENOUS AS NEEDED
Status: DISCONTINUED | OUTPATIENT
Start: 2019-02-28 | End: 2019-02-28 | Stop reason: HOSPADM

## 2019-02-28 RX ORDER — CEFAZOLIN SODIUM 2 G/50ML
2 SOLUTION INTRAVENOUS ONCE
Status: COMPLETED | OUTPATIENT
Start: 2019-02-28 | End: 2019-02-28

## 2019-02-28 RX ORDER — DEXAMETHASONE SODIUM PHOSPHATE 4 MG/ML
INJECTION, SOLUTION INTRA-ARTICULAR; INTRALESIONAL; INTRAMUSCULAR; INTRAVENOUS; SOFT TISSUE AS NEEDED
Status: DISCONTINUED | OUTPATIENT
Start: 2019-02-28 | End: 2019-02-28 | Stop reason: HOSPADM

## 2019-02-28 RX ORDER — ONDANSETRON 2 MG/ML
4 INJECTION INTRAMUSCULAR; INTRAVENOUS ONCE
Status: COMPLETED | OUTPATIENT
Start: 2019-02-28 | End: 2019-02-28

## 2019-02-28 RX ORDER — SODIUM CHLORIDE, SODIUM LACTATE, POTASSIUM CHLORIDE, CALCIUM CHLORIDE 600; 310; 30; 20 MG/100ML; MG/100ML; MG/100ML; MG/100ML
75 INJECTION, SOLUTION INTRAVENOUS CONTINUOUS
Status: DISCONTINUED | OUTPATIENT
Start: 2019-02-28 | End: 2019-02-28 | Stop reason: HOSPADM

## 2019-02-28 RX ORDER — LIDOCAINE HYDROCHLORIDE 20 MG/ML
INJECTION, SOLUTION EPIDURAL; INFILTRATION; INTRACAUDAL; PERINEURAL AS NEEDED
Status: DISCONTINUED | OUTPATIENT
Start: 2019-02-28 | End: 2019-02-28 | Stop reason: HOSPADM

## 2019-02-28 RX ORDER — MIDAZOLAM HYDROCHLORIDE 1 MG/ML
INJECTION, SOLUTION INTRAMUSCULAR; INTRAVENOUS AS NEEDED
Status: DISCONTINUED | OUTPATIENT
Start: 2019-02-28 | End: 2019-02-28 | Stop reason: HOSPADM

## 2019-02-28 RX ORDER — HYDROCODONE BITARTRATE AND ACETAMINOPHEN 5; 300 MG/1; MG/1
1 TABLET ORAL
Qty: 60 TAB | Refills: 0 | Status: SHIPPED | OUTPATIENT
Start: 2019-02-28 | End: 2019-03-03

## 2019-02-28 RX ADMIN — DEXMEDETOMIDINE HYDROCHLORIDE 8 MCG: 4 INJECTION, SOLUTION INTRAVENOUS at 13:18

## 2019-02-28 RX ADMIN — LIDOCAINE HYDROCHLORIDE 30.5 MG/HR: 20 INJECTION, SOLUTION EPIDURAL; INFILTRATION; INTRACAUDAL; PERINEURAL at 13:13

## 2019-02-28 RX ADMIN — HEPARIN SODIUM 5000 UNITS: 5000 INJECTION INTRAVENOUS; SUBCUTANEOUS at 11:39

## 2019-02-28 RX ADMIN — ROCURONIUM BROMIDE 30 MG: 10 INJECTION, SOLUTION INTRAVENOUS at 13:09

## 2019-02-28 RX ADMIN — DEXMEDETOMIDINE HYDROCHLORIDE 6 MCG: 4 INJECTION, SOLUTION INTRAVENOUS at 13:16

## 2019-02-28 RX ADMIN — DEXAMETHASONE SODIUM PHOSPHATE 4 MG: 4 INJECTION, SOLUTION INTRA-ARTICULAR; INTRALESIONAL; INTRAMUSCULAR; INTRAVENOUS; SOFT TISSUE at 13:13

## 2019-02-28 RX ADMIN — KETOROLAC TROMETHAMINE 30 MG: 30 INJECTION, SOLUTION INTRAMUSCULAR; INTRAVENOUS at 13:30

## 2019-02-28 RX ADMIN — LIDOCAINE HYDROCHLORIDE 100 MG: 20 INJECTION, SOLUTION EPIDURAL; INFILTRATION; INTRACAUDAL; PERINEURAL at 13:02

## 2019-02-28 RX ADMIN — SUCCINYLCHOLINE CHLORIDE 140 MG: 20 INJECTION INTRAMUSCULAR; INTRAVENOUS at 13:02

## 2019-02-28 RX ADMIN — DEXMEDETOMIDINE HYDROCHLORIDE 6 MCG: 4 INJECTION, SOLUTION INTRAVENOUS at 13:40

## 2019-02-28 RX ADMIN — DEXMEDETOMIDINE HYDROCHLORIDE 6 MCG: 4 INJECTION, SOLUTION INTRAVENOUS at 13:06

## 2019-02-28 RX ADMIN — FAMOTIDINE 20 MG: 20 TABLET, FILM COATED ORAL at 11:39

## 2019-02-28 RX ADMIN — FENTANYL CITRATE 25 MCG: 50 INJECTION INTRAMUSCULAR; INTRAVENOUS at 14:19

## 2019-02-28 RX ADMIN — SODIUM CHLORIDE, SODIUM LACTATE, POTASSIUM CHLORIDE, AND CALCIUM CHLORIDE 75 ML/HR: 600; 310; 30; 20 INJECTION, SOLUTION INTRAVENOUS at 11:39

## 2019-02-28 RX ADMIN — Medication 3 MG: at 13:37

## 2019-02-28 RX ADMIN — DEXMEDETOMIDINE HYDROCHLORIDE 8 MCG: 4 INJECTION, SOLUTION INTRAVENOUS at 13:32

## 2019-02-28 RX ADMIN — PROPOFOL 120 MG: 10 INJECTION, EMULSION INTRAVENOUS at 13:02

## 2019-02-28 RX ADMIN — DEXMEDETOMIDINE HYDROCHLORIDE 6 MCG: 4 INJECTION, SOLUTION INTRAVENOUS at 13:31

## 2019-02-28 RX ADMIN — ONDANSETRON 4 MG: 2 INJECTION INTRAMUSCULAR; INTRAVENOUS at 14:09

## 2019-02-28 RX ADMIN — FENTANYL CITRATE 25 MCG: 50 INJECTION INTRAMUSCULAR; INTRAVENOUS at 14:29

## 2019-02-28 RX ADMIN — ONDANSETRON 4 MG: 2 INJECTION INTRAMUSCULAR; INTRAVENOUS at 13:13

## 2019-02-28 RX ADMIN — FENTANYL CITRATE 25 MCG: 50 INJECTION INTRAMUSCULAR; INTRAVENOUS at 14:09

## 2019-02-28 RX ADMIN — DEXMEDETOMIDINE HYDROCHLORIDE 6 MCG: 4 INJECTION, SOLUTION INTRAVENOUS at 13:21

## 2019-02-28 RX ADMIN — MIDAZOLAM HYDROCHLORIDE 2 MG: 1 INJECTION, SOLUTION INTRAMUSCULAR; INTRAVENOUS at 12:57

## 2019-02-28 RX ADMIN — CEFAZOLIN SODIUM 2 G: 2 SOLUTION INTRAVENOUS at 12:57

## 2019-02-28 RX ADMIN — GLYCOPYRROLATE 0.4 MG: 0.2 INJECTION INTRAMUSCULAR; INTRAVENOUS at 13:37

## 2019-02-28 RX ADMIN — FENTANYL CITRATE 100 MCG: 50 INJECTION, SOLUTION INTRAMUSCULAR; INTRAVENOUS at 13:02

## 2019-02-28 RX ADMIN — GLYCOPYRROLATE 0.2 MG: 0.2 INJECTION INTRAMUSCULAR; INTRAVENOUS at 13:27

## 2019-02-28 NOTE — DISCHARGE INSTRUCTIONS
Laparoscopic Oophorectomy: What to Expect at [facility]    After surgery to remove one or both ovaries, you may feel some pain in your belly for a few days. Your belly may also be swollen. You may have a change in your bowel movements for a few days. It's normal to also have some shoulder or back pain. This is caused by the gas your doctor put in your belly to help see your organs better. To help with pain, your doctor will prescribe medicines. You may need about 1 week to fully recover. It's important not to lift anything heavy for about 1 week. You can ask your doctor when it's okay to have sex. This care sheet gives you a general idea about how long it will take for you to recover. But each person recovers at a different pace. Follow the steps below to get better as quickly as possible. How can you care for yourself at home? Activity    · Rest when you feel tired.     · Be active. Walking is a good choice.     · Allow your body to heal. Don't move quickly or lift anything heavy until you are feeling better.     · Hold a pillow over your incisions when you cough or take deep breaths. This will support your belly and may help to decrease your pain.     · Do breathing exercises at home as instructed by your doctor. This will help prevent pneumonia. Diet    · You can eat your normal diet. If your stomach is upset, try bland, low-fat foods like plain rice, broiled chicken, toast, and yogurt.     · Drink plenty of fluids (unless your doctor tells you not to).   · If your bowel movements are not regular right after surgery, try to avoid constipation and straining. Drink plenty of water. Your doctor may suggest fiber, a stool softener, or a mild laxative. Medicines    · Your doctor will tell you if and when you can restart your medicines.  He or she will also give you instructions about taking any new medicines.     · If you take blood thinners, such as warfarin (Coumadin), clopidogrel (Plavix), or aspirin, be sure to talk to your doctor. He or she will tell you if and when to start taking those medicines again. Make sure that you understand exactly what your doctor wants you to do.     · Be safe with medicines. Read and follow all instructions on the label. ? If the doctor gave you a prescription medicine for pain, take it as prescribed. ? If you are not taking a prescription pain medicine, ask your doctor if you can take an over-the-counter medicine. Incision care    · If you have strips of tape on the cut (incision) the doctor made, leave the tape on for a week or until it falls off.     · Wash the area daily with warm, soapy water, and pat it dry. Don't use hydrogen peroxide or alcohol. They can slow healing.     · You may cover the area with a gauze bandage if it oozes fluid or rubs against clothing.     · Change the bandage every day.     · Keep the area clean and dry. Other instructions    · Wear loose, comfortable clothing. For a few weeks, avoid anything that puts pressure on your belly.     · You may want to use a heating pad on your belly to help with pain. Follow-up care is a key part of your treatment and safety. Be sure to make and go to all appointments, and call your doctor if you are having problems. It's also a good idea to know your test results and keep a list of the medicines you take. When should you call for help? Call 911 anytime you think you may need emergency care.  For example, call if:    · You passed out (lost consciousness).     · You have chest pain, are short of breath, or cough up blood.    Call your doctor now or seek immediate medical care if:    · You have pain that does not get better after you take pain medicine.     · You cannot pass stools or gas.     · You have vaginal discharge that has increased in amount or smells bad.     · You are sick to your stomach or cannot drink fluids.     · You have loose stitches, or your incision comes open.     · Bright red blood has soaked through the bandage over your incision.     · You have signs of infection, such as:  ? Increased pain, swelling, warmth, or redness. ? Red streaks leading from the incision. ? Pus draining from the incision. ? A fever.     · You have bright red vaginal bleeding that soaks one or more pads in an hour, or you have large clots.     · You have signs of a blood clot in your leg (called a deep vein thrombosis), such as:  ? Pain in your calf, back of the knee, thigh, or groin. ? Redness and swelling in your leg.    Watch closely for changes in your health, and be sure to contact your doctor if you have any problems. Where can you learn more? Go to http://pooja-real.info/. Enter C606 in the search box to learn more about \"Laparoscopic Oophorectomy: What to Expect at Home. \"  Current as of: May 14, 2018  Content Version: 11.9  © 6931-8500 [employer]. Care instructions adapted under license by [employer] (which disclaims liability or warranty for this information). If you have questions about a medical condition or this instruction, always ask your healthcare professional. [place] [address] any warranty or liability for your use of this information. Constipation: Care Instructions  Your Care Instructions    Constipation means that you have a hard time passing stools (bowel movements). People pass stools from 3 times a day to once every 3 days. What is normal for you may be different. Constipation may occur with pain in the rectum and cramping. The pain may get worse when you try to pass stools. Sometimes there are small amounts of bright red blood on toilet paper or the surface of stools. This is because of enlarged veins near the rectum (hemorrhoids). A few changes in your diet and lifestyle may help you avoid ongoing constipation. Your doctor may also prescribe medicine to help loosen your stool. Some medicines can cause constipation. These include pain medicines and antidepressants. Tell your doctor about all the medicines you take. Your doctor may want to make a medicine change to ease your symptoms. Follow-up care is a key part of your treatment and safety. Be sure to make and go to all appointments, and call your doctor if you are having problems. It's also a good idea to know your test results and keep a list of the medicines you take. How can you care for yourself at home? · Drink plenty of fluids, enough so that your urine is light yellow or clear like water. If you have kidney, heart, or liver disease and have to limit fluids, talk with your doctor before you increase the amount of fluids you drink. · Include high-fiber foods in your diet each day. These include fruits, vegetables, beans, and whole grains. · Get at least 30 minutes of exercise on most days of the week. Walking is a good choice. You also may want to do other activities, such as running, swimming, cycling, or playing tennis or team sports. · Take a fiber supplement, such as Citrucel or Metamucil, every day. Read and follow all instructions on the label. · Schedule time each day for a bowel movement. A daily routine may help. Take your time having your bowel movement. · Support your feet with a small step stool when you sit on the toilet. This helps flex your hips and places your pelvis in a squatting position. · Your doctor may recommend an over-the-counter laxative to relieve your constipation. Examples are Milk of Magnesia and MiraLax. Read and follow all instructions on the label. Do not use laxatives on a long-term basis. When should you call for help?   Call your doctor now or seek immediate medical care if:    · You have new or worse belly pain.     · You have new or worse nausea or vomiting.     · You have blood in your stools.    Watch closely for changes in your health, and be sure to contact your doctor if:    · Your constipation is getting worse.     · You do not get better as expected. Where can you learn more? Go to http://pooja-real.info/. Enter 21  in the search box to learn more about \"Constipation: Care Instructions. \"  Current as of: September 23, 2018  Content Version: 11.9  © 1175-6135 [employer]. Care instructions adapted under license by [employer] (which disclaims liability or warranty for this information). If you have questions about a medical condition or this instruction, always ask your healthcare professional. [address] any warranty or liability for your use of this information. Hydrocodone/Acetaminophen (Vicodin, Norco, Lortab) - (By mouth)   Why this medicine is used:   Treats pain. Contact a nurse or doctor right away if you have:  · Blistering, peeling, red skin rash  · Fast or slow heartbeat, shallow breathing, blue lips, fingernails, or skin  · Anxiety, restlessness, muscle spasms, twitching, seeing or hearing things that are not there  · Dark urine or pale stools, yellow skin or eyes  · Extreme weakness, sweating, seizures, cold or clammy skin  · Lightheadedness, dizziness, fainting, fever, sweating     Common side effects:  · Constipation, nausea, vomiting, loss of appetite, stomach pain  · Tiredness or sleepiness  © 2017 [facility] Information is for End User's use only and may not be sold, redistributed or otherwise used for commercial purposes. DISCHARGE SUMMARY from Nurse    PATIENT INSTRUCTIONS:    After general anesthesia or intravenous sedation, for 24 hours or while taking prescription Narcotics:  · Limit your activities  · Do not drive and operate hazardous machinery  · Do not make important personal or business decisions  · Do  not drink alcoholic beverages  · If you have not urinated within 8 hours after discharge, please contact your surgeon on call.     Report the following to your surgeon:  · Excessive pain, swelling, redness or odor of or around the surgical area  · Temperature over 100.5  · Nausea and vomiting lasting longer than 4 hours or if unable to take medications  · Any signs of decreased circulation or nerve impairment to extremity: change in color, persistent  numbness, tingling, coldness or increase pain  · Any questions    *  Please give a list of your current medications to your Primary Care Provider. *  Please update this list whenever your medications are discontinued, doses are      changed, or new medications (including over-the-counter products) are added. *  Please carry medication information at all times in case of emergency situations. These are general instructions for a healthy lifestyle:    No smoking/ No tobacco products/ Avoid exposure to second hand smoke  Surgeon General's Warning:  Quitting smoking now greatly reduces serious risk to your health. Obesity, smoking, and sedentary lifestyle greatly increases your risk for illness    A healthy diet, regular physical exercise & weight monitoring are important for maintaining a healthy lifestyle    You may be retaining fluid if you have a history of heart failure or if you experience any of the following symptoms:  Weight gain of 3 pounds or more overnight or 5 pounds in a week, increased swelling in our hands or feet or shortness of breath while lying flat in bed. Please call your doctor as soon as you notice any of these symptoms; do not wait until your next office visit. Recognize signs and symptoms of STROKE:    F-face looks uneven    A-arms unable to move or move unevenly    S-speech slurred or non-existent    T-time-call 911 as soon as signs and symptoms begin-DO NOT go       Back to bed or wait to see if you get better-TIME IS BRAIN. Warning Signs of HEART ATTACK     Call 911 if you have these symptoms:   Chest discomfort.  Most heart attacks involve discomfort in the center of the chest that lasts more than a few minutes, or that goes away and comes back. It can feel like uncomfortable pressure, squeezing, fullness, or pain.  Discomfort in other areas of the upper body. Symptoms can include pain or discomfort in one or both arms, the back, neck, jaw, or stomach.  Shortness of breath with or without chest discomfort.  Other signs may include breaking out in a cold sweat, nausea, or lightheadedness. Don't wait more than five minutes to call 911 - MINUTES MATTER! Fast action can save your life. Calling 911 is almost always the fastest way to get lifesaving treatment. Emergency Medical Services staff can begin treatment when they arrive -- up to an hour sooner than if someone gets to the hospital by car. The discharge information has been reviewed with the patient and spouse. The patient/spouse verbalized understanding. Discharge medications reviewed with the patient and spouse and appropriate educational materials and side effects teaching were provided.   ___________________________________________________________________________________________________________________________________

## 2019-02-28 NOTE — ANESTHESIA POSTPROCEDURE EVALUATION
Procedure(s): LAPAROSCOPIC BILATERAL SALPINGO OOPHORECTOMY. Anesthesia Post Evaluation Multimodal analgesia: multimodal analgesia used between 6 hours prior to anesthesia start to PACU discharge Patient location during evaluation: bedside Patient participation: complete - patient participated Level of consciousness: awake Pain score: 5 Pain management: adequate Airway patency: patent Anesthetic complications: no 
Cardiovascular status: stable Respiratory status: acceptable Hydration status: acceptable Post anesthesia nausea and vomiting:  controlled Visit Vitals /46 Pulse 65 Temp 37 °C (98.6 °F) Resp 19 Ht 5' 4\" (1.626 m) Wt 61.7 kg (136 lb) SpO2 97% BMI 23.34 kg/m²

## 2019-02-28 NOTE — INTERVAL H&P NOTE
H&P Update: 
Juani Lora was seen and examined. History and physical has been reviewed. The patient has been examined.  There have been no significant clinical changes since the completion of the originally dated History and Physical. 
 
Signed By: Ed Kwok MD   
 February 28, 2019 12:03 PM

## 2019-02-28 NOTE — PERIOP NOTES
Late entry Patient confirmed by two identifiers with discharge instructions prior too being provided to patient and spouse. Patient armband removed and shredded

## 2019-02-28 NOTE — BRIEF OP NOTE
BRIEF OPERATIVE NOTE Date of Procedure: 2/28/2019 Preoperative Diagnosis: N83.20 COMPLEX RIGHT OVARIAN CYST Postoperative Diagnosis: * No post-op diagnosis entered * Procedure(s): LAPAROSCOPIC BILATERAL SALPINGO OOPHORECTOMY/POSSIBLE HYSTERECTOMY AND STAGING Surgeon(s) and Role: Frank Soto MD - Primary Surgical Assistant: Radha Griffith Surgical Staff: 
Circ-1: Luz Marina Sandoval RN Scrub Tech-1: Denny Contreras Surg Asst-1: Eloise Maloney Event Time In Time Out Incision Start [id number] Incision Close Anesthesia: General  
Estimated Blood Loss: 5 Specimens:  
ID Type Source Tests Collected by Time Destination 1 : Left fallopian tube and ovary Preservative Ovary  Frank Soto MD 2/28/2019 1329 Pathology 2 : Right fallopian tube and ovary Preservative Ovary  Frank Soto MD 2/28/2019 1331 Pathology 1 : Pelvic Washings for Cytology Fresh Pelvis  Frank Soto MD 2/28/2019 1328 Cytology Findings: 2 cm right ovarian cyst with small fibrotic nodule Complications: none Implants: * No implants in log *

## 2019-02-28 NOTE — INTERVAL H&P NOTE
H&P Update: 
Rocael Browning was seen and examined. History and physical has been reviewed. The patient has been examined.  There have been no significant clinical changes since the completion of the originally dated History and Physical. 
 
Signed By: Torito Tse MD   
 February 28, 2019 12:03 PM

## 2019-02-28 NOTE — ANESTHESIA PREPROCEDURE EVALUATION
Anesthetic History No history of anesthetic complications Review of Systems / Medical History Patient summary reviewed and pertinent labs reviewed Pulmonary Within defined limits Neuro/Psych Within defined limits Cardiovascular Hypertension: well controlled Exercise tolerance: >4 METS 
  
GI/Hepatic/Renal 
Within defined limits Endo/Other Arthritis Other Findings Comments: Right Ovarian Cyst  
 
 
 
 
Physical Exam 
 
Airway Mallampati: II 
TM Distance: 4 - 6 cm Neck ROM: normal range of motion Mouth opening: Normal 
 
 Cardiovascular Regular rate and rhythm,  S1 and S2 normal,  no murmur, click, rub, or gallop Dental 
No notable dental hx Pulmonary Breath sounds clear to auscultation Abdominal 
GI exam deferred Other Findings Anesthetic Plan ASA: 2 Anesthesia type: general 
 
 
 
 
Induction: Intravenous Anesthetic plan and risks discussed with: Patient

## 2019-03-01 NOTE — OP NOTES
[address] [place] [address]   OPERATIVE REPORT    Name:  Gretta Ricci  MR#:   [id number]  :  1960  ACCOUNT #:  [de-identified]  DATE OF SERVICE:  2019    PREOPERATIVE DIAGNOSIS:  Right ovarian cyst.    POSTOPERATIVE DIAGNOSIS:  Right ovarian cyst.    PROCEDURE PERFORMED:  Laparoscopic bilateral salpingo-oophorectomy and washing. SURGEON:  Essie John DO    ASSISTANT:  Radha Griffith. ANESTHESIA:  General.    FLUIDS:  400 mL. COMPLICATIONS:  _____    SPECIMENS REMOVED:  Right tube and ovary, left tube and ovary, washings. IMPLANTS:  _____    ESTIMATED BLOOD LOSS:  5 mL. URINE OUTPUT:  125 mL, clear urine. FINDINGS:  Laparoscopic findings revealed normal left tube and ovary. Right ovary with a 2-cm cyst and a small nodule, otherwise, all peritoneal surfaces were smooth with a small uterus. INDICATIONS:  The patient is a 70-year-old who initially was evaluated in 2017 for elevated LFTs and had an incidental finding of 1.4-cm simple cyst.  She underwent ultrasound with no change. A followup ultrasound in 2019 showed this right ovarian cyst had changed appearance with some more complexity. Her CA-125 was normal.  She presents today for definitive surgical management. DESCRIPTION OF PROCEDURE:  The patient was taken to the operating room where general anesthesia was obtained without difficulty. She was placed in dorsal lithotomy position, prepped and draped in a sterile fashion. Surgical time-out was obtained by surgical team.  A Monet catheter was placed. A sterile speculum was then placed in the patient's vagina. The anterior lip of the cervix was grasped with a single-tooth tenaculum. The cervix was then dilated and then a 6-cm ARPITA tip was advanced without difficulty. Attention was then turned to the abdomen where an incision was made above the umbilicus and carried down to the underlying fascia.   The fascia was then grasped with Kocher clamps and incised using Amandeep scissors. A 0 Vicryl stay suture was placed. Intraabdominal entry was obtained using blunt dissection. The Nick trocar was advanced. Pneumoperitoneum was obtained to 15 mmHg. The patient was placed in steep Trendelenburg with the findings above. At this point, two 5-mm trocars were placed on left side of the abdomen under direct visualization. Washings were obtained. Attention was turned to the left side where the left adnexa was elevated. The ureter was seen peristalsing below. The left IP ligament was sealed and divided using LigaSure device. The remaining attachments to the broad ligament were sealed and divided up to the left uterine fundus. The left uterine ligament and fallopian tube were then sealed and divided. This was then repeated on the right side in the same fashion without difficulty. At this point, both specimens were placed in EndoCatch, removed, and sent for permanent specimen. Next, pelvis was irrigated copiously. There was good hemostasis at all sites. All trocars removed and pneumoperitoneum was released. The supraumbilical fascial incision was reapproximated with 0 Vicryl figure-of-eight stitch. The patient tolerated the procedure well. Instrument and needle count correct x2, and the patient was taken to recovery room in stable condition.       Christina Magallon DO [email or web address]  D:  02/28/2019 13:42  T:  03/01/2019 0:35  JOB #:  [id number]

## 2019-03-04 ENCOUNTER — TELEPHONE (OUTPATIENT)
Dept: ONCOLOGY | Age: 59
End: 2019-03-04

## 2019-03-12 NOTE — PROGRESS NOTES
[facility]  [address] [place] [address], [address], [facility]  [address] [facility], [address] [facility], [address] [place], [place] [address]in Ishmael Bateliers   [phone]        Postoperative Office Note  Patient ID:  Name: Juani Lora  MRM: [id number]  : 1960/58 y.o. Date: 3/14/2019    SUBJECTIVE:    This is a 59 y.o.  female who presents s/p Laparoscopic bilateral salpingo-oophorectomy and washing. on 2019. Currently she has no problems with eating, bowel movements, voiding, or their wound  Appetite is good. Eating a regular diet without difficulty. Urinating without difficulty. Bowel movements are regular. The patient is not having any pain. .    Her pathology revealed      A: LEFT OVARY AND FALLOPIAN TUBE:   ENDOSALPINGIOSIS OF OVARY. B: RIGHT OVARY AND FALLOPIAN TUBE:   MUCINOUS CYSTADENOFIBROMA OF OVARY. Medications:     Current Outpatient Medications on File Prior to Visit   Medication Sig Dispense Refill    butalbital-acetaminophen-caffeine (FIORICET, ESGIC) -40 mg per tablet Take 1 Tab by mouth every six (6) hours as needed for Pain. 60 Tab 0    hydroCHLOROthiazide (HYDRODIURIL) 25 mg tablet TAKE 1 TABLET BY MOUTH EVERY DAY 90 Tab 2    tiZANidine (ZANAFLEX) 4 mg tablet TAKE 1 TABLET BY MOUTH EVERY NIGHT 90 Tab 1    amLODIPine (NORVASC) 10 mg tablet TAKE 1 TABLET BY MOUTH DAILY 90 Tab 2    pravastatin (PRAVACHOL) 10 mg tablet Take 1 Tab by mouth nightly. 90 Tab 1    amitriptyline (ELAVIL) 10 mg tablet TK 1/2 T PO QHS FOR 1 WEEK AND THEN 1 T QHS THEREAFTER  3    hydrALAZINE (APRESOLINE) 100 mg tablet Take 1 Tab by mouth three (3) times daily. 270 Tab 2    potassium chloride (K-DUR, KLOR-CON) 20 mEq tablet Take 1 Tab by mouth daily. 90 Tab 2    TURMERIC ROOT EXTRACT PO Take 1,000 mg by mouth.  GLUC/CHONDR-MSM#7/C/DEENA/BORON (GLUCOSAMINE-CHONDR, BOSWELLIA, PO) Take 500 mg by mouth daily.       thiamine (VITAMIN B-1) 100 mg tablet Take 50 mg by mouth daily.  multivitamin (ONE A DAY) tablet Take 1 Tab by mouth daily.  Cholecalciferol, Vitamin D3, 2,000 unit cap Take 2,000 Units by mouth daily. No current facility-administered medications on file prior to visit. Allergies: Allergies   Allergen Reactions    Other Plant, Animal, Environmental Itching and Sneezing     \"Everything but feathers and horses. \"       OBJECTIVE:    Vitals:   Visit Vitals  /75 (BP 1 Location: Left arm, BP Patient Position: Sitting)   Pulse 80   Temp 98.5 °F (36.9 °C) (Oral)   Resp 16   Wt 61.8 kg (136 lb 4.8 oz)   LMP 01/10/2012 (LMP Unknown)   SpO2 97%   BMI 23.40 kg/m²       Physical Examination:    General:  alert, cooperative, no distress   Abdomen: soft, bowel sounds active, non-tender   Incision: healing well   Pelvic: deferred   Rectal: not done   Extremity:   extremities normal, atraumatic, no cyanosis or edema     IMPRESSION/PLAN:    Amanda Gaines is Doing well postoperatively. .  She has a working diagnosis of [address]. The operative procedures and clinical results have been reviewed with the patient. Implications of diagnosis discussed at length. I will see the patient back prn. F/U with referring GYN. The patient is advised to call our office with any problems or concerns.       Verona Li NP  Gynecologic Oncology  3/14/2019/2:23 PM

## 2019-03-14 ENCOUNTER — OFFICE VISIT (OUTPATIENT)
Dept: ONCOLOGY | Age: 59
End: 2019-03-14

## 2019-03-14 VITALS
OXYGEN SATURATION: 97 % | DIASTOLIC BLOOD PRESSURE: 75 MMHG | RESPIRATION RATE: 16 BRPM | SYSTOLIC BLOOD PRESSURE: 123 MMHG | TEMPERATURE: 98.5 F | WEIGHT: 136.3 LBS | BODY MASS INDEX: 23.4 KG/M2 | HEART RATE: 80 BPM

## 2019-03-14 DIAGNOSIS — N83.201 CYST OF RIGHT OVARY: ICD-10-CM

## 2019-03-14 DIAGNOSIS — Z48.89 POSTOPERATIVE VISIT: Primary | ICD-10-CM

## 2019-03-20 DIAGNOSIS — R51.9 CHRONIC NONINTRACTABLE HEADACHE, UNSPECIFIED HEADACHE TYPE: ICD-10-CM

## 2019-03-20 DIAGNOSIS — G89.29 CHRONIC NONINTRACTABLE HEADACHE, UNSPECIFIED HEADACHE TYPE: ICD-10-CM

## 2019-03-20 RX ORDER — BUTALBITAL, ACETAMINOPHEN AND CAFFEINE 50; 325; 40 MG/1; MG/1; MG/1
1 TABLET ORAL
Qty: 60 TAB | Refills: 0 | Status: SHIPPED | OUTPATIENT
Start: 2019-03-20 | End: 2019-04-16 | Stop reason: SDUPTHER

## 2019-04-16 DIAGNOSIS — R51.9 CHRONIC NONINTRACTABLE HEADACHE, UNSPECIFIED HEADACHE TYPE: ICD-10-CM

## 2019-04-16 DIAGNOSIS — G89.29 CHRONIC NONINTRACTABLE HEADACHE, UNSPECIFIED HEADACHE TYPE: ICD-10-CM

## 2019-04-16 RX ORDER — BUTALBITAL, ACETAMINOPHEN AND CAFFEINE 50; 325; 40 MG/1; MG/1; MG/1
1 TABLET ORAL
Qty: 60 TAB | Refills: 0 | Status: SHIPPED | OUTPATIENT
Start: 2019-04-16 | End: 2019-05-22 | Stop reason: SDUPTHER

## 2019-05-22 DIAGNOSIS — G89.29 CHRONIC NONINTRACTABLE HEADACHE, UNSPECIFIED HEADACHE TYPE: ICD-10-CM

## 2019-05-22 DIAGNOSIS — R51.9 CHRONIC NONINTRACTABLE HEADACHE, UNSPECIFIED HEADACHE TYPE: ICD-10-CM

## 2019-05-24 RX ORDER — BUTALBITAL, ACETAMINOPHEN AND CAFFEINE 50; 325; 40 MG/1; MG/1; MG/1
1 TABLET ORAL
Qty: 60 TAB | Refills: 0 | Status: SHIPPED | OUTPATIENT
Start: 2019-05-24 | End: 2019-07-01 | Stop reason: SDUPTHER

## 2019-07-01 DIAGNOSIS — G89.29 CHRONIC NONINTRACTABLE HEADACHE, UNSPECIFIED HEADACHE TYPE: ICD-10-CM

## 2019-07-01 DIAGNOSIS — R51.9 CHRONIC NONINTRACTABLE HEADACHE, UNSPECIFIED HEADACHE TYPE: ICD-10-CM

## 2019-07-02 RX ORDER — BUTALBITAL, ACETAMINOPHEN AND CAFFEINE 50; 325; 40 MG/1; MG/1; MG/1
1 TABLET ORAL
Qty: 60 TAB | Refills: 0 | Status: SHIPPED | OUTPATIENT
Start: 2019-07-02 | End: 2019-08-14 | Stop reason: SDUPTHER

## 2019-07-02 NOTE — TELEPHONE ENCOUNTER
PCP: Misa Jara MD    Last appt: 1/29/2019  Future Appointments   Date Time Provider Carolina Palumbo   7/23/2019  9:15 AM [facility] NURSE VISIT [facility] SWETA GARVIN   7/30/2019 11:00 AM Misa Jara MD [address]       Requested Prescriptions     Pending Prescriptions Disp Refills    butalbital-acetaminophen-caffeine (FIORICET, ESGIC) -40 mg per tablet 60 Tab 0     Sig: Take 1 Tab by mouth every six (6) hours as needed for Pain.

## 2019-07-23 ENCOUNTER — HOSPITAL ENCOUNTER (OUTPATIENT)
Dept: LAB | Age: 59
Discharge: HOME | End: 2019-07-23
Payer: COMMERCIAL

## 2019-07-23 ENCOUNTER — APPOINTMENT (OUTPATIENT)
Dept: INTERNAL MEDICINE CLINIC | Age: 59
End: 2019-07-23

## 2019-07-23 DIAGNOSIS — E55.9 VITAMIN D DEFICIENCY: ICD-10-CM

## 2019-07-23 DIAGNOSIS — E78.5 HYPERLIPIDEMIA, UNSPECIFIED HYPERLIPIDEMIA TYPE: ICD-10-CM

## 2019-07-23 DIAGNOSIS — M48.061 SPINAL STENOSIS OF LUMBAR REGION, UNSPECIFIED WHETHER NEUROGENIC CLAUDICATION PRESENT: ICD-10-CM

## 2019-07-23 DIAGNOSIS — I10 ESSENTIAL HYPERTENSION: ICD-10-CM

## 2019-07-23 DIAGNOSIS — G47.33 OBSTRUCTIVE SLEEP APNEA: ICD-10-CM

## 2019-07-23 DIAGNOSIS — M25.512 ACUTE PAIN OF LEFT SHOULDER: ICD-10-CM

## 2019-07-23 DIAGNOSIS — K58.1 IRRITABLE BOWEL SYNDROME WITH CONSTIPATION: ICD-10-CM

## 2019-07-23 DIAGNOSIS — R51.9 CHRONIC DAILY HEADACHE: ICD-10-CM

## 2019-07-23 DIAGNOSIS — M54.2 NECK PAIN: ICD-10-CM

## 2019-07-23 DIAGNOSIS — M47.816 LUMBAR FACET ARTHROPATHY: ICD-10-CM

## 2019-07-23 DIAGNOSIS — M50.30 DEGENERATIVE DISC DISEASE, CERVICAL: ICD-10-CM

## 2019-07-23 DIAGNOSIS — N83.201 RIGHT OVARIAN CYST: ICD-10-CM

## 2019-07-23 DIAGNOSIS — K52.9 COLITIS: ICD-10-CM

## 2019-07-23 LAB
ALBUMIN SERPL-MCNC: 3.8 G/DL (ref 3.4–5)
ALBUMIN/GLOB SERPL: 1.2 {RATIO} (ref 0.8–1.7)
ALP SERPL-CCNC: 104 U/L (ref 45–117)
ALT SERPL-CCNC: 39 U/L (ref 13–56)
ANION GAP SERPL CALC-SCNC: 5 MMOL/L (ref 3–18)
APPEARANCE UR: CLEAR
AST SERPL-CCNC: 17 U/L (ref 10–38)
BACTERIA URNS QL MICRO: NEGATIVE /HPF
BASOPHILS # BLD: 0 K/UL (ref 0–0.1)
BASOPHILS NFR BLD: 0 % (ref 0–2)
BILIRUB SERPL-MCNC: 0.3 MG/DL (ref 0.2–1)
BILIRUB UR QL: NEGATIVE
BUN SERPL-MCNC: 28 MG/DL (ref 7–18)
BUN/CREAT SERPL: 40 (ref 12–20)
CALCIUM SERPL-MCNC: 9 MG/DL (ref 8.5–10.1)
CHLORIDE SERPL-SCNC: 103 MMOL/L (ref 100–111)
CHOLEST SERPL-MCNC: 163 MG/DL
CO2 SERPL-SCNC: 32 MMOL/L (ref 21–32)
COLOR UR: ABNORMAL
CREAT SERPL-MCNC: 0.7 MG/DL (ref 0.6–1.3)
DIFFERENTIAL METHOD BLD: ABNORMAL
EOSINOPHIL # BLD: 0.2 K/UL (ref 0–0.4)
EOSINOPHIL NFR BLD: 3 % (ref 0–5)
EPITH CASTS URNS QL MICRO: ABNORMAL /LPF (ref 0–5)
ERYTHROCYTE [DISTWIDTH] IN BLOOD BY AUTOMATED COUNT: 13.1 % (ref 11.6–14.5)
GLOBULIN SER CALC-MCNC: 3.1 G/DL (ref 2–4)
GLUCOSE SERPL-MCNC: 87 MG/DL (ref 74–99)
GLUCOSE UR STRIP.AUTO-MCNC: NEGATIVE MG/DL
HCT VFR BLD AUTO: 42 % (ref 35–45)
HDLC SERPL-MCNC: 57 MG/DL (ref 40–60)
HDLC SERPL: 2.9 {RATIO} (ref 0–5)
HGB BLD-MCNC: 13.6 G/DL (ref 12–16)
HGB UR QL STRIP: NEGATIVE
KETONES UR QL STRIP.AUTO: NEGATIVE MG/DL
LDLC SERPL CALC-MCNC: 80.6 MG/DL (ref 0–100)
LEUKOCYTE ESTERASE UR QL STRIP.AUTO: NEGATIVE
LIPID PROFILE,FLP: NORMAL
LYMPHOCYTES # BLD: 2.7 K/UL (ref 0.9–3.6)
LYMPHOCYTES NFR BLD: 53 % (ref 21–52)
MCH RBC QN AUTO: 29.4 PG (ref 24–34)
MCHC RBC AUTO-ENTMCNC: 32.4 G/DL (ref 31–37)
MCV RBC AUTO: 90.9 FL (ref 74–97)
MONOCYTES # BLD: 0.5 K/UL (ref 0.05–1.2)
MONOCYTES NFR BLD: 9 % (ref 3–10)
NEUTS SEG # BLD: 1.8 K/UL (ref 1.8–8)
NEUTS SEG NFR BLD: 35 % (ref 40–73)
NITRITE UR QL STRIP.AUTO: NEGATIVE
PH UR STRIP: 5.5 [PH] (ref 5–8)
PLATELET # BLD AUTO: 221 K/UL (ref 135–420)
PMV BLD AUTO: 9.9 FL (ref 9.2–11.8)
POTASSIUM SERPL-SCNC: 3.7 MMOL/L (ref 3.5–5.5)
PROT SERPL-MCNC: 6.9 G/DL (ref 6.4–8.2)
PROT UR STRIP-MCNC: NEGATIVE MG/DL
RBC # BLD AUTO: 4.62 M/UL (ref 4.2–5.3)
RBC #/AREA URNS HPF: 0 /HPF (ref 0–5)
SODIUM SERPL-SCNC: 140 MMOL/L (ref 136–145)
SP GR UR REFRACTOMETRY: >1.03 (ref 1–1.03)
TRIGL SERPL-MCNC: 127 MG/DL (ref ?–150)
TSH SERPL DL<=0.05 MIU/L-ACNC: 1.17 UIU/ML (ref 0.36–3.74)
UROBILINOGEN UR QL STRIP.AUTO: 0.2 EU/DL (ref 0.2–1)
VLDLC SERPL CALC-MCNC: 25.4 MG/DL
WBC # BLD AUTO: 5.1 K/UL (ref 4.6–13.2)
WBC URNS QL MICRO: ABNORMAL /HPF (ref 0–4)

## 2019-07-23 PROCEDURE — 85025 COMPLETE CBC W/AUTO DIFF WBC: CPT

## 2019-07-23 PROCEDURE — 81001 URINALYSIS AUTO W/SCOPE: CPT

## 2019-07-23 PROCEDURE — 36415 COLL VENOUS BLD VENIPUNCTURE: CPT

## 2019-07-23 PROCEDURE — 82306 VITAMIN D 25 HYDROXY: CPT

## 2019-07-23 PROCEDURE — 83036 HEMOGLOBIN GLYCOSYLATED A1C: CPT

## 2019-07-23 PROCEDURE — 84443 ASSAY THYROID STIM HORMONE: CPT

## 2019-07-23 PROCEDURE — 80053 COMPREHEN METABOLIC PANEL: CPT

## 2019-07-23 PROCEDURE — 80061 LIPID PANEL: CPT

## 2019-07-24 LAB
25(OH)D3 SERPL-MCNC: 42.5 NG/ML (ref 30–100)
EST. AVERAGE GLUCOSE BLD GHB EST-MCNC: 105 MG/DL
HBA1C MFR BLD: 5.3 % (ref 4.2–5.6)

## 2019-07-30 ENCOUNTER — OFFICE VISIT (OUTPATIENT)
Dept: INTERNAL MEDICINE CLINIC | Age: 59
End: 2019-07-30

## 2019-07-30 VITALS
OXYGEN SATURATION: 96 % | SYSTOLIC BLOOD PRESSURE: 128 MMHG | HEIGHT: 64 IN | DIASTOLIC BLOOD PRESSURE: 78 MMHG | BODY MASS INDEX: 23.39 KG/M2 | RESPIRATION RATE: 12 BRPM | HEART RATE: 76 BPM | TEMPERATURE: 98.8 F | WEIGHT: 137 LBS

## 2019-07-30 DIAGNOSIS — I10 ESSENTIAL HYPERTENSION: ICD-10-CM

## 2019-07-30 DIAGNOSIS — G47.33 OBSTRUCTIVE SLEEP APNEA: ICD-10-CM

## 2019-07-30 DIAGNOSIS — Z00.01 ENCOUNTER FOR ROUTINE ADULT PHYSICAL EXAM WITH ABNORMAL FINDINGS: Primary | ICD-10-CM

## 2019-07-30 DIAGNOSIS — K52.9 COLITIS: ICD-10-CM

## 2019-07-30 DIAGNOSIS — K58.1 IRRITABLE BOWEL SYNDROME WITH CONSTIPATION: ICD-10-CM

## 2019-07-30 DIAGNOSIS — M50.30 DEGENERATIVE DISC DISEASE, CERVICAL: ICD-10-CM

## 2019-07-30 DIAGNOSIS — R51.9 CHRONIC DAILY HEADACHE: ICD-10-CM

## 2019-07-30 DIAGNOSIS — N83.201 RIGHT OVARIAN CYST: ICD-10-CM

## 2019-07-30 DIAGNOSIS — M48.062 SPINAL STENOSIS OF LUMBAR REGION WITH NEUROGENIC CLAUDICATION: ICD-10-CM

## 2019-07-30 DIAGNOSIS — M54.2 NECK PAIN: ICD-10-CM

## 2019-07-30 DIAGNOSIS — E78.5 HYPERLIPIDEMIA, UNSPECIFIED HYPERLIPIDEMIA TYPE: ICD-10-CM

## 2019-07-30 RX ORDER — RUTIN 500 MG
500 TABLET ORAL DAILY
COMMUNITY
End: 2020-10-01

## 2019-07-30 NOTE — PATIENT INSTRUCTIONS
DASH Diet: Care Instructions  Your Care Instructions    The DASH diet is an eating plan that can help lower your blood pressure. DASH stands for Dietary Approaches to Stop Hypertension. Hypertension is high blood pressure. The DASH diet focuses on eating foods that are high in calcium, potassium, and magnesium. These nutrients can lower blood pressure. The foods that are highest in these nutrients are fruits, vegetables, low-fat dairy products, nuts, seeds, and legumes. But taking calcium, potassium, and magnesium supplements instead of eating foods that are high in those nutrients does not have the same effect. The DASH diet also includes whole grains, fish, and poultry. The DASH diet is one of several lifestyle changes your doctor may recommend to lower your high blood pressure. Your doctor may also want you to decrease the amount of sodium in your diet. Lowering sodium while following the DASH diet can lower blood pressure even further than just the DASH diet alone. Follow-up care is a key part of your treatment and safety. Be sure to make and go to all appointments, and call your doctor if you are having problems. It's also a good idea to know your test results and keep a list of the medicines you take. How can you care for yourself at home? Following the DASH diet  · Eat 4 to 5 servings of fruit each day. A serving is 1 medium-sized piece of fruit, ½ cup chopped or canned fruit, 1/4 cup dried fruit, or 4 ounces (½ cup) of fruit juice. Choose fruit more often than fruit juice. · Eat 4 to 5 servings of vegetables each day. A serving is 1 cup of lettuce or raw leafy vegetables, ½ cup of chopped or cooked vegetables, or 4 ounces (½ cup) of vegetable juice. Choose vegetables more often than vegetable juice. · Get 2 to 3 servings of low-fat and fat-free dairy each day. A serving is 8 ounces of milk, 1 cup of yogurt, or 1 ½ ounces of cheese. · Eat 6 to 8 servings of grains each day.  A serving is 1 slice of bread, 1 ounce of dry cereal, or ½ cup of cooked rice, pasta, or cooked cereal. Try to choose whole-grain products as much as possible. · Limit lean meat, poultry, and fish to 2 servings each day. A serving is 3 ounces, about the size of a deck of cards. · Eat 4 to 5 servings of nuts, seeds, and legumes (cooked dried beans, lentils, and split peas) each week. A serving is 1/3 cup of nuts, 2 tablespoons of seeds, or ½ cup of cooked beans or peas. · Limit fats and oils to 2 to 3 servings each day. A serving is 1 teaspoon of vegetable oil or 2 tablespoons of salad dressing. · Limit sweets and added sugars to 5 servings or less a week. A serving is 1 tablespoon jelly or jam, ½ cup sorbet, or 1 cup of lemonade. · Eat less than 2,300 milligrams (mg) of sodium a day. If you limit your sodium to 1,500 mg a day, you can lower your blood pressure even more. Tips for success  · Start small. Do not try to make dramatic changes to your diet all at once. You might feel that you are missing out on your favorite foods and then be more likely to not follow the plan. Make small changes, and stick with them. Once those changes become habit, add a few more changes. · Try some of the following:  ? Make it a goal to eat a fruit or vegetable at every meal and at snacks. This will make it easy to get the recommended amount of fruits and vegetables each day. ? Try yogurt topped with fruit and nuts for a snack or healthy dessert. ? Add lettuce, tomato, cucumber, and onion to sandwiches. ? Combine a ready-made pizza crust with low-fat mozzarella cheese and lots of vegetable toppings. Try using tomatoes, squash, spinach, broccoli, carrots, cauliflower, and onions. ? Have a variety of cut-up vegetables with a low-fat dip as an appetizer instead of chips and dip. ? Sprinkle sunflower seeds or chopped almonds over salads. Or try adding chopped walnuts or almonds to cooked vegetables.   ? Try some vegetarian meals using beans and peas. Add garbanzo or kidney beans to salads. Make burritos and tacos with mashed medrano beans or black beans. Where can you learn more? Go to http://pooja-real.info/. Enter S962 in the search box to learn more about \"DASH Diet: Care Instructions. \"  Current as of: July 22, 2018  Content Version: 12.1  © 0593-4320 [employer]wise, [employer]. Care instructions adapted under license by [employer] (which disclaims liability or warranty for this information). If you have questions about a medical condition or this instruction, always ask your healthcare professional. [address] any warranty or liability for your use of this information.

## 2019-07-30 NOTE — PROGRESS NOTES
Chief Complaint   Patient presents with    Hypertension     6 month follow up with lab results. 1. Have you been to the ER, urgent care clinic or hospitalized since your last visit? NO.     2. Have you seen or consulted any other health care providers outside of the [address] [place] [address] since your last visit (Include any pap smears or colon screening)? NO      Do you have an Advanced Directive? NO    Would you like information on Advanced Directives? NO    Learning Assessment 2/15/2019   PRIMARY LEARNER Patient   HIGHEST LEVEL OF EDUCATION - PRIMARY LEARNER  2 YEARS OF COLLEGE   BARRIERS PRIMARY LEARNER NONE   CO-LEARNER CAREGIVER No   PRIMARY LANGUAGE ENGLISH   LEARNER PREFERENCE PRIMARY DEMONSTRATION   ANSWERED BY patient   RELATIONSHIP SELF     Abuse Screening Questionnaire 7/30/2019   Do you ever feel afraid of your partner? N   Are you in a relationship with someone who physically or mentally threatens you? N   Is it safe for you to go home? Y     3 most recent PHQ Screens 7/30/2019   PHQ Not Done -   Little interest or pleasure in doing things Not at all   Feeling down, depressed, irritable, or hopeless Not at all   Total Score PHQ 2 0     Fall Risk Assessment, last 12 mths 7/30/2019   Able to walk? Yes   Fall in past 12 months?  No

## 2019-08-03 PROBLEM — M25.512 ACUTE PAIN OF LEFT SHOULDER: Status: RESOLVED | Noted: 2019-02-02 | Resolved: 2019-08-03

## 2019-08-03 NOTE — PROGRESS NOTES
HPI:   Chapis Genao is a 59y.o. year old female who presents today for a physical exam and for evaluation of hypertension, hyperlipidemia, chronic daily headaches, obstructive sleep apnea, allergic rhinitis, vasomotor instability, and irritable bowel syndrome. She reports that she is doing very well. She had reported at her last visit that a surveillance ultrasound by Dr. Mekhi Esqueda noted that a known right ovarian cyst had enlarged slightly and changed in appearance. She was referred to Dr. Vance Marie for evaluation, and he stated that although the cyst was likely benign, he recommended laparoscopic removal. On 2/28/2019, she underwent a laparoscopic bilateral salpingo-oophorectomy and washing. 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 She states that since the surgery, she has noticed some mild hot flashes. She is otherwise feeling well and without complaints. In 7/2018, she was referred to Dr. Cynthia York for her chronic daily headaches, and evaluation included brain MRI and MRA (8/8/2018) showing small nonspecific subcortical white matter hyperintensity left insula region,otherwise unremarkable MRI; and MRA negative. She underwent sleep study evaluation and was diagnosed with moderate obstructive sleep apnea, and was referred to Dr. Tae Marie and successfully started on CPAP. She was prescribed a Medrol dose pack to allow her to withdraw from daily use of analgesics for her headaches. She was then started on amitriptyine 10 mg daily and instructed to use Excedrin migraine for moderate headaches, and Fioricet only for severe headaches. Celebrex and Zanaflex were prescribed to manage her chronic neck pain, and she continues to take one Zanaflex nightly. She also completed physical therapy, including dry needling, for her neck pain with significant improvement.  She is continuing to use Fioricet when she develops a headache, but reports that she is needing to take only one with good response. She also is continuing to take Zanaflex and amitriptyline at bedtime, but is no longer needing Celebrex. On 1/29/2018, she called the office and reported she was out having dinner when she experienced an \"IBS episode\" characterized by flushing in the face, dry mouth, and severe abdominal cramping. When she arrived home, she experienced diarrhea and a single episode of vomiting. She reported that she continued to have abdominal cramping and fatigue following this episode, although described as getting slowly better each day. She reported that she had similar episodes to this dating back 30 years, and was diagnosed with IBS at that time. She stated that the episodes used to resolve more quickly, although over the last year, she states that it now is taking several days before she recovers. She was referred to Dr. Katie Azevedo, and started on Linzess for constipation. However, she states that it was not effective. She underwent evaluation by Dr. Phoenix Brasher and Dr. Gabrielle walters and had a small bowel series (8/7/2018) and abdominal duplex scan (8/16/2018), which were negative. She also collected a 24 hour urine for 5-HIAA, which was negative. She reports that she has noted an improvement in her abdominal pain and diarrheal episodes, and reports that she has not had any episodes since discontinuing captopril. She currently denies any abdominal pain, nausea, vomiting, melena, hematochezia, or change in bowel movements. On 8/2/2016, she was evaluated by PETERSON Ahmadi for left sided crampy abdominal pain, diarrhea, and hematochezia. She was treated for presumed diverticulitis with Cipro and Flagyl, and underwent evaluation showing WBC 11.2, ALT 59, alkaline phosphatase 121, and lipase 427.  Abdominal CT scan (8/2/2016) showed localized bowel wall thickening with inflammatory change involving the splenic flexure and proximal descending colon; no colonic diverticuli are seen so the presence of diverticulitis is unlikely; would be a typical location for bowel ischemia, but the patient's vascular structures are essentially normal so bowel ischemia unlikely; favor infectious or inflammatory colitis. She was evaluated by Dr. Alicia Cee and admitted for bowel rest, IVF, and IV antibiotics. She improved significantly in 36 hours and was discharged home. She completed the course of oral antibiotics and states that her symptoms have completely resolved. In 4/2017, she was again evaluated by Dr. Alicia Cee for recurrent abdominal pain, and abdominal and pelvic CT scan was obtained (4/28/2017) showing mild nonspecific thickening of the proximal jejunum and significant improvement in the distal transverse colon adjacent to the splenic flexure with a short segment with mild wall thickening. Her discomfort resolved, but concern was raised for possible ischemia as cause. An echocardiogram was obtained (5/26/2017) showing normal LV function (EF 65%), no RWMA, grade 1 diastolic dysfunction, and wall thickening upper limits of normal. She also underwent a hypercoaguable laboratory workup, which was negative. She underwent screening colonoscopy by Dr. Kamilla Gallegos in 6/2011, which found a polyp in the proximal descending colon (pathology: hyperplastic). Because she could not intubate the cecum, follow-up was recommended for 2 years. In 4/2015, she underwent repeat colonoscopy with Dr. Milla Givens, which showed left diverticular disease, hypertrophied anal papilla, and no polyps. Follow-up was recommended for 5 years. She has a history of hypertension, treated with amlodipine, hydrochlorothiazide, and hydralazine. She has resumed exercising and she purchased an [employer] machine for her home. She denies any chest pain, shortness of breath at rest or with exertion, palpitations, lightheadedness, or edema.  She also has hyperlipidemia, treated with fenofibrate and fish oil in the past, but these were discontinued and she was changed to rosuvastatin in 6/2016. However, due to mild to moderate elevation in AST/ALT, it was discontinued in 5/2017 with resolution of abnormal liver function tests. She was subsequently started on pravastatin in 10/2018, and reports no difficulties with it today. She has a history of mild-severe obstructive sleep apnea, diagnosed by Dr. Cary Cano, and was prescribed nasal CPAP. She states that she is no longer using her machine because it was making her headaches worse. She suffers from chronic headaches, considered to be migraines in the past as they were associated with menstruation. Since she entered menopause, the severity of her headaches has decreased and the character has changed. She has severe allergic rhinitis, and awakens with morning headaches when her allergies are severe. She is followed by Dr. Marilin Coleman and had been receiving immunotherapy with some improvement. She also has vasomotor instability, which responded to venlafaxine and she has successfully weaned from taking it. In 6/2016, she presented with complaints of right sciatica and she underwent a lumbar MRI (7/8/2016) showing multilevel mild disc bulges, mild facet arthropathy, and multilevel mild foraminal stenosis; L3-L4 with mild to moderate spinal canal stenosis, no more than mild spinal canal stenosis elsewhere, and no cord compression. She was evaluated by Dr. Madina Roberto on 9/1/2016 and started on gabapentin. She also was referred to physical therapy and received dry needling, which resulted in significant improvement. She also has a history of an anterior cervical discectomy and fusion (C5-C6) in 2/2011 by Dr. Yossi Carlisle for a herniated disc and right radiculopathy. She does experience intermittent neck pain, and feels that it may be exacerbating her headaches.     Past Medical History:   Diagnosis Date    Allergic rhinitis     Chronic headaches     Hx of colonic polyp 6/2011    Hyperplastic;  [place].    Hyperlipidemia     Hypertension     IBS (irritable bowel syndrome)     Obstructive sleep apnea     Moderate-severe; not using nasal CPAP    S/P cervical discectomy 2011    Anterior C5-C6 discectomy for herniated disc and right radiculopathy; Dr. Melissa Lees     Past Surgical History:   Procedure Laterality Date    ENDOSCOPY, COLON, DIAGNOSTIC      HX BACK SURGERY  11    neck surgery; ruptured cervical discs    HX COLONOSCOPY  2011    HX GYN      dilation and curretage    HX GYN           Current Outpatient Medications   Medication Sig    rutin 500 mg tab Take 500 mg by mouth daily.  butalbital-acetaminophen-caffeine (FIORICET, ESGIC) -40 mg per tablet Take 1 Tab by mouth every six (6) hours as needed for Pain.  hydrALAZINE (APRESOLINE) 100 mg tablet TAKE 1 TABLET BY MOUTH THREE TIMES DAILY    pravastatin (PRAVACHOL) 10 mg tablet TAKE 1 TABLET BY MOUTH EVERY NIGHT    potassium chloride (K-DUR, KLOR-CON) 20 mEq tablet TAKE 1 TABLET BY MOUTH DAILY    hydroCHLOROthiazide (HYDRODIURIL) 25 mg tablet TAKE 1 TABLET BY MOUTH EVERY DAY    tiZANidine (ZANAFLEX) 4 mg tablet TAKE 1 TABLET BY MOUTH EVERY NIGHT    amLODIPine (NORVASC) 10 mg tablet TAKE 1 TABLET BY MOUTH DAILY    amitriptyline (ELAVIL) 10 mg tablet TK 1/2 T PO QHS FOR 1 WEEK AND THEN 1 T QHS THEREAFTER    TURMERIC ROOT EXTRACT PO Take 1,000 mg by mouth.  GLUC/CHONDR-[facility] [place]C/DEENA/B[place]ON (GLUCOSAMINE-CHONDR, BOSWELLIA, PO) Take 500 mg by mouth daily.  multivitamin (ONE A DAY) tablet Take 1 Tab by mouth daily.  Cholecalciferol, Vitamin D3, 2,000 unit cap Take 2,000 Units by mouth daily. No current facility-administered medications for this visit. Allergies and Intolerances: Allergies   Allergen Reactions    Other Plant, Animal, Environmental Itching and Sneezing     \"Everything but feathers and horses. \"        Family History: Her mother had hypertension, but  from a melanoma.  Her father  from a DVT. Her brother has CAD and underwent CABG at the age of 40. Family History   Problem Relation Age of Onset    Heart Disease Brother     Other Mother         melanoma    Arthritis-osteo Mother     Bleeding Prob Father     Heart Disease Father     Heart Attack Father     Stroke Maternal Uncle     Heart Attack Maternal Uncle     Breast Cancer Maternal Grandmother      Social History:   She  reports that she quit smoking about 4 years ago. She smoked 0.50 packs per day. She has never used smokeless tobacco. She reports that she smoked 0.5 ppd for 30 years, stopping in . She is  and lives with her . She recently retired from the [employer] department of [employer]; her  sells eyeglass frames to [employer]. Social History     Substance and Sexual Activity   Alcohol Use Yes    Alcohol/week: 0.0 standard drinks    Comment: Very Rarely     Immunization History:  Immunization History   Administered Date(s) Administered    Influenza Vaccine 10/08/2014, 10/09/2017    Influenza Vaccine (Quad) PF 10/23/2018    TD Vaccine 2006    Tdap 10/07/2016       Review of Systems:   As above included in HPI. Otherwise 11 point review of systems negative including constitutional, skin, HENT, eyes, respiratory, cardiovascular, gastrointestinal, genitourinary, musculoskeletal, endo/heme/aller, neurological.    Physical:   Vitals:   BP: 128/78   HR: 76  WT: 137 lb (62.1 kg)  BMI:  23.52 kg/m2    Patient appears in no apparent distress. Affect is appropriate. HEENT --Anicteric sclerae, tympanic membranes normal,  ear canals normal.  PERRL, EOMI, conjunctiva and lids normal.   Sinuses were nontender, turbinates normal, hearing normal.  Oropharynx without  erythema, normal tongue, oral mucosa and tonsils. No cervical lymphadenopathy. No thyromegaly, JVD, or bruits. Carotid pulses 2+ with normal upstroke. Lungs --Clear to auscultation. No wheezing or rales.   Heart --Regular rate and rhythm, no murmurs, rubs, gallops, or clicks. Chest wall --Nontender to palpation. PMI normal.  Abdomen -- Soft and nontender, no hepatosplenomegaly or masses. Extremities -- Without cyanosis, clubbing, edema. 2+ pulses equally and bilaterally. Neuro -- CN 2-12 intact, strength 5/5 with intact soft touch in all extremities  Derm  no obvious abnormalities noted, no rash      Review of Data:  Labs:  Hospital Outpatient Visit on 07/23/2019   Component Date Value Ref Range Status    WBC 07/23/2019 5.1  4.6 - 13.2 K/uL Final    RBC 07/23/2019 4.62  4.20 - 5.30 M/uL Final    HGB 07/23/2019 13.6  12.0 - 16.0 g/dL Final    HCT 07/23/2019 42.0  35.0 - 45.0 % Final    MCV 07/23/2019 90.9  74.0 - 97.0 FL Final    MCH 07/23/2019 29.4  24.0 - 34.0 PG Final    MCHC 07/23/2019 32.4  31.0 - 37.0 g/dL Final    RDW 07/23/2019 13.1  11.6 - 14.5 % Final    PLATELET 24/88/1880 501  135 - 420 K/uL Final    MPV 07/23/2019 9.9  9.2 - 11.8 FL Final    NEUTROPHILS 07/23/2019 35* 40 - 73 % Final    LYMPHOCYTES 07/23/2019 53* 21 - 52 % Final    MONOCYTES 07/23/2019 9  3 - 10 % Final    EOSINOPHILS 07/23/2019 3  0 - 5 % Final    BASOPHILS 07/23/2019 0  0 - 2 % Final    ABS. NEUTROPHILS 07/23/2019 1.8  1.8 - 8.0 K/UL Final    ABS. LYMPHOCYTES 07/23/2019 2.7  0.9 - 3.6 K/UL Final    ABS. MONOCYTES 07/23/2019 0.5  0.05 - 1.2 K/UL Final    ABS. EOSINOPHILS 07/23/2019 0.2  0.0 - 0.4 K/UL Final    ABS.  BASOPHILS 07/23/2019 0.0  0.0 - 0.1 K/UL Final    DF 07/23/2019 AUTOMATED    Final    Hemoglobin A1c 07/23/2019 5.3  4.2 - 5.6 % Final    Est. average glucose 07/23/2019 105  mg/dL Final    LIPID PROFILE 07/23/2019        Final    Cholesterol, total 07/23/2019 163  <200 MG/DL Final    Triglyceride 07/23/2019 127  <150 MG/DL Final    HDL Cholesterol 07/23/2019 57  40 - 60 MG/DL Final    LDL, calculated 07/23/2019 80.6  0 - 100 MG/DL Final    VLDL, calculated 07/23/2019 25.4  MG/DL Final    CHOL/HDL Ratio 07/23/2019 2.9  0 - 5.0   Final    Sodium 07/23/2019 140  136 - 145 mmol/L Final    Potassium 07/23/2019 3.7  3.5 - 5.5 mmol/L Final    Chloride 07/23/2019 103  100 - 111 mmol/L Final    CO2 07/23/2019 32  21 - 32 mmol/L Final    Anion gap 07/23/2019 5  3.0 - 18 mmol/L Final    Glucose 07/23/2019 87  74 - 99 mg/dL Final    BUN 07/23/2019 28* 7.0 - 18 MG/DL Final    Creatinine 07/23/2019 0.70  0.6 - 1.3 MG/DL Final    BUN/Creatinine ratio 07/23/2019 40* 12 - 20   Final    GFR est AA 07/23/2019 >60  >60 ml/min/1.73m2 Final    GFR est non-AA 07/23/2019 >60  >60 ml/min/1.73m2 Final    Calcium 07/23/2019 9.0  8.5 - 10.1 MG/DL Final    Bilirubin, total 07/23/2019 0.3  0.2 - 1.0 MG/DL Final    ALT (SGPT) 07/23/2019 39  13 - 56 U/L Final    AST (SGOT) 07/23/2019 17  10 - 38 U/L Final    Alk.  phosphatase 07/23/2019 104  45 - 117 U/L Final    Protein, total 07/23/2019 6.9  6.4 - 8.2 g/dL Final    Albumin 07/23/2019 3.8  3.4 - 5.0 g/dL Final    Globulin 07/23/2019 3.1  2.0 - 4.0 g/dL Final    A-G Ratio 07/23/2019 1.2  0.8 - 1.7   Final    TSH 07/23/2019 1.17  0.36 - 3.74 uIU/mL Final    Color 07/23/2019 DARK YELLOW    Final    Appearance 07/23/2019 CLEAR    Final    Specific gravity 07/23/2019 >1.030* 1.005 - 1.030 Final    pH (UA) 07/23/2019 5.5  5.0 - 8.0   Final    Protein 07/23/2019 NEGATIVE   NEG mg/dL Final    Glucose 07/23/2019 NEGATIVE   NEG mg/dL Final    Ketone 07/23/2019 NEGATIVE   NEG mg/dL Final    Bilirubin 07/23/2019 NEGATIVE   NEG   Final    Blood 07/23/2019 NEGATIVE   NEG   Final    Urobilinogen 07/23/2019 0.2  0.2 - 1.0 EU/dL Final    Nitrites 07/23/2019 NEGATIVE   NEG   Final    Leukocyte Esterase 07/23/2019 NEGATIVE   NEG   Final    WBC 07/23/2019 0 to 2  0 - 4 /hpf Final    RBC 07/23/2019 0  0 - 5 /hpf Final    Epithelial cells 07/23/2019 FEW  0 - 5 /lpf Final    Bacteria 07/23/2019 NEGATIVE   NEG /hpf Final    Vitamin D 25-Hydroxy 07/23/2019 42.5  30 - 100 ng/mL Final     Calculated 10 year ASCVD risk score: 5.4 %    Health Maintenance:  Screening:    Mammogram: negative (11/2018)   PAP smear: well women exams by Dr. Joselo Beauchamp (last 12/2018)   Colorectal: colonoscopy (4/2015) no polyps. Dr. Kimberly Fisher. Due 4/2020. Depression: Weaned from Effexor (vasomotor instability). DM (HbA1c/FPG): FPG 87 (7/2019)   Hepatitis C: negative (12/2015)   Falls: none   DEXA: N/A   Glaucoma: has regular eye exams with Dr. Herbert Mason (last 11/2018)   Smoking: 15 pack year (stopped 2011)   Vitamin D: 40.2 (10/2018)   Medicare Wellness: N/A    Impression:  Patient Active Problem List   Diagnosis Code    Hypertension I10    Hyperlipidemia E78.5    Obstructive sleep apnea G47.33    Chronic daily headache R51    IBS (irritable bowel syndrome) K58.9    Colon polyp K63.5    Vitamin D deficiency E55.9    Colitis K52.9    Lumbar spinal stenosis M48.061    HNP (herniated nucleus pulposus), lumbar M51.26    Lumbar facet arthropathy M47.816    Neck pain M54.2    Degenerative disc disease, cervical M50.30    Acute pain of left shoulder M25.512    Right ovarian cyst N83.201       Plan:  1. Hypertension. Blood pressure appears well controlled on current regimen of hydralazine 100 mg tid, amlodipine 10 mg daily, and hydrochlorothiazide 25 mg daily. Successfully discontinued treatment with captopril since unclear if abdominal attacks may have been a form of angioedema. Renal function remains normal with creatinine 0.70/ eGFR >60. However, BUN/creatinine ratio remains significantly elevated to 40, and again discussed importance of increasing fluid intake, particularly given treatment with hydrochlorothiazide. Follow closely. 2. Hyperlipidemia. Began treatment with rosuvastatin in 6/2016, and elevation in transaminases first noted in 8/2016. Discontinued in 5/2017 due to persistent elevation of transaminases, and resolved after discontinuation.  Calculated 10 year ASCVD risk off statin is 5.4 %, which does not place her in one of the four statin benefit groups as per new AHA/ACC guidelines. However, , , and HDL 50, and patient with strong family history of heart disease. She started treatment with pravastatin 10 mg daily in 10/2018, and LDL today improved to 80 with HDL 54. Liver function tests remain normal. Will continue to follow. 3. Elevated transaminases. Now resolved since rosuvastatin discontinued. Review of record shows elevation noted since 8/2016 when initiated. Iron studies, hepatitis panel, KIEL, SPEP, CK, aldolase, and anti-smooth muscle antibody negative. RUQ ultrasound (10/2016) showing fatty infiltration of liver. Advised patient to avoid alcohol. 4. Colitis. Unclear etiology, but nonspecific bowel wall thickening in short segment of jejunum and sigmoid on repeat CT scan raised question of ischemic in origin. Hypercoaguable workup including lab studies and echocardiogram negative. Appears to have intermittent \"IBS\" episodes associated with facial flushing, dry mouth, and severe abdominal cramping followed by diarrhea +/- vomiting. Episodes linger for several days following. No evidence of urticaria seen. Interesting is the abnormalities noted on abdominal CT scan in two of her episodes, with nonspecific short segment thickening in various locations in small and large intestines. Obtained complement studies including C4, C1 esterase inhibtor, and C1q levels which were normal. However, instructed patient that if she developed another episode, would like to check C4 level during the episode. Discontinued captopril and has not had recurrence since doing so. Evaluated by GI and had negative small bowel series, duplex ultrasound, and 24 hour urine for 5-HIAA level. Continue to follow. 5. Chronic daily headaches. Evaluated by Dr. Ingrid Ruiz and weaned from daily Fioricet and Excedrin migraine.  Headaches now improved with daily amitriptyline and trying to use Excedrin migraine and Fioricet only occasionally for moderate and severe headaches, respectively. Does have chronic neck pain related to her anterior neck fusion, and is finding Zanaflex and regular exercises to be helpful. Continuing to follow-up with Dr. Tita Spears. 6. Right leg sciatica. Evidence of lumbar spinal stenosis and facet arthropathy, and lumbar herniated disc. Significant improvement with dry needling therapy and reports no further pain. Continue to follow and follow-up with Dr. Jose Guadalupe Girard if recurs. 7. Abnormal glucose. Fasting glucose remains normal. Most likely due to weight loss. Emphasized importance of continued lifestyle modifications. 8.  Obstructive sleep apnea. Underwent reevaluation by Dr. Felicia Crowe, and repeat sleep study showed moderate PEGGY. Started on CPAP and noting improvement. Continue to follow. 9. Right ovarian cyst. Enlarged slightly and changed in appearance at last evaluation on ultrasound. Referred to Dr. Ashley Browne and underwent laparoscopic bilateral salpingo-oophorectomy and washing. 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 No further treatment needed. 10. Health maintenance. Given script for Shingrix vaccine previously but not yet obtained. Other immunizations up to date. Well woman exam with Dr. Lizeth Arias in 12/2018. Mammogram up to date. Continue regular eye exams with Dr. Wendy Meng. Vitamin D level normal. Continue maintenance dose supplement. Patient understands recommendations and agrees with plan. Follow-up in 6 months.

## 2019-08-14 DIAGNOSIS — G89.29 CHRONIC NONINTRACTABLE HEADACHE, UNSPECIFIED HEADACHE TYPE: ICD-10-CM

## 2019-08-14 DIAGNOSIS — R51.9 CHRONIC NONINTRACTABLE HEADACHE, UNSPECIFIED HEADACHE TYPE: ICD-10-CM

## 2019-08-14 RX ORDER — BUTALBITAL, ACETAMINOPHEN AND CAFFEINE 50; 325; 40 MG/1; MG/1; MG/1
1 TABLET ORAL
Qty: 60 TAB | Refills: 0 | Status: SHIPPED | OUTPATIENT
Start: 2019-08-14 | End: 2019-09-17 | Stop reason: SDUPTHER

## 2019-09-17 DIAGNOSIS — R51.9 CHRONIC NONINTRACTABLE HEADACHE, UNSPECIFIED HEADACHE TYPE: ICD-10-CM

## 2019-09-17 DIAGNOSIS — G89.29 CHRONIC NONINTRACTABLE HEADACHE, UNSPECIFIED HEADACHE TYPE: ICD-10-CM

## 2019-09-17 RX ORDER — BUTALBITAL, ACETAMINOPHEN AND CAFFEINE 50; 325; 40 MG/1; MG/1; MG/1
1 TABLET ORAL
Qty: 60 TAB | Refills: 0 | Status: SHIPPED | OUTPATIENT
Start: 2019-09-17 | End: 2019-10-14 | Stop reason: SDUPTHER

## 2019-10-10 ENCOUNTER — TELEPHONE (OUTPATIENT)
Dept: INTERNAL MEDICINE CLINIC | Age: 59
End: 2019-10-10

## 2019-10-10 ENCOUNTER — OFFICE VISIT (OUTPATIENT)
Dept: INTERNAL MEDICINE CLINIC | Age: 59
End: 2019-10-10

## 2019-10-10 VITALS
TEMPERATURE: 98.8 F | WEIGHT: 134.4 LBS | OXYGEN SATURATION: 95 % | BODY MASS INDEX: 22.94 KG/M2 | RESPIRATION RATE: 16 BRPM | DIASTOLIC BLOOD PRESSURE: 66 MMHG | HEART RATE: 78 BPM | HEIGHT: 64 IN | SYSTOLIC BLOOD PRESSURE: 128 MMHG

## 2019-10-10 DIAGNOSIS — E78.5 HYPERLIPIDEMIA, UNSPECIFIED HYPERLIPIDEMIA TYPE: ICD-10-CM

## 2019-10-10 DIAGNOSIS — G47.33 OBSTRUCTIVE SLEEP APNEA: ICD-10-CM

## 2019-10-10 DIAGNOSIS — Z87.891 FORMER SMOKER: ICD-10-CM

## 2019-10-10 DIAGNOSIS — R93.89 ABNORMAL CHEST X-RAY: ICD-10-CM

## 2019-10-10 DIAGNOSIS — J18.9 PNEUMONIA OF RIGHT LOWER LOBE DUE TO INFECTIOUS ORGANISM: Primary | ICD-10-CM

## 2019-10-10 DIAGNOSIS — I10 ESSENTIAL HYPERTENSION: ICD-10-CM

## 2019-10-10 DIAGNOSIS — M50.30 DEGENERATIVE DISC DISEASE, CERVICAL: ICD-10-CM

## 2019-10-10 DIAGNOSIS — M47.816 LUMBAR FACET ARTHROPATHY: ICD-10-CM

## 2019-10-10 RX ORDER — ALBUTEROL SULFATE 90 UG/1
1 AEROSOL, METERED RESPIRATORY (INHALATION)
Qty: 1 INHALER | Refills: 0 | Status: SHIPPED | OUTPATIENT
Start: 2019-10-10 | End: 2020-10-01

## 2019-10-10 RX ORDER — AMOXICILLIN AND CLAVULANATE POTASSIUM 875; 125 MG/1; MG/1
1 TABLET, FILM COATED ORAL EVERY 12 HOURS
Qty: 20 TAB | Refills: 0 | Status: SHIPPED | OUTPATIENT
Start: 2019-10-10 | End: 2019-10-20

## 2019-10-10 NOTE — TELEPHONE ENCOUNTER
Pt needs to f/up- [facility] was [facility] with [facility] , is there a day she can be worked in ,she is unable to make it today .

## 2019-10-10 NOTE — PROGRESS NOTES
Chief Complaint   Patient presents with    Cough     Follow up from [facility] urgent care on Monday, October 7, 2019 and was given Quvar inhaler, benzotate and an antibiotic for pneumonia. 1. Have you been to the ER, urgent care clinic or hospitalized since your last visit? YES. [facility]  10/7/2019 for pneumonia. 2. Have you seen or consulted any other health care providers outside of the [address] [place] [address] since your last visit (Include any pap smears or colon screening)? NO      Learning Assessment 2/15/2019   PRIMARY LEARNER Patient   HIGHEST LEVEL OF EDUCATION - PRIMARY LEARNER  2 YEARS OF COLLEGE   BARRIERS PRIMARY LEARNER NONE   CO-LEARNER CAREGIVER No   PRIMARY LANGUAGE ENGLISH   LEARNER PREFERENCE PRIMARY DEMONSTRATION   ANSWERED BY patient   RELATIONSHIP SELF     Abuse Screening Questionnaire 10/10/2019   Do you ever feel afraid of your partner? N   Are you in a relationship with someone who physically or mentally threatens you? N   Is it safe for you to go home? Y     3 most recent PHQ Screens 10/10/2019   PHQ Not Done -   Little interest or pleasure in doing things Not at all   Feeling down, depressed, irritable, or hopeless Not at all   Total Score PHQ 2 0     Fall Risk Assessment, last 12 mths 10/10/2019   Able to walk? Yes   Fall in past 12 months?  No

## 2019-10-10 NOTE — PATIENT INSTRUCTIONS
Take Augmentin 1 tab two times per day for 10 days with doxycycline Use albuterol inhaler as needed Obtain Chest x-ray around 11/20/2019 Discontinue Qvar. Pneumonia: Care Instructions Your Care Instructions Pneumonia is an infection of the lungs. Most cases are caused by infections from bacteria or viruses. Pneumonia may be mild or very severe. If it is caused by bacteria, you will be treated with antibiotics. It may take a few weeks to a few months to recover fully from pneumonia, depending on how sick you were and whether your overall health is good. Follow-up care is a key part of your treatment and safety. Be sure to make and go to all appointments, and call your doctor if you are having problems. It's also a good idea to know your test results and keep a list of the medicines you take. How can you care for yourself at home? · Take your antibiotics exactly as directed. Do not stop taking the medicine just because you are feeling better. You need to take the full course of antibiotics. · Take your medicines exactly as prescribed. Call your doctor if you think you are having a problem with your medicine. · Get plenty of rest and sleep. You may feel weak and tired for a while, but your energy level will improve with time. · To prevent dehydration, drink plenty of fluids, enough so that your urine is light yellow or clear like water. Choose water and other caffeine-free clear liquids until you feel better. If you have kidney, heart, or liver disease and have to limit fluids, talk with your doctor before you increase the amount of fluids you drink. · Take care of your cough so you can rest. A cough that brings up mucus from your lungs is common with pneumonia. It is one way your body gets rid of the infection. But if coughing keeps you from resting or causes severe fatigue and chest-wall pain, talk to your doctor. He or she may suggest that you take a medicine to reduce the cough. · Use a vaporizer or humidifier to add moisture to your bedroom. Follow the directions for cleaning the machine. · Do not smoke or allow others to smoke around you. Smoke will make your cough last longer. If you need help quitting, talk to your doctor about stop-smoking programs and medicines. These can increase your chances of quitting for good. · Take an over-the-counter pain medicine, such as acetaminophen (Tylenol), ibuprofen (Advil, Motrin), or naproxen (Aleve). Read and follow all instructions on the label. · Do not take two or more pain medicines at the same time unless the doctor told you to. Many pain medicines have acetaminophen, which is Tylenol. Too much acetaminophen (Tylenol) can be harmful. · If you were given a spirometer to measure how well your lungs are working, use it as instructed. This can help your doctor tell how your recovery is going. · To prevent pneumonia in the future, talk to your doctor about getting a flu vaccine (once a year) and a pneumococcal vaccine (one time only for most people). When should you call for help? Call 911 anytime you think you may need emergency care. For example, call if: 
  · You have severe trouble breathing.  
 Call your doctor now or seek immediate medical care if: 
  · You cough up dark brown or bloody mucus (sputum).  
  · You have new or worse trouble breathing.  
  · You are dizzy or lightheaded, or you feel like you may faint.  
 Watch closely for changes in your health, and be sure to contact your doctor if: 
  · You have a new or higher fever.  
  · You are coughing more deeply or more often.  
  · You are not getting better after 2 days (48 hours).  
  · You do not get better as expected. Where can you learn more? Go to http://pooja-real.info/. Enter [id number] in the search box to learn more about \"Pneumonia: Care Instructions. \" Current as of: June 9, 2019 Content Version: 12.2 © 8919-6995 [employer]. Care instructions adapted under license by [employer] (which disclaims liability or warranty for this information). If you have questions about a medical condition or this instruction, always ask your healthcare professional. [address] any warranty or liability for your use of this information.

## 2019-10-10 NOTE — TELEPHONE ENCOUNTER
Unfortunately Dr. Amy Gutierrez doesn't have anything on her schedule at all this month. However, if a patient calls to cancel or reschedule their appointment she can be placed into the open slot.

## 2019-10-14 DIAGNOSIS — G89.29 CHRONIC NONINTRACTABLE HEADACHE, UNSPECIFIED HEADACHE TYPE: ICD-10-CM

## 2019-10-14 DIAGNOSIS — R51.9 CHRONIC NONINTRACTABLE HEADACHE, UNSPECIFIED HEADACHE TYPE: ICD-10-CM

## 2019-10-14 PROBLEM — Z87.891 FORMER SMOKER: Status: ACTIVE | Noted: 2019-10-14

## 2019-10-14 RX ORDER — BUTALBITAL, ACETAMINOPHEN AND CAFFEINE 50; 325; 40 MG/1; MG/1; MG/1
1 TABLET ORAL
Qty: 60 TAB | Refills: 0 | Status: SHIPPED | OUTPATIENT
Start: 2019-10-14 | End: 2019-11-11 | Stop reason: SDUPTHER

## 2019-10-14 NOTE — PROGRESS NOTES
HPI:   Susanna Morton is a 59y.o. year old female who presents today for an acute visit. She has a history of hypertension, hyperlipidemia, chronic daily headaches, obstructive sleep apnea, allergic rhinitis, vasomotor instability, and irritable bowel syndrome. She reports that approximately 3 weeks ago, she developed a cold and nonproductive cough. She states that her symptoms persisted, although appeared to be gradually improving until 10/5/2019, when she [place] a [place] festival. She states that upon returning home that evening, she noted that she had a fever of 100.8 ° F and chills, and she stated that she felt poorly with nausea, decreased appetite, myalgias, and weakness. She also states that her cough appeared to be mildly worse, and by 10/7/2019, it had become productive of yellow brown sputum. She presented to [facility] and evaluation included WBC 14.2 with 71% neutrophils, hemoglobin 40.9, and chest x-ray revealed peribronchial cuffing with an infiltrate in the right lower lobe. She was started on doxycycline 100 mg bid for 10 days, Qvar inhaler, and Tessalon pearls. She presents today for follow-up, and reports that she is feeling somewhat better. She states that she has had no more fevers. She states that she is continuing to have a productive cough, and reports mild dyspnea on exertion. She states that she is not finding the Qvar inhaler to be helpful. She states that her appetite is better, although she continues to have weakness and malaise. She admits that she smoked 0.5 ppd for 30 years, stopping into 2011. She has not had difficulty previously with dyspnea or wheezing. She is otherwise without complaints. In 7/2018, she was referred to Dr. Bryon Gatica for her chronic daily headaches, and evaluation included brain MRI and MRA (8/8/2018) showing small nonspecific subcortical white matter hyperintensity left insula region,otherwise unremarkable MRI; and MRA negative.  She underwent sleep study evaluation and was diagnosed with moderate obstructive sleep apnea, and was referred to Dr. Flores Bach and successfully started on CPAP. She was prescribed a Medrol dose pack to allow her to withdraw from daily use of analgesics for her headaches. She was then started on amitriptyine 10 mg daily and instructed to use Excedrin migraine for moderate headaches, and Fioricet only for severe headaches. Celebrex and Zanaflex were prescribed to manage her chronic neck pain, and she continues to take one Zanaflex nightly. She also completed physical therapy, including dry needling, for her neck pain with significant improvement. She is continuing to use Fioricet when she develops a headache, but reports that she is needing to take only one with good response. She also is continuing to take Zanaflex and amitriptyline at bedtime, but is no longer needing Celebrex. On 1/29/2018, she called the office and reported she was out having dinner when she experienced an \"IBS episode\" characterized by flushing in the face, dry mouth, and severe abdominal cramping. When she arrived home, she experienced diarrhea and a single episode of vomiting. She reported that she continued to have abdominal cramping and fatigue following this episode, although described as getting slowly better each day. She reported that she had similar episodes to this dating back 30 years, and was diagnosed with IBS at that time. She stated that the episodes used to resolve more quickly, although over the last year, she states that it now is taking several days before she recovers. She was referred to Dr. Emi Miles, and started on Linzess for constipation. However, she states that it was not effective. She underwent evaluation by Dr. Alcides Rea and Dr. Reid Lopez and had a small bowel series (8/7/2018) and abdominal duplex scan (8/16/2018), which were negative. She also collected a 24 hour urine for 5-HIAA, which was negative.  She reports that she has noted an improvement in her abdominal pain and diarrheal episodes, and reports that she has not had any episodes since discontinuing captopril. She currently denies any abdominal pain, nausea, vomiting, melena, hematochezia, or change in bowel movements. On 8/2/2016, she was evaluated by PETERSON Ahmadi for left sided crampy abdominal pain, diarrhea, and hematochezia. She was treated for presumed diverticulitis with Cipro and Flagyl, and underwent evaluation showing WBC 11.2, ALT 59, alkaline phosphatase 121, and lipase 427. Abdominal CT scan (8/2/2016) showed localized bowel wall thickening with inflammatory change involving the splenic flexure and proximal descending colon; no colonic diverticuli are seen so the presence of diverticulitis is unlikely; would be a typical location for bowel ischemia, but the patient's vascular structures are essentially normal so bowel ischemia unlikely; favor infectious or inflammatory colitis. She was evaluated by Dr. Eleazar Perez and admitted for bowel rest, IVF, and IV antibiotics. She improved significantly in 36 hours and was discharged home. She completed the course of oral antibiotics and states that her symptoms have completely resolved. In 4/2017, she was again evaluated by Dr. Eleazar Perez for recurrent abdominal pain, and abdominal and pelvic CT scan was obtained (4/28/2017) showing mild nonspecific thickening of the proximal jejunum and significant improvement in the distal transverse colon adjacent to the splenic flexure with a short segment with mild wall thickening. Her discomfort resolved, but concern was raised for possible ischemia as cause. An echocardiogram was obtained (5/26/2017) showing normal LV function (EF 65%), no RWMA, grade 1 diastolic dysfunction, and wall thickening upper limits of normal. She also underwent a hypercoaguable laboratory workup, which was negative.      She underwent screening colonoscopy by Dr. Vickey Mendoaz in 6/2011, which found a polyp in the proximal descending colon (pathology: hyperplastic). Because she could not intubate the cecum, follow-up was recommended for 2 years. In 4/2015, she underwent repeat colonoscopy with Dr. Devi Quintana, which showed left diverticular disease, hypertrophied anal papilla, and no polyps. Follow-up was recommended for 5 years. She has a history of hypertension, treated with amlodipine, hydrochlorothiazide, and hydralazine. She has resumed exercising and she purchased an elliptical machine for her home. She denies any chest pain, shortness of breath at rest or with exertion, palpitations, lightheadedness, or edema. She also has hyperlipidemia, treated with fenofibrate and fish oil in the past, but these were discontinued and she was changed to rosuvastatin in 6/2016. However, due to mild to moderate elevation in AST/ALT, it was discontinued in 5/2017 with resolution of abnormal liver function tests. She was subsequently started on pravastatin in 10/2018, and reports no difficulties with it today. She has a history of mild-severe obstructive sleep apnea, diagnosed by Dr. Patricia Sanders, and was prescribed nasal CPAP. She states that she is no longer using her machine because it was making her headaches worse. She suffers from chronic headaches, considered to be migraines in the past as they were associated with menstruation. Since she entered menopause, the severity of her headaches has decreased and the character has changed. She has severe allergic rhinitis, and awakens with morning headaches when her allergies are severe. She is followed by Dr. Shira Montalvo and had been receiving immunotherapy with some improvement. She also has vasomotor instability, which responded to venlafaxine and she has successfully weaned from taking it.     In 6/2016, she presented with complaints of right sciatica and she underwent a lumbar MRI (7/8/2016) showing multilevel mild disc bulges, mild facet arthropathy, and multilevel mild foraminal stenosis; L3-L4 with mild to moderate spinal canal stenosis, no more than mild spinal canal stenosis elsewhere, and no cord compression. She was evaluated by Dr. Praneeth Ye on 2016 and started on gabapentin. She also was referred to physical therapy and received dry needling, which resulted in significant improvement. She also has a history of an anterior cervical discectomy and fusion (C5-C6) in 2011 by Dr. Lucrecia Jolly for a herniated disc and right radiculopathy. She does experience intermittent neck pain, and feels that it may be exacerbating her headaches. She has a history of a right ovarian cyst, and on surveillance ultrasound by Dr. Danilo Matthew, it was noted that the right ovarian cyst had enlarged slightly and changed in appearance. She was referred to Dr. Rebekah Phan for evaluation, and he stated that although the cyst was likely benign, he recommended laparoscopic removal. On 2019, she underwent a laparoscopic bilateral salpingo-oophorectomy and washing. 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 Past Medical History:   Diagnosis Date    Allergic rhinitis     Chronic headaches     Hx of colonic polyp 2011    Hyperplastic; Dr. Tiana Ferris.  Hyperlipidemia     Hypertension     IBS (irritable bowel syndrome)     Obstructive sleep apnea     Moderate-severe; not using nasal CPAP    S/P cervical discectomy 2011    Anterior C5-C6 discectomy for herniated disc and right radiculopathy; Dr. Lucrecia Jolly     Past Surgical History:   Procedure Laterality Date    ENDOSCOPY, COLON, DIAGNOSTIC      HX BACK SURGERY  11    neck surgery; ruptured cervical discs    HX COLONOSCOPY  2011    HX GYN      dilation and curretage    HX GYN           Current Outpatient Medications   Medication Sig    amoxicillin-clavulanate (AUGMENTIN) 875-125 mg per tablet Take 1 Tab by mouth every twelve (12) hours for 10 days.     albuterol (PROVENTIL HFA, VENTOLIN HFA, PROAIR HFA) 90 mcg/actuation inhaler Take 1 Puff by inhalation every six (6) hours as needed for Wheezing.  inhalational spacing device 1 Each by Does Not Apply route as needed for Cough (shortness of breath).  tiZANidine (ZANAFLEX) 4 mg tablet TAKE 1 TABLET BY MOUTH EVERY EVENING    butalbital-acetaminophen-caffeine (FIORICET, ESGIC) -40 mg per tablet Take 1 Tab by mouth every six (6) hours as needed for Pain.  amLODIPine (NORVASC) 10 mg tablet TAKE 1 TABLET BY MOUTH DAILY    rutin 500 mg tab Take 500 mg by mouth daily.  hydrALAZINE (APRESOLINE) 100 mg tablet TAKE 1 TABLET BY MOUTH THREE TIMES DAILY    pravastatin (PRAVACHOL) 10 mg tablet TAKE 1 TABLET BY MOUTH EVERY NIGHT    potassium chloride (K-DUR, KLOR-CON) 20 mEq tablet TAKE 1 TABLET BY MOUTH DAILY    hydroCHLOROthiazide (HYDRODIURIL) 25 mg tablet TAKE 1 TABLET BY MOUTH EVERY DAY    amitriptyline (ELAVIL) 10 mg tablet TK 1/2 T PO QHS FOR 1 WEEK AND THEN 1 T QHS THEREAFTER    TURMERIC ROOT EXTRACT PO Take 1,000 mg by mouth.  GLUC/CHONDR-MSM#7/C/DEENA/BORON (GLUCOSAMINE-CHONDR, BOSWELLIA, PO) Take 500 mg by mouth daily.  multivitamin (ONE A DAY) tablet Take 1 Tab by mouth daily.  Cholecalciferol, Vitamin D3, 2,000 unit cap Take 2,000 Units by mouth daily. No current facility-administered medications for this visit. Allergies and Intolerances: Allergies   Allergen Reactions    Other Plant, Animal, Environmental Itching and Sneezing     \"Everything but feathers and horses. \"        Family History: Her mother had hypertension, but  from a melanoma. Her father  from a DVT. Her brother has CAD and underwent CABG at the age of 40.    Family History   Problem Relation Age of Onset    Heart Disease Brother     Other Mother         melanoma    Arthritis-osteo Mother     Bleeding Prob Father     Heart Disease Father     Heart Attack Father     Stroke Maternal Uncle     Heart Attack Maternal Uncle  Breast Cancer Maternal Grandmother      Social History:   She  reports that she quit smoking about 4 years ago. She smoked 0.50 packs per day. She has never used smokeless tobacco. She reports that she smoked 0.5 ppd for 30 years, stopping in 2011. She is  and lives with her . She recently retired from the [employer] department of [id number]; her  sells eyeglass frames to [employer] Social History     Substance and Sexual Activity   Alcohol Use Yes    Alcohol/week: 0.0 standard drinks    Comment: Very Rarely     Immunization History:  Immunization History   Administered Date(s) Administered    Influenza Vaccine 10/08/2014, 10/09/2017    Influenza Vaccine (Quad) PF 10/23/2018    TD Vaccine 01/01/2006    Tdap 10/07/2016    Zoster Recombinant 08/29/2019       Review of Systems:   As above included in HPI. Otherwise 11 point review of systems negative including constitutional, skin, HENT, eyes, respiratory, cardiovascular, gastrointestinal, genitourinary, musculoskeletal, endo/heme/aller, neurological.    Physical:   Vitals:   BP: 128/66   HR: 78  WT: 134 lb 6.4 oz (61 kg)  BMI:  23.07 kg/m2    Patient appears in no apparent distress. Affect is appropriate. HEENT: PERRLA, anicteric, oropharynx clear, no JVD, adenopathy or thyromegaly. No carotid bruits or radiated murmur. Lungs: good air movement, no wheezing; crackles present on inspiration and expiration at right base. Heart: regular rate and rhythm. No murmur, rubs, gallops  Abdomen: soft, nontender, nondistended, normal bowel sounds, no hepatosplenomegaly or masses. Extremities: without edema. Pulses 1-2+ bilaterally. Review of Data:  Labs:  No visits with results within 1 Month(s) from this visit.    Latest known visit with results is:   Hospital Outpatient Visit on 07/23/2019   Component Date Value Ref Range Status    WBC 07/23/2019 5.1  4.6 - 13.2 K/uL Final    RBC 07/23/2019 4.62  4.20 - 5.30 M/uL Final    HGB 07/23/2019 13.6  12.0 - 16.0 g/dL Final    HCT 07/23/2019 42.0  35.0 - 45.0 % Final    MCV 07/23/2019 90.9  74.0 - 97.0 FL Final    MCH 07/23/2019 29.4  24.0 - 34.0 PG Final    MCHC 07/23/2019 32.4  31.0 - 37.0 g/dL Final    RDW 07/23/2019 13.1  11.6 - 14.5 % Final    PLATELET 45/19/5540 783  135 - 420 K/uL Final    MPV 07/23/2019 9.9  9.2 - 11.8 FL Final    NEUTROPHILS 07/23/2019 35* 40 - 73 % Final    LYMPHOCYTES 07/23/2019 53* 21 - 52 % Final    MONOCYTES 07/23/2019 9  3 - 10 % Final    EOSINOPHILS 07/23/2019 3  0 - 5 % Final    BASOPHILS 07/23/2019 0  0 - 2 % Final    ABS. NEUTROPHILS 07/23/2019 1.8  1.8 - 8.0 K/UL Final    ABS. LYMPHOCYTES 07/23/2019 2.7  0.9 - 3.6 K/UL Final    ABS. MONOCYTES 07/23/2019 0.5  0.05 - 1.2 K/UL Final    ABS. EOSINOPHILS 07/23/2019 0.2  0.0 - 0.4 K/UL Final    ABS.  BASOPHILS 07/23/2019 0.0  0.0 - 0.1 K/UL Final    DF 07/23/2019 AUTOMATED    Final    Hemoglobin A1c 07/23/2019 5.3  4.2 - 5.6 % Final    Est. average glucose 07/23/2019 105  mg/dL Final    LIPID PROFILE 07/23/2019        Final    Cholesterol, total 07/23/2019 163  <200 MG/DL Final    Triglyceride 07/23/2019 127  <150 MG/DL Final    HDL Cholesterol 07/23/2019 57  40 - 60 MG/DL Final    LDL, calculated 07/23/2019 80.6  0 - 100 MG/DL Final    VLDL, calculated 07/23/2019 25.4  MG/DL Final    CHOL/HDL Ratio 07/23/2019 2.9  0 - 5.0   Final    Sodium 07/23/2019 140  136 - 145 mmol/L Final    Potassium 07/23/2019 3.7  3.5 - 5.5 mmol/L Final    Chloride 07/23/2019 103  100 - 111 mmol/L Final    CO2 07/23/2019 32  21 - 32 mmol/L Final    Anion gap 07/23/2019 5  3.0 - 18 mmol/L Final    Glucose 07/23/2019 87  74 - 99 mg/dL Final    BUN 07/23/2019 28* 7.0 - 18 MG/DL Final    Creatinine 07/23/2019 0.70  0.6 - 1.3 MG/DL Final    BUN/Creatinine ratio 07/23/2019 40* 12 - 20   Final    GFR est AA 07/23/2019 >60  >60 ml/min/1.73m2 Final    GFR est non-AA 07/23/2019 >60  >60 ml/min/1.73m2 Final    Calcium 07/23/2019 9.0  8.5 - 10.1 MG/DL Final    Bilirubin, total 07/23/2019 0.3  0.2 - 1.0 MG/DL Final    ALT (SGPT) 07/23/2019 39  13 - 56 U/L Final    AST (SGOT) 07/23/2019 17  10 - 38 U/L Final    Alk. phosphatase 07/23/2019 104  45 - 117 U/L Final    Protein, total 07/23/2019 6.9  6.4 - 8.2 g/dL Final    Albumin 07/23/2019 3.8  3.4 - 5.0 g/dL Final    Globulin 07/23/2019 3.1  2.0 - 4.0 g/dL Final    A-G Ratio 07/23/2019 1.2  0.8 - 1.7   Final    TSH 07/23/2019 1.17  0.36 - 3.74 uIU/mL Final    Color 07/23/2019 DARK YELLOW    Final    Appearance 07/23/2019 CLEAR    Final    Specific gravity 07/23/2019 >1.030* 1.005 - 1.030 Final    pH (UA) 07/23/2019 5.5  5.0 - 8.0   Final    Protein 07/23/2019 NEGATIVE   NEG mg/dL Final    Glucose 07/23/2019 NEGATIVE   NEG mg/dL Final    Ketone 07/23/2019 NEGATIVE   NEG mg/dL Final    Bilirubin 07/23/2019 NEGATIVE   NEG   Final    Blood 07/23/2019 NEGATIVE   NEG   Final    Urobilinogen 07/23/2019 0.2  0.2 - 1.0 EU/dL Final    Nitrites 07/23/2019 NEGATIVE   NEG   Final    Leukocyte Esterase 07/23/2019 NEGATIVE   NEG   Final    WBC 07/23/2019 0 to 2  0 - 4 /hpf Final    RBC 07/23/2019 0  0 - 5 /hpf Final    Epithelial cells 07/23/2019 FEW  0 - 5 /lpf Final    Bacteria 07/23/2019 NEGATIVE   NEG /hpf Final    Vitamin D 25-Hydroxy 07/23/2019 42.5  30 - 100 ng/mL Final     Calculated 10 year ASCVD risk score: 5.4 %    Health Maintenance:  Screening:    Mammogram: negative (11/2018)   PAP smear: well women exams by Dr. Rukhsana Hawkins (last 12/2018)   Colorectal: colonoscopy (4/2015) no polyps. Dr. Willie Díaz. Due 4/2020. Depression: Weaned from Effexor (vasomotor instability).    DM (HbA1c/FPG): FPG 87 (7/2019)   Hepatitis C: negative (12/2015)   Falls: none   DEXA: N/A   Glaucoma: has regular eye exams with Dr. Jeremiah Cee (last 11/2018)   Smoking: 15 pack year (stopped 2011)   Vitamin D: 40.2 (10/2018)   Medicare Wellness: N/A    Impression:  Patient Active Problem List   Diagnosis Code    Hypertension I10    Hyperlipidemia E78.5    Obstructive sleep apnea G47.33    Chronic daily headache R51    IBS (irritable bowel syndrome) K58.9    Colon polyp K63.5    Vitamin D deficiency E55.9    Colitis K52.9    Lumbar spinal stenosis M48.061    HNP (herniated nucleus pulposus), lumbar M51.26    Lumbar facet arthropathy M47.816    Neck pain M54.2    Degenerative disc disease, cervical M50.30    Right ovarian cyst N83.201       Plan:  Right lower lobe pneumonia. Patient reports that she had a URI for approximately 3 weeks, mainly manifested as nasal congestion, post-nasal drainage and nonproductive cough. She appeared to be gradually improving until 10/5/2019 when she noted that onset of a fever, chills, myalgias, and weakness. She stated that her cough appeared to be mildly worse, and over the next 48 hours became productive of yellow brown sputum. She presented to [facility] and found to have WBC 14.2( 71% neutrophils and chest x-ray revealed peribronchial cuffing with an infiltrate in the right lower lobe. She was started on doxycycline, Qvar inhaler, and Tessalon pearls. She presents today feeling somewhat better with no more fevers. However, she is continuing to have a productive cough and reports mild dyspnea on exertion. Oxygen saturation 95% on room air today. She admits that she smoked 0.5 ppd for 30 years, stopping into 2011. Will broaden antibiotic coverage for community acquired pneumonia to include Augmentin bid for 10 days in addition to doxycycline. Will also prescribe an albuterol inhaler with spacer to help with patient's mild dyspnea on exertion. Advised to discontinue Qvar. Will call if not improving. Advised to obtain chest x-ray to document resolution of infiltrate in 6 weeks (mid 11/2019). Will call if no improvement. Other issues:  1. Hypertension.  Blood pressure appears well controlled on current regimen of hydralazine 100 mg tid, amlodipine 10 mg daily, and hydrochlorothiazide 25 mg daily. Successfully discontinued treatment with captopril since unclear if abdominal attacks may have been a form of angioedema. Renal function remains normal with creatinine 0.70/ eGFR >60. However, BUN/creatinine ratio remains significantly elevated to 40, and again discussed importance of increasing fluid intake, particularly given treatment with hydrochlorothiazide. Follow closely. 2. Hyperlipidemia. Began treatment with rosuvastatin in 6/2016, and elevation in transaminases first noted in 8/2016. Discontinued in 5/2017 due to persistent elevation of transaminases, and resolved after discontinuation. Calculated 10 year ASCVD risk off statin is 5.4 %, which does not place her in one of the four statin benefit groups as per new AHA/ACC guidelines. However, , , and HDL 50, and patient with strong family history of heart disease. She started treatment with pravastatin 10 mg daily in 10/2018, and LDL improved to 80 with HDL 54. Liver function tests remain normal. Will continue to follow. 3. Elevated transaminases. Now resolved since rosuvastatin discontinued. Review of record shows elevation noted since 8/2016 when initiated. Iron studies, hepatitis panel, KIEL, SPEP, CK, aldolase, and anti-smooth muscle antibody negative. RUQ ultrasound (10/2016) showing fatty infiltration of liver. Advised patient to avoid alcohol. 4. Colitis. Unclear etiology, but nonspecific bowel wall thickening in short segment of jejunum and sigmoid on repeat CT scan raised question of ischemic in origin. Hypercoaguable workup including lab studies and echocardiogram negative. Appears to have intermittent \"IBS\" episodes associated with facial flushing, dry mouth, and severe abdominal cramping followed by diarrhea +/- vomiting. Episodes linger for several days following. No evidence of urticaria seen.  Interesting is the abnormalities noted on abdominal CT scan in two of her episodes, with nonspecific short segment thickening in various locations in small and large intestines. Obtained complement studies including C4, C1 esterase inhibtor, and C1q levels which were normal. However, instructed patient that if she developed another episode, would like to check C4 level during the episode. Discontinued captopril and has not had recurrence since doing so. Evaluated by GI and had negative small bowel series, duplex ultrasound, and 24 hour urine for 5-HIAA level. Continue to follow. 5. Chronic daily headaches. Evaluated by Dr. Gómez Saenz and weaned from daily Fioricet and Excedrin migraine. Headaches now improved with daily amitriptyline and trying to use Excedrin migraine and Fioricet only occasionally for moderate and severe headaches, respectively. Does have chronic neck pain related to her anterior neck fusion, and is finding Zanaflex and regular exercises to be helpful. Continuing to follow-up with Dr. Gómez Saenz. 6. Right leg sciatica. Evidence of lumbar spinal stenosis and facet arthropathy, and lumbar herniated disc. Significant improvement with dry needling therapy and reports no further pain. Continue to follow and follow-up with Dr. Valentino Argueta if recurs. 7. Abnormal glucose. Fasting glucose remains normal. Most likely due to weight loss. Emphasized importance of continued lifestyle modifications. 8.  Obstructive sleep apnea. Underwent reevaluation by Dr. Cait Alcala, and repeat sleep study showed moderate PEGGY. Started on CPAP and noting improvement. Continue to follow. 9. Right ovarian cyst. Enlarged slightly and changed in appearance at last evaluation on ultrasound. Referred to Dr. Doug Murphy and underwent laparoscopic bilateral salpingo-oophorectomy and washing. 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 No further treatment needed. 10. Health maintenance. Instructed to hold off on influenza vaccine until improves. Completed 1/2 doses of Shingrix vaccine.  Other immunizations up to date. Well woman exam with Dr. Tunde Wade in 12/2018. Mammogram up to date. Continue regular eye exams with Dr. Nile Francis. Vitamin D level normal. Continue maintenance dose supplement. Patient understands recommendations and agrees with plan. Follow-up as previously scheduled.

## 2019-10-18 ENCOUNTER — TELEPHONE (OUTPATIENT)
Dept: INTERNAL MEDICINE CLINIC | Age: 59
End: 2019-10-18

## 2019-10-18 NOTE — TELEPHONE ENCOUNTER
Pt calling says she was due to fly on 10/23/2019. Says she still is not feeling well. Needs letter saying she can't fly due to illness. Wants date illness began, 10/7/2019, dx of pneumonia. Also needs an signature on the letter.

## 2019-10-18 NOTE — LETTER
10/21/2019 12:56 PM 
 
 
 
Ms. Karel Tao [address] [place] [id number] : 1960 To whom it may concern: Ms. Harriet Galvin is under the care of [facility] [address] [place] [address]. She developed an upper respiratory illness, and was diagnosed with pneumonia of the right lung on 10/7/2019. She was treated with antibiotics, and due to continued issues, remains unable to fly due to her illness. Please ask the patient to contact the office if any further clarification is needed. Sincerely, Nicho Trinh MD

## 2019-11-11 DIAGNOSIS — G89.29 CHRONIC NONINTRACTABLE HEADACHE, UNSPECIFIED HEADACHE TYPE: ICD-10-CM

## 2019-11-11 DIAGNOSIS — R51.9 CHRONIC NONINTRACTABLE HEADACHE, UNSPECIFIED HEADACHE TYPE: ICD-10-CM

## 2019-11-11 RX ORDER — BUTALBITAL, ACETAMINOPHEN AND CAFFEINE 50; 325; 40 MG/1; MG/1; MG/1
1 TABLET ORAL
Qty: 60 TAB | Refills: 0 | Status: SHIPPED | OUTPATIENT
Start: 2019-11-11 | End: 2019-12-10 | Stop reason: SDUPTHER

## 2019-11-12 NOTE — TELEPHONE ENCOUNTER
Patient dropped off a letter that the airline is now requiring to be completed by her PCP. Form placed in your box. Please call her once completed and ready for .  [phone]

## 2019-11-26 ENCOUNTER — HOSPITAL ENCOUNTER (OUTPATIENT)
Dept: MAMMOGRAPHY | Age: 59
Discharge: HOME | End: 2019-11-26
Attending: INTERNAL MEDICINE
Payer: COMMERCIAL

## 2019-11-26 ENCOUNTER — HOSPITAL ENCOUNTER (OUTPATIENT)
Dept: GENERAL RADIOLOGY | Age: 59
Discharge: HOME | End: 2019-11-26
Attending: INTERNAL MEDICINE
Payer: COMMERCIAL

## 2019-11-26 DIAGNOSIS — J18.9 PNEUMONIA OF RIGHT LOWER LOBE DUE TO INFECTIOUS ORGANISM: ICD-10-CM

## 2019-11-26 DIAGNOSIS — R93.89 ABNORMAL CHEST X-RAY: ICD-10-CM

## 2019-11-26 DIAGNOSIS — Z12.31 VISIT FOR SCREENING MAMMOGRAM: ICD-10-CM

## 2019-11-26 PROCEDURE — 71046 X-RAY EXAM CHEST 2 VIEWS: CPT

## 2019-11-26 PROCEDURE — 77063 BREAST TOMOSYNTHESIS BI: CPT

## 2019-11-27 ENCOUNTER — TELEPHONE (OUTPATIENT)
Dept: INTERNAL MEDICINE CLINIC | Age: 59
End: 2019-11-27

## 2019-11-27 NOTE — TELEPHONE ENCOUNTER
XR Results (most recent):  Results from Hospital Encounter encounter on 11/26/19   XR CHEST PA LAT    Narrative EXAM: XR CHEST PA LAT    INDICATION: RLL infiltrate on chest x-ray at [facility] 10/7/2019 with  clinical pneumonia. Former smoker. Follow-up for resolution    COMPARISON: 2/16/2011. FINDINGS: PA and lateral radiographs of the chest demonstrate clear lungs. The  cardiac and mediastinal contours and pulmonary vascularity are normal. The bones  and soft tissues are within normal limits. Impression IMPRESSION: Normal chest.         Please let the patient know that her repeat chest x-ray was normal, showing resolution of her pneumonia.

## 2019-11-27 NOTE — TELEPHONE ENCOUNTER
Called patient and verified full name and date of birth. Informed patient of Dr. Violetta Brandon' message and patient verbalized understanding.

## 2019-12-10 DIAGNOSIS — G89.29 CHRONIC NONINTRACTABLE HEADACHE, UNSPECIFIED HEADACHE TYPE: ICD-10-CM

## 2019-12-10 DIAGNOSIS — R51.9 CHRONIC NONINTRACTABLE HEADACHE, UNSPECIFIED HEADACHE TYPE: ICD-10-CM

## 2019-12-10 RX ORDER — BUTALBITAL, ACETAMINOPHEN AND CAFFEINE 50; 325; 40 MG/1; MG/1; MG/1
1 TABLET ORAL
Qty: 60 TAB | Refills: 0 | Status: SHIPPED | OUTPATIENT
Start: 2019-12-10 | End: 2020-01-01 | Stop reason: SDUPTHER

## 2020-01-01 DIAGNOSIS — G89.29 CHRONIC NONINTRACTABLE HEADACHE, UNSPECIFIED HEADACHE TYPE: ICD-10-CM

## 2020-01-01 DIAGNOSIS — R51.9 CHRONIC NONINTRACTABLE HEADACHE, UNSPECIFIED HEADACHE TYPE: ICD-10-CM

## 2020-01-02 RX ORDER — TIZANIDINE 4 MG/1
4 TABLET ORAL EVERY EVENING
Qty: 90 TAB | Refills: 1 | Status: SHIPPED | OUTPATIENT
Start: 2020-01-02 | End: 2020-06-21 | Stop reason: SDUPTHER

## 2020-01-02 RX ORDER — BUTALBITAL, ACETAMINOPHEN AND CAFFEINE 50; 325; 40 MG/1; MG/1; MG/1
1 TABLET ORAL
Qty: 60 TAB | Refills: 0 | Status: SHIPPED | OUTPATIENT
Start: 2020-01-02 | End: 2020-01-28 | Stop reason: SDUPTHER

## 2020-01-28 ENCOUNTER — APPOINTMENT (OUTPATIENT)
Dept: INTERNAL MEDICINE CLINIC | Age: 60
End: 2020-01-28

## 2020-01-28 ENCOUNTER — HOSPITAL ENCOUNTER (OUTPATIENT)
Dept: LAB | Age: 60
Discharge: HOME | End: 2020-01-28
Payer: COMMERCIAL

## 2020-01-28 DIAGNOSIS — M50.30 DEGENERATIVE DISC DISEASE, CERVICAL: ICD-10-CM

## 2020-01-28 DIAGNOSIS — M54.2 NECK PAIN: ICD-10-CM

## 2020-01-28 DIAGNOSIS — K52.9 COLITIS: ICD-10-CM

## 2020-01-28 DIAGNOSIS — K58.1 IRRITABLE BOWEL SYNDROME WITH CONSTIPATION: ICD-10-CM

## 2020-01-28 DIAGNOSIS — I10 ESSENTIAL HYPERTENSION: ICD-10-CM

## 2020-01-28 DIAGNOSIS — Z00.01 ENCOUNTER FOR ROUTINE ADULT PHYSICAL EXAM WITH ABNORMAL FINDINGS: ICD-10-CM

## 2020-01-28 DIAGNOSIS — R51.9 CHRONIC NONINTRACTABLE HEADACHE, UNSPECIFIED HEADACHE TYPE: ICD-10-CM

## 2020-01-28 DIAGNOSIS — G47.33 OBSTRUCTIVE SLEEP APNEA: ICD-10-CM

## 2020-01-28 DIAGNOSIS — N83.201 RIGHT OVARIAN CYST: ICD-10-CM

## 2020-01-28 DIAGNOSIS — M48.062 SPINAL STENOSIS OF LUMBAR REGION WITH NEUROGENIC CLAUDICATION: ICD-10-CM

## 2020-01-28 DIAGNOSIS — E78.5 HYPERLIPIDEMIA, UNSPECIFIED HYPERLIPIDEMIA TYPE: ICD-10-CM

## 2020-01-28 DIAGNOSIS — G89.29 CHRONIC NONINTRACTABLE HEADACHE, UNSPECIFIED HEADACHE TYPE: ICD-10-CM

## 2020-01-28 DIAGNOSIS — R51.9 CHRONIC DAILY HEADACHE: ICD-10-CM

## 2020-01-28 LAB
ALBUMIN SERPL-MCNC: 3.8 G/DL (ref 3.4–5)
ALBUMIN/GLOB SERPL: 1.1 {RATIO} (ref 0.8–1.7)
ALP SERPL-CCNC: 102 U/L (ref 45–117)
ALT SERPL-CCNC: 39 U/L (ref 13–56)
ANION GAP SERPL CALC-SCNC: 3 MMOL/L (ref 3–18)
AST SERPL-CCNC: 22 U/L (ref 10–38)
BILIRUB SERPL-MCNC: 0.4 MG/DL (ref 0.2–1)
BUN SERPL-MCNC: 20 MG/DL (ref 7–18)
BUN/CREAT SERPL: 30 (ref 12–20)
CALCIUM SERPL-MCNC: 9.5 MG/DL (ref 8.5–10.1)
CHLORIDE SERPL-SCNC: 105 MMOL/L (ref 100–111)
CHOLEST SERPL-MCNC: 196 MG/DL
CO2 SERPL-SCNC: 33 MMOL/L (ref 21–32)
CREAT SERPL-MCNC: 0.66 MG/DL (ref 0.6–1.3)
GLOBULIN SER CALC-MCNC: 3.5 G/DL (ref 2–4)
GLUCOSE SERPL-MCNC: 83 MG/DL (ref 74–99)
HDLC SERPL-MCNC: 55 MG/DL (ref 40–60)
HDLC SERPL: 3.6 {RATIO} (ref 0–5)
LDLC SERPL CALC-MCNC: 104.2 MG/DL (ref 0–100)
LIPID PROFILE,FLP: ABNORMAL
MAGNESIUM SERPL-MCNC: 2 MG/DL (ref 1.6–2.6)
POTASSIUM SERPL-SCNC: 3.5 MMOL/L (ref 3.5–5.5)
PROT SERPL-MCNC: 7.3 G/DL (ref 6.4–8.2)
SODIUM SERPL-SCNC: 141 MMOL/L (ref 136–145)
TRIGL SERPL-MCNC: 184 MG/DL (ref ?–150)
VLDLC SERPL CALC-MCNC: 36.8 MG/DL

## 2020-01-28 PROCEDURE — 80053 COMPREHEN METABOLIC PANEL: CPT

## 2020-01-28 PROCEDURE — 80061 LIPID PANEL: CPT

## 2020-01-28 PROCEDURE — 36415 COLL VENOUS BLD VENIPUNCTURE: CPT

## 2020-01-28 PROCEDURE — 83735 ASSAY OF MAGNESIUM: CPT

## 2020-01-28 RX ORDER — BUTALBITAL, ACETAMINOPHEN AND CAFFEINE 50; 325; 40 MG/1; MG/1; MG/1
1 TABLET ORAL
Qty: 60 TAB | Refills: 0 | Status: SHIPPED | OUTPATIENT
Start: 2020-01-28 | End: 2020-02-20 | Stop reason: SDUPTHER

## 2020-02-04 ENCOUNTER — OFFICE VISIT (OUTPATIENT)
Dept: INTERNAL MEDICINE CLINIC | Age: 60
End: 2020-02-04

## 2020-02-04 VITALS
SYSTOLIC BLOOD PRESSURE: 126 MMHG | RESPIRATION RATE: 14 BRPM | HEART RATE: 77 BPM | BODY MASS INDEX: 24.24 KG/M2 | WEIGHT: 142 LBS | DIASTOLIC BLOOD PRESSURE: 68 MMHG | OXYGEN SATURATION: 99 % | HEIGHT: 64 IN | TEMPERATURE: 97.9 F

## 2020-02-04 DIAGNOSIS — Z23 ENCOUNTER FOR IMMUNIZATION: ICD-10-CM

## 2020-02-04 DIAGNOSIS — E78.5 HYPERLIPIDEMIA, UNSPECIFIED HYPERLIPIDEMIA TYPE: ICD-10-CM

## 2020-02-04 DIAGNOSIS — N83.201 RIGHT OVARIAN CYST: ICD-10-CM

## 2020-02-04 DIAGNOSIS — Z12.11 COLON CANCER SCREENING: ICD-10-CM

## 2020-02-04 DIAGNOSIS — I10 ESSENTIAL HYPERTENSION: Primary | ICD-10-CM

## 2020-02-04 DIAGNOSIS — Z87.891 FORMER SMOKER: ICD-10-CM

## 2020-02-04 DIAGNOSIS — M54.2 NECK PAIN: ICD-10-CM

## 2020-02-04 DIAGNOSIS — Z00.00 LABORATORY TESTS ORDERED AS PART OF A COMPLETE PHYSICAL EXAM (CPE): ICD-10-CM

## 2020-02-04 DIAGNOSIS — M50.30 DEGENERATIVE DISC DISEASE, CERVICAL: ICD-10-CM

## 2020-02-04 DIAGNOSIS — Z87.19 HISTORY OF COLITIS: ICD-10-CM

## 2020-02-04 DIAGNOSIS — R51.9 CHRONIC DAILY HEADACHE: ICD-10-CM

## 2020-02-04 DIAGNOSIS — K58.1 IRRITABLE BOWEL SYNDROME WITH CONSTIPATION: ICD-10-CM

## 2020-02-04 DIAGNOSIS — E55.9 VITAMIN D DEFICIENCY: ICD-10-CM

## 2020-02-04 DIAGNOSIS — G47.33 OBSTRUCTIVE SLEEP APNEA: ICD-10-CM

## 2020-02-04 RX ORDER — CETIRIZINE HCL 10 MG
10 TABLET ORAL DAILY
Qty: 90 TAB | Refills: 1 | Status: SHIPPED | OUTPATIENT
Start: 2020-02-04 | End: 2020-10-01

## 2020-02-04 RX ORDER — TRIAMCINOLONE ACETONIDE 55 UG/1
2 SPRAY, METERED NASAL DAILY
Qty: 1 BOTTLE | Refills: 3 | Status: SHIPPED | OUTPATIENT
Start: 2020-02-04 | End: 2020-10-01

## 2020-02-04 NOTE — PATIENT INSTRUCTIONS
Vaccine Information Statement Influenza (Flu) Vaccine (Inactivated or Recombinant): What You Need to Know Many Vaccine Information Statements are available in Pashto and other languages. See www.immunize.org/vis Hojas de información sobre vacunas están disponibles en español y en muchos otros idiomas. Visite www.immunize.org/vis 1. Why get vaccinated? Influenza vaccine can prevent influenza (flu). Flu is a contagious disease that spreads around the [facility] every year, usually between October and May. Anyone can get the flu, but it is more dangerous for some people. Infants and young children, people 72years of age and older, pregnant women, and people with certain health conditions or a weakened immune system are at greatest risk of flu complications. Pneumonia, bronchitis, sinus infections and ear infections are examples of flu-related complications. If you have a medical condition, such as heart disease, cancer or diabetes, flu can make it worse. Flu can cause fever and chills, sore throat, muscle aches, fatigue, cough, headache, and runny or stuffy nose. Some people may have vomiting and diarrhea, though this is more common in children than adults. Each year thousands of people in the [facility] die from flu, and many more are hospitalized. Flu vaccine prevents millions of illnesses and flu-related visits to the doctor each year. 2. Influenza vaccines CDC recommends everyone 10months of age and older get vaccinated every flu season. Children 6 months through 6years of age may need 2 doses during a single flu season. Everyone else needs only 1 dose each flu season. It takes about 2 weeks for protection to develop after vaccination. There are many flu viruses, and they are always changing. Each year a new flu vaccine is made to protect against three or four viruses that are likely to cause disease in the upcoming flu season.  Even when the vaccine doesnt exactly match these viruses, it may still provide some protection. Influenza vaccine does not cause flu. Influenza vaccine may be given at the same time as other vaccines. 3. Talk with your health care provider Tell your vaccine provider if the person getting the vaccine: 
 Has had an allergic reaction after a previous dose of influenza vaccine, or has any severe, life-threatening allergies.  Has ever had Guillain-Barré Syndrome (also called GBS). In some cases, your health care provider may decide to postpone influenza vaccination to a future visit. People with minor illnesses, such as a cold, may be vaccinated. People who are moderately or severely ill should usually wait until they recover before getting influenza vaccine. Your health care provider can give you more information. 4. Risks of a reaction  Soreness, redness, and swelling where shot is given, fever, muscle aches, and headache can happen after influenza vaccine.  There may be a very small increased risk of Guillain-Barré Syndrome (GBS) after inactivated influenza vaccine (the flu shot). [facility] who get the flu shot along with pneumococcal vaccine (PCV13), and/or DTaP vaccine at the same time might be slightly more likely to have a seizure caused by fever. Tell your health care provider if a child who is getting flu vaccine has ever had a seizure. People sometimes faint after medical procedures, including vaccination. Tell your provider if you feel dizzy or have vision changes or ringing in the ears. As with any medicine, there is a very remote chance of a vaccine causing a severe allergic reaction, other serious injury, or death. 5. What if there is a serious problem? An allergic reaction could occur after the vaccinated person leaves the clinic.  If you see signs of a severe allergic reaction (hives, swelling of the face and throat, difficulty breathing, a fast heartbeat, dizziness, or weakness), call 9-1-1 and get the person to the nearest hospital. 
 
For other signs that concern you, call your health care provider. Adverse reactions should be reported to the Vaccine Adverse Event Reporting System (VAERS). Your health care provider will usually file this report, or you can do it yourself. Visit the VAERS website at www.vaers. hhs.gov or call 2-386.122.9121. VAERS is only for reporting reactions, and VAERS staff do not give medical advice. 6. The National Vaccine Injury Compensation Program 
 
The [facility] Vaccine Injury Compensation Program (VICP) is a federal program that was created to compensate people who may have been injured by certain vaccines. Visit the VICP website at www.[facility].gov/vaccinecompensation or call 6-696.913.6322 to learn about the program and about filing a claim. There is a time limit to file a claim for compensation. 7. How can I learn more?  Ask your health care provider.  Call your local or state health department.  Contact the Centers for Disease Control and Prevention (CDC): 
- Call 3-910.986.4258 (8-762-YPB-INFO) or 
- Visit CDCs influenza website at www.cdc.gov/flu Vaccine Information Statement (Interim) Inactivated Influenza Vaccine 8/15/2019 
42 GALL[address] Ordoñez 498QH-16 Department of [facility] and [employer] Centers for Disease Control and Prevention Office Use Only Potassium-Rich Diet: Care Instructions Your Care Instructions Potassium is a mineral. It helps keep the right mix of fluids in your body. It also helps your nerves and muscles work as they should. You'll find it in milk and meats. It's also in all fresh foods, including fruits and vegetables. Most adults need about 5 grams of potassium a day. The foods you eat should supply all that you need. Some health conditions can cause a loss of potassium.  For example, kidney problems and stomach problems with vomiting and diarrhea can cause you to lose this mineral. Some medicines, such as water pills (diuretics), can cause low potassium. If you can't get enough potassium from what you eat, your doctor may advise you to take supplements. Follow-up care is a key part of your treatment and safety. Be sure to make and go to all appointments, and call your doctor if you are having problems. It's also a good idea to know your test results and keep a list of the medicines you take. How can you care for yourself at home? · Plan your diet around foods that are rich in potassium. Fresh, unprocessed whole foods have the most. These foods include: ? Milk and other dairy products. ? Vegetables, especially broccoli, cooked dry beans, tomatoes, potatoes, artichokes, winter squash, and spinach. ? Fruits, especially citrus fruits, bananas, and apricots. Dried apricots contain more potassium than fresh apricots. ? Meat, poultry, and fish. · Ask your doctor about using a salt substitute or \"light\" salt. These often contain potassium. Where can you learn more? Go to http://poojaArtsiclereal.info/. Enter H315 in the search box to learn more about \"Potassium-Rich Diet: Care Instructions. \" Current as of: November 7, 2018 Content Version: 12.2 © 5162-6199 [employer]. Care instructions adapted under license by [employer] (which disclaims liability or warranty for this information). If you have questions about a medical condition or this instruction, always ask your healthcare professional. [place] [address] any warranty or liability for your use of this information. Seasonal Allergies: Care Instructions Your Care Instructions Allergies occur when your body's defense system (immune system) overreacts to certain substances. The immune system treats a harmless substance as if it were a harmful germ or virus. Many things can cause this to happen. Examples include pollens, medicine, food, dust, animal dander, and mold. Your allergies are seasonal if you have symptoms just at certain times of the year. In that case, you are probably allergic to pollens from certain trees, grasses, or weeds. Allergies can be mild or severe. Over-the-counter allergy medicine may help with some symptoms. Read and follow all instructions on the label. Managing your allergies is an important part of staying healthy. Your doctor may suggest that you have tests to help find the cause of your allergies. When you know what things trigger your symptoms, you can avoid them. This can prevent allergy symptoms and other health problems. In some cases, immunotherapy might help. For this treatment, you get shots or use pills that have a small amount of certain allergens in them. Your body \"gets used to\" the allergen, so you react less to it over time. This kind of treatment may help prevent or reduce some allergy symptoms. Follow-up care is a key part of your treatment and safety. Be sure to make and go to all appointments, and call your doctor if you are having problems. It's also a good idea to know your test results and keep a list of the medicines you take. How can you care for yourself at home? · Be safe with medicines. Take your medicines exactly as prescribed. Call your doctor if you think you are having a problem with your medicine. · During your allergy season, keep windows closed. If you need to use air-conditioning, change or clean all filters every month. Take a shower and change your clothes after you have been outside. · Stay inside when pollen counts are high. Vacuum once or twice a week. Use a vacuum  with a HEPA filter or a double-thickness filter. When should you call for help? Give an epinephrine shot if: 
  · You think you are having a severe allergic reaction.  
 After giving an epinephrine shot, call 911, even if you feel better. 
 Call 911 if:   · You have symptoms of a severe allergic reaction. These may include: 
? Sudden raised, red areas (hives) all over your body. ? Swelling of the throat, mouth, lips, or tongue. ? Trouble breathing. ? Passing out (losing consciousness). Or you may feel very lightheaded or suddenly feel weak, confused, or restless.  
  · You have been given an epinephrine shot, even if you feel better.  
 Call your doctor now or seek immediate medical care if: 
  · You have symptoms of an allergic reaction, such as: ? A rash or hives (raised, red areas on the skin). ? Itching. ? Swelling. ? Belly pain, nausea, or vomiting.  
 Watch closely for changes in your health, and be sure to contact your doctor if: 
  · You do not get better as expected. Where can you learn more? Go to http://pooja-real.info/. Enter J912 in the search box to learn more about \"Seasonal Allergies: Care Instructions. \" Current as of: April 7, 2019 Content Version: 12.2 © 2211-5110 [employer]. Care instructions adapted under license by [employer] (which disclaims liability or warranty for this information). If you have questions about a medical condition or this instruction, always ask your healthcare professional. [address] any warranty or liability for your use of this information.

## 2020-02-04 NOTE — PROGRESS NOTES
Chief Complaint   Patient presents with    Hypertension     6 month follow up with labs. Health Maintenance Due   Topic Date Due    Influenza Age 5 to Adult  08/01/2019    Shingrix Vaccine Age 50> (2 of 2) 10/24/2019    PAP AKA CERVICAL CYTOLOGY  12/28/2019    COLONOSCOPY  04/17/2020     Patient given influenza vaccine, FLUARIX, in left deltoid, per verbal order from Dr. Luis Oden with read back. Instructed patient to sit and wait 10-20 minutes before leaving the premises so that we can watch for any complications or adverse reactions. Patient given vaccine information statement handout before vaccine was given. Patient tolerated well without adverse reactions or complications. 1. Have you been to the ER, urgent care clinic or hospitalized since your last visit? NO.     2. Have you seen or consulted any other health care providers outside of the [address] [place] [address] since your last visit (Include any pap smears or colon screening)? NO      Do you have an Advanced Directive? NO    Would you like information on Advanced Directives?  NO    Learning Assessment 2/15/2019   PRIMARY LEARNER Patient   HIGHEST LEVEL OF EDUCATION - PRIMARY LEARNER  2 YEARS OF COLLEGE   BARRIERS PRIMARY LEARNER NONE   CO-LEARNER CAREGIVER No   PRIMARY LANGUAGE ENGLISH   LEARNER PREFERENCE PRIMARY DEMONSTRATION   ANSWERED BY patient   RELATIONSHIP SELF

## 2020-02-08 ENCOUNTER — TELEPHONE (OUTPATIENT)
Dept: INTERNAL MEDICINE CLINIC | Age: 60
End: 2020-02-08

## 2020-02-08 NOTE — TELEPHONE ENCOUNTER
Please request recent pap smear from Dr. Gisella Avelar . Patient reports being seen annually. Thank you.

## 2020-02-08 NOTE — PROGRESS NOTES
HPI:   Mariza Cortes is a 62y.o. year old female who presents today for a routine visit. She has a history of hypertension, hyperlipidemia, chronic daily headaches, obstructive sleep apnea, allergic rhinitis, vasomotor instability, and irritable bowel syndrome. She was last seen on 10/10/2019 and complained of a 3 week history of URI with nonproductive cough. She stated that her symptoms persisted, although appeared to be gradually improving until 10/5/2019, when she developed a fever of 100.8 ° F, chills, nausea, decreased appetite, myalgias, and weakness. She also stated that her cough appeared to be  worse, and by 10/7/2019, it had become productive of yellow brown sputum. She presented to [facility] and evaluation included WBC 14.2 with 71% neutrophils, hemoglobin 40.9, and chest x-ray revealed peribronchial cuffing with an infiltrate in the right lower lobe. She was started on doxycycline 100 mg bid for 10 days, Qvar inhaler, and Tessalon pearls. She smoked 0.5 ppd for 30 years, stopping into 2011. When seen on 10/10/2019, Augmentin was added given persistent symptoms, and she was given an albuterol inhaler. She clinically improved, and repeat chest x-ray on 11/26/2019 was normal. She reports today that she is doing well. She has noticed some increase in sinus congestion, but is not currently taking any medication for it. She states that she had received immunotherapy in the past, but it was discontinued approximately 4 years ago. She denies any cough, but reports increase in her headaches. She denies any fever or chills. She is otherwise without new complaints. In 7/2018, she was referred to Dr. Devon Sumner for her chronic daily headaches, and evaluation included brain MRI and MRA (8/8/2018) showing small nonspecific subcortical white matter hyperintensity left insula region,otherwise unremarkable MRI; and MRA negative.  She underwent sleep study evaluation and was diagnosed with moderate obstructive sleep apnea, and was referred to Dr. Mi Richmond and successfully started on CPAP. She was prescribed a Medrol dose pack to allow her to withdraw from daily use of analgesics for her headaches. She was then started on amitriptyine 10 mg daily and instructed to use Excedrin migraine for moderate headaches, and Fioricet only for severe headaches. Celebrex and Zanaflex were prescribed to manage her chronic neck pain, and she continues to take one Zanaflex nightly. She also completed physical therapy, including dry needling, for her neck pain with significant improvement. She is continuing to use Fioricet when she develops a headache, but reports that she is needing to take only one with good response. She also is continuing to take Zanaflex and amitriptyline at bedtime, but is no longer needing Celebrex. On 1/29/2018, she called the office and reported she was out having dinner when she experienced an \"IBS episode\" characterized by flushing in the face, dry mouth, and severe abdominal cramping. When she arrived home, she experienced diarrhea and a single episode of vomiting. She reported that she continued to have abdominal cramping and fatigue following this episode, although described as getting slowly better each day. She reported that she had similar episodes to this dating back 30 years, and was diagnosed with IBS at that time. She stated that the episodes used to resolve more quickly, although over the last year, she states that it now is taking several days before she recovers. She was referred to Dr. Andie Umanzor, and started on Linzess for constipation. However, she states that it was not effective. She underwent evaluation by Dr. Vijay Orosco and Dr. Juan Manuel Middleton and had a small bowel series (8/7/2018) and abdominal duplex scan (8/16/2018), which were negative. She also collected a 24 hour urine for 5-HIAA, which was negative.  She reports that she has noted an improvement in her abdominal pain and diarrheal episodes, and reports that she has not had any episodes since discontinuing captopril. She currently denies any abdominal pain, nausea, vomiting, melena, hematochezia, or change in bowel movements. On 8/2/2016, she was evaluated by PETERSON Ahmadi for left sided crampy abdominal pain, diarrhea, and hematochezia. She was treated for presumed diverticulitis with Cipro and Flagyl, and underwent evaluation showing WBC 11.2, ALT 59, alkaline phosphatase 121, and lipase 427. Abdominal CT scan (8/2/2016) showed localized bowel wall thickening with inflammatory change involving the splenic flexure and proximal descending colon; no colonic diverticuli are seen so the presence of diverticulitis is unlikely; would be a typical location for bowel ischemia, but the patient's vascular structures are essentially normal so bowel ischemia unlikely; favor infectious or inflammatory colitis. She was evaluated by Dr. Zain Henry and admitted for bowel rest, IVF, and IV antibiotics. She improved significantly in 36 hours and was discharged home. She completed the course of oral antibiotics and states that her symptoms have completely resolved. In 4/2017, she was again evaluated by Dr. Zain Henry for recurrent abdominal pain, and abdominal and pelvic CT scan was obtained (4/28/2017) showing mild nonspecific thickening of the proximal jejunum and significant improvement in the distal transverse colon adjacent to the splenic flexure with a short segment with mild wall thickening. Her discomfort resolved, but concern was raised for possible ischemia as cause. An echocardiogram was obtained (5/26/2017) showing normal LV function (EF 65%), no RWMA, grade 1 diastolic dysfunction, and wall thickening upper limits of normal. She also underwent a hypercoaguable laboratory workup, which was negative. She underwent screening colonoscopy by Dr. Kota Gilbert in 6/2011, which found a polyp in the proximal descending colon (pathology: hyperplastic).  Because she could not intubate the cecum, follow-up was recommended for 2 years. In 4/2015, she underwent repeat colonoscopy with Dr. Norma Haddad, which showed left diverticular disease, hypertrophied anal papilla, and no polyps. Follow-up was recommended for 5 years. She has a history of hypertension, treated with amlodipine, hydrochlorothiazide, and hydralazine. She has resumed exercising and she purchased an elliptical machine for her home. She denies any chest pain, shortness of breath at rest or with exertion, palpitations, lightheadedness, or edema. She also has hyperlipidemia, treated with fenofibrate and fish oil in the past, but these were discontinued and she was changed to rosuvastatin in 6/2016. However, due to mild to moderate elevation in AST/ALT, it was discontinued in 5/2017 with resolution of abnormal liver function tests. She was subsequently started on pravastatin in 10/2018, and reports no difficulties with it today. She has a history of mild-severe obstructive sleep apnea, diagnosed by Dr. Jovon Suazo, and was prescribed nasal CPAP. She states that she is no longer using her machine because it was making her headaches worse. She suffers from chronic headaches, considered to be migraines in the past as they were associated with menstruation. Since she entered menopause, the severity of her headaches has decreased and the character has changed. She has severe allergic rhinitis, and awakens with morning headaches when her allergies are severe. She is followed by Dr. Jossie Woods and had been receiving immunotherapy with some improvement. She also has vasomotor instability, which responded to venlafaxine and she has successfully weaned from taking it.     In 6/2016, she presented with complaints of right sciatica and she underwent a lumbar MRI (7/8/2016) showing multilevel mild disc bulges, mild facet arthropathy, and multilevel mild foraminal stenosis; L3-L4 with mild to moderate spinal canal stenosis, no more than mild spinal canal stenosis elsewhere, and no cord compression. She was evaluated by Dr. Mary Lou Villanueva on 2016 and started on gabapentin. She also was referred to physical therapy and received dry needling, which resulted in significant improvement. She also has a history of an anterior cervical discectomy and fusion (C5-C6) in 2011 by Dr. Teddy Latif for a herniated disc and right radiculopathy. She does experience intermittent neck pain, and feels that it may be exacerbating her headaches. She has a history of a right ovarian cyst, and on surveillance ultrasound by Dr. Venecia Sexton, it was noted that the right ovarian cyst had enlarged slightly and changed in appearance. She was referred to Dr. Jayla Soto for evaluation, and he stated that although the cyst was likely benign, he recommended laparoscopic removal. On 2019, she underwent a laparoscopic bilateral salpingo-oophorectomy and washing. 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 Past Medical History:   Diagnosis Date    Allergic rhinitis     Chronic headaches     Hx of colonic polyp 2011    Hyperplastic; Dr. Kerry Mcmahon.  Hyperlipidemia     Hypertension     IBS (irritable bowel syndrome)     Obstructive sleep apnea     Moderate-severe; not using nasal CPAP    S/P cervical discectomy 2011    Anterior C5-C6 discectomy for herniated disc and right radiculopathy; Dr. Teddy Latif     Past Surgical History:   Procedure Laterality Date    ENDOSCOPY, COLON, DIAGNOSTIC      HX BACK SURGERY  11    neck surgery; ruptured cervical discs    HX COLONOSCOPY  2011    HX GYN      dilation and curretage    HX GYN           Current Outpatient Medications   Medication Sig    cetirizine (ZYRTEC) 10 mg tablet Take 1 Tab by mouth daily.  Indications: inflammation of the nose due to an allergy    triamcinolone (NASACORT AQ) 55 mcg nasal inhaler 2 Sprays by Both Nostrils route daily.  butalbital-acetaminophen-caffeine (FIORICET, ESGIC) -40 mg per tablet Take 1 Tab by mouth every six (6) hours as needed for Headache.  tiZANidine (ZANAFLEX) 4 mg tablet Take 1 Tab by mouth every evening.  hydroCHLOROthiazide (HYDRODIURIL) 25 mg tablet TAKE 1 TABLET BY MOUTH EVERY DAY    potassium chloride (K-DUR, KLOR-CON) 20 mEq tablet TAKE 1 TABLET BY MOUTH DAILY    pravastatin (PRAVACHOL) 10 mg tablet TAKE 1 TABLET BY MOUTH EVERY NIGHT    amLODIPine (NORVASC) 10 mg tablet TAKE 1 TABLET BY MOUTH DAILY    hydrALAZINE (APRESOLINE) 100 mg tablet TAKE 1 TABLET BY MOUTH THREE TIMES DAILY    TURMERIC ROOT EXTRACT PO Take 1,000 mg by mouth.  GLUC/CHONDR-MSM#7/C/DEENA/BORON (GLUCOSAMINE-CHONDR, BOSWELLIA, PO) Take 500 mg by mouth daily.  multivitamin (ONE A DAY) tablet Take 1 Tab by mouth daily.  Cholecalciferol, Vitamin D3, 2,000 unit cap Take 2,000 Units by mouth daily.  albuterol (PROVENTIL HFA, VENTOLIN HFA, PROAIR HFA) 90 mcg/actuation inhaler Take 1 Puff by inhalation every six (6) hours as needed for Wheezing.  inhalational spacing device 1 Each by Does Not Apply route as needed for Cough (shortness of breath).  rutin 500 mg tab Take 500 mg by mouth daily. No current facility-administered medications for this visit. Allergies and Intolerances: Allergies   Allergen Reactions    Other Plant, Animal, Environmental Itching and Sneezing     \"Everything but feathers and horses. \"        Family History: Her mother had hypertension, but  from a melanoma. Her father  from a DVT. Her brother has CAD and underwent CABG at the age of 40.    Family History   Problem Relation Age of Onset    Heart Disease Brother     Other Mother         melanoma    Arthritis-osteo Mother     Bleeding Prob Father     Heart Disease Father     Heart Attack Father     Stroke Maternal Uncle     Heart Attack Maternal Uncle     Breast Cancer Maternal Grandmother      Social History:   She  reports that she quit smoking about 5 years ago. She smoked 0.50 packs per day. She has never used smokeless tobacco. She reports that she smoked 0.5 ppd for 30 years, stopping in 2011. She is  and lives with her . She recently retired from the [employer] department [id number]; her  sells eyeglass frames to opticians. Social History     Substance and Sexual Activity   Alcohol Use Yes    Alcohol/week: 0.0 standard drinks    Comment: Very Rarely     Immunization History:  Immunization History   Administered Date(s) Administered    Influenza Vaccine 10/08/2014, 10/09/2017    Influenza Vaccine (Quad) PF 10/23/2018, 02/04/2020    TD Vaccine 01/01/2006    Tdap 10/07/2016    Zoster Recombinant 08/29/2019, 12/18/2019       Review of Systems:   As above included in HPI. Otherwise 11 point review of systems negative including constitutional, skin, HENT, eyes, respiratory, cardiovascular, gastrointestinal, genitourinary, musculoskeletal, endo/heme/aller, neurological.    Physical:   Vitals:   BP: 126/68   HR: 77  WT: 142 lb (64.4 kg)  BMI:  24.37 kg/m2    Patient appears in no apparent distress. Affect is appropriate. HEENT: PERRLA, anicteric, oropharynx clear, no JVD, adenopathy or thyromegaly. No carotid bruits or radiated murmur. Lungs: good air movement, no wheezing; crackles present on inspiration and expiration at right base. Heart: regular rate and rhythm. No murmur, rubs, gallops  Abdomen: soft, nontender, nondistended, normal bowel sounds, no hepatosplenomegaly or masses. Extremities: without edema. Pulses 1-2+ bilaterally.     Review of Data:  Labs:  Hospital Outpatient Visit on 01/28/2020   Component Date Value Ref Range Status    LIPID PROFILE 01/28/2020        Final    Cholesterol, total 01/28/2020 196  <200 MG/DL Final    Triglyceride 01/28/2020 184* <150 MG/DL Final    HDL Cholesterol 01/28/2020 55  40 - 60 MG/DL Final    LDL, calculated 01/28/2020 104.2* 0 - 100 MG/DL Final    VLDL, calculated 01/28/2020 36.8  MG/DL Final    CHOL/HDL Ratio 01/28/2020 3.6  0 - 5.0   Final    Magnesium 01/28/2020 2.0  1.6 - 2.6 mg/dL Final    Sodium 01/28/2020 141  136 - 145 mmol/L Final    Potassium 01/28/2020 3.5  3.5 - 5.5 mmol/L Final    Chloride 01/28/2020 105  100 - 111 mmol/L Final    CO2 01/28/2020 33* 21 - 32 mmol/L Final    Anion gap 01/28/2020 3  3.0 - 18 mmol/L Final    Glucose 01/28/2020 83  74 - 99 mg/dL Final    BUN 01/28/2020 20* 7.0 - 18 MG/DL Final    Creatinine 01/28/2020 0.66  0.6 - 1.3 MG/DL Final    BUN/Creatinine ratio 01/28/2020 30* 12 - 20   Final    GFR est AA 01/28/2020 >60  >60 ml/min/1.73m2 Final    GFR est non-AA 01/28/2020 >60  >60 ml/min/1.73m2 Final    Calcium 01/28/2020 9.5  8.5 - 10.1 MG/DL Final    Bilirubin, total 01/28/2020 0.4  0.2 - 1.0 MG/DL Final    ALT (SGPT) 01/28/2020 39  13 - 56 U/L Final    AST (SGOT) 01/28/2020 22  10 - 38 U/L Final    Alk. phosphatase 01/28/2020 102  45 - 117 U/L Final    Protein, total 01/28/2020 7.3  6.4 - 8.2 g/dL Final    Albumin 01/28/2020 3.8  3.4 - 5.0 g/dL Final    Globulin 01/28/2020 3.5  2.0 - 4.0 g/dL Final    A-G Ratio 01/28/2020 1.1  0.8 - 1.7   Final     Health Maintenance:  Screening:    Mammogram: negative (11/2019)   PAP smear: well women exams by Dr. Sabrina Juarez (last 12/2019)   Colorectal: colonoscopy (4/2015) no polyps. Dr. Ryan Walton. Due 4/2020. Depression: Weaned from Effexor (vasomotor instability).    DM (HbA1c/FPG): FPG 83 (1/2020)   Hepatitis C: negative (12/2015)   Falls: none   DEXA: N/A   Glaucoma: has regular eye exams with Dr. Mariusz Escalante (last 11/2018)   Smoking: 15 pack year (stopped 2011)   Vitamin D: 40.2 (10/2018)   Medicare Wellness: N/A    Impression:  Patient Active Problem List   Diagnosis Code    Hypertension I10    Hyperlipidemia E78.5    Obstructive sleep apnea G47.33    Chronic daily headache R51    IBS (irritable bowel syndrome) K58.9    Colon polyp K63.5    Vitamin D deficiency E55.9    History of colitis Z87.19    Lumbar spinal stenosis M48.061    HNP (herniated nucleus pulposus), lumbar M51.26    Lumbar facet arthropathy M47.816    Neck pain M54.2    Degenerative disc disease, cervical M50.30    Right ovarian cyst N83.201    Former smoker Z87.891       Plan:  1. Hypertension. Blood pressure appears well controlled on current regimen of hydralazine 100 mg tid, amlodipine 10 mg daily, and hydrochlorothiazide 25 mg daily. Successfully discontinued treatment with captopril since unclear if abdominal attacks may have been a form of angioedema. Renal function remains normal with creatinine 0.66/ eGFR >60. However, BUN/creatinine ratio remains significantly elevated at 30, and again discussed importance of increasing fluid intake, particularly given treatment with hydrochlorothiazide. Follow closely. 2. Hyperlipidemia. Began treatment with rosuvastatin in 6/2016, and elevation in transaminases first noted in 8/2016. Discontinued in 5/2017 due to persistent elevation of transaminases, and resolved after discontinuation. Calculated 10 year ASCVD risk off statin was 5.4 %, which did not place her in one of the four statin benefit groups as per new AHA/ACC guidelines. However, , , and HDL 50, and patient with strong family history of heart disease. She started treatment with pravastatin 10 mg daily in 10/2018, and LDL improved to 80 with HDL 54 at that time. LDL worse today at 104, and patient acknowledges dietary indiscretion and increase in weight of 8 pounds since last visit. Emphasized importance of lifestyle modifications, including diet, exercise, and weight loss. Liver function tests remain normal. If LDL remains elevated, will consider increasing dose of pravastatin. Will continue to follow. 3. Elevated transaminases. Now resolved since rosuvastatin discontinued. Review of record shows elevation noted since 8/2016 when initiated.  Iron studies, hepatitis panel, KEIL, SPEP, CK, aldolase, and anti-smooth muscle antibody negative. RUQ ultrasound (10/2016) showing fatty infiltration of liver. Advised patient to avoid alcohol. 4. Right lower lobe pneumonia. Patient reported that she had a URI for approximately 3 weeks, mainly manifested as nasal congestion, post-nasal drainage and nonproductive cough. She appeared to be gradually improving until 10/5/2019 when she noted that onset of a fever, chills, myalgias, and weakness. She stated that her cough appeared to be mildly worse, and over the next 48 hours became productive of yellow brown sputum. She presented to [facility] and found to have WBC 14.2( 71% neutrophils and chest x-ray revealed peribronchial cuffing with an infiltrate in the right lower lobe. She was started on doxycycline, Qvar inhaler, and Tessalon pearls. She was seen in follow-up and continued to have a productive cough and reported mild dyspnea on exertion. Antibiotic coverage was broadened for a community acquired pneumonia to include Augmentin bid for 10 days in addition to doxycycline. Prescribed an albuterol inhaler with spacer to help with mild dyspnea on exertion, and patient reports resolution today. Repeat chest x-ray normal 911/26/2019). 5. Colitis. Unclear etiology, but nonspecific bowel wall thickening in short segment of jejunum and sigmoid on repeat CT scan raised question of ischemic in origin. Hypercoaguable workup including lab studies and echocardiogram negative. Appears to have intermittent \"IBS\" episodes associated with facial flushing, dry mouth, and severe abdominal cramping followed by diarrhea +/- vomiting. Episodes linger for several days following. No evidence of urticaria seen. Interesting is the abnormalities noted on abdominal CT scan in two of her episodes, with nonspecific short segment thickening in various locations in small and large intestines.  Obtained complement studies including C4, C1 esterase inhibtor, and C1q levels which were normal. However, instructed patient that if she developed another episode, would like to check C4 level during the episode. Discontinued captopril and has not had recurrence since doing so. Evaluated by GI and had negative small bowel series, duplex ultrasound, and 24 hour urine for 5-HIAA level. No longer a significant issue since captopril discontinued. Continue to follow. 5. Chronic daily headaches. Evaluated by Dr. Kun Black and weaned from daily Fioricet and Excedrin migraine. Headaches now improved with daily amitriptyline and trying to use Excedrin migraine and Fioricet only occasionally for moderate and severe headaches, respectively. Does have chronic neck pain related to her anterior neck fusion, and is finding Zanaflex and regular exercises to be helpful. States has not needed to follow-up with Dr. Kun Black. Describing increasing sinus congestion contributing to headaches. Will begin Zyrtec and Nasacort to see if will help. She states that she is no longer taking amitriptyline. Advised to call if no improvement. 6. Right leg sciatica. Evidence of lumbar spinal stenosis and facet arthropathy, and lumbar herniated disc. Significant improvement with dry needling therapy and reports no further pain. Continue to follow and follow-up with Dr. Foreman if recurs. 7. Abnormal glucose. Fasting glucose remains normal. Most likely due to weight loss. Emphasized importance of continued lifestyle modifications. 8.  Obstructive sleep apnea. Underwent reevaluation by Dr. Caryle Sprinkle, and repeat sleep study showed moderate PEGGY. Started on CPAP and noting improvement. Continue to follow. 9. Right ovarian cyst. Enlarged slightly and changed in appearance at last evaluation on ultrasound. Referred to Dr. Carli Brady and underwent laparoscopic bilateral salpingo-oophorectomy and washing. 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 No further treatment needed.    10. Health maintenance. Will give influenza vaccine today. Completed 2/2 doses of Shingrix vaccine. Other immunizations up to date. Well woman exam with Dr. Tysno Ibrahim in 2/2020. Mammogram up to date. Will refer to Dr. Evangelina Peña as due for colon cancer screening. Continue regular eye exams with Dr. Vargas. Vitamin D level normal. Continue maintenance dose supplement. Patient understands recommendations and agrees with plan.   Follow-up 6 months for physical.

## 2020-02-20 DIAGNOSIS — G89.29 CHRONIC NONINTRACTABLE HEADACHE, UNSPECIFIED HEADACHE TYPE: ICD-10-CM

## 2020-02-20 DIAGNOSIS — R51.9 CHRONIC NONINTRACTABLE HEADACHE, UNSPECIFIED HEADACHE TYPE: ICD-10-CM

## 2020-02-20 RX ORDER — BUTALBITAL, ACETAMINOPHEN AND CAFFEINE 50; 325; 40 MG/1; MG/1; MG/1
1 TABLET ORAL
Qty: 60 TAB | Refills: 0 | Status: SHIPPED | OUTPATIENT
Start: 2020-02-20 | End: 2020-03-15 | Stop reason: SDUPTHER

## 2020-03-15 DIAGNOSIS — R51.9 CHRONIC NONINTRACTABLE HEADACHE, UNSPECIFIED HEADACHE TYPE: ICD-10-CM

## 2020-03-15 DIAGNOSIS — G89.29 CHRONIC NONINTRACTABLE HEADACHE, UNSPECIFIED HEADACHE TYPE: ICD-10-CM

## 2020-03-15 RX ORDER — HYDRALAZINE HYDROCHLORIDE 100 MG/1
TABLET, FILM COATED ORAL
Qty: 270 TAB | Refills: 2 | Status: SHIPPED | OUTPATIENT
Start: 2020-03-15 | End: 2020-10-19 | Stop reason: SDUPTHER

## 2020-03-16 RX ORDER — BUTALBITAL, ACETAMINOPHEN AND CAFFEINE 50; 325; 40 MG/1; MG/1; MG/1
1 TABLET ORAL
Qty: 60 TAB | Refills: 0 | Status: SHIPPED | OUTPATIENT
Start: 2020-03-16 | End: 2020-04-09 | Stop reason: SDUPTHER

## 2020-04-09 DIAGNOSIS — G89.29 CHRONIC NONINTRACTABLE HEADACHE, UNSPECIFIED HEADACHE TYPE: ICD-10-CM

## 2020-04-09 DIAGNOSIS — R51.9 CHRONIC NONINTRACTABLE HEADACHE, UNSPECIFIED HEADACHE TYPE: ICD-10-CM

## 2020-04-09 RX ORDER — BUTALBITAL, ACETAMINOPHEN AND CAFFEINE 50; 325; 40 MG/1; MG/1; MG/1
1 TABLET ORAL
Qty: 60 TAB | Refills: 0 | Status: SHIPPED | OUTPATIENT
Start: 2020-04-09 | End: 2020-05-04 | Stop reason: SDUPTHER

## 2020-05-04 DIAGNOSIS — G89.29 CHRONIC NONINTRACTABLE HEADACHE, UNSPECIFIED HEADACHE TYPE: ICD-10-CM

## 2020-05-04 DIAGNOSIS — R51.9 CHRONIC NONINTRACTABLE HEADACHE, UNSPECIFIED HEADACHE TYPE: ICD-10-CM

## 2020-05-04 RX ORDER — BUTALBITAL, ACETAMINOPHEN AND CAFFEINE 50; 325; 40 MG/1; MG/1; MG/1
1 TABLET ORAL
Qty: 60 TAB | Refills: 0 | Status: SHIPPED | OUTPATIENT
Start: 2020-05-04 | End: 2020-05-28 | Stop reason: SDUPTHER

## 2020-05-28 DIAGNOSIS — G89.29 CHRONIC NONINTRACTABLE HEADACHE, UNSPECIFIED HEADACHE TYPE: ICD-10-CM

## 2020-05-28 DIAGNOSIS — R51.9 CHRONIC NONINTRACTABLE HEADACHE, UNSPECIFIED HEADACHE TYPE: ICD-10-CM

## 2020-05-28 RX ORDER — BUTALBITAL, ACETAMINOPHEN AND CAFFEINE 50; 325; 40 MG/1; MG/1; MG/1
1 TABLET ORAL
Qty: 60 TAB | Refills: 0 | Status: SHIPPED | OUTPATIENT
Start: 2020-05-28 | End: 2020-06-21 | Stop reason: SDUPTHER

## 2020-06-21 DIAGNOSIS — G89.29 CHRONIC NONINTRACTABLE HEADACHE, UNSPECIFIED HEADACHE TYPE: ICD-10-CM

## 2020-06-21 DIAGNOSIS — R51.9 CHRONIC NONINTRACTABLE HEADACHE, UNSPECIFIED HEADACHE TYPE: ICD-10-CM

## 2020-06-22 RX ORDER — TIZANIDINE 4 MG/1
4 TABLET ORAL EVERY EVENING
Qty: 90 TAB | Refills: 1 | Status: SHIPPED | OUTPATIENT
Start: 2020-06-22 | End: 2020-09-28 | Stop reason: SDUPTHER

## 2020-06-22 RX ORDER — BUTALBITAL, ACETAMINOPHEN AND CAFFEINE 50; 325; 40 MG/1; MG/1; MG/1
1 TABLET ORAL
Qty: 60 TAB | Refills: 0 | Status: SHIPPED | OUTPATIENT
Start: 2020-06-22 | End: 2020-07-15 | Stop reason: SDUPTHER

## 2020-07-10 ENCOUNTER — APPOINTMENT (OUTPATIENT)
Dept: INTERNAL MEDICINE CLINIC | Age: 60
End: 2020-07-10

## 2020-07-10 ENCOUNTER — HOSPITAL ENCOUNTER (OUTPATIENT)
Dept: LAB | Age: 60
Discharge: HOME | End: 2020-07-10
Payer: COMMERCIAL

## 2020-07-10 DIAGNOSIS — Z00.00 LABORATORY TESTS ORDERED AS PART OF A COMPLETE PHYSICAL EXAM (CPE): ICD-10-CM

## 2020-07-10 DIAGNOSIS — G47.33 OBSTRUCTIVE SLEEP APNEA: ICD-10-CM

## 2020-07-10 DIAGNOSIS — M50.30 DEGENERATIVE DISC DISEASE, CERVICAL: ICD-10-CM

## 2020-07-10 DIAGNOSIS — R51.9 CHRONIC DAILY HEADACHE: ICD-10-CM

## 2020-07-10 DIAGNOSIS — Z23 ENCOUNTER FOR IMMUNIZATION: ICD-10-CM

## 2020-07-10 DIAGNOSIS — Z87.891 FORMER SMOKER: ICD-10-CM

## 2020-07-10 DIAGNOSIS — Z12.11 COLON CANCER SCREENING: ICD-10-CM

## 2020-07-10 DIAGNOSIS — K58.1 IRRITABLE BOWEL SYNDROME WITH CONSTIPATION: ICD-10-CM

## 2020-07-10 DIAGNOSIS — E78.5 HYPERLIPIDEMIA, UNSPECIFIED HYPERLIPIDEMIA TYPE: ICD-10-CM

## 2020-07-10 DIAGNOSIS — E55.9 VITAMIN D DEFICIENCY: ICD-10-CM

## 2020-07-10 DIAGNOSIS — I10 ESSENTIAL HYPERTENSION: ICD-10-CM

## 2020-07-10 DIAGNOSIS — N83.201 RIGHT OVARIAN CYST: ICD-10-CM

## 2020-07-10 DIAGNOSIS — M54.2 NECK PAIN: ICD-10-CM

## 2020-07-10 DIAGNOSIS — Z87.19 HISTORY OF COLITIS: ICD-10-CM

## 2020-07-10 LAB
25(OH)D3 SERPL-MCNC: 46.1 NG/ML (ref 30–100)
ALBUMIN SERPL-MCNC: 4 G/DL (ref 3.4–5)
ALBUMIN/GLOB SERPL: 1.3 {RATIO} (ref 0.8–1.7)
ALP SERPL-CCNC: 91 U/L (ref 45–117)
ALT SERPL-CCNC: 35 U/L (ref 13–56)
ANION GAP SERPL CALC-SCNC: 10 MMOL/L (ref 3–18)
APPEARANCE UR: CLEAR
AST SERPL-CCNC: 19 U/L (ref 10–38)
BASOPHILS # BLD: 0 K/UL (ref 0–0.1)
BASOPHILS NFR BLD: 0 % (ref 0–2)
BILIRUB SERPL-MCNC: 0.3 MG/DL (ref 0.2–1)
BILIRUB UR QL: ABNORMAL
BUN SERPL-MCNC: 28 MG/DL (ref 7–18)
BUN/CREAT SERPL: 41 (ref 12–20)
CALCIUM SERPL-MCNC: 9.4 MG/DL (ref 8.5–10.1)
CHLORIDE SERPL-SCNC: 104 MMOL/L (ref 100–111)
CHOLEST SERPL-MCNC: 193 MG/DL
CO2 SERPL-SCNC: 29 MMOL/L (ref 21–32)
COLOR UR: ABNORMAL
CREAT SERPL-MCNC: 0.69 MG/DL (ref 0.6–1.3)
DIFFERENTIAL METHOD BLD: ABNORMAL
EOSINOPHIL # BLD: 0.1 K/UL (ref 0–0.4)
EOSINOPHIL NFR BLD: 2 % (ref 0–5)
ERYTHROCYTE [DISTWIDTH] IN BLOOD BY AUTOMATED COUNT: 12.9 % (ref 11.6–14.5)
GLOBULIN SER CALC-MCNC: 3.1 G/DL (ref 2–4)
GLUCOSE SERPL-MCNC: 84 MG/DL (ref 74–99)
GLUCOSE UR STRIP.AUTO-MCNC: NEGATIVE MG/DL
HCT VFR BLD AUTO: 43.8 % (ref 35–45)
HDLC SERPL-MCNC: 51 MG/DL (ref 40–60)
HDLC SERPL: 3.8 {RATIO} (ref 0–5)
HGB BLD-MCNC: 14.1 G/DL (ref 12–16)
HGB UR QL STRIP: NEGATIVE
KETONES UR QL STRIP.AUTO: NEGATIVE MG/DL
LDLC SERPL CALC-MCNC: 107 MG/DL (ref 0–100)
LEUKOCYTE ESTERASE UR QL STRIP.AUTO: NEGATIVE
LIPID PROFILE,FLP: ABNORMAL
LYMPHOCYTES # BLD: 2.7 K/UL (ref 0.9–3.6)
LYMPHOCYTES NFR BLD: 50 % (ref 21–52)
MAGNESIUM SERPL-MCNC: 2.3 MG/DL (ref 1.6–2.6)
MCH RBC QN AUTO: 29.4 PG (ref 24–34)
MCHC RBC AUTO-ENTMCNC: 32.2 G/DL (ref 31–37)
MCV RBC AUTO: 91.4 FL (ref 74–97)
MONOCYTES # BLD: 0.5 K/UL (ref 0.05–1.2)
MONOCYTES NFR BLD: 9 % (ref 3–10)
NEUTS SEG # BLD: 2.1 K/UL (ref 1.8–8)
NEUTS SEG NFR BLD: 39 % (ref 40–73)
NITRITE UR QL STRIP.AUTO: NEGATIVE
PH UR STRIP: 5.5 [PH] (ref 5–8)
PLATELET # BLD AUTO: 273 K/UL (ref 135–420)
PMV BLD AUTO: 10.7 FL (ref 9.2–11.8)
POTASSIUM SERPL-SCNC: 3.8 MMOL/L (ref 3.5–5.5)
PROT SERPL-MCNC: 7.1 G/DL (ref 6.4–8.2)
PROT UR STRIP-MCNC: NEGATIVE MG/DL
RBC # BLD AUTO: 4.79 M/UL (ref 4.2–5.3)
SODIUM SERPL-SCNC: 143 MMOL/L (ref 136–145)
SP GR UR REFRACTOMETRY: >1.03 (ref 1–1.03)
TRIGL SERPL-MCNC: 175 MG/DL (ref ?–150)
TSH SERPL DL<=0.05 MIU/L-ACNC: 1.04 UIU/ML (ref 0.36–3.74)
UROBILINOGEN UR QL STRIP.AUTO: 0.2 EU/DL (ref 0.2–1)
VLDLC SERPL CALC-MCNC: 35 MG/DL
WBC # BLD AUTO: 5.3 K/UL (ref 4.6–13.2)

## 2020-07-10 PROCEDURE — 80061 LIPID PANEL: CPT

## 2020-07-10 PROCEDURE — 84443 ASSAY THYROID STIM HORMONE: CPT

## 2020-07-10 PROCEDURE — 83735 ASSAY OF MAGNESIUM: CPT

## 2020-07-10 PROCEDURE — 81003 URINALYSIS AUTO W/O SCOPE: CPT

## 2020-07-10 PROCEDURE — 36415 COLL VENOUS BLD VENIPUNCTURE: CPT

## 2020-07-10 PROCEDURE — 82306 VITAMIN D 25 HYDROXY: CPT

## 2020-07-10 PROCEDURE — 80053 COMPREHEN METABOLIC PANEL: CPT

## 2020-07-10 PROCEDURE — 85025 COMPLETE CBC W/AUTO DIFF WBC: CPT

## 2020-07-15 DIAGNOSIS — E87.6 HYPOKALEMIA: ICD-10-CM

## 2020-07-15 DIAGNOSIS — G89.29 CHRONIC NONINTRACTABLE HEADACHE, UNSPECIFIED HEADACHE TYPE: ICD-10-CM

## 2020-07-15 DIAGNOSIS — R51.9 CHRONIC NONINTRACTABLE HEADACHE, UNSPECIFIED HEADACHE TYPE: ICD-10-CM

## 2020-07-15 RX ORDER — AMLODIPINE BESYLATE 10 MG/1
TABLET ORAL
Qty: 90 TAB | Refills: 2 | Status: SHIPPED | OUTPATIENT
Start: 2020-07-15 | End: 2021-04-15 | Stop reason: SDUPTHER

## 2020-07-15 RX ORDER — POTASSIUM CHLORIDE 20 MEQ/1
TABLET, EXTENDED RELEASE ORAL
Qty: 90 TAB | Refills: 2 | Status: SHIPPED | OUTPATIENT
Start: 2020-07-15 | End: 2021-01-19 | Stop reason: SDDI

## 2020-07-15 RX ORDER — HYDROCHLOROTHIAZIDE 25 MG/1
TABLET ORAL
Qty: 90 TAB | Refills: 2 | Status: SHIPPED | OUTPATIENT
Start: 2020-07-15 | End: 2021-04-15 | Stop reason: SDUPTHER

## 2020-07-15 RX ORDER — BUTALBITAL, ACETAMINOPHEN AND CAFFEINE 50; 325; 40 MG/1; MG/1; MG/1
1 TABLET ORAL
Qty: 60 TAB | Refills: 0 | Status: SHIPPED | OUTPATIENT
Start: 2020-07-15 | End: 2020-08-06 | Stop reason: SDUPTHER

## 2020-07-16 ENCOUNTER — VIRTUAL VISIT (OUTPATIENT)
Dept: INTERNAL MEDICINE CLINIC | Age: 60
End: 2020-07-16

## 2020-07-16 ENCOUNTER — TELEPHONE (OUTPATIENT)
Dept: INTERNAL MEDICINE CLINIC | Age: 60
End: 2020-07-16

## 2020-07-16 DIAGNOSIS — R51.9 CHRONIC DAILY HEADACHE: ICD-10-CM

## 2020-07-16 DIAGNOSIS — E55.9 VITAMIN D DEFICIENCY: ICD-10-CM

## 2020-07-16 DIAGNOSIS — M48.062 SPINAL STENOSIS OF LUMBAR REGION WITH NEUROGENIC CLAUDICATION: ICD-10-CM

## 2020-07-16 DIAGNOSIS — Z12.31 SCREENING MAMMOGRAM, ENCOUNTER FOR: ICD-10-CM

## 2020-07-16 DIAGNOSIS — G47.33 OBSTRUCTIVE SLEEP APNEA: ICD-10-CM

## 2020-07-16 DIAGNOSIS — Z87.19 HISTORY OF COLITIS: ICD-10-CM

## 2020-07-16 DIAGNOSIS — Z87.891 FORMER SMOKER: ICD-10-CM

## 2020-07-16 DIAGNOSIS — K58.1 IRRITABLE BOWEL SYNDROME WITH CONSTIPATION: ICD-10-CM

## 2020-07-16 DIAGNOSIS — I10 ESSENTIAL HYPERTENSION: Primary | ICD-10-CM

## 2020-07-16 DIAGNOSIS — M47.816 LUMBAR FACET ARTHROPATHY: ICD-10-CM

## 2020-07-16 DIAGNOSIS — M50.30 DEGENERATIVE DISC DISEASE, CERVICAL: ICD-10-CM

## 2020-07-16 DIAGNOSIS — E78.5 HYPERLIPIDEMIA, UNSPECIFIED HYPERLIPIDEMIA TYPE: ICD-10-CM

## 2020-07-16 NOTE — TELEPHONE ENCOUNTER
Please schedule patient for appointment with labs prior in 6 months. Please request last pap smear from Dr. Tom Martínez. Thank you.

## 2020-07-19 NOTE — PROGRESS NOTES
Olive Browning is a 62 y.o. female who was seen by synchronous (real-time) audio-video technology on 7/16/2020. HPI:   Olive Browning is a 62y.o. year old female who has a history of hypertension, hyperlipidemia, chronic daily headaches, obstructive sleep apnea, allergic rhinitis, vasomotor instability, and irritable bowel syndrome. She reports today that she is doing well. She is retired, so is able to social distance and wear a mask. She admits to being less active, and she has gained some weight during the pandemic. However, she is attempting to decrease her weight back to her baseline. She is otherwise without new complaints. In 7/2018, she was referred to Dr. Christian Ponce for her chronic daily headaches, and evaluation included brain MRI and MRA (8/8/2018) showing small nonspecific subcortical white matter hyperintensity left insula region,otherwise unremarkable MRI; and MRA negative. She underwent sleep study evaluation and was diagnosed with moderate obstructive sleep apnea, and was referred to Dr. Garfield Chris and successfully started on CPAP. She was prescribed a Medrol dose pack to allow her to withdraw from daily use of analgesics for her headaches. She was then started on amitriptyine 10 mg daily and instructed to use Excedrin migraine for moderate headaches, and Fioricet only for severe headaches. Celebrex and Zanaflex were prescribed to manage her chronic neck pain, and she continues to take one Zanaflex nightly. She also completed physical therapy, including dry needling, for her neck pain with significant improvement. She is continuing to use Fioricet when she develops a headache, but reports that she is needing to take only one with good response. She also is continuing to take Zanaflex and amitriptyline at bedtime, but is no longer needing Celebrex.     On 1/29/2018, she called the office and reported she was out having dinner when she experienced an \"IBS episode\" characterized by flushing in the face, dry mouth, and severe abdominal cramping. When she arrived home, she experienced diarrhea and a single episode of vomiting. She reported that she continued to have abdominal cramping and fatigue following this episode, although described as getting slowly better each day. She reported that she had similar episodes to this dating back 30 years, and was diagnosed with IBS at that time. She stated that the episodes used to resolve more quickly, although over the last year, she states that it now is taking several days before she recovers. She was referred to Dr. Tariq Franco, and started on Linzess for constipation. However, she states that it was not effective. She underwent evaluation by Dr. Tomas Mares and Dr. Casimiro Marks and had a small bowel series (8/7/2018) and abdominal duplex scan (8/16/2018), which were negative. She also collected a 24 hour urine for 5-HIAA, which was negative. She reports that she has noted an improvement in her abdominal pain and diarrheal episodes, and reports that she has not had any episodes since discontinuing captopril. She currently denies any abdominal pain, nausea, vomiting, melena, hematochezia, or change in bowel movements. On 8/2/2016, she was evaluated by PETERSON Ahmadi for left sided crampy abdominal pain, diarrhea, and hematochezia. She was treated for presumed diverticulitis with Cipro and Flagyl, and underwent evaluation showing WBC 11.2, ALT 59, alkaline phosphatase 121, and lipase 427. Abdominal CT scan (8/2/2016) showed localized bowel wall thickening with inflammatory change involving the splenic flexure and proximal descending colon; no colonic diverticuli are seen so the presence of diverticulitis is unlikely; would be a typical location for bowel ischemia, but the patient's vascular structures are essentially normal so bowel ischemia unlikely; favor infectious or inflammatory colitis. She was evaluated by Dr. Sonya Bauman and admitted for bowel rest, IVF, and IV antibiotics. She improved significantly in 36 hours and was discharged home. She completed the course of oral antibiotics and states that her symptoms have completely resolved. In 4/2017, she was again evaluated by Dr. Rylan Newsome for recurrent abdominal pain, and abdominal and pelvic CT scan was obtained (4/28/2017) showing mild nonspecific thickening of the proximal jejunum and significant improvement in the distal transverse colon adjacent to the splenic flexure with a short segment with mild wall thickening. Her discomfort resolved, but concern was raised for possible ischemia as cause. An echocardiogram was obtained (5/26/2017) showing normal LV function (EF 65%), no RWMA, grade 1 diastolic dysfunction, and wall thickening upper limits of normal. She also underwent a hypercoaguable laboratory workup, which was negative. She underwent screening colonoscopy by Dr. Magnolia Crowe in 6/2011, which found a polyp in the proximal descending colon (pathology: hyperplastic). Because she could not intubate the cecum, follow-up was recommended for 2 years. In 4/2015, she underwent repeat colonoscopy with Dr. Devi Quintana, which showed left diverticular disease, hypertrophied anal papilla, and no polyps. Follow-up was recommended for 5 years. She has a history of hypertension, treated with amlodipine, hydrochlorothiazide, and hydralazine. She has resumed exercising and she purchased an [employer] machine for her home. She denies any chest pain, shortness of breath at rest or with exertion, palpitations, lightheadedness, or edema. She also has hyperlipidemia, treated with fenofibrate and fish oil in the past, but these were discontinued and she was changed to rosuvastatin in 6/2016. However, due to mild to moderate elevation in AST/ALT, it was discontinued in 5/2017 with resolution of abnormal liver function tests. She was subsequently started on pravastatin in 10/2018, and reports no difficulties with it today.     She has a history of mild-severe obstructive sleep apnea, diagnosed by Dr. Amado Aguilera, and was prescribed nasal CPAP. She states that she is no longer using her machine because it was making her headaches worse. She suffers from chronic headaches, considered to be migraines in the past as they were associated with menstruation. Since she entered menopause, the severity of her headaches has decreased and the character has changed. She has severe allergic rhinitis, and awakens with morning headaches when her allergies are severe. She is followed by Dr. Erich Green and had been receiving immunotherapy with some improvement. She also has vasomotor instability, which responded to venlafaxine and she has successfully weaned from taking it. In 10/2019, she reported a 3 week history of URI with nonproductive cough. She stated that her symptoms persisted, although appeared to be gradually improving until 10/5/2019, when she developed a fever of 100.8 ° F, chills, nausea, decreased appetite, myalgias, and weakness. She also stated that her cough appeared to be  worse, and by 10/7/2019, it had become productive of yellow brown sputum. She presented to [facility] and evaluation included WBC 14.2 with 71% neutrophils, hemoglobin 40.9, and chest x-ray revealed peribronchial cuffing with an infiltrate in the right lower lobe. She was started on doxycycline 100 mg bid for 10 days, Qvar inhaler, and Tessalon pearls. She smoked 0.5 ppd for 30 years, stopping into 2011. When seen on 10/10/2019, Augmentin was added given persistent symptoms, and she was given an albuterol inhaler.  She clinically improved, and repeat chest x-ray on 11/26/2019 was normal.     In 6/2016, she presented with complaints of right sciatica and she underwent a lumbar MRI (7/8/2016) showing multilevel mild disc bulges, mild facet arthropathy, and multilevel mild foraminal stenosis; L3-L4 with mild to moderate spinal canal stenosis, no more than mild spinal canal stenosis elsewhere, and no cord compression. She was evaluated by Dr. Tasha Jones on 2016 and started on gabapentin. She also was referred to physical therapy and received dry needling, which resulted in significant improvement. She also has a history of an anterior cervical discectomy and fusion (C5-C6) in 2011 by Dr. Russ Buck for a herniated disc and right radiculopathy. She does experience intermittent neck pain, and feels that it may be exacerbating her headaches. She has a history of a right ovarian cyst, and on surveillance ultrasound by Dr. Noni Rodriguez, it was noted that the right ovarian cyst had enlarged slightly and changed in appearance. She was referred to Dr. Stephanie Moody for evaluation, and he stated that although the cyst was likely benign, he recommended laparoscopic removal. On 2019, she underwent a laparoscopic bilateral salpingo-oophorectomy and washing. 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 Past Medical History:   Diagnosis Date    Allergic rhinitis     Chronic headaches     Hx of colonic polyp 2011    Hyperplastic; Dr. Kevin Cardozo.     Hyperlipidemia     Hypertension     IBS (irritable bowel syndrome)     Obstructive sleep apnea     Moderate-severe; not using nasal CPAP    S/P cervical discectomy 2011    Anterior C5-C6 discectomy for herniated disc and right radiculopathy; Dr. Russ Buck     Past Surgical History:   Procedure Laterality Date    ENDOSCOPY, COLON, DIAGNOSTIC      HX BACK SURGERY  11    neck surgery; ruptured cervical discs    HX COLONOSCOPY  2011    HX GYN      dilation and curretage    HX GYN           Current Outpatient Medications   Medication Sig    hydroCHLOROthiazide (HYDRODIURIL) 25 mg tablet TAKE 1 TABLET BY MOUTH EVERY DAY    potassium chloride (K-DUR, KLOR-CON) 20 mEq tablet TAKE 1 TABLET BY MOUTH DAILY    amLODIPine (NORVASC) 10 mg tablet TAKE 1 TABLET BY MOUTH DAILY    butalbital-acetaminophen-caffeine (FIORICET, ESGIC) -40 mg per tablet Take 1 Tab by mouth every six (6) hours as needed for Headache.  tiZANidine (ZANAFLEX) 4 mg tablet Take 1 Tab by mouth every evening.  hydrALAZINE (APRESOLINE) 100 mg tablet TAKE 1 TABLET BY MOUTH THREE TIMES DAILY    cetirizine (ZYRTEC) 10 mg tablet Take 1 Tab by mouth daily. Indications: inflammation of the nose due to an allergy    triamcinolone (NASACORT AQ) 55 mcg nasal inhaler 2 Sprays by Both Nostrils route daily.  pravastatin (PRAVACHOL) 10 mg tablet TAKE 1 TABLET BY MOUTH EVERY NIGHT    albuterol (PROVENTIL HFA, VENTOLIN HFA, PROAIR HFA) 90 mcg/actuation inhaler Take 1 Puff by inhalation every six (6) hours as needed for Wheezing.  inhalational spacing device 1 Each by Does Not Apply route as needed for Cough (shortness of breath).  rutin 500 mg tab Take 500 mg by mouth daily.  TURMERIC ROOT EXTRACT PO Take 1,000 mg by mouth.  GLUC/CHONDR-MSM#7/C/DEENA/BORON (GLUCOSAMINE-CHONDR, BOSWELLIA, PO) Take 500 mg by mouth daily.  multivitamin (ONE A DAY) tablet Take 1 Tab by mouth daily.  Cholecalciferol, Vitamin D3, 2,000 unit cap Take 2,000 Units by mouth daily. No current facility-administered medications for this visit. Allergies and Intolerances: Allergies   Allergen Reactions    Other Plant, Animal, Environmental Itching and Sneezing     \"Everything but feathers and horses. \"        Family History: Her mother had hypertension, but  from a melanoma. Her father  from a DVT. Her brother has CAD and underwent CABG at the age of 40.    Family History   Problem Relation Age of Onset    Heart Disease Brother     Other Mother         melanoma    Arthritis-osteo Mother     Bleeding Prob Father     Heart Disease Father     Heart Attack Father     Stroke Maternal Uncle     Heart Attack Maternal Uncle     Breast Cancer Maternal Grandmother      Social History:   She  reports that she quit smoking about 5 years ago. She smoked 0.50 packs per day. She has never used smokeless tobacco. She reports that she smoked 0.5 ppd for 30 years, stopping in 2011. She is  and lives with her . 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 Social History     Substance and Sexual Activity   Alcohol Use Yes    Alcohol/week: 0.0 standard drinks    Comment: Very Rarely     Immunization History:  Immunization History   Administered Date(s) Administered    Influenza Vaccine 10/08/2014, 10/09/2017    Influenza Vaccine (Quad) PF 10/23/2018, 02/04/2020    TD Vaccine 01/01/2006    Tdap 10/07/2016    Zoster Recombinant 08/29/2019, 12/18/2019       Review of Systems:   As above included in HPI.   Otherwise 11 point review of systems negative including constitutional, skin, HENT, eyes, respiratory, cardiovascular, gastrointestinal, genitourinary, musculoskeletal, endo/heme/aller, neurological.    Physical:   Vitals:   BP:     HR:    WT:    BMI:  kg/m2    Objective:   Vital Signs: (As obtained by patient/caregiver at home)  Visit Vitals  LMP 01/10/2012 (LMP Unknown)        Constitutional: [x] Appears well-developed and well-nourished [x] No apparent distress      [] Abnormal -     Mental status: [x] Alert and awake  [x] Oriented to person/place/time [x] Able to follow commands    [] Abnormal -     Eyes:   EOM    [x]  Normal    [] Abnormal -   Sclera  [x]  Normal    [] Abnormal -          Discharge [x]  None visible   [] Abnormal -     HENT: [x] Normocephalic, atraumatic  [] Abnormal -   [x] Mouth/Throat: Mucous membranes are moist    External Ears [x] Normal  [] Abnormal -    Neck: [x] No visualized mass [] Abnormal -     Pulmonary/Chest: [x] Respiratory effort normal   [x] No visualized signs of difficulty breathing or respiratory distress        [] Abnormal -      Musculoskeletal:   [x] Normal gait with no signs of ataxia         [x] Normal range of motion of neck        [] Abnormal -     Neurological:        [x] No Facial Asymmetry (Cranial nerve 7 motor function) (limited exam due to video visit)          [x] No gaze palsy        [] Abnormal -          Skin:        [x] No significant exanthematous lesions or discoloration noted on facial skin         [] Abnormal -            Psychiatric:       [x] Normal Affect [] Abnormal -        [x] No Hallucinations        Review of Data:  Labs:  Hospital Outpatient Visit on 07/10/2020   Component Date Value Ref Range Status    WBC 07/10/2020 5.3  4.6 - 13.2 K/uL Final    RBC 07/10/2020 4.79  4.20 - 5.30 M/uL Final    HGB 07/10/2020 14.1  12.0 - 16.0 g/dL Final    HCT 07/10/2020 43.8  35.0 - 45.0 % Final    MCV 07/10/2020 91.4  74.0 - 97.0 FL Final    MCH 07/10/2020 29.4  24.0 - 34.0 PG Final    MCHC 07/10/2020 32.2  31.0 - 37.0 g/dL Final    RDW 07/10/2020 12.9  11.6 - 14.5 % Final    PLATELET 72/67/2662 649  135 - 420 K/uL Final    MPV 07/10/2020 10.7  9.2 - 11.8 FL Final    NEUTROPHILS 07/10/2020 39* 40 - 73 % Final    LYMPHOCYTES 07/10/2020 50  21 - 52 % Final    MONOCYTES 07/10/2020 9  3 - 10 % Final    EOSINOPHILS 07/10/2020 2  0 - 5 % Final    BASOPHILS 07/10/2020 0  0 - 2 % Final    ABS. NEUTROPHILS 07/10/2020 2.1  1.8 - 8.0 K/UL Final    ABS. LYMPHOCYTES 07/10/2020 2.7  0.9 - 3.6 K/UL Final    ABS. MONOCYTES 07/10/2020 0.5  0.05 - 1.2 K/UL Final    ABS. EOSINOPHILS 07/10/2020 0.1  0.0 - 0.4 K/UL Final    ABS.  BASOPHILS 07/10/2020 0.0  0.0 - 0.1 K/UL Final    DF 07/10/2020 AUTOMATED    Final    LIPID PROFILE 07/10/2020        Final    Cholesterol, total 07/10/2020 193  <200 MG/DL Final    Triglyceride 07/10/2020 175* <150 MG/DL Final    HDL Cholesterol 07/10/2020 51  40 - 60 MG/DL Final    LDL, calculated 07/10/2020 107* 0 - 100 MG/DL Final    VLDL, calculated 07/10/2020 35  MG/DL Final    CHOL/HDL Ratio 07/10/2020 3.8  0 - 5.0   Final    Magnesium 07/10/2020 2.3  1.6 - 2.6 mg/dL Final    Sodium 07/10/2020 143  136 - 145 mmol/L Final    Potassium 07/10/2020 3.8  3.5 - 5.5 mmol/L Final    Chloride 07/10/2020 104  100 - 111 mmol/L Final    CO2 07/10/2020 29  21 - 32 mmol/L Final    Anion gap 07/10/2020 10  3.0 - 18 mmol/L Final    Glucose 07/10/2020 84  74 - 99 mg/dL Final    BUN 07/10/2020 28* 7.0 - 18 MG/DL Final    Creatinine 07/10/2020 0.69  0.6 - 1.3 MG/DL Final    BUN/Creatinine ratio 07/10/2020 41* 12 - 20   Final    GFR est AA 07/10/2020 >60  >60 ml/min/1.73m2 Final    GFR est non-AA 07/10/2020 >60  >60 ml/min/1.73m2 Final    Calcium 07/10/2020 9.4  8.5 - 10.1 MG/DL Final    Bilirubin, total 07/10/2020 0.3  0.2 - 1.0 MG/DL Final    ALT (SGPT) 07/10/2020 35  13 - 56 U/L Final    AST (SGOT) 07/10/2020 19  10 - 38 U/L Final    Alk. phosphatase 07/10/2020 91  45 - 117 U/L Final    Protein, total 07/10/2020 7.1  6.4 - 8.2 g/dL Final    Albumin 07/10/2020 4.0  3.4 - 5.0 g/dL Final    Globulin 07/10/2020 3.1  2.0 - 4.0 g/dL Final    A-G Ratio 07/10/2020 1.3  0.8 - 1.7   Final    TSH 07/10/2020 1.04  0.36 - 3.74 uIU/mL Final    Vitamin D 25-Hydroxy 07/10/2020 46.1  30 - 100 ng/mL Final    Color 07/10/2020 DARK YELLOW    Final    Appearance 07/10/2020 CLEAR    Final    Specific gravity 07/10/2020 >1.030* 1.005 - 1.030 Final    pH (UA) 07/10/2020 5.5  5.0 - 8.0   Final    Protein 07/10/2020 Negative  NEG mg/dL Final    Glucose 07/10/2020 Negative  NEG mg/dL Final    Ketone 07/10/2020 Negative  NEG mg/dL Final    Bilirubin 07/10/2020 SMALL* NEG   Final    Blood 07/10/2020 Negative  NEG   Final    Urobilinogen 07/10/2020 0.2  0.2 - 1.0 EU/dL Final    Nitrites 07/10/2020 Negative  NEG   Final    Leukocyte Esterase 07/10/2020 Negative  NEG   Final     Health Maintenance:  Screening:    Mammogram: negative (11/2019)   PAP smear: well women exams by Dr. Wilbur Rivero (last 12/2019)   Colorectal: colonoscopy (4/2015) no polyps. Dr. Caryl Driscoll.  Due 4/2020. Depression: Weaned from Effexor (vasomotor instability). DM (HbA1c/FPG): FPG 84 (7/2020)   Hepatitis C: negative (12/2015)   Falls: none   DEXA: N/A   Glaucoma: has regular eye exams with Dr. Ros Gallo (last 11/2018)   Smoking: 15 pack year (stopped 2011)   Vitamin D: 46.1 (7/2020)   Medicare Wellness: N/A    Impression:  Patient Active Problem List   Diagnosis Code    Hypertension I10    Hyperlipidemia E78.5    Obstructive sleep apnea G47.33    Chronic daily headache R51    IBS (irritable bowel syndrome) K58.9    Colon polyp K63.5    Vitamin D deficiency E55.9    History of colitis Z87.19    Lumbar spinal stenosis M48.061    HNP (herniated nucleus pulposus), lumbar M51.26    Lumbar facet arthropathy M47.816    Neck pain M54.2    Degenerative disc disease, cervical M50.30    Right ovarian cyst N83.201    Former smoker Z87.891       Plan:  1. Hypertension. Blood pressure has been well controlled on current regimen of hydralazine 100 mg tid, amlodipine 10 mg daily, and hydrochlorothiazide 25 mg daily. Successfully discontinued treatment with captopril since unclear if abdominal attacks may have been a form of angioedema. Advised to check blood pressure at home. Renal function remains normal with creatinine 0.69/ eGFR >60. However, BUN/creatinine ratio remains significantly elevated at 41, and again discussed importance of increasing fluid intake, particularly given treatment with hydrochlorothiazide. Follow closely. 2. Hyperlipidemia. Began treatment with rosuvastatin in 6/2016, and elevation in transaminases first noted in 8/2016. Discontinued in 5/2017 due to persistent elevation of transaminases, and resolved after discontinuation. Calculated 10 year ASCVD risk off statin was 5.4 %, which did not place her in one of the four statin benefit groups as per new AHA/ACC guidelines. However, , , and HDL 50, and patient with strong family history of heart disease.  She started treatment with pravastatin 10 mg daily in 10/2018, and LDL improved to 80 with HDL 54 at that time. LDL worse today at 107 and HDL 51, and patient acknowledges dietary indiscretion and increase in weight. Emphasized importance of lifestyle modifications, including diet, exercise, and weight loss. Liver function tests remain normal. If LDL remains elevated, will consider increasing dose of pravastatin. Will continue to follow. 3. Elevated transaminases. Now resolved since rosuvastatin discontinued. Review of record shows elevation noted since 8/2016 when initiated. Iron studies, hepatitis panel, KIEL, SPEP, CK, aldolase, and anti-smooth muscle antibody negative. RUQ ultrasound (10/2016) showing fatty infiltration of liver. Advised patient to avoid alcohol. 4. Right lower lobe pneumonia. Had a URI for approximately 3 weeks, mainly manifested as nasal congestion, post-nasal drainage and nonproductive cough. She appeared to be gradually improving until 10/5/2019 when she noted that onset of a fever, chills, myalgias, and weakness. She stated that her cough appeared to be mildly worse, and over the next 48 hours became productive of yellow brown sputum. She presented to [facility] and found to have WBC 14.2 (71% neutrophils) and chest x-ray revealed peribronchial cuffing with an infiltrate in the right lower lobe. She was started on doxycycline, Qvar inhaler, and Tessalon pearls. She was seen in follow-up and continued to have a productive cough and reported mild dyspnea on exertion. Antibiotic coverage was broadened for a community acquired pneumonia to include Augmentin bid for 10 days in addition to doxycycline. Prescribed an albuterol inhaler with spacer to help with mild dyspnea on exertion, and patient reports resolution today. Repeat chest x-ray normal (11/2019). 5. Colitis.  Unclear etiology, but nonspecific bowel wall thickening in short segment of jejunum and sigmoid on repeat CT scan raised question of ischemic in origin. Hypercoaguable workup including lab studies and echocardiogram negative. Appears to have intermittent \"IBS\" episodes associated with facial flushing, dry mouth, and severe abdominal cramping followed by diarrhea +/- vomiting. Episodes linger for several days following. No evidence of urticaria seen. Interesting is the abnormalities noted on abdominal CT scan in two of her episodes, with nonspecific short segment thickening in various locations in small and large intestines. Obtained complement studies including C4, C1 esterase inhibtor, and C1q levels which were normal. However, instructed patient that if she developed another episode, would like to check C4 level during the episode. Discontinued captopril and has not had recurrence since doing so. Evaluated by GI and had negative small bowel series, duplex ultrasound, and 24 hour urine for 5-HIAA level. No longer a significant issue since captopril discontinued. Continue to follow. 5. Chronic daily headaches. Evaluated by Dr. Moody Fairbanks and weaned from daily Fioricet and Excedrin migraine. Headaches now improved with daily amitriptyline and trying to use Excedrin migraine and Fioricet only occasionally for moderate and severe headaches, respectively. Does have chronic neck pain related to her anterior neck fusion, and is finding Zanaflex and regular exercises to be helpful. States has not needed to follow-up with Dr. Moody Fairbanks. Describing sinus issues also contributing to headaches, and now on Zyrtec and Nasocort prn.   6. Right leg sciatica. Evidence of lumbar spinal stenosis and facet arthropathy, and lumbar herniated disc. Significant improvement with dry needling therapy and reports no further pain. Continue to follow and follow-up with Dr. Lily Pendleton if recurs. 7. Abnormal glucose. Fasting glucose remains normal. Most likely due to weight loss. Emphasized importance of continued lifestyle modifications. 8.  Obstructive sleep apnea.  Underwent reevaluation by Dr. Lyle Levine, and repeat sleep study showed moderate PEGGY. Started on CPAP and noting improvement. Continue to follow. 9. Right ovarian cyst. Enlarged slightly and changed in appearance at last evaluation on ultrasound. Referred to Dr. Darlin Verudzco and underwent laparoscopic bilateral salpingo-oophorectomy and washing. 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 No further treatment needed. 10. Health maintenance. Completed 2/2 doses of Shingrix vaccine. Other immunizations up to date. Well woman exam with Dr. Uday Flores in 2/2020. Will discuss baseline bone density study with next mammogram. Mammogram up to date. Has visit scheduled with Dr. Sharan Acosta to schedule colonoscopy. Continue regular eye exams with Dr. Pramod Kong. Vitamin D level normal. Continue maintenance dose supplement. Patient understands recommendations and agrees with plan. Follow-up 6 months. We discussed the expected course, resolution and complications of the diagnosis(es) in detail. Medication risks, benefits, costs, interactions, and alternatives were discussed as indicated. I advised her to contact the office if her condition worsens, changes or fails to improve as anticipated. She expressed understanding with the diagnosis(es) and plan. Khadijah Bedoya, who was evaluated through a patient-initiated, synchronous (real-time) audio-video encounter, and/or her healthcare decision maker, is aware that it is a billable service, with coverage as determined by her insurance carrier. She provided verbal consent to proceed: Yes, and patient identification was verified. It was conducted pursuant to the emergency declaration under the [address] [place] [address] authority and the [employer] Act. A caregiver was present when appropriate. Ability to conduct physical exam was limited. I was in the office.  The patient was at home.      Maria Elena Grant MD

## 2020-08-06 DIAGNOSIS — G89.29 CHRONIC NONINTRACTABLE HEADACHE, UNSPECIFIED HEADACHE TYPE: ICD-10-CM

## 2020-08-06 DIAGNOSIS — R51.9 CHRONIC NONINTRACTABLE HEADACHE, UNSPECIFIED HEADACHE TYPE: ICD-10-CM

## 2020-08-06 RX ORDER — BUTALBITAL, ACETAMINOPHEN AND CAFFEINE 50; 325; 40 MG/1; MG/1; MG/1
1 TABLET ORAL
Qty: 60 TAB | Refills: 0 | Status: SHIPPED | OUTPATIENT
Start: 2020-08-06 | End: 2020-09-01 | Stop reason: SDUPTHER

## 2020-08-06 NOTE — TELEPHONE ENCOUNTER
PCP: Mignon Bynum MD    Last appt: 7/16/2020  Future Appointments   Date Time Provider Carolina Palumbo   9/1/2020 11:00 AM TSS [facility] NURSE VISIT [facility] SWETA SCHED   11/30/2020 10:00 AM HBV YULISSA RM 4 3D [facility] HBV   1/12/2021  8:30 AM IOC NURSE VISIT IOC SWETA SCHED   1/19/2021  9:30 AM Mignon Bynum MD [address]       Requested Prescriptions     Pending Prescriptions Disp Refills    butalbital-acetaminophen-caffeine (FIORICET, ESGIC) -40 mg per tablet 60 Tab 0     Sig: Take 1 Tab by mouth every six (6) hours as needed for Headache.

## 2020-09-01 ENCOUNTER — OFFICE VISIT (OUTPATIENT)
Dept: SURGERY | Age: 60
End: 2020-09-01

## 2020-09-01 VITALS
BODY MASS INDEX: 23.22 KG/M2 | WEIGHT: 136 LBS | DIASTOLIC BLOOD PRESSURE: 77 MMHG | RESPIRATION RATE: 16 BRPM | TEMPERATURE: 97.6 F | HEIGHT: 64 IN | HEART RATE: 72 BPM | SYSTOLIC BLOOD PRESSURE: 128 MMHG | OXYGEN SATURATION: 97 %

## 2020-09-01 DIAGNOSIS — R51.9 CHRONIC NONINTRACTABLE HEADACHE, UNSPECIFIED HEADACHE TYPE: ICD-10-CM

## 2020-09-01 DIAGNOSIS — Z12.11 COLON CANCER SCREENING: Primary | ICD-10-CM

## 2020-09-01 DIAGNOSIS — G89.29 CHRONIC NONINTRACTABLE HEADACHE, UNSPECIFIED HEADACHE TYPE: ICD-10-CM

## 2020-09-01 DIAGNOSIS — Z01.818 PRE-OP TESTING: ICD-10-CM

## 2020-09-01 RX ORDER — BUTALBITAL, ACETAMINOPHEN AND CAFFEINE 50; 325; 40 MG/1; MG/1; MG/1
1 TABLET ORAL
Qty: 60 TAB | Refills: 0 | Status: SHIPPED | OUTPATIENT
Start: 2020-09-01 | End: 2020-09-28 | Stop reason: SDUPTHER

## 2020-09-01 NOTE — TELEPHONE ENCOUNTER
PCP: Rocael Anaya MD    Last appt: 7/16/2020  Future Appointments   Date Time Provider Carolina Palumbo   11/30/2020 10:00 AM HBV YULISSA RM 4 3D [facility] HBV   1/12/2021  8:30 AM IO NURSE VISIT [facility] SWETA SCHED   1/19/2021  9:30 AM Rocael Anaya MD [address]       Requested Prescriptions     Pending Prescriptions Disp Refills    butalbital-acetaminophen-caffeine (FIORICET, ESGIC) -40 mg per tablet 60 Tab 0     Sig: Take 1 Tab by mouth every six (6) hours as needed for Headache.

## 2020-09-01 NOTE — PROGRESS NOTES
Review of Systems   Constitutional: Negative. HENT: Negative. Eyes: Negative. Respiratory: Negative. Cardiovascular: Negative. Gastrointestinal: Negative. Genitourinary: Negative. Musculoskeletal: Negative. Skin: Negative. Neurological: Negative. Endo/Heme/Allergies: Negative. Psychiatric/Behavioral: Negative. Colon Screen    Patient: Olinda Dewey MRN: [id number]  SSN: [id number]    YOB: 1960  Age: 62 y.o. Sex: female        Subjective:   Olinda Dewey is here to schedule her recall colonoscopy. Her PCP is Viridiana Ziegler MD.  Patient referred for colonoscopy for   Screening colonoscopy. Patient denies rectal pain or bleeding. Abdominal surgeries as described below, specifically oophorectomy. Family history as described below, specifically none. Last colonoscopy was 5 years ago with Dr. Willie Díaz. This was normal and she was recalled for 5 years. Allergies   Allergen Reactions    Other Plant, Animal, Environmental Itching and Sneezing     \"Everything but feathers and horses. \"       Past Medical History:   Diagnosis Date    Allergic rhinitis     Chronic headaches     Hx of colonic polyp 2011    Hyperplastic; Dr. Mark Khoury.     Hyperlipidemia     Hypertension     IBS (irritable bowel syndrome)     Obstructive sleep apnea     Moderate-severe; not using nasal CPAP    S/P cervical discectomy 2011    Anterior C5-C6 discectomy for herniated disc and right radiculopathy; Dr. Radha Mays     Past Surgical History:   Procedure Laterality Date    ENDOSCOPY, COLON, DIAGNOSTIC      HX BACK SURGERY  11    neck surgery; ruptured cervical discs    HX COLONOSCOPY  2011 2015    4/17/15, normal, recalled for 5 years   [facility] HX GYN      dilation and curretage    HX GYN          HX OOPHORECTOMY  10/2018      Family History   Problem Relation Age of Onset    Heart Disease Brother     Other Mother         melanoma    Arthritis-osteo Mother  Bleeding Prob Father     Heart Disease Father     Heart Attack Father     Stroke Maternal Uncle     Heart Attack Maternal Uncle     Breast Cancer Maternal Grandmother      Social History     Tobacco Use    Smoking status: Former Smoker     Packs/day: 0.50     Last attempt to quit: 2015     Years since quittin.6    Smokeless tobacco: Never Used   Substance Use Topics    Alcohol use: Yes     Alcohol/week: 0.0 standard drinks     Comment: Very Rarely      Prior to Admission medications    Medication Sig Start Date End Date Taking? Authorizing Provider   butalbital-acetaminophen-caffeine (FIORICET, ESGIC) -40 mg per tablet Take 1 Tab by mouth every six (6) hours as needed for Headache. 20  Yes Anibal Trinh MD   hydroCHLOROthiazide (HYDRODIURIL) 25 mg tablet TAKE 1 TABLET BY MOUTH EVERY DAY 7/15/20  Yes Anibal Trinh MD   potassium chloride (K-DUR, KLOR-CON) 20 mEq tablet TAKE 1 TABLET BY MOUTH DAILY 7/15/20  Yes Anibal Trinh MD   amLODIPine (NORVASC) 10 mg tablet TAKE 1 TABLET BY MOUTH DAILY 7/15/20  Yes Anibal Trinh MD   tiZANidine (ZANAFLEX) 4 mg tablet Take 1 Tab by mouth every evening. 20  Yes Anibal Trinh MD   hydrALAZINE (APRESOLINE) 100 mg tablet TAKE 1 TABLET BY MOUTH THREE TIMES DAILY 3/15/20  Yes Anibal Trinh MD   cetirizine (ZYRTEC) 10 mg tablet Take 1 Tab by mouth daily. Indications: inflammation of the nose due to an allergy 20  Yes Anibal Trinh MD   triamcinolone (NASACORT AQ) 55 mcg nasal inhaler 2 Sprays by Both Nostrils route daily. 20  Yes Anibal Trinh MD   pravastatin (PRAVACHOL) 10 mg tablet TAKE 1 TABLET BY MOUTH EVERY NIGHT 10/18/19  Yes Anibal Trinh MD   albuterol (PROVENTIL HFA, VENTOLIN HFA, PROAIR HFA) 90 mcg/actuation inhaler Take 1 Puff by inhalation every six (6) hours as needed for Wheezing.  10/10/19  Yes Anibal Trinh MD   inhalational spacing device 1 Each by Does Not Apply route as needed for Cough (shortness of breath). 10/10/19  Yes Porfirio Nj MD   rutin 500 mg tab Take 500 mg by mouth daily. Yes Provider, Historical   TURMERIC ROOT EXTRACT PO Take 1,000 mg by mouth. Yes Provider, Historical   GLUC/CHONDR-MSM#7/C/DEENA/BORON (GLUCOSAMINE-CHONDR, BOSWELLIA, PO) Take 500 mg by mouth daily. Yes Provider, Historical   multivitamin (ONE A DAY) tablet Take 1 Tab by mouth daily. Yes Provider, Historical   Cholecalciferol, Vitamin D3, 2,000 unit cap Take 2,000 Units by mouth daily. Yes Provider, Historical          Review of Systems:      Risks colonoscopy described- colon injury, missed lesion, anesthesia problems, bleeding       Dede Mixon, ESTRELLA  September 1, 8985  11:27 AM

## 2020-09-28 DIAGNOSIS — G89.29 CHRONIC NONINTRACTABLE HEADACHE, UNSPECIFIED HEADACHE TYPE: ICD-10-CM

## 2020-09-28 DIAGNOSIS — R51.9 CHRONIC NONINTRACTABLE HEADACHE, UNSPECIFIED HEADACHE TYPE: ICD-10-CM

## 2020-09-28 RX ORDER — BUTALBITAL, ACETAMINOPHEN AND CAFFEINE 50; 325; 40 MG/1; MG/1; MG/1
1 TABLET ORAL
Qty: 60 TAB | Refills: 0 | Status: SHIPPED | OUTPATIENT
Start: 2020-09-28 | End: 2020-10-22 | Stop reason: SDUPTHER

## 2020-09-28 RX ORDER — TIZANIDINE 4 MG/1
4 TABLET ORAL EVERY EVENING
Qty: 90 TAB | Refills: 1 | Status: SHIPPED | OUTPATIENT
Start: 2020-09-28 | End: 2021-04-15 | Stop reason: SDUPTHER

## 2020-09-29 ENCOUNTER — OFFICE VISIT (OUTPATIENT)
Dept: INTERNAL MEDICINE CLINIC | Age: 60
End: 2020-09-29
Payer: COMMERCIAL

## 2020-09-29 VITALS
BODY MASS INDEX: 23.05 KG/M2 | RESPIRATION RATE: 16 BRPM | TEMPERATURE: 97.9 F | DIASTOLIC BLOOD PRESSURE: 72 MMHG | SYSTOLIC BLOOD PRESSURE: 134 MMHG | WEIGHT: 135 LBS | HEIGHT: 64 IN | HEART RATE: 99 BPM | OXYGEN SATURATION: 95 %

## 2020-09-29 DIAGNOSIS — Z23 NEEDS FLU SHOT: ICD-10-CM

## 2020-09-29 DIAGNOSIS — M54.31 RIGHT SIDED SCIATICA: Primary | ICD-10-CM

## 2020-09-29 DIAGNOSIS — M48.062 SPINAL STENOSIS OF LUMBAR REGION WITH NEUROGENIC CLAUDICATION: ICD-10-CM

## 2020-09-29 DIAGNOSIS — Z87.19 HISTORY OF COLITIS: ICD-10-CM

## 2020-09-29 DIAGNOSIS — M47.816 LUMBAR FACET ARTHROPATHY: ICD-10-CM

## 2020-09-29 DIAGNOSIS — I10 ESSENTIAL HYPERTENSION: ICD-10-CM

## 2020-09-29 DIAGNOSIS — E78.5 HYPERLIPIDEMIA, UNSPECIFIED HYPERLIPIDEMIA TYPE: ICD-10-CM

## 2020-09-29 DIAGNOSIS — R51.9 CHRONIC DAILY HEADACHE: ICD-10-CM

## 2020-09-29 DIAGNOSIS — K58.1 IRRITABLE BOWEL SYNDROME WITH CONSTIPATION: ICD-10-CM

## 2020-09-29 PROCEDURE — 90686 IIV4 VACC NO PRSV 0.5 ML IM: CPT | Performed by: INTERNAL MEDICINE

## 2020-09-29 PROCEDURE — 99214 OFFICE O/P EST MOD 30 MIN: CPT | Performed by: INTERNAL MEDICINE

## 2020-09-29 PROCEDURE — 90471 IMMUNIZATION ADMIN: CPT | Performed by: INTERNAL MEDICINE

## 2020-09-29 RX ORDER — METHYLPREDNISOLONE 4 MG/1
4 TABLET ORAL
Qty: 1 DOSE PACK | Refills: 0 | Status: SHIPPED | OUTPATIENT
Start: 2020-09-29 | End: 2021-01-19 | Stop reason: ALTCHOICE

## 2020-09-29 RX ORDER — GABAPENTIN 300 MG/1
300 CAPSULE ORAL 3 TIMES DAILY
Qty: 90 CAP | Refills: 0 | Status: SHIPPED | OUTPATIENT
Start: 2020-09-29 | End: 2020-10-15 | Stop reason: SDUPTHER

## 2020-09-29 NOTE — PATIENT INSTRUCTIONS
Vaccine Information Statement    Influenza (Flu) Vaccine (Inactivated or Recombinant): What You Need to Know    Many Vaccine Information Statements are available in German and other languages. See www.immunize.org/vis  Hojas de información sobre vacunas están disponibles en español y en muchos otros idiomas. Visite www.immunize.org/vis    1. Why get vaccinated? Influenza vaccine can prevent influenza (flu). Flu is a contagious disease that spreads around the [facility] every year, usually between October and May. Anyone can get the flu, but it is more dangerous for some people. Infants and young children, people 72years of age and older, pregnant women, and people with certain health conditions or a weakened immune system are at greatest risk of flu complications. Pneumonia, bronchitis, sinus infections and ear infections are examples of flu-related complications. If you have a medical condition, such as heart disease, cancer or diabetes, flu can make it worse. Flu can cause fever and chills, sore throat, muscle aches, fatigue, cough, headache, and runny or stuffy nose. Some people may have vomiting and diarrhea, though this is more common in children than adults. Each year thousands of people in the [facility] die from flu, and many more are hospitalized. Flu vaccine prevents millions of illnesses and flu-related visits to the doctor each year. 2. Influenza vaccines     CDC recommends everyone 10months of age and older get vaccinated every flu season. Children 6 months through 6years of age may need 2 doses during a single flu season. Everyone else needs only 1 dose each flu season. It takes about 2 weeks for protection to develop after vaccination. There are many flu viruses, and they are always changing. Each year a new flu vaccine is made to protect against three or four viruses that are likely to cause disease in the upcoming flu season.  Even when the vaccine doesnt exactly match these viruses, it may still provide some protection. Influenza vaccine does not cause flu. Influenza vaccine may be given at the same time as other vaccines. 3. Talk with your health care provider    Tell your vaccine provider if the person getting the vaccine:   Has had an allergic reaction after a previous dose of influenza vaccine, or has any severe, life-threatening allergies.  Has ever had Guillain-Barré Syndrome (also called GBS). In some cases, your health care provider may decide to postpone influenza vaccination to a future visit. People with minor illnesses, such as a cold, may be vaccinated. People who are moderately or severely ill should usually wait until they recover before getting influenza vaccine. Your health care provider can give you more information. 4. Risks of a reaction     Soreness, redness, and swelling where shot is given, fever, muscle aches, and headache can happen after influenza vaccine.  There may be a very small increased risk of Guillain-Barré Syndrome (GBS) after inactivated influenza vaccine (the flu shot). [facility] children who get the flu shot along with pneumococcal vaccine (PCV13), and/or DTaP vaccine at the same time might be slightly more likely to have a seizure caused by fever. Tell your health care provider if a child who is getting flu vaccine has ever had a seizure. People sometimes faint after medical procedures, including vaccination. Tell your provider if you feel dizzy or have vision changes or ringing in the ears. As with any medicine, there is a very remote chance of a vaccine causing a severe allergic reaction, other serious injury, or death. 5. What if there is a serious problem? An allergic reaction could occur after the vaccinated person leaves the clinic.  If you see signs of a severe allergic reaction (hives, swelling of the face and throat, difficulty breathing, a fast heartbeat, dizziness, or weakness), call 9-1-1 and get the person to the nearest hospital.    For other signs that concern you, call your health care provider. Adverse reactions should be reported to the Vaccine Adverse Event Reporting System (VAERS). Your health care provider will usually file this report, or you can do it yourself. Visit the VAERS website at www.vaers. hhs.gov or call 7-439.148.3070. VAERS is only for reporting reactions, and VAERS staff do not give medical advice. 6. The National Vaccine Injury Compensation Program    The [facility] Vaccine Injury Compensation Program (VICP) is a federal program that was created to compensate people who may have been injured by certain vaccines. Visit the VICP website at www.[facility].gov/vaccinecompensation or call 9-710.197.8587 to learn about the program and about filing a claim. There is a time limit to file a claim for compensation. 7. How can I learn more?  Ask your health care provider.  Call your local or state health department.  Contact the Centers for Disease Control and Prevention (CDC):  - Call 7-744.444.7511 (6-069-QHZ-INFO) or  - Visit CDCs influenza website at www.cdc.gov/flu    Vaccine Information Statement (Interim)  Inactivated Influenza Vaccine   8/15/2019  42 GALLO Suarez [id number]   Department of Health and Human Services  Centers for Disease Control and Prevention    Office Use Only         Sciatica: Exercises  Introduction  Here are some examples of typical rehabilitation exercises for your condition. Start each exercise slowly. Ease off the exercise if you start to have pain. Your doctor or physical therapist will tell you when you can start these exercises and which ones will work best for you. When you are not being active, find a comfortable position for rest. Some people are comfortable on the floor or a medium-firm bed with a small pillow under their head and another under their knees. Some people prefer to lie on their side with a pillow between their knees.  Don't stay in one position for too long. Take short walks (10 to 20 minutes) every 2 to 3 hours. Avoid slopes, hills, and stairs until you feel better. Walk only distances you can manage without pain, especially leg pain. How to do the exercises  Back stretches   1. Get down on your hands and knees on the floor. 2. Relax your head and allow it to droop. Round your back up toward the ceiling until you feel a nice stretch in your upper, middle, and lower back. Hold this stretch for as long as it feels comfortable, or about 15 to 30 seconds. 3. Return to the starting position with a flat back while you are on your hands and knees. 4. Let your back sway by pressing your stomach toward the floor. Lift your buttocks toward the ceiling. 5. Hold this position for 15 to 30 seconds. 6. Repeat 2 to 4 times. Follow-up care is a key part of your treatment and safety. Be sure to make and go to all appointments, and call your doctor if you are having problems. It's also a good idea to know your test results and keep a list of the medicines you take. Where can you learn more? Go to http://www.gray.com/  Enter D665 in the search box to learn more about \"Sciatica: Exercises. \"  Current as of: March 2, 2020               Content Version: 12.6  © 2006-2020 [employer] Inc[employer]. Care instructions adapted under license by [employer] (which disclaims liability or warranty for this information). If you have questions about a medical condition or this instruction, always ask your healthcare professional. [address] any warranty or liability for your use of this information. Sciatica: Care Instructions  Your Care Instructions     Sciatica (say \"arl-PU-jc-kuh\") is an irritation of one of the sciatic nerves, which come from the spinal cord in the lower back. The sciatic nerves and their branches extend down through the buttock to the foot.  Sciatica can develop when an injured disc in the back irritates or presses against a spinal nerve root. Its main symptom is pain, numbness, or weakness that is often worse in the leg or foot than in the back. Sciatica often will improve and go away with time. Early treatment usually includes medicines and exercises to relieve pain. Follow-up care is a key part of your treatment and safety. Be sure to make and go to all appointments, and call your doctor if you are having problems. It's also a good idea to know your test results and keep a list of the medicines you take. How can you care for yourself at home? · Take pain medicines exactly as directed. ? If the doctor gave you a prescription medicine for pain, take it as prescribed. ? If you are not taking a prescription pain medicine, ask your doctor if you can take an over-the-counter medicine. · Use heat or ice to relieve pain. ? To apply heat, put a warm water bottle, heating pad set on low, or warm cloth on your back. Do not go to sleep with a heating pad on your skin. ? To use ice, put ice or a cold pack on the area for 10 to 20 minutes at a time. Put a thin cloth between the ice and your skin. · Avoid sitting if possible, unless it feels better than standing. · Alternate lying down with short walks. Increase your walking distance as you are able to without making your symptoms worse. · Do not do anything that makes your symptoms worse. When should you call for help? Call 911 anytime you think you may need emergency care. For example, call if:    · You are unable to move a leg at all. Call your doctor now or seek immediate medical care if:    · You have new or worse symptoms in your legs or buttocks. Symptoms may include:  ? Numbness or tingling. ? Weakness. ? Pain.     · You lose bladder or bowel control. Watch closely for changes in your health, and be sure to contact your doctor if:    · You are not getting better as expected. Where can you learn more?   Go to http://www.gray.com/  Enter Z239 in the search box to learn more about \"Sciatica: Care Instructions. \"  Current as of: March 2, 2020               Content Version: 12.6  © 0421-8258 [employer]. Care instructions adapted under license by [employer] (which disclaims liability or warranty for this information). If you have questions about a medical condition or this instruction, always ask your healthcare professional. [place] [address] any warranty or liability for your use of this information.

## 2020-09-29 NOTE — PROGRESS NOTES
Han Parisi 1960 female who presents for routine immunizations. Patient denies any symptoms , reactions or allergies that would exclude them from being immunized today. Risks and adverse reactions were discussed and the VIS was given to them. All questions were addressed. Order placed for Reg Influenza,  per Verbal Order from Dr. Mk Babin with read back. Patient was observed for 15 min post injection. There were no reactions observed.     Galen Fry LPN

## 2020-10-02 ENCOUNTER — HOSPITAL ENCOUNTER (OUTPATIENT)
Dept: LAB | Age: 60
Discharge: HOME | End: 2020-10-02
Payer: COMMERCIAL

## 2020-10-02 ENCOUNTER — TELEPHONE (OUTPATIENT)
Dept: INTERNAL MEDICINE CLINIC | Age: 60
End: 2020-10-02

## 2020-10-02 PROCEDURE — 87635 SARS-COV-2 COVID-19 AMP PRB: CPT

## 2020-10-02 NOTE — TELEPHONE ENCOUNTER
Pt states she can't get comfortable when she is sitting or laying down. She started day 3 today of her steroid pack. Pain level is 3-4 so a little better but not to the point she can rest.  She is requesting a few pills of pain medication.   Please advise her at [phone]

## 2020-10-02 NOTE — TELEPHONE ENCOUNTER
Called and spoke with patient. Reports taking gabapentin tid and has started the Medrol dose pack. Noting improvement in right sciatica symptoms, but still having pain in lower back mainly when lying down. Reports some improvement with ambulation, stretching, and heat. Advised that may take naprosyn or ibuprofen for pain and advised to be careful to take with food. Also discussed lidocaine patches that are available over the counter. Discussed physical therapy, but wishing to wait to see if medications are effective. Also advised to consider follow-up with Dr. Jeanette Rae. Will call back if no improvement.

## 2020-10-03 LAB — SARS-COV-2, COV2NT: NOT DETECTED

## 2020-10-03 NOTE — PROGRESS NOTES
HPI:   Han Parisi is a 62y.o. year old female who presents today for an acute visit. She has a history of hypertension, hyperlipidemia, chronic daily headaches, obstructive sleep apnea, allergic rhinitis, vasomotor instability, and irritable bowel syndrome. She reports that developed low back pain one week ago after picking up her granddaughter. She states that the pain gradually progressed with radiation into her right buttocks and posterior right leg. She attempted management with ibuprofen and stretches, but due to worsening pain over the last several days, she presented to the [facility] ED on 9/26/2020. Lumbar spine x-ray showed no fracture or subluxation, but increasing degenerative disc space narrowing at L3-L4. She was treated with Skelaxin and naproxen, as well as lidocaine patches. However, she states that she could not obtain lidocaine patches since not covered by her insurance. She reports today that she has had persistent symptoms with radiation also into her right groin. She has not found naproxen to be effective in controlling pain. She has been applying ice and heat with mild improvement. She denies any fevers, chills, weight change, saddle paresthesia, neurogenic bowel or bladder symptoms, or recent trauma. She is otherwise without new complaints. In 7/2018, she was referred to Dr. Melissa Luna for her chronic daily headaches, and evaluation included brain MRI and MRA (8/8/2018) showing small nonspecific subcortical white matter hyperintensity left insula region,otherwise unremarkable MRI; and MRA negative. She underwent sleep study evaluation and was diagnosed with moderate obstructive sleep apnea, and was referred to Dr. Liane Hinojosa and successfully started on CPAP. She was prescribed a Medrol dose pack to allow her to withdraw from daily use of analgesics for her headaches.  She was then started on amitriptyine 10 mg daily and instructed to use Excedrin migraine for moderate headaches, and Fioricet only for severe headaches. Celebrex and Zanaflex were prescribed to manage her chronic neck pain, and she continues to take one Zanaflex nightly. She also completed physical therapy, including dry needling, for her neck pain with significant improvement. She is continuing to use Fioricet when she develops a headache, but reports that she is needing to take only one with good response. She also is continuing to take Zanaflex and amitriptyline at bedtime, but is no longer needing Celebrex. On 1/29/2018, she called the office and reported she was out having dinner when she experienced an \"IBS episode\" characterized by flushing in the face, dry mouth, and severe abdominal cramping. When she arrived home, she experienced diarrhea and a single episode of vomiting. She reported that she continued to have abdominal cramping and fatigue following this episode, although described as getting slowly better each day. She reported that she had similar episodes to this dating back 30 years, and was diagnosed with IBS at that time. She stated that the episodes used to resolve more quickly, although over the last year, she states that it now is taking several days before she recovers. She was referred to Dr. Narayan Duarte, and started on Linzess for constipation. However, she states that it was not effective. She underwent evaluation by Dr. Michael Martínez and Dr. Cristiana Nieves and had a small bowel series (8/7/2018) and abdominal duplex scan (8/16/2018), which were negative. She also collected a 24 hour urine for 5-HIAA, which was negative. She reports that she has noted an improvement in her abdominal pain and diarrheal episodes, and reports that she has not had any episodes since discontinuing captopril. She currently denies any abdominal pain, nausea, vomiting, melena, hematochezia, or change in bowel movements. On 8/2/2016, she was evaluated by PETERSON Ahmadi for left sided crampy abdominal pain, diarrhea, and hematochezia.  She was treated for presumed diverticulitis with Cipro and Flagyl, and underwent evaluation showing WBC 11.2, ALT 59, alkaline phosphatase 121, and lipase 427. Abdominal CT scan (8/2/2016) showed localized bowel wall thickening with inflammatory change involving the splenic flexure and proximal descending colon; no colonic diverticuli are seen so the presence of diverticulitis is unlikely; would be a typical location for bowel ischemia, but the patient's vascular structures are essentially normal so bowel ischemia unlikely; favor infectious or inflammatory colitis. She was evaluated by Dr. Moses Manuel and admitted for bowel rest, IVF, and IV antibiotics. She improved significantly in 36 hours and was discharged home. She completed the course of oral antibiotics and states that her symptoms have completely resolved. In 4/2017, she was again evaluated by Dr. Moses Manuel for recurrent abdominal pain, and abdominal and pelvic CT scan was obtained (4/28/2017) showing mild nonspecific thickening of the proximal jejunum and significant improvement in the distal transverse colon adjacent to the splenic flexure with a short segment with mild wall thickening. Her discomfort resolved, but concern was raised for possible ischemia as cause. An echocardiogram was obtained (5/26/2017) showing normal LV function (EF 65%), no RWMA, grade 1 diastolic dysfunction, and wall thickening upper limits of normal. She also underwent a hypercoaguable laboratory workup, which was negative. She underwent screening colonoscopy by Dr. Jessika Card in 6/2011, which found a polyp in the proximal descending colon (pathology: hyperplastic). Because she could not intubate the cecum, follow-up was recommended for 2 years. In 4/2015, she underwent repeat colonoscopy with Dr. Walter Ortiz, which showed left diverticular disease, hypertrophied anal papilla, and no polyps. Follow-up was recommended for 5 years.      She has a history of hypertension, treated with amlodipine, hydrochlorothiazide, and hydralazine. She has resumed exercising and she purchased an elliptical machine for her home. She denies any chest pain, shortness of breath at rest or with exertion, palpitations, lightheadedness, or edema. She also has hyperlipidemia, treated with fenofibrate and fish oil in the past, but these were discontinued and she was changed to rosuvastatin in 6/2016. However, due to mild to moderate elevation in AST/ALT, it was discontinued in 5/2017 with resolution of abnormal liver function tests. She was subsequently started on pravastatin in 10/2018, and reports no difficulties with it today. She has a history of mild-severe obstructive sleep apnea, diagnosed by Dr. Hernando Vines, and was prescribed nasal CPAP. She states that she is no longer using her machine because it was making her headaches worse. She suffers from chronic headaches, considered to be migraines in the past as they were associated with menstruation. Since she entered menopause, the severity of her headaches has decreased and the character has changed. She has severe allergic rhinitis, and awakens with morning headaches when her allergies are severe. She is followed by Dr. Jet Rucker and had been receiving immunotherapy with some improvement. She also has vasomotor instability, which responded to venlafaxine and she has successfully weaned from taking it. In 10/2019, she reported a 3 week history of URI with nonproductive cough. She stated that her symptoms persisted, although appeared to be gradually improving until 10/5/2019, when she developed a fever of 100.8 ° F, chills, nausea, decreased appetite, myalgias, and weakness. She also stated that her cough appeared to be  worse, and by 10/7/2019, it had become productive of yellow brown sputum.  She presented to [facility] and evaluation included WBC 14.2 with 71% neutrophils, hemoglobin 40.9, and chest x-ray revealed peribronchial cuffing with an infiltrate in the right lower lobe. She was started on doxycycline 100 mg bid for 10 days, Qvar inhaler, and Tessalon pearls. She smoked 0.5 ppd for 30 years, stopping into 2011. When seen on 10/10/2019, Augmentin was added given persistent symptoms, and she was given an albuterol inhaler. She clinically improved, and repeat chest x-ray on 11/26/2019 was normal.     In 6/2016, she presented with complaints of right sciatica and she underwent a lumbar MRI (7/8/2016) showing multilevel mild disc bulges, mild facet arthropathy, and multilevel mild foraminal stenosis; L3-L4 with mild to moderate spinal canal stenosis, no more than mild spinal canal stenosis elsewhere, and no cord compression. She was evaluated by Dr. Nirav Frederick on 9/1/2016 and started on gabapentin. She also was referred to physical therapy and received dry needling, which resulted in significant improvement. She also has a history of an anterior cervical discectomy and fusion (C5-C6) in 2/2011 by Dr. Yamilka River for a herniated disc and right radiculopathy. She does experience intermittent neck pain, and feels that it may be exacerbating her headaches. She has a history of a right ovarian cyst, and on surveillance ultrasound by Dr. Tyson Ibrahim, it was noted that the right ovarian cyst had enlarged slightly and changed in appearance. She was referred to Dr. Kvng Goodson for evaluation, and he stated that although the cyst was likely benign, he recommended laparoscopic removal. On 2/28/2019, she underwent a laparoscopic bilateral salpingo-oophorectomy and washing. 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 Past Medical History:   Diagnosis Date    Allergic rhinitis     Arthritis     Chronic headaches     Hx of colonic polyp 6/2011    Hyperplastic; Dr. Lavinia Zepeda.     Hyperlipidemia     Hypertension     IBS (irritable bowel syndrome)     Obstructive sleep apnea     uses cpap    S/P cervical discectomy 2011    Anterior C5-C6 discectomy for herniated disc and right radiculopathy; Dr. Deja Lopez     Past Surgical History:   Procedure Laterality Date    ENDOSCOPY, COLON, DIAGNOSTIC      HX BACK SURGERY  11    neck surgery; ruptured cervical discs    HX COLONOSCOPY  2011 2015    4/17/15, normal, recalled for 5 years    HX GYN      dilation and curretage    HX GYN          HX OOPHORECTOMY  10/2018     Current Outpatient Medications   Medication Sig    methylPREDNISolone (MEDROL DOSEPACK) 4 mg tablet Take 1 Tab by mouth Specific Days and Specific Times.  gabapentin (NEURONTIN) 300 mg capsule Take 1 Cap by mouth three (3) times daily. Max Daily Amount: 900 mg. Indications: neuropathic pain    tiZANidine (ZANAFLEX) 4 mg tablet Take 1 Tab by mouth every evening.  butalbital-acetaminophen-caffeine (FIORICET, ESGIC) -40 mg per tablet Take 1 Tab by mouth every six (6) hours as needed for Headache.  hydroCHLOROthiazide (HYDRODIURIL) 25 mg tablet TAKE 1 TABLET BY MOUTH EVERY DAY    potassium chloride (K-DUR, KLOR-CON) 20 mEq tablet TAKE 1 TABLET BY MOUTH DAILY    amLODIPine (NORVASC) 10 mg tablet TAKE 1 TABLET BY MOUTH DAILY    hydrALAZINE (APRESOLINE) 100 mg tablet TAKE 1 TABLET BY MOUTH THREE TIMES DAILY    pravastatin (PRAVACHOL) 10 mg tablet TAKE 1 TABLET BY MOUTH EVERY NIGHT    TURMERIC ROOT EXTRACT PO Take 1,000 mg by mouth.  multivitamin (ONE A DAY) tablet Take 1 Tab by mouth daily.  Cholecalciferol, Vitamin D3, 2,000 unit cap Take 2,000 Units by mouth daily.  GLUC/CHONDR-[facility] [place]/C/DEENA/BORON (GLUCOSAMINE-CHONDR, BOSWELLIA, PO) Take 500 mg by mouth daily. No current facility-administered medications for this visit. Allergies and Intolerances: Allergies   Allergen Reactions    Other Plant, Animal, Environmental Itching and Sneezing     \"Everything but feathers and horses. \"        Family History: Her mother had hypertension, but  from a melanoma. Her father  from a DVT. Her brother has CAD and underwent CABG at the age of 40. Family History   Problem Relation Age of Onset    Heart Disease Brother     Other Mother         melanoma    Arthritis-osteo Mother     Bleeding Prob Father     Heart Disease Father     Heart Attack Father     Stroke Maternal Uncle     Heart Attack Maternal Uncle     Breast Cancer Maternal Grandmother      Social History:   She  reports that she quit smoking about 5 years ago. She smoked 0.50 packs per day. She has never used smokeless tobacco. She reports that she smoked 0.5 ppd for 30 years, stopping in . She is  and lives with her . She recently retired from the [employer] [place] [employer] [place]; her  sells eyeglass frames to [employer]. Social History     Substance and Sexual Activity   Alcohol Use Not Currently    Alcohol/week: 0.0 standard drinks     Immunization History:  Immunization History   Administered Date(s) Administered    Influenza Vaccine 10/08/2014, 10/09/2017    Influenza Vaccine (Quad) PF (>6 Mo Flulaval, Fluarix, and >3 Yrs Afluria, Fluzone [id number]) 10/23/2018, 2020, 2020    TD Vaccine 2006    Tdap 10/07/2016    Zoster Recombinant 2019, 2019       Review of Systems:   As above included in HPI. Otherwise 11 point review of systems negative including constitutional, skin, HENT, eyes, respiratory, cardiovascular, gastrointestinal, genitourinary, musculoskeletal, endo/heme/aller, neurological.    Physical:   Visit Vitals  /72 (BP 1 Location: Left arm, BP Patient Position: Sitting)   Pulse 99   Temp 97.9 °F (36.6 °C) (Temporal)   Resp 16   Ht 5' 4\" (1.626 m)   Wt 135 lb (61.2 kg)   LMP 01/10/2012 (LMP Unknown)   SpO2 95%   BMI 23.17 kg/m²      Patient appears in no apparent distress. Affect is appropriate. HEENT: PERRLA, anicteric, oropharynx clear, no JVD, adenopathy or thyromegaly. No carotid bruits or radiated murmur.   Lungs: clear to auscultation, no wheezes, rhonchi, or rales. Heart: regular rate and rhythm. No murmur, rubs, gallops  Abdomen: soft, nontender, nondistended, normal bowel sounds, no hepatosplenomegaly or masses. Back: no tenderness to palpation over spine; negative straight leg raises bilaterally; mild paraspinal muscle tenderness on right. Extremities: without edema. Review of Data:  Labs:  No visits with results within 1 Month(s) from this visit. Latest known visit with results is:   Hospital Outpatient Visit on 07/10/2020   Component Date Value Ref Range Status    WBC 07/10/2020 5.3  4.6 - 13.2 K/uL Final    RBC 07/10/2020 4.79  4.20 - 5.30 M/uL Final    HGB 07/10/2020 14.1  12.0 - 16.0 g/dL Final    HCT 07/10/2020 43.8  35.0 - 45.0 % Final    MCV 07/10/2020 91.4  74.0 - 97.0 FL Final    MCH 07/10/2020 29.4  24.0 - 34.0 PG Final    MCHC 07/10/2020 32.2  31.0 - 37.0 g/dL Final    RDW 07/10/2020 12.9  11.6 - 14.5 % Final    PLATELET 96/82/3034 666  135 - 420 K/uL Final    MPV 07/10/2020 10.7  9.2 - 11.8 FL Final    NEUTROPHILS 07/10/2020 39* 40 - 73 % Final    LYMPHOCYTES 07/10/2020 50  21 - 52 % Final    MONOCYTES 07/10/2020 9  3 - 10 % Final    EOSINOPHILS 07/10/2020 2  0 - 5 % Final    BASOPHILS 07/10/2020 0  0 - 2 % Final    ABS. NEUTROPHILS 07/10/2020 2.1  1.8 - 8.0 K/UL Final    ABS. LYMPHOCYTES 07/10/2020 2.7  0.9 - 3.6 K/UL Final    ABS. MONOCYTES 07/10/2020 0.5  0.05 - 1.2 K/UL Final    ABS. EOSINOPHILS 07/10/2020 0.1  0.0 - 0.4 K/UL Final    ABS.  BASOPHILS 07/10/2020 0.0  0.0 - 0.1 K/UL Final    DF 07/10/2020 AUTOMATED    Final    LIPID PROFILE 07/10/2020        Final    Cholesterol, total 07/10/2020 193  <200 MG/DL Final    Triglyceride 07/10/2020 175* <150 MG/DL Final    HDL Cholesterol 07/10/2020 51  40 - 60 MG/DL Final    LDL, calculated 07/10/2020 107* 0 - 100 MG/DL Final    VLDL, calculated 07/10/2020 35  MG/DL Final    CHOL/HDL Ratio 07/10/2020 3.8  0 - 5.0   Final    Magnesium 07/10/2020 2.3  1.6 - 2.6 mg/dL Final    Sodium 07/10/2020 143  136 - 145 mmol/L Final    Potassium 07/10/2020 3.8  3.5 - 5.5 mmol/L Final    Chloride 07/10/2020 104  100 - 111 mmol/L Final    CO2 07/10/2020 29  21 - 32 mmol/L Final    Anion gap 07/10/2020 10  3.0 - 18 mmol/L Final    Glucose 07/10/2020 84  74 - 99 mg/dL Final    BUN 07/10/2020 28* 7.0 - 18 MG/DL Final    Creatinine 07/10/2020 0.69  0.6 - 1.3 MG/DL Final    BUN/Creatinine ratio 07/10/2020 41* 12 - 20   Final    GFR est AA 07/10/2020 >60  >60 ml/min/1.73m2 Final    GFR est non-AA 07/10/2020 >60  >60 ml/min/1.73m2 Final    Calcium 07/10/2020 9.4  8.5 - 10.1 MG/DL Final    Bilirubin, total 07/10/2020 0.3  0.2 - 1.0 MG/DL Final    ALT (SGPT) 07/10/2020 35  13 - 56 U/L Final    AST (SGOT) 07/10/2020 19  10 - 38 U/L Final    Alk. phosphatase 07/10/2020 91  45 - 117 U/L Final    Protein, total 07/10/2020 7.1  6.4 - 8.2 g/dL Final    Albumin 07/10/2020 4.0  3.4 - 5.0 g/dL Final    Globulin 07/10/2020 3.1  2.0 - 4.0 g/dL Final    A-G Ratio 07/10/2020 1.3  0.8 - 1.7   Final    TSH 07/10/2020 1.04  0.36 - 3.74 uIU/mL Final    Vitamin D 25-Hydroxy 07/10/2020 46.1  30 - 100 ng/mL Final    Color 07/10/2020 DARK YELLOW    Final    Appearance 07/10/2020 CLEAR    Final    Specific gravity 07/10/2020 >1.030* 1.005 - 1.030 Final    pH (UA) 07/10/2020 5.5  5.0 - 8.0   Final    Protein 07/10/2020 Negative  NEG mg/dL Final    Glucose 07/10/2020 Negative  NEG mg/dL Final    Ketone 07/10/2020 Negative  NEG mg/dL Final    Bilirubin 07/10/2020 SMALL* NEG   Final    Blood 07/10/2020 Negative  NEG   Final    Urobilinogen 07/10/2020 0.2  0.2 - 1.0 EU/dL Final    Nitrites 07/10/2020 Negative  NEG   Final    Leukocyte Esterase 07/10/2020 Negative  NEG   Final     Health Maintenance:  Screening:    Mammogram: negative (11/2019)   PAP smear: well women exams by Dr. Kevin Bernheim (last 12/2019)   Colorectal: colonoscopy (4/2015) no polyps. Dr. Felipe Brewster. Scheduled 10/7/2020   Depression: Weaned from Effexor (vasomotor instability). DM (HbA1c/FPG): FPG 84 (7/2020)   Hepatitis C: negative (12/2015)   Falls: none   DEXA: N/A   Glaucoma: has regular eye exams with Dr. Natalio Rice (last 11/2018)   Smoking: 15 pack year (stopped 2011)   Vitamin D: 46.1 (7/2020)   Medicare Wellness: N/A    Impression:  Patient Active Problem List   Diagnosis Code    Hypertension I10    Hyperlipidemia E78.5    Obstructive sleep apnea G47.33    Chronic daily headache R51    IBS (irritable bowel syndrome) K58.9    Colon polyp K63.5    Vitamin D deficiency E55.9    History of colitis Z87.19    Lumbar spinal stenosis M48.061    HNP (herniated nucleus pulposus), lumbar M51.26    Lumbar facet arthropathy M47.816    Neck pain M54.2    Degenerative disc disease, cervical M50.30    Right ovarian cyst N83.201    Former smoker Z87.891       Plan:  Low back pain with right sciatica. Patient described onset of low back pain one week ago after picking up her granddaughter. She reports radiation into her right buttocks and worsening pain over the last several days. She had been using ibuprofen without significant relief. She presented to [facility] ED on 9/26/2020, and lumbar spine x-ray showed no fracture or subluxation, but increasing degenerative disc space narrowing at L3-L4. She was treated with Skelaxin and naproxen, as well as lidocaine patches. She reports today persistent symptoms with radiation also into right groin, and will be treated with a Medrol dose pack and gabapentin 300 mg tid. Discussed physical therapy, but patient wishing to see if symptoms will improve with treatment. Also discussed follow up with Dr. David Godoy, but wishing to defer until determine if treatment is effective. Advised to call back for any worsening. Other issues:  1. Hypertension.  Blood pressure well controlled today on current regimen of hydralazine 100 mg tid, amlodipine 10 mg daily, and hydrochlorothiazide 25 mg daily. Successfully discontinued treatment with captopril since unclear if abdominal attacks may have been a form of angioedema. Notable that has not had recurrence since discontinuing. Advised to continue to monitor blood pressure at home. Renal function was normal with creatinine 0.69/ eGFR >60 at last visit. However, BUN/creatinine ratio also remained significantly elevated at 41, and again discussed importance of increasing fluid intake, particularly given treatment with hydrochlorothiazide. Will reassess at upcoming visit. 2. Hyperlipidemia. Began treatment with rosuvastatin in 6/2016, and elevation in transaminases first noted in 8/2016. Discontinued in 5/2017 due to persistent elevation of transaminases, and resolved after discontinuation. Calculated 10 year ASCVD risk off statin was 5.4 %, which did not place her in one of the four statin benefit groups as per new AHA/ACC guidelines. However, , , and HDL 50, and patient with strong family history of heart disease. She started treatment with pravastatin 10 mg daily in 10/2018, and LDL improved to 80 with HDL 54 at that time. LDL worse today at 107 and HDL 51, and patient acknowledges dietary indiscretion and increase in weight. Emphasized importance of lifestyle modifications, including diet, exercise, and weight loss. Liver function tests remain normal. If LDL remains elevated, will consider increasing dose of pravastatin. Will assess at next visit. 3. Elevated transaminases. Now resolved since rosuvastatin discontinued. Review of record shows elevation noted since 8/2016 when initiated. Iron studies, hepatitis panel, KIEL, SPEP, CK, aldolase, and anti-smooth muscle antibody negative. RUQ ultrasound (10/2016) showing fatty infiltration of liver. Advised patient to avoid alcohol. 4. Right lower lobe pneumonia.  Had a URI for approximately 3 weeks, mainly manifested as nasal congestion, post-nasal drainage and nonproductive cough. She appeared to be gradually improving until 10/5/2019 when she noted that onset of a fever, chills, myalgias, and weakness. She stated that her cough appeared to be mildly worse, and over the next 48 hours became productive of yellow brown sputum. She presented to [facility] and found to have WBC 14.2 (71% neutrophils) and chest x-ray revealed peribronchial cuffing with an infiltrate in the right lower lobe. She was started on doxycycline, Qvar inhaler, and Tessalon pearls. She was seen in follow-up and continued to have a productive cough and reported mild dyspnea on exertion. Antibiotic coverage was broadened for a community acquired pneumonia to include Augmentin bid for 10 days in addition to doxycycline, and prescribed an albuterol inhaler with spacer to help with mild dyspnea on exertion. Clinically improved, and repeat chest x-ray normal (11/2019). No further issues since that time. 5. Colitis. Unclear etiology, but nonspecific bowel wall thickening in short segment of jejunum and sigmoid on repeat CT scan raised question of ischemic in origin. Hypercoaguable workup including lab studies and echocardiogram negative. Appears to have intermittent \"IBS\" episodes associated with facial flushing, dry mouth, and severe abdominal cramping followed by diarrhea +/- vomiting. Episodes linger for several days following. No evidence of urticaria seen. Interesting is the abnormalities noted on abdominal CT scan in two of her episodes, with nonspecific short segment thickening in various locations in small and large intestines. Obtained complement studies including C4, C1 esterase inhibtor, and C1q levels which were normal. However, instructed patient that if she developed another episode, would like to check C4 level during the episode. Discontinued captopril and has not had recurrence since doing so.  Evaluated by GI and had negative small bowel series, duplex ultrasound, and 24 hour urine for 5-HIAA level. No longer a significant issue since captopril discontinued. Continue to follow. 5. Chronic daily headaches. Evaluated by Dr. César Cordero and weaned from daily Fioricet and Excedrin migraine. Headaches now improved with daily amitriptyline and trying to use Excedrin migraine and Fioricet only occasionally for moderate and severe headaches, respectively. However, seems to have resumed Fioricet daily. Will readdress at next visit. Does have chronic neck pain related to her anterior neck fusion, and is finding Zanaflex and regular exercises to be helpful. States has not needed to follow-up with Dr. César Cordero. Describing sinus issues also contributing to headaches, and now on Zyrtec and Nasocort prn.   6. Right leg sciatica. Evidence of lumbar spinal stenosis and facet arthropathy, and lumbar herniated disc. Significant improvement with dry needling therapy and reports no further pain. Continue to follow and follow-up with Dr. Lamont Alvarez if recurs. 7. Abnormal glucose. Fasting glucose remains normal. Most likely due to weight loss. Emphasized importance of continued lifestyle modifications. 8.  Obstructive sleep apnea. Underwent reevaluation by Dr. Diannia Krabbe, and repeat sleep study showed moderate PEGGY. Started on CPAP and noting improvement. Continue to follow. 9. Right ovarian cyst. Enlarged slightly and changed in appearance at last evaluation on ultrasound. Referred to Dr. Caroline Johnson and underwent laparoscopic bilateral salpingo-oophorectomy and washing. 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 No further treatment needed. 10. Health maintenance. Will give influenza vaccine today. Completed 2/2 doses of Shingrix vaccine. Other immunizations up to date. Well woman exam with Dr. Jfef Macias in 2/2020. Will discuss baseline bone density study with next mammogram. Mammogram up to date. Scheduled with Dr. Rafa Lew for colonoscopy on 10/7/2020. Continue regular eye exams with Dr. Mac Vieira.  Vitamin D level normal. Continue maintenance dose supplement. Patient understands recommendations and agrees with plan. Follow-up as previously scheduled.

## 2020-10-06 ENCOUNTER — ANESTHESIA EVENT (OUTPATIENT)
Dept: ENDOSCOPY | Age: 60
End: 2020-10-06
Payer: COMMERCIAL

## 2020-10-07 ENCOUNTER — ANESTHESIA (OUTPATIENT)
Dept: ENDOSCOPY | Age: 60
End: 2020-10-07
Payer: COMMERCIAL

## 2020-10-07 ENCOUNTER — HOSPITAL ENCOUNTER (OUTPATIENT)
Age: 60
Setting detail: OUTPATIENT SURGERY
Discharge: HOME | End: 2020-10-07
Attending: COLON & RECTAL SURGERY | Admitting: COLON & RECTAL SURGERY
Payer: COMMERCIAL

## 2020-10-07 VITALS
TEMPERATURE: 98.3 F | DIASTOLIC BLOOD PRESSURE: 78 MMHG | BODY MASS INDEX: 23.92 KG/M2 | OXYGEN SATURATION: 100 % | RESPIRATION RATE: 11 BRPM | SYSTOLIC BLOOD PRESSURE: 134 MMHG | HEIGHT: 63 IN | WEIGHT: 135 LBS | HEART RATE: 66 BPM

## 2020-10-07 PROCEDURE — 00812 ANES LWR INTST SCR COLSC: CPT | Performed by: ANESTHESIOLOGY

## 2020-10-07 PROCEDURE — 88305 TISSUE EXAM BY PATHOLOGIST: CPT

## 2020-10-07 PROCEDURE — 77030021593 HC FCPS BIOP ENDOSC BSC -A: Performed by: COLON & RECTAL SURGERY

## 2020-10-07 PROCEDURE — 74011250637 HC RX REV CODE- 250/637: Performed by: COLON & RECTAL SURGERY

## 2020-10-07 PROCEDURE — 74011250636 HC RX REV CODE- 250/636: Performed by: NURSE ANESTHETIST, CERTIFIED REGISTERED

## 2020-10-07 PROCEDURE — 2709999900 HC NON-CHARGEABLE SUPPLY: Performed by: COLON & RECTAL SURGERY

## 2020-10-07 PROCEDURE — 76040000007: Performed by: COLON & RECTAL SURGERY

## 2020-10-07 PROCEDURE — 76060000032 HC ANESTHESIA 0.5 TO 1 HR: Performed by: COLON & RECTAL SURGERY

## 2020-10-07 PROCEDURE — 74011250636 HC RX REV CODE- 250/636: Performed by: ANESTHESIOLOGY

## 2020-10-07 PROCEDURE — 45380 COLONOSCOPY AND BIOPSY: CPT | Performed by: COLON & RECTAL SURGERY

## 2020-10-07 RX ORDER — PROPOFOL 10 MG/ML
INJECTION, EMULSION INTRAVENOUS AS NEEDED
Status: DISCONTINUED | OUTPATIENT
Start: 2020-10-07 | End: 2020-10-07 | Stop reason: HOSPADM

## 2020-10-07 RX ORDER — DEXTROMETHORPHAN/PSEUDOEPHED 2.5-7.5/.8
DROPS ORAL AS NEEDED
Status: DISCONTINUED | OUTPATIENT
Start: 2020-10-07 | End: 2020-10-07 | Stop reason: HOSPADM

## 2020-10-07 RX ORDER — SODIUM CHLORIDE, SODIUM LACTATE, POTASSIUM CHLORIDE, CALCIUM CHLORIDE 600; 310; 30; 20 MG/100ML; MG/100ML; MG/100ML; MG/100ML
50 INJECTION, SOLUTION INTRAVENOUS CONTINUOUS
Status: DISCONTINUED | OUTPATIENT
Start: 2020-10-07 | End: 2020-10-07 | Stop reason: HOSPADM

## 2020-10-07 RX ADMIN — SODIUM CHLORIDE, SODIUM LACTATE, POTASSIUM CHLORIDE, AND CALCIUM CHLORIDE 50 ML/HR: 600; 310; 30; 20 INJECTION, SOLUTION INTRAVENOUS at 10:48

## 2020-10-07 RX ADMIN — PROPOFOL 30 MG: 10 INJECTION, EMULSION INTRAVENOUS at 11:47

## 2020-10-07 RX ADMIN — PROPOFOL 150 MG: 10 INJECTION, EMULSION INTRAVENOUS at 11:33

## 2020-10-07 RX ADMIN — PROPOFOL 50 MG: 10 INJECTION, EMULSION INTRAVENOUS at 11:41

## 2020-10-07 RX ADMIN — PROPOFOL 100 MG: 10 INJECTION, EMULSION INTRAVENOUS at 11:17

## 2020-10-07 RX ADMIN — PROPOFOL 30 MG: 10 INJECTION, EMULSION INTRAVENOUS at 11:53

## 2020-10-07 RX ADMIN — PROPOFOL 200 MG: 10 INJECTION, EMULSION INTRAVENOUS at 11:25

## 2020-10-07 NOTE — ANESTHESIA POSTPROCEDURE EVALUATION
Procedure(s):  COLONOSCOPY with bx. MAC    Anesthesia Post Evaluation      Multimodal analgesia: multimodal analgesia used between 6 hours prior to anesthesia start to PACU discharge  Patient location during evaluation: PACU  Patient participation: complete - patient participated  Level of consciousness: awake  Pain management: adequate  Airway patency: patent  Anesthetic complications: no  Cardiovascular status: acceptable  Respiratory status: acceptable  Hydration status: acceptable  Post anesthesia nausea and vomiting:  none      INITIAL Post-op Vital signs:   Vitals Value Taken Time   /70 10/7/2020 12:30 PM   Temp     Pulse 67 10/7/2020 12:31 PM   Resp 13 10/7/2020 12:31 PM   SpO2 100 % 10/7/2020 12:31 PM   Vitals shown include unvalidated device data.

## 2020-10-07 NOTE — ANESTHESIA PREPROCEDURE EVALUATION
Relevant Problems   No relevant active problems       Anesthetic History   No history of anesthetic complications            Review of Systems / Medical History  Patient summary reviewed and pertinent labs reviewed    Pulmonary        Sleep apnea: CPAP           Neuro/Psych   Within defined limits           Cardiovascular    Hypertension: well controlled                   GI/Hepatic/Renal  Within defined limits              Endo/Other        Arthritis     Other Findings              Physical Exam    Airway  Mallampati: II  TM Distance: 4 - 6 cm  Neck ROM: normal range of motion   Mouth opening: Normal     Cardiovascular    Rhythm: regular  Rate: normal         Dental  No notable dental hx       Pulmonary                Comments: Non labored Abdominal  GI exam deferred       Other Findings            Anesthetic Plan    ASA: 2  Anesthesia type: MAC            Anesthetic plan and risks discussed with: Patient

## 2020-10-07 NOTE — H&P
HPI: Kaitlin Ott is a 62 y.o. female presenting with chief complain of need for crc screening. Past Medical History:   Diagnosis Date    Allergic rhinitis     Arthritis     Chronic headaches     Hx of colonic polyp 2011    Hyperplastic; Dr. Tootie Bennett.     Hyperlipidemia     Hypertension     IBS (irritable bowel syndrome)     Obstructive sleep apnea     uses cpap    S/P cervical discectomy 2011    Anterior C5-C6 discectomy for herniated disc and right radiculopathy; Dr. Solange Colunga       Past Surgical History:   Procedure Laterality Date    ENDOSCOPY, COLON, DIAGNOSTIC      HX BACK SURGERY  11    neck surgery; ruptured cervical discs    HX COLONOSCOPY  2011 2015    4/17/15, normal, recalled for 5 years    HX GYN      dilation and curretage    HX GYN          HX OOPHORECTOMY  10/2018       Family History   Problem Relation Age of Onset    Heart Disease Brother     Other Mother         melanoma    Arthritis-osteo Mother     Bleeding Prob Father     Heart Disease Father     Heart Attack Father     Stroke Maternal Uncle     Heart Attack Maternal Uncle     Breast Cancer Maternal Grandmother        Social History     Socioeconomic History    Marital status:      Spouse name: Not on file    Number of children: Not on file    Years of education: Not on file    Highest education level: Not on file   Tobacco Use    Smoking status: Former Smoker     Packs/day: 0.50     Last attempt to quit:      Years since quittin.7    Smokeless tobacco: Never Used   Substance and Sexual Activity    Alcohol use: Not Currently     Alcohol/week: 0.0 standard drinks    Drug use: No    Sexual activity: Yes     Partners: Male     Birth control/protection: None       Review of Systems - neg    Outpatient Medications Marked as Taking for the 10/7/20 encounter [facility] Encounter)   Medication Sig Dispense Refill    gabapentin (NEURONTIN) 300 mg capsule Take 1 Cap by mouth three (3) times daily. Max Daily Amount: 900 mg. Indications: neuropathic pain 90 Cap 0    tiZANidine (ZANAFLEX) 4 mg tablet Take 1 Tab by mouth every evening. 90 Tab 1    butalbital-acetaminophen-caffeine (FIORICET, ESGIC) -40 mg per tablet Take 1 Tab by mouth every six (6) hours as needed for Headache. 60 Tab 0    hydroCHLOROthiazide (HYDRODIURIL) 25 mg tablet TAKE 1 TABLET BY MOUTH EVERY DAY 90 Tab 2    potassium chloride (K-DUR, KLOR-CON) 20 mEq tablet TAKE 1 TABLET BY MOUTH DAILY 90 Tab 2    amLODIPine (NORVASC) 10 mg tablet TAKE 1 TABLET BY MOUTH DAILY 90 Tab 2    hydrALAZINE (APRESOLINE) 100 mg tablet TAKE 1 TABLET BY MOUTH THREE TIMES DAILY 270 Tab 2    pravastatin (PRAVACHOL) 10 mg tablet TAKE 1 TABLET BY MOUTH EVERY NIGHT 90 Tab 2    TURMERIC ROOT EXTRACT PO Take 1,000 mg by mouth.  GLUC/CHONDR-MSM#7/C/DEENA/BORON (GLUCOSAMINE-CHONDR, BOSWELLIA, PO) Take 500 mg by mouth daily.  multivitamin (ONE A DAY) tablet Take 1 Tab by mouth daily. Allergies   Allergen Reactions    Other Plant, Animal, Environmental Itching and Sneezing     \"Everything but feathers and horses. \"       Vitals:    10/01/20 1258 10/07/20 1042   BP:  (!) 148/80   Pulse:  82   Resp:  15   Temp:  98.3 °F (36.8 °C)   SpO2:  98%   Weight: 61.2 kg (135 lb)    Height: 5' 3\" (1.6 m)    LMP: 01/10/2012       Physical Exam  Constitutional:       Appearance: She is well-developed. HENT:      Head: Normocephalic and atraumatic. Eyes:      Conjunctiva/sclera: Conjunctivae normal.   Abdominal:      General: There is no distension. Palpations: Abdomen is soft. Tenderness: There is no abdominal tenderness. Musculoskeletal: Normal range of motion. Lymphadenopathy:      Cervical: No cervical adenopathy. Skin:     General: Skin is warm and dry. Findings: No rash. Neurological:      Sensory: No sensory deficit.    Psychiatric:         Speech: Speech normal.         Assessment / Plan    colonoscopy    The diagnoses and plan were discussed with the patient. All questions answered. Plan of care agreed to by all concerned.

## 2020-10-07 NOTE — PROCEDURES
[facility]  [address] [facility], [address] [facility]   [place], [address]  [phone]                    Colonoscopy Procedure Note      Berna Hilliard  1960  [id number]                Date of Procedure: 10/7/2020    Preoperative diagnosis: -    Postoperative diagnosis: rectal polyp bx x6    :  Sammy Rodriguez MD    Assistant(s): Endoscopy RN-1: Alaina Chen RN; Sapphire Frederick RN; Catalino FINK    Sedation: MAC    Complications: None    Implants: None    Procedure Details:  Prior to the procedure, a history and physical were performed. The patients medications, allergies and sensitivities were reviewed and all questions were answered. After informed consent was obtained for the procedure, with all risks and benefits of procedure explained. The patient was taken to the endoscopy suite and placed in the left lateral decubitus position. Patient identification and proposed procedure were verified prior to the procedure by the nurse and I. After sequential anesthesia administered by anesthesiologist, a digital rectal exam was performed and was normal.  The Olympus video colonoscope was introduced through the anus and advanced to cecum, which was identified by the ileocecal valve and appendiceal orifice. The quality of preparation was good. The colonoscope was slowly withdrawn and the mucosa examined for any abnormalities. Cecal withdrawal time was greater than 6 minutes. The patient tolerated the procedure well. There were no complications. Findings/Interventions:   Polyps - #1-6, 5 mm in size, located in the rectum, removed by cold biopsy and sent for pathology    EBL: none    Recommendations: -Repeat colonoscopy in 5 years.    NO aspirin for 5 days     Discharge Disposition:  Home  Sammy Rodriguez MD  10/7/2020  12:01 PM

## 2020-10-07 NOTE — DISCHARGE INSTRUCTIONS
Patient Education        Colon Polyps: Care Instructions  Your Care Instructions     Colon polyps are growths in the colon or the rectum. The cause of most colon polyps is not known, and most people who get them do not have any problems. But a certain kind can turn into cancer. For this reason, regular testing for colon polyps is important for people as they get older. It is also important for anyone who has an increased risk for colon cancer. Polyps are usually found through routine colon cancer screening tests. Although most colon polyps are not cancerous, they are usually removed and then tested for cancer. Screening for colon cancer saves lives because the cancer can usually be cured if it is caught early. If you have a polyp that is the type that can turn into cancer, you may need more tests to examine your entire colon. The doctor will remove any other polyps that he or she finds, and you will be tested more often. Follow-up care is a key part of your treatment and safety. Be sure to make and go to all appointments, and call your doctor if you are having problems. It's also a good idea to know your test results and keep a list of the medicines you take. How can you care for yourself at home? Regular exams to look for colon polyps are the best way to prevent polyps from turning into colon cancer. These can include stool tests, sigmoidoscopy, colonoscopy, and CT colonography. Talk with your doctor about a testing schedule that is right for you. To prevent polyps  There is no home treatment that can prevent colon polyps. But these steps may help lower your risk for cancer. · Stay active. Being active can help you get to and stay at a healthy weight. Try to exercise on most days of the week. Walking is a good choice. · Eat well. Choose a variety of vegetables, fruits, legumes (such as peas and beans), fish, poultry, and whole grains. · Do not smoke.  If you need help quitting, talk to your doctor about stop-smoking programs and medicines. These can increase your chances of quitting for good. · If you drink alcohol, limit how much you drink. Limit alcohol to 2 drinks a day for men and 1 drink a day for women. When should you call for help? Call your doctor now or seek immediate medical care if:    · You have severe belly pain.     · Your stools are maroon or very bloody. Watch closely for changes in your health, and be sure to contact your doctor if:    · You have a fever.     · You have nausea or vomiting.     · You have a change in bowel habits (new constipation or diarrhea).     · Your symptoms get worse or are not improving as expected. Where can you learn more? Go to http://www.root.com/  Enter C571 in the search box to learn more about \"Colon Polyps: Care Instructions. \"  Current as of: April 29, 2020               Content Version: 12.6  © 2006-2020 [employer]. Care instructions adapted under license by [employer] (which disclaims liability or warranty for this information). If you have questions about a medical condition or this instruction, always ask your healthcare professional. [place] [address] any warranty or liability for your use of this information. Colonoscopy: What to Expect at [address] [place] [address]  After you have a colonoscopy, you will stay at the clinic for 1 to 2 hours until the medicines wear off. Then you can go home. But you will need to arrange for a ride. Your doctor will tell you when you can eat and do your other usual activities. Your doctor will talk to you about when you will need your next colonoscopy. Your doctor can help you decide how often you need to be checked. This will depend on the results of your test and your risk for colorectal cancer. After the test, you may be bloated or have gas pains. You may need to pass gas.  If a biopsy was done or a polyp was removed, you may have streaks of blood in your stool (feces) for a few days. This care sheet gives you a general idea about how long it will take for you to recover. But each person recovers at a different pace. Follow the steps below to get better as quickly as possible. How can you care for yourself at home? Activity  · Rest when you feel tired. · You can do your normal activities when it feels okay to do so. Diet  · Follow your doctor's directions for eating. · Unless your doctor has told you not to, drink plenty of fluids. This helps to replace the fluids that were lost during the colon prep. · Do not drink alcohol. Medicines  · Your doctor will tell you if and when you can restart your medicines. He or she will also give you instructions about taking any new medicines. · If you take blood thinners, such as warfarin (Coumadin), clopidogrel (Plavix), or aspirin, be sure to talk to your doctor. He or she will tell you if and when to start taking those medicines again. Make sure that you understand exactly what your doctor wants you to do. · If polyps were removed or a biopsy was done during the test, your doctor may tell you not to take aspirin or other anti-inflammatory medicines for a few days. These include ibuprofen (Advil, Motrin) and naproxen (Aleve). Other instructions  · For your safety, do not drive or operate machinery until the medicine wears off and you can think clearly. Your doctor may tell you not to drive or operate machinery until the day after your test.  · Do not sign legal documents or make major decisions until the medicine wears off and you can think clearly. The anesthesia can make it hard for you to fully understand what you are agreeing to. Follow-up care is a key part of your treatment and safety. Be sure to make and go to all appointments, and call your doctor if you are having problems. It's also a good idea to know your test results and keep a list of the medicines you take.   When should you call for help?  Call 911 anytime you think you may need emergency care. For example, call if:  · You passed out (lost consciousness). · You pass maroon or bloody stools. · You have severe belly pain. Call your doctor now or seek immediate medical care if:  · Your stools are black and tarlike. · Your stools have streaks of blood, but you did not have a biopsy or any polyps removed. · You have belly pain, or your belly is swollen and firm. · You vomit. · You have a fever. · You are very dizzy. Watch closely for changes in your health, and be sure to contact your doctor if you have any problems. Where can you learn more? Go to [employer]  Enter E264 in the search box to learn more about \"Colonoscopy: What to Expect at Home. \"   © 9508-2758 [employer]. Care instructions adapted under license by [facility] [employer] (which disclaims liability or warranty for this information). This care instruction is for use with your licensed healthcare professional. If you have questions about a medical condition or this instruction, always ask your healthcare professional. [place] [address] any warranty or liability for your use of this information. Content Version: 00.2.528640; Current as of: November 20, 2015                  DISCHARGE SUMMARY from Nurse     POST-PROCEDURE INSTRUCTIONS:    Call your Physician if you:  ? Observe any excess bleeding. ? Develop a temperature over 100.5o F.  ? Experience abdominal, shoulder or chest pain. ? Notice any signs of decreased circulation or nerve impairment to an extremity such as a change in color, persistent numbness, tingling, coldness or increase in pain. ? Vomit blood or you have nausea and vomiting lasting longer than 4 hours. ? Are unable to take medications. ? Are unable to urinate within 8 hours after discharge following general anesthesia or intravenous sedation.     For the next 24 hours after receiving general anesthesia or intravenous sedation, or while taking prescription Narcotics, limit your activities:  ? Do NOT drive a motor vehicle, operate hazard machinery or power tools, or perform tasks that require coordination. The medication you received during your procedure may have some effect on your mental awareness. ? Do NOT make important personal or business decisions. The medication you received during your procedure may have some effect on your mental awareness. ? Do NOT drink alcoholic beverages. These drinks do not mix well with the medications that have been given to you. ? Upon discharge from the hospital, you must be accompanied by a responsible adult. ? Resume your diet as directed by your physician. ? Resume medications as your physician has prescribed. ? Please give a list of your current medications to your Primary Care Provider. ? Please update this list whenever your medications are discontinued, doses are changed, or new medications (including over-the-counter products) are added. ? Please carry medication information at all times in case of emergency situations. These are general instructions for a healthy lifestyle:    No smoking/ No tobacco products/ Avoid exposure to second hand smoke.  Surgeon General's Warning:  Quitting smoking now greatly reduces serious risk to your health. Obesity, smoking, and a sedentary lifestyle greatly increase your risk for illness.  A healthy diet, regular physical exercise & weight monitoring are important for maintaining a healthy lifestyle   You may be retaining fluid if you have a history of heart failure or if you experience any of the following symptoms:  Weight gain of 3 pounds or more overnight or 5 pounds in a week, increased swelling in our hands or feet or shortness of breath while lying flat in bed. Please call your doctor as soon as you notice any of these symptoms; do not wait until your next office visit.     Recognize signs and symptoms of STROKE:  F  -  Face looks uneven  A  -  Arms unable to move or move unevenly  S  -  Speech slurred or non-existent  T  -  Time to call 911 - as soon as signs and symptoms begin - DO NOT go back to bed or wait to see If you get better - TIME IS BRAIN. Colorectal Screening   Colorectal cancer almost always develops from precancerous polyps (abnormal growths) in the colon or rectum. Screening tests can find precancerous polyps, so that they can be removed before they turn into cancer. Screening tests can also find colorectal cancer early, when treatment works best.  [place] Speak with your physician about when you should begin screening and how often you should be tested. Educational references and/or instructions provided during this visit included:    Colon Polyps      APPOINTMENTS:    Please make a follow-up appointment with your physician. Discharge information has been reviewed with the patient. The patient verbalized understanding.

## 2020-10-15 DIAGNOSIS — M54.31 RIGHT SIDED SCIATICA: ICD-10-CM

## 2020-10-16 ENCOUNTER — TELEPHONE (OUTPATIENT)
Dept: SURGERY | Age: 60
End: 2020-10-16

## 2020-10-16 RX ORDER — GABAPENTIN 300 MG/1
300 CAPSULE ORAL 3 TIMES DAILY
Qty: 90 CAP | Refills: 0 | Status: SHIPPED | OUTPATIENT
Start: 2020-10-16 | End: 2020-10-21

## 2020-10-16 NOTE — TELEPHONE ENCOUNTER
Reviewed report generated by the [facility]. Does not demonstrate aberrancies or inconsistencies with regard to the prescribing of controlled medications to this patient by other providers. Last filled 9/29/2020 per . Please inquire reason for early request. She was prescribed 90 tablets, so should last for 30 days.

## 2020-10-16 NOTE — TELEPHONE ENCOUNTER
----- Message from Marge Michele MD sent at 10/12/2020  9:23 AM EDT -----  Benign polyp(s). Repeat colonoscopy in 5 year(s) as planned. Notified patient of pathology results. Tickler placed in recall for 5 years. Patient understands.

## 2020-10-16 NOTE — TELEPHONE ENCOUNTER
Called and spoke with patient she reports increasing her dosage of Gabapentin because her pain was so bad. She reports taking 2 caps by mouth 3 times a day and this is why she is now needing a refill. She states she is still currently taking 2 tabs TID but her pain is just now improving.

## 2020-10-19 RX ORDER — HYDRALAZINE HYDROCHLORIDE 100 MG/1
100 TABLET, FILM COATED ORAL 3 TIMES DAILY
Qty: 270 TAB | Refills: 2 | Status: SHIPPED | OUTPATIENT
Start: 2020-10-19 | End: 2021-07-14

## 2020-10-20 RX ORDER — PRAVASTATIN SODIUM 10 MG/1
10 TABLET ORAL
Qty: 90 TAB | Refills: 2 | Status: SHIPPED | OUTPATIENT
Start: 2020-10-20 | End: 2021-01-19

## 2020-10-20 NOTE — TELEPHONE ENCOUNTER
Last Visit: 9/29/20 with MD Aracely Bradley  Next Appointment: 1/19/21 with MD Aracely Bradley  Previous Refill Encounter(s): 10/18/19 #90 with 2 refills    Requested Prescriptions     Pending Prescriptions Disp Refills    pravastatin (PRAVACHOL) 10 mg tablet 90 Tab 2     Sig: Take 1 Tab by mouth nightly.

## 2020-10-21 ENCOUNTER — TELEPHONE (OUTPATIENT)
Dept: INTERNAL MEDICINE CLINIC | Age: 60
End: 2020-10-21

## 2020-10-21 DIAGNOSIS — M54.31 RIGHT SIDED SCIATICA: ICD-10-CM

## 2020-10-21 RX ORDER — GABAPENTIN 300 MG/1
600 CAPSULE ORAL 3 TIMES DAILY
Qty: 180 CAP | Refills: 0 | Status: SHIPPED | OUTPATIENT
Start: 2020-10-21 | End: 2021-01-19 | Stop reason: ALTCHOICE

## 2020-10-21 NOTE — TELEPHONE ENCOUNTER
Patient stating she upped her dose of Gabapentin because it wasn't working. She has been taking 2 capsules 3 times a day. She has run out and her pharmacy wont fill unless you send in a new script with new directions    Stating she is going to the chiropractor but is still numb on hip and groin area. Pain level is at 1 1/2 now.

## 2020-10-21 NOTE — TELEPHONE ENCOUNTER
Called and spoke with Isaac Huntley at the pharmacy. They will close out/cancel the RX from 10/16/20 and are filling the one sent in today.

## 2020-10-21 NOTE — TELEPHONE ENCOUNTER
Reviewed report generated by the [facility]. Does not demonstrate aberrancies or inconsistencies with regard to the prescribing of controlled medications to this patient by other providers. Last filled 9/29/2020 per  for #90 with instructions 300 mg tid. Will send new script for increase in dose of 600 mg tid and #180 sent to [facility]. Please confirm with pharmacy above and make sure they don't fill both scripts. Script for gabapentin 300 mg tid was sent on 10/16/2020. Thanks.

## 2020-10-22 DIAGNOSIS — G89.29 CHRONIC NONINTRACTABLE HEADACHE, UNSPECIFIED HEADACHE TYPE: ICD-10-CM

## 2020-10-22 DIAGNOSIS — R51.9 CHRONIC NONINTRACTABLE HEADACHE, UNSPECIFIED HEADACHE TYPE: ICD-10-CM

## 2020-10-23 RX ORDER — BUTALBITAL, ACETAMINOPHEN AND CAFFEINE 50; 325; 40 MG/1; MG/1; MG/1
1 TABLET ORAL
Qty: 60 TAB | Refills: 0 | Status: SHIPPED | OUTPATIENT
Start: 2020-10-23 | End: 2020-11-16 | Stop reason: SDUPTHER

## 2020-10-23 NOTE — TELEPHONE ENCOUNTER
Last Visit: 9/29/2020 with MD Vijay Champion  Next Appointment: 1/19/2021 with MD Vijay Champion   Previous Refill Encounter(s): 9/28/2020 per MD Vijay Champion #60    Requested Prescriptions     Pending Prescriptions Disp Refills    butalbital-acetaminophen-caffeine (FIORICET, ESGIC) -40 mg per tablet 60 Tab 0     Sig: Take 1 Tab by mouth every six (6) hours as needed for Headache.

## 2020-11-16 DIAGNOSIS — G89.29 CHRONIC NONINTRACTABLE HEADACHE, UNSPECIFIED HEADACHE TYPE: ICD-10-CM

## 2020-11-16 DIAGNOSIS — R51.9 CHRONIC NONINTRACTABLE HEADACHE, UNSPECIFIED HEADACHE TYPE: ICD-10-CM

## 2020-11-16 RX ORDER — BUTALBITAL, ACETAMINOPHEN AND CAFFEINE 50; 325; 40 MG/1; MG/1; MG/1
1 TABLET ORAL
Qty: 60 TAB | Refills: 0 | Status: SHIPPED | OUTPATIENT
Start: 2020-11-16 | End: 2020-12-09 | Stop reason: SDUPTHER

## 2020-12-03 ENCOUNTER — HOSPITAL ENCOUNTER (OUTPATIENT)
Dept: MAMMOGRAPHY | Age: 60
Discharge: HOME | End: 2020-12-03
Attending: INTERNAL MEDICINE
Payer: COMMERCIAL

## 2020-12-03 DIAGNOSIS — Z12.31 SCREENING MAMMOGRAM, ENCOUNTER FOR: ICD-10-CM

## 2020-12-03 PROCEDURE — 77063 BREAST TOMOSYNTHESIS BI: CPT

## 2020-12-09 DIAGNOSIS — R51.9 CHRONIC NONINTRACTABLE HEADACHE, UNSPECIFIED HEADACHE TYPE: ICD-10-CM

## 2020-12-09 DIAGNOSIS — G89.29 CHRONIC NONINTRACTABLE HEADACHE, UNSPECIFIED HEADACHE TYPE: ICD-10-CM

## 2020-12-09 RX ORDER — BUTALBITAL, ACETAMINOPHEN AND CAFFEINE 50; 325; 40 MG/1; MG/1; MG/1
1 TABLET ORAL
Qty: 60 TAB | Refills: 0 | Status: SHIPPED | OUTPATIENT
Start: 2020-12-09 | End: 2021-01-05 | Stop reason: SDUPTHER

## 2020-12-15 ENCOUNTER — TELEPHONE (OUTPATIENT)
Dept: INTERNAL MEDICINE CLINIC | Age: 60
End: 2020-12-15

## 2020-12-15 NOTE — TELEPHONE ENCOUNTER
Pt states she needs to be seen or have a virtual appt for depression. She states this is a new problem for her. Please advise her at [phone] when she can be seen.

## 2020-12-17 ENCOUNTER — VIRTUAL VISIT (OUTPATIENT)
Dept: INTERNAL MEDICINE CLINIC | Age: 60
End: 2020-12-17
Payer: COMMERCIAL

## 2020-12-17 DIAGNOSIS — M47.816 LUMBAR FACET ARTHROPATHY: ICD-10-CM

## 2020-12-17 DIAGNOSIS — E78.5 HYPERLIPIDEMIA, UNSPECIFIED HYPERLIPIDEMIA TYPE: ICD-10-CM

## 2020-12-17 DIAGNOSIS — K63.5 HYPERPLASTIC COLONIC POLYP, UNSPECIFIED PART OF COLON: ICD-10-CM

## 2020-12-17 DIAGNOSIS — M54.31 RIGHT SIDED SCIATICA: ICD-10-CM

## 2020-12-17 DIAGNOSIS — I10 ESSENTIAL HYPERTENSION: ICD-10-CM

## 2020-12-17 DIAGNOSIS — F32.9 REACTIVE DEPRESSION: Primary | ICD-10-CM

## 2020-12-17 PROCEDURE — 99215 OFFICE O/P EST HI 40 MIN: CPT | Performed by: INTERNAL MEDICINE

## 2020-12-17 RX ORDER — VENLAFAXINE HYDROCHLORIDE 75 MG/1
75 CAPSULE, EXTENDED RELEASE ORAL DAILY
Qty: 30 CAP | Refills: 1 | Status: SHIPPED | OUTPATIENT
Start: 2020-12-17 | End: 2021-01-19

## 2020-12-23 PROBLEM — M54.31 RIGHT SIDED SCIATICA: Status: ACTIVE | Noted: 2020-12-23

## 2020-12-23 PROBLEM — F32.9 REACTIVE DEPRESSION: Status: ACTIVE | Noted: 2020-12-23

## 2020-12-23 NOTE — PROGRESS NOTES
Agnes Holguin is a 56 y.o. female who was seen by synchronous (real-time) audio-video technology on 12/17/2020. HPI:   Agnes Holguin is a 56y.o. year old female who presents today for an acute visit. She has a history of hypertension, hyperlipidemia, chronic daily headaches, obstructive sleep apnea, allergic rhinitis, vasomotor instability, and irritable bowel syndrome. She reports today that she has been having worsening difficulty with depression over the last several months, and states that she has been feeling very depressed and is crying often. She relates that she discovered that her  had been having multiple affairs with coworkers over the last 10 years. She states that she retired early due to the pandemic, and she feels that this has exacerbated the problem due to the isolation she is feeling. She states that she stayed with him all of this time since she felt it was her obligation to keep the family together. However, she states that the situation became unbearable, and she has now moved out of her home and is living with her son. She states that she has started seeing a counselor, and it is felt that she would benefit from treatment with a medication for depression. She denies any suicidal plan. She states that she is eating and sleeping reasonably well. She is otherwise without new complaints. In 7/2018, she was referred to Dr. Latha Rainey for her chronic daily headaches, and evaluation included brain MRI and MRA (8/8/2018) showing small nonspecific subcortical white matter hyperintensity left insula region,otherwise unremarkable MRI; and MRA negative. She underwent sleep study evaluation and was diagnosed with moderate obstructive sleep apnea, and was referred to Dr. Sarah George and successfully started on CPAP. She was prescribed a Medrol dose pack to allow her to withdraw from daily use of analgesics for her headaches.  She was then started on amitriptyine 10 mg daily and instructed to use Excedrin migraine for moderate headaches, and Fioricet only for severe headaches. Celebrex and Zanaflex were prescribed to manage her chronic neck pain, and she continues to take one Zanaflex nightly. She also completed physical therapy, including dry needling, for her neck pain with significant improvement. She is continuing to use Fioricet when she develops a headache, but reports that she is needing to take only one with good response. She also is continuing to take Zanaflex and amitriptyline at bedtime, but is no longer needing Celebrex. On 1/29/2018, she called the office and reported she was out having dinner when she experienced an \"IBS episode\" characterized by flushing in the face, dry mouth, and severe abdominal cramping. When she arrived home, she experienced diarrhea and a single episode of vomiting. She reported that she continued to have abdominal cramping and fatigue following this episode, although described as getting slowly better each day. She reported that she had similar episodes to this dating back 30 years, and was diagnosed with IBS at that time. She stated that the episodes used to resolve more quickly, although over the last year, she states that it now is taking several days before she recovers. She was referred to Dr. Danita Staton, and started on Linzess for constipation. However, she states that it was not effective. She underwent evaluation by Dr. Sudhakar Srivastava and Dr. Son Franco and had a small bowel series (8/7/2018) and abdominal duplex scan (8/16/2018), which were negative. She also collected a 24 hour urine for 5-HIAA, which was negative. She reports that she has noted an improvement in her abdominal pain and diarrheal episodes, and reports that she has not had any episodes since discontinuing captopril. She currently denies any abdominal pain, nausea, vomiting, melena, hematochezia, or change in bowel movements.     On 8/2/2016, she was evaluated by PETERSON Ahmadi for left sided crampy abdominal pain, diarrhea, and hematochezia. She was treated for presumed diverticulitis with Cipro and Flagyl, and underwent evaluation showing WBC 11.2, ALT 59, alkaline phosphatase 121, and lipase 427. Abdominal CT scan (8/2/2016) showed localized bowel wall thickening with inflammatory change involving the splenic flexure and proximal descending colon; no colonic diverticuli are seen so the presence of diverticulitis is unlikely; would be a typical location for bowel ischemia, but the patient's vascular structures are essentially normal so bowel ischemia unlikely; favor infectious or inflammatory colitis. She was evaluated by Dr. Son Mejia and admitted for bowel rest, IVF, and IV antibiotics. She improved significantly in 36 hours and was discharged home. She completed the course of oral antibiotics and states that her symptoms have completely resolved. In 4/2017, she was again evaluated by Dr. Son Mejia for recurrent abdominal pain, and abdominal and pelvic CT scan was obtained (4/28/2017) showing mild nonspecific thickening of the proximal jejunum and significant improvement in the distal transverse colon adjacent to the splenic flexure with a short segment with mild wall thickening. Her discomfort resolved, but concern was raised for possible ischemia as cause. An echocardiogram was obtained (5/26/2017) showing normal LV function (EF 65%), no RWMA, grade 1 diastolic dysfunction, and wall thickening upper limits of normal. She also underwent a hypercoaguable laboratory workup, which was negative. She underwent screening colonoscopy by Dr. Re Taylor in 6/2011, which found a polyp in the proximal descending colon (pathology: hyperplastic). Because she could not intubate the cecum, follow-up was recommended for 2 years. In 4/2015, she underwent repeat colonoscopy with Dr. Demond Cha, which showed left diverticular disease, hypertrophied anal papilla, and no polyps.  She had a repeat colonoscopy in 10/2020 by Dr. Son Mejia showing a 5 mm rectal polyp (pathology: hyperplastic). Follow-up was recommended for 5 years. She has a history of hypertension, treated with amlodipine, hydrochlorothiazide, and hydralazine. She has resumed exercising and she purchased an elliptical machine for her home. She denies any chest pain, shortness of breath at rest or with exertion, palpitations, lightheadedness, or edema. She also has hyperlipidemia, treated with fenofibrate and fish oil in the past, but these were discontinued and she was changed to rosuvastatin in 6/2016. However, due to mild to moderate elevation in AST/ALT, it was discontinued in 5/2017 with resolution of abnormal liver function tests. She was subsequently started on pravastatin in 10/2018, and reports no difficulties with it today. She has a history of mild-severe obstructive sleep apnea, diagnosed by Dr. Davian Zafar, and was prescribed nasal CPAP. She states that she is no longer using her machine because it was making her headaches worse. She suffers from chronic headaches, considered to be migraines in the past as they were associated with menstruation. Since she entered menopause, the severity of her headaches has decreased and the character has changed. She has severe allergic rhinitis, and awakens with morning headaches when her allergies are severe. She is followed by Dr. Senia Membreno and had been receiving immunotherapy with some improvement. She also has vasomotor instability, which responded to venlafaxine and she has successfully weaned from taking it. In 10/2019, she reported a 3 week history of URI with nonproductive cough. She stated that her symptoms persisted, although appeared to be gradually improving until 10/5/2019, when she developed a fever of 100.8 ° F, chills, nausea, decreased appetite, myalgias, and weakness. She also stated that her cough appeared to be  worse, and by 10/7/2019, it had become productive of yellow brown sputum.  She presented to [facility] and evaluation included WBC 14.2 with 71% neutrophils, hemoglobin 40.9, and chest x-ray revealed peribronchial cuffing with an infiltrate in the right lower lobe. She was started on doxycycline 100 mg bid for 10 days, Qvar inhaler, and Tessalon pearls. She smoked 0.5 ppd for 30 years, stopping into 2011. When seen on 10/10/2019, Augmentin was added given persistent symptoms, and she was given an albuterol inhaler. She clinically improved, and repeat chest x-ray on 11/26/2019 was normal.     She also has a history of an anterior cervical discectomy and fusion (C5-C6) in 2/2011 by Dr. Patrick Steni for a herniated disc and right radiculopathy. She does experience intermittent neck pain, and feels that it may be exacerbating her headaches. In 6/2016, she presented with complaints of right sciatica and she underwent a lumbar MRI (7/8/2016) showing multilevel mild disc bulges, mild facet arthropathy, and multilevel mild foraminal stenosis; L3-L4 with mild to moderate spinal canal stenosis, no more than mild spinal canal stenosis elsewhere, and no cord compression. She was evaluated by Dr. Vernice Holstein on 9/1/2016 and started on gabapentin. She also was referred to physical therapy and received dry needling, which resulted in significant improvement. In 9/2020, she developed acute worsening of low back pain after picking up her granddaughter. She states that the pain gradually progressed with radiation into her right buttocks and posterior right leg. She presented to the [facility] ED on 9/26/2020. Lumbar spine x-ray showed no fracture or subluxation, but increasing degenerative disc space narrowing at L3-L4. She was treated with Skelaxin and naproxen without improvement. She was seen in the office and prescribed a Medrol dose pack and restarted on gabapentin with improvement.      She has a history of a right ovarian cyst, and on surveillance ultrasound by Dr. Manisha Simon, it was noted that the right ovarian cyst had enlarged slightly and changed in appearance. She was referred to Dr. Riley Brady for evaluation, and he stated that although the cyst was likely benign, he recommended laparoscopic removal. On 2019, she underwent a laparoscopic bilateral salpingo-oophorectomy and washing. 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 Past Medical History:   Diagnosis Date    Allergic rhinitis     Arthritis     Chronic headaches     Hx of colonic polyp 2011    Hyperplastic; Dr. Rosanna Cano.  Hyperlipidemia     Hypertension     IBS (irritable bowel syndrome)     Obstructive sleep apnea     uses cpap    S/P cervical discectomy 2011    Anterior C5-C6 discectomy for herniated disc and right radiculopathy; Dr. Lara Escobedo     Past Surgical History:   Procedure Laterality Date    COLONOSCOPY N/A 10/7/2020    COLONOSCOPY with bx performed by Shima [facility]ante [facility], COLON, DIAGNOSTIC      HX BACK SURGERY  11    neck surgery; ruptured cervical discs    HX COLONOSCOPY  2011 2015    4/17/15, normal, recalled for 5 years    HX GYN      dilation and curretage    HX GYN          HX OOPHORECTOMY  10/2018     Current Outpatient Medications   Medication Sig    venlafaxine-SR (EFFEXOR-XR) 75 mg capsule Take 1 Cap by mouth daily.  butalbital-acetaminophen-caffeine (FIORICET, ESGIC) -40 mg per tablet Take 1 Tab by mouth every six (6) hours as needed for Headache.  gabapentin (NEURONTIN) 300 mg capsule Take 2 Caps by mouth three (3) times daily. Max Daily Amount: 1,800 mg. Indications: neuropathic pain    pravastatin (PRAVACHOL) 10 mg tablet Take 1 Tab by mouth nightly.  hydrALAZINE (APRESOLINE) 100 mg tablet Take 1 Tab by mouth three (3) times daily.  methylPREDNISolone (MEDROL DOSEPACK) 4 mg tablet Take 1 Tab by mouth Specific Days and Specific Times.     tiZANidine (ZANAFLEX) 4 mg tablet Take 1 Tab by mouth every evening.  hydroCHLOROthiazide (HYDRODIURIL) 25 mg tablet TAKE 1 TABLET BY MOUTH EVERY DAY    potassium chloride (K-DUR, KLOR-CON) 20 mEq tablet TAKE 1 TABLET BY MOUTH DAILY    amLODIPine (NORVASC) 10 mg tablet TAKE 1 TABLET BY MOUTH DAILY    TURMERIC ROOT EXTRACT PO Take 1,000 mg by mouth.  GLUC/CHONDR-MSM#7/C/DEENA/BORON (GLUCOSAMINE-CHONDR, BOSWELLIA, PO) Take 500 mg by mouth daily.  multivitamin (ONE A DAY) tablet Take 1 Tab by mouth daily.  Cholecalciferol, Vitamin D3, 2,000 unit cap Take 2,000 Units by mouth daily. No current facility-administered medications for this visit. Allergies and Intolerances: Allergies   Allergen Reactions    Other Plant, Animal, Environmental Itching and Sneezing     \"Everything but feathers and horses. \"        Family History: Her mother had hypertension, but  from a melanoma. Her father  from a DVT. Her brother has CAD and underwent CABG at the age of 40. Family History   Problem Relation Age of Onset    Heart Disease Brother     Other Mother         melanoma    Arthritis-osteo Mother     Bleeding Prob Father     Heart Disease Father     Heart Attack Father     Stroke Maternal Uncle     Heart Attack Maternal Uncle     Breast Cancer Maternal Grandmother      Social History:   She  reports that she quit smoking about 5 years ago. She smoked 0.50 packs per day. She has never used smokeless tobacco. She reports that she smoked 0.5 ppd for 30 years, stopping in . She is  and lives with her . She recently retired from the [employer] department of [employer]; her  sells eyeglass frames to opticians.     Social History     Substance and Sexual Activity   Alcohol Use Not Currently    Alcohol/week: 0.0 standard drinks     Immunization History:  Immunization History   Administered Date(s) Administered    Influenza Vaccine 10/08/2014, 10/09/2017    Influenza Vaccine (Quad) PF (>6 Mo Flulaval, Fluarix, and >3 Yrs Afluria, Fluzone 53782) 10/23/2018, 02/04/2020, 09/29/2020    TD Vaccine 01/01/2006    Tdap 10/07/2016    Zoster Recombinant 08/29/2019, 12/18/2019       Review of Systems:   As above included in HPI.   Otherwise 11 point review of systems negative including constitutional, skin, HENT, eyes, respiratory, cardiovascular, gastrointestinal, genitourinary, musculoskeletal, endo/heme/aller, neurological.    Physical:      Visit Vitals  LMP 01/10/2012 (LMP Unknown)        Constitutional: [x] Appears well-developed and well-nourished [x] No apparent distress      [] Abnormal -     Mental status: [x] Alert and awake  [x] Oriented to person/place/time [x] Able to follow commands    [] Abnormal -     Eyes:   EOM    [x]  Normal    [] Abnormal -   Sclera  [x]  Normal    [] Abnormal -          Discharge [x]  None visible   [] Abnormal -     HENT: [x] Normocephalic, atraumatic  [] Abnormal -   [x] Mouth/Throat: Mucous membranes are moist    External Ears [x] Normal  [] Abnormal -    Neck: [x] No visualized mass [] Abnormal -     Pulmonary/Chest: [x] Respiratory effort normal   [x] No visualized signs of difficulty breathing or respiratory distress        [] Abnormal -      Musculoskeletal:   [x] Normal gait with no signs of ataxia         [x] Normal range of motion of neck        [] Abnormal -     Neurological:        [x] No Facial Asymmetry (Cranial nerve 7 motor function) (limited exam due to video visit)          [x] No gaze palsy        [] Abnormal -          Skin:        [x] No significant exanthematous lesions or discoloration noted on facial skin         [] Abnormal -            Psychiatric:       [x] Normal Affect [] Abnormal -        [x] No Hallucinations        Review of Data:  Labs:  Hospital Outpatient Visit on 12/03/2020   Component Date Value Ref Range Status    SARS-CoV-2 10/02/2020 Not Detected  Not Detected   Final     Health Maintenance:  Screening:    Mammogram: negative (12/2020)   PAP smear: well women exams by  Keshav Cortes (last 12/2019)   Colorectal: colonoscopy (10/2020) hyperplastic polyp. Dr. Cecil Hurt. Due 10/2025   Depression: Weaned from Effexor (vasomotor instability). DM (HbA1c/FPG): FPG 84 (7/2020)   Hepatitis C: negative (12/2015)   Falls: none   DEXA: unknown   Glaucoma: has regular eye exams with Dr. Marlys Mancilla (last 11/2018)   Smoking: 15 pack year (stopped 2011)   Vitamin D: 46.1 (7/2020)   Medicare Wellness: N/A    Impression:  Patient Active Problem List   Diagnosis Code    Hypertension I10    Hyperlipidemia E78.5    Obstructive sleep apnea G47.33    Chronic daily headache R51.9    IBS (irritable bowel syndrome) K58.9    Colon polyp K63.5    Vitamin D deficiency E55.9    History of colitis Z87.19    Lumbar spinal stenosis M48.061    HNP (herniated nucleus pulposus), lumbar M51.26    Lumbar facet arthropathy M47.816    Neck pain M54.2    Degenerative disc disease, cervical M50.30    Right ovarian cyst N83.201    Former smoker Z87.891    Reactive depression F32.9    Right sided sciatica M54.31       Plan:  Reactive depression. Patient describes worsening depression over the last several months in reaction to multiple stressors including recent long-term, pandemic isolation, and most important, long term infidelity of her . She has now moved out of her home and is living with her son. She is receiving counseling, but is experiencing depression symptoms and would benefit from treatment with medication. Has previously been treated with venlafaxine SR and tolerated without side effects. Will start venlafaxine SR 75 mg daily. Discussed timing of side effects and therapeutic response. Has previously scheduled appointment in 1/2021, and will follow-up at that time. .     Other issues:  1. Hypertension. Blood pressure well controlled today on current regimen of hydralazine 100 mg tid, amlodipine 10 mg daily, and hydrochlorothiazide 25 mg daily.  Successfully discontinued treatment with captopril since unclear if abdominal attacks may have been a form of angioedema. Notable that has not had recurrence since discontinuing. Advised to continue to monitor blood pressure at home. Renal function was normal with creatinine 0.69/ eGFR >60 at last visit. However, BUN/creatinine ratio also remained significantly elevated at 41, and again discussed importance of increasing fluid intake, particularly given treatment with hydrochlorothiazide. Will reassess at upcoming visit. 2. Hyperlipidemia. Began treatment with rosuvastatin in 6/2016, and elevation in transaminases first noted in 8/2016. Discontinued in 5/2017 due to persistent elevation of transaminases, and resolved after discontinuation. Calculated 10 year ASCVD risk off statin was 5.4 %, which did not place her in one of the four statin benefit groups as per new AHA/ACC guidelines. However, , , and HDL 50, and patient with strong family history of heart disease. She started treatment with pravastatin 10 mg daily in 10/2018, and LDL improved to 80 with HDL 54 at that time. LDL worse today at 107 and HDL 51, and patient acknowledges dietary indiscretion and increase in weight. Emphasized importance of lifestyle modifications, including diet, exercise, and weight loss. Liver function tests remain normal. If LDL remains elevated, will consider increasing dose of pravastatin. Will assess at next visit. 3. Elevated transaminases. Now resolved since rosuvastatin discontinued. Review of record shows elevation noted since 8/2016 when initiated. Iron studies, hepatitis panel, KIEL, SPEP, CK, aldolase, and anti-smooth muscle antibody negative. RUQ ultrasound (10/2016) showing fatty infiltration of liver. Advised patient to avoid alcohol. 4. Right lower lobe pneumonia. Had a URI for approximately 3 weeks, mainly manifested as nasal congestion, post-nasal drainage and nonproductive cough.  She appeared to be gradually improving until 10/5/2019 when she noted that onset of a fever, chills, myalgias, and weakness. She stated that her cough appeared to be mildly worse, and over the next 48 hours became productive of yellow brown sputum. She presented to [facility] and found to have WBC 14.2 (71% neutrophils) and chest x-ray revealed peribronchial cuffing with an infiltrate in the right lower lobe. She was started on doxycycline, Qvar inhaler, and Tessalon pearls. She was seen in follow-up and continued to have a productive cough and reported mild dyspnea on exertion. Antibiotic coverage was broadened for a community acquired pneumonia to include Augmentin bid for 10 days in addition to doxycycline, and prescribed an albuterol inhaler with spacer to help with mild dyspnea on exertion. Clinically improved, and repeat chest x-ray normal (11/2019). No further issues since that time. 5. Colitis. Unclear etiology, but nonspecific bowel wall thickening in short segment of jejunum and sigmoid on repeat CT scan raised question of ischemic in origin. Hypercoaguable workup including lab studies and echocardiogram negative. Appears to have intermittent \"IBS\" episodes associated with facial flushing, dry mouth, and severe abdominal cramping followed by diarrhea +/- vomiting. Episodes linger for several days following. No evidence of urticaria seen. Interesting is the abnormalities noted on abdominal CT scan in two of her episodes, with nonspecific short segment thickening in various locations in small and large intestines. Obtained complement studies including C4, C1 esterase inhibtor, and C1q levels which were normal. However, instructed patient that if she developed another episode, would like to check C4 level during the episode. Discontinued captopril and has not had recurrence since doing so. Evaluated by GI and had negative small bowel series, duplex ultrasound, and 24 hour urine for 5-HIAA level. No longer a significant issue since captopril discontinued.  Continue to follow. 5. Chronic daily headaches. Evaluated by Dr. Hoang Song and weaned from daily Fioricet and Excedrin migraine. Headaches now improved with daily amitriptyline and trying to use Excedrin migraine and Fioricet only occasionally for moderate and severe headaches, respectively. However, seems to have resumed Fioricet daily. Will readdress at next visit. Does have chronic neck pain related to her anterior neck fusion, and is finding Zanaflex and regular exercises to be helpful. States has not needed to follow-up with Dr. Hoang Song. Describing sinus issues also contributing to headaches, and now on Zyrtec and Nasocort prn.   6. Right leg sciatica. Evidence of lumbar spinal stenosis and facet arthropathy, and lumbar herniated disc. Significant improvement with dry needling therapy previously. Had acute worsening in 9/2020 after picking up her granddaughter. She presented to the [facility] ED on 9/26/2020, and lumbar spine x-ray showed no fracture or subluxation, but increasing degenerative disc space narrowing at L3-L4. She was treated with Skelaxin and naproxen, as well as lidocaine patches. She presented to the office with persistent symptoms on 9/29/2020 and was treated with a Medrol dose pack and gabapentin 300 mg tid. Continue to follow and follow-up with Dr. Curlee Gowers if recurs. 7. Abnormal glucose. Fasting glucose remains normal. Most likely due to weight loss. Emphasized importance of continued lifestyle modifications. 8.  Obstructive sleep apnea. Underwent reevaluation by Dr. Saul Wallis, and repeat sleep study showed moderate PEGGY. Started on CPAP and noting improvement. Continue to follow. 9. Right ovarian cyst. Enlarged slightly and changed in appearance at last evaluation on ultrasound. Referred to Dr. Riley Brady and underwent laparoscopic bilateral salpingo-oophorectomy and washing. 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 No further treatment needed. 10. Health maintenance.   Already received influenza vaccine. Completed 2/2 doses of Shingrix vaccine. Other immunizations up to date. Well woman exam with Dr. Rob Moss in 2/2020. Will discuss baseline bone density study with next mammogram. Mammogram up to date. Completed colonoscopy with Dr. Danilo Casillas and follow-up in 5 years . Continue regular eye exams with Dr. Ela Dominguez. Vitamin D level normal. Continue maintenance dose supplement. Total time: 40 minutes spent with the patient on counseling, answering questions and/or coordination of care. Complex medical review performed, including review of medical history, lab results, and testing. Complicated management plan formulated. Patient understands recommendations and agrees with plan. Follow-up as previously scheduled. We discussed the expected course, resolution and complications of the diagnosis(es) in detail. Medication risks, benefits, costs, interactions, and alternatives were discussed as indicated. I advised her to contact the office if her condition worsens, changes or fails to improve as anticipated. She expressed understanding with the diagnosis(es) and plan. Berna Hilliard, who was evaluated through a patient-initiated, synchronous (real-time) audio-video encounter, and/or her healthcare decision maker, is aware that it is a billable service, with coverage as determined by her insurance carrier. She provided verbal consent to proceed: Yes, and patient identification was verified. It was conducted pursuant to the emergency declaration under the [facility], [address] [place] [address] authority and the [employer] Act. A caregiver was present when appropriate. Ability to conduct physical exam was limited. I was in the office. The patient was at home.       Milagro Blair MD

## 2021-01-05 DIAGNOSIS — R51.9 CHRONIC NONINTRACTABLE HEADACHE, UNSPECIFIED HEADACHE TYPE: ICD-10-CM

## 2021-01-05 DIAGNOSIS — G89.29 CHRONIC NONINTRACTABLE HEADACHE, UNSPECIFIED HEADACHE TYPE: ICD-10-CM

## 2021-01-05 RX ORDER — BUTALBITAL, ACETAMINOPHEN AND CAFFEINE 50; 325; 40 MG/1; MG/1; MG/1
1 TABLET ORAL
Qty: 60 TAB | Refills: 0 | Status: SHIPPED | OUTPATIENT
Start: 2021-01-05 | End: 2021-01-28 | Stop reason: SDUPTHER

## 2021-01-12 ENCOUNTER — HOSPITAL ENCOUNTER (OUTPATIENT)
Dept: LAB | Age: 61
Discharge: HOME | End: 2021-01-12
Payer: COMMERCIAL

## 2021-01-12 ENCOUNTER — APPOINTMENT (OUTPATIENT)
Dept: INTERNAL MEDICINE CLINIC | Age: 61
End: 2021-01-12

## 2021-01-12 DIAGNOSIS — K58.1 IRRITABLE BOWEL SYNDROME WITH CONSTIPATION: ICD-10-CM

## 2021-01-12 DIAGNOSIS — G47.33 OBSTRUCTIVE SLEEP APNEA: ICD-10-CM

## 2021-01-12 DIAGNOSIS — M50.30 DEGENERATIVE DISC DISEASE, CERVICAL: ICD-10-CM

## 2021-01-12 DIAGNOSIS — Z87.19 HISTORY OF COLITIS: ICD-10-CM

## 2021-01-12 DIAGNOSIS — E78.5 HYPERLIPIDEMIA, UNSPECIFIED HYPERLIPIDEMIA TYPE: ICD-10-CM

## 2021-01-12 DIAGNOSIS — R51.9 CHRONIC DAILY HEADACHE: ICD-10-CM

## 2021-01-12 DIAGNOSIS — I10 ESSENTIAL HYPERTENSION: ICD-10-CM

## 2021-01-12 DIAGNOSIS — M48.062 SPINAL STENOSIS OF LUMBAR REGION WITH NEUROGENIC CLAUDICATION: ICD-10-CM

## 2021-01-12 DIAGNOSIS — E55.9 VITAMIN D DEFICIENCY: ICD-10-CM

## 2021-01-12 DIAGNOSIS — Z12.31 SCREENING MAMMOGRAM, ENCOUNTER FOR: ICD-10-CM

## 2021-01-12 DIAGNOSIS — Z87.891 FORMER SMOKER: ICD-10-CM

## 2021-01-12 DIAGNOSIS — M47.816 LUMBAR FACET ARTHROPATHY: ICD-10-CM

## 2021-01-12 LAB
ALBUMIN SERPL-MCNC: 3.9 G/DL (ref 3.4–5)
ALBUMIN/GLOB SERPL: 1.2 {RATIO} (ref 0.8–1.7)
ALP SERPL-CCNC: 96 U/L (ref 45–117)
ALT SERPL-CCNC: 35 U/L (ref 13–56)
ANION GAP SERPL CALC-SCNC: 6 MMOL/L (ref 3–18)
AST SERPL-CCNC: 20 U/L (ref 10–38)
BILIRUB SERPL-MCNC: 0.5 MG/DL (ref 0.2–1)
BUN SERPL-MCNC: 24 MG/DL (ref 7–18)
BUN/CREAT SERPL: 36 (ref 12–20)
CALCIUM SERPL-MCNC: 9.3 MG/DL (ref 8.5–10.1)
CHLORIDE SERPL-SCNC: 105 MMOL/L (ref 100–111)
CHOLEST SERPL-MCNC: 186 MG/DL
CO2 SERPL-SCNC: 30 MMOL/L (ref 21–32)
CREAT SERPL-MCNC: 0.67 MG/DL (ref 0.6–1.3)
GLOBULIN SER CALC-MCNC: 3.2 G/DL (ref 2–4)
GLUCOSE SERPL-MCNC: 86 MG/DL (ref 74–99)
HDLC SERPL-MCNC: 56 MG/DL (ref 40–60)
HDLC SERPL: 3.3 {RATIO} (ref 0–5)
LDLC SERPL CALC-MCNC: 105.2 MG/DL (ref 0–100)
LIPID PROFILE,FLP: ABNORMAL
MAGNESIUM SERPL-MCNC: 2.2 MG/DL (ref 1.6–2.6)
POTASSIUM SERPL-SCNC: 3.9 MMOL/L (ref 3.5–5.5)
PROT SERPL-MCNC: 7.1 G/DL (ref 6.4–8.2)
SODIUM SERPL-SCNC: 141 MMOL/L (ref 136–145)
TRIGL SERPL-MCNC: 124 MG/DL (ref ?–150)
VLDLC SERPL CALC-MCNC: 24.8 MG/DL

## 2021-01-12 PROCEDURE — 80061 LIPID PANEL: CPT

## 2021-01-12 PROCEDURE — 82306 VITAMIN D 25 HYDROXY: CPT

## 2021-01-12 PROCEDURE — 83735 ASSAY OF MAGNESIUM: CPT

## 2021-01-12 PROCEDURE — 80053 COMPREHEN METABOLIC PANEL: CPT

## 2021-01-12 PROCEDURE — 36415 COLL VENOUS BLD VENIPUNCTURE: CPT

## 2021-01-13 LAB — 25(OH)D3 SERPL-MCNC: 32.3 NG/ML (ref 30–100)

## 2021-01-19 ENCOUNTER — OFFICE VISIT (OUTPATIENT)
Dept: INTERNAL MEDICINE CLINIC | Age: 61
End: 2021-01-19
Payer: COMMERCIAL

## 2021-01-19 VITALS
WEIGHT: 132 LBS | SYSTOLIC BLOOD PRESSURE: 134 MMHG | OXYGEN SATURATION: 96 % | HEIGHT: 63 IN | HEART RATE: 74 BPM | DIASTOLIC BLOOD PRESSURE: 74 MMHG | BODY MASS INDEX: 23.39 KG/M2 | TEMPERATURE: 97.9 F

## 2021-01-19 DIAGNOSIS — E55.9 VITAMIN D DEFICIENCY: ICD-10-CM

## 2021-01-19 DIAGNOSIS — M50.30 DEGENERATIVE DISC DISEASE, CERVICAL: ICD-10-CM

## 2021-01-19 DIAGNOSIS — Z87.891 FORMER SMOKER: ICD-10-CM

## 2021-01-19 DIAGNOSIS — F32.9 REACTIVE DEPRESSION: ICD-10-CM

## 2021-01-19 DIAGNOSIS — G47.33 OBSTRUCTIVE SLEEP APNEA: ICD-10-CM

## 2021-01-19 DIAGNOSIS — M47.816 LUMBAR FACET ARTHROPATHY: ICD-10-CM

## 2021-01-19 DIAGNOSIS — M48.062 SPINAL STENOSIS OF LUMBAR REGION WITH NEUROGENIC CLAUDICATION: ICD-10-CM

## 2021-01-19 DIAGNOSIS — K58.1 IRRITABLE BOWEL SYNDROME WITH CONSTIPATION: ICD-10-CM

## 2021-01-19 DIAGNOSIS — Z87.19 HISTORY OF COLITIS: ICD-10-CM

## 2021-01-19 DIAGNOSIS — I10 ESSENTIAL HYPERTENSION: Primary | ICD-10-CM

## 2021-01-19 DIAGNOSIS — M54.31 RIGHT SIDED SCIATICA: ICD-10-CM

## 2021-01-19 DIAGNOSIS — E78.5 HYPERLIPIDEMIA, UNSPECIFIED HYPERLIPIDEMIA TYPE: ICD-10-CM

## 2021-01-19 DIAGNOSIS — R51.9 CHRONIC DAILY HEADACHE: ICD-10-CM

## 2021-01-19 PROCEDURE — 99214 OFFICE O/P EST MOD 30 MIN: CPT | Performed by: INTERNAL MEDICINE

## 2021-01-19 RX ORDER — VENLAFAXINE HYDROCHLORIDE 150 MG/1
150 CAPSULE, EXTENDED RELEASE ORAL DAILY
Qty: 90 CAP | Refills: 2 | Status: SHIPPED | OUTPATIENT
Start: 2021-01-19 | End: 2021-10-28

## 2021-01-19 RX ORDER — PRAVASTATIN SODIUM 20 MG/1
20 TABLET ORAL
Qty: 90 TAB | Refills: 2 | Status: SHIPPED | OUTPATIENT
Start: 2021-01-19 | End: 2021-10-28

## 2021-01-19 NOTE — PROGRESS NOTES
1. Have you been to the ER, urgent care clinic or hospitalized since your last visit? YES. On file    2. Have you seen or consulted any other health care providers outside of the [address] [place] [address] since your last visit (Include any pap smears or colon screening)?  NO

## 2021-01-19 NOTE — PATIENT INSTRUCTIONS
Learning About High-Potassium Foods What foods are high in potassium? The foods you eat contain nutrients, such as vitamins and minerals. Potassium is a nutrient. Your body needs the right amount to stay healthy and work as it should. You can use the list below to help you make choices about which foods to eat. Foods are high in potassium if they have more than 200 mg per serving. Fruits · Apricots, 2 raw · Avocado, ½ fruit · Banana, 1 medium · Antonino, 1 fruit · Nectarine, 1 fruit · Orange, 1 fruit · Prunes, 5 fruits · Raisins, ¼ cup Vegetables · Artichoke, 1 medium · Beets, ½ cup · Broccoli, ½ cup · Kale (raw), 1 cup · Potato with skin, 1 medium · Spinach, ½ cup · Sweet potato, 1 medium · Tomato sauce, ½ cup · Zucchini, ½ cup Dairy and dairy alternatives · Milk, 1 cup · Soy milk, 1 cup · Yogurt, 6 oz Meats and other protein foods · Beans (lima, navy, white), ½ cup · Beef, ground, 3 oz · Chicken, 3 oz · Fish (halibut, tuna, cod, snapper), 3 oz · Nuts (almonds, hazelnuts, [place], cashew, pistachios), 1 oz · Peanut butter, 2 Tbsp · Peanuts, 1 oz · [place] 3 oz Seasonings · Salt substitutes Work with your doctor to find out how much of this nutrient you need. Depending on your health, you may need more or less of it in your diet. Where can you learn more? Go to http://www.gray.com/ Enter P450 in the search box to learn more about \"Learning About High-Potassium Foods. \" Current as of: August 22, 2019               Content Version: 12.6 © 9090-2419 [employer]. Care instructions adapted under license by [employer] (which disclaims liability or warranty for this information). If you have questions about a medical condition or this instruction, always ask your healthcare professional. [place] [address] any warranty or liability for your use of this information. Heart-Healthy Diet: Care Instructions Your Care Instructions A heart-healthy diet has lots of vegetables, fruits, nuts, beans, and whole grains, and is low in salt. It limits foods that are high in saturated fat, such as meats, cheeses, and fried foods. It may be hard to change your diet, but even small changes can lower your risk of heart attack and heart disease. Follow-up care is a key part of your treatment and safety. Be sure to make and go to all appointments, and call your doctor if you are having problems. It's also a good idea to know your test results and keep a list of the medicines you take. How can you care for yourself at home? Watch your portions · Learn what a serving is. A \"serving\" and a \"portion\" are not always the same thing. Make sure that you are not eating larger portions than are recommended. For example, a serving of pasta is ½ cup. A serving size of meat is 2 to 3 ounces. A 3-ounce serving is about the size of a deck of cards. Measure serving sizes until you are good at [place]" them. Keep in mind that restaurants often serve portions that are 2 or 3 times the size of one serving. · To keep your energy level up and keep you from feeling hungry, eat often but in smaller portions. · Eat only the number of calories you need to stay at a healthy weight. If you need to lose weight, eat fewer calories than your body burns (through exercise and other physical activity). Eat more fruits and vegetables · Eat a variety of fruit and vegetables every day. Dark green, deep orange, red, or yellow fruits and vegetables are especially good for you. Examples include spinach, carrots, peaches, and berries. · Keep carrots, celery, and other veggies handy for snacks. Buy fruit that is in season and store it where you can see it so that you will be tempted to eat it. · Cook dishes that have a lot of veggies in them, such as stir-fries and soups. Limit saturated and trans fat · Read food labels, and try to avoid saturated and trans fats. They increase your risk of heart disease. · Use olive or canola oil when you cook. · Bake, broil, grill, or steam foods instead of frying them. · Choose lean meats instead of high-fat meats such as hot dogs and sausages. Cut off all visible fat when you prepare meat. · Eat fish, skinless poultry, and meat alternatives such as soy products instead of high-fat meats. Soy products, such as tofu, may be especially good for your heart. · Choose low-fat or fat-free milk and dairy products. Eat foods high in fiber · Eat a variety of grain products every day. Include whole-grain foods that have lots of fiber and nutrients. Examples of whole-grain foods include oats, whole wheat bread, and brown rice. · Buy whole-grain breads and cereals, instead of white bread or pastries. Limit salt and sodium · Limit how much salt and sodium you eat to help lower your blood pressure. · Taste food before you salt it. Add only a little salt when you think you need it. With time, your taste buds will adjust to less salt. · Eat fewer snack items, fast foods, and other high-salt, processed foods. Check food labels for the amount of sodium in packaged foods. · Choose low-sodium versions of canned goods (such as soups, vegetables, and beans). Limit sugar · Limit drinks and foods with added sugar. These include candy, desserts, and soda pop. Limit alcohol · Limit alcohol to no more than 2 drinks a day for men and 1 drink a day for women. Too much alcohol can cause health problems. When should you call for help? Watch closely for changes in your health, and be sure to contact your doctor if: 
  · You would like help planning heart-healthy meals. Where can you learn more? Go to http://www.SeeToo.com/ Enter V137 in the search box to learn more about \"Heart-Healthy Diet: Care Instructions. \" 
 Current as of: August 22, 2019               Content Version: 12.6 © 1385-8746 [employer]. Care instructions adapted under license by [employer] (which disclaims liability or warranty for this information). If you have questions about a medical condition or this instruction, always ask your healthcare professional. [address] any warranty or liability for your use of this information.

## 2021-01-22 NOTE — PROGRESS NOTES
HPI:   Christine Mascorro is a 56y.o. year old female who presents today for a routine visit. She has a history of hypertension, hyperlipidemia, chronic daily headaches, obstructive sleep apnea, allergic rhinitis, vasomotor instability, and irritable bowel syndrome. She was seen for an acute visit in 12/2020 due to worsening depression symptoms related to marital issues and the pandemic. She was started on venlafaxine, and reports some improvement today. She states that she is no longer crying incessantly, but she still is having difficulty getting out of bed and finding motivation to do daily activities. Continuing to see counselor and finding it to be helpful. She states that she is continuing to eat and sleep reasonably well. She is otherwise without new complaints. In 7/2018, she was referred to Dr. Daphnie Webber for her chronic daily headaches, and evaluation included brain MRI and MRA (8/8/2018) showing small nonspecific subcortical white matter hyperintensity left insula region,otherwise unremarkable MRI; and MRA negative. She underwent sleep study evaluation and was diagnosed with moderate obstructive sleep apnea, and was referred to Dr. Curtis Cuevas and successfully started on CPAP. She was prescribed a Medrol dose pack to allow her to withdraw from daily use of analgesics for her headaches. She was then started on amitriptyine 10 mg daily and instructed to use Excedrin migraine for moderate headaches, and Fioricet only for severe headaches. Celebrex and Zanaflex were prescribed to manage her chronic neck pain, and she continues to take one Zanaflex nightly. She also completed physical therapy, including dry needling, for her neck pain with significant improvement. She is continuing to use Fioricet when she develops a headache, but reports that she is needing to take only one with good response. She also is continuing to take Zanaflex and amitriptyline at bedtime, but is no longer needing Celebrex.     On 1/29/2018, she called the office and reported she was out having dinner when she experienced an \"IBS episode\" characterized by flushing in the face, dry mouth, and severe abdominal cramping. When she arrived home, she experienced diarrhea and a single episode of vomiting. She reported that she continued to have abdominal cramping and fatigue following this episode, although described as getting slowly better each day. She reported that she had similar episodes to this dating back 30 years, and was diagnosed with IBS at that time. She stated that the episodes used to resolve more quickly, although over the last year, she states that it now is taking several days before she recovers. She was referred to Dr. Jelani Correia, and started on Linzess for constipation. However, she states that it was not effective. She underwent evaluation by Dr. Emir Ramirez and Dr. Galina Quevedo and had a small bowel series (8/7/2018) and abdominal duplex scan (8/16/2018), which were negative. She also collected a 24 hour urine for 5-HIAA, which was negative. She reports that she has noted an improvement in her abdominal pain and diarrheal episodes, and reports that she has not had any episodes since discontinuing captopril. She currently denies any abdominal pain, nausea, vomiting, melena, hematochezia, or change in bowel movements. On 8/2/2016, she was evaluated by PETERSON Ahmadi for left sided crampy abdominal pain, diarrhea, and hematochezia. She was treated for presumed diverticulitis with Cipro and Flagyl, and underwent evaluation showing WBC 11.2, ALT 59, alkaline phosphatase 121, and lipase 427.  Abdominal CT scan (8/2/2016) showed localized bowel wall thickening with inflammatory change involving the splenic flexure and proximal descending colon; no colonic diverticuli are seen so the presence of diverticulitis is unlikely; would be a typical location for bowel ischemia, but the patient's vascular structures are essentially normal so bowel ischemia unlikely; favor infectious or inflammatory colitis. She was evaluated by Dr. Tim Stewart and admitted for bowel rest, IVF, and IV antibiotics. She improved significantly in 36 hours and was discharged home. She completed the course of oral antibiotics and states that her symptoms have completely resolved. In 4/2017, she was again evaluated by Dr. Tim Stewart for recurrent abdominal pain, and abdominal and pelvic CT scan was obtained (4/28/2017) showing mild nonspecific thickening of the proximal jejunum and significant improvement in the distal transverse colon adjacent to the splenic flexure with a short segment with mild wall thickening. Her discomfort resolved, but concern was raised for possible ischemia as cause. An echocardiogram was obtained (5/26/2017) showing normal LV function (EF 65%), no RWMA, grade 1 diastolic dysfunction, and wall thickening upper limits of normal. She also underwent a hypercoaguable laboratory workup, which was negative. She underwent screening colonoscopy by Dr. Maggi Gibson in 6/2011, which found a polyp in the proximal descending colon (pathology: hyperplastic). Because she could not intubate the cecum, follow-up was recommended for 2 years. In 4/2015, she underwent repeat colonoscopy with Dr. Jamila Mann, which showed left diverticular disease, hypertrophied anal papilla, and no polyps. She had a repeat colonoscopy in 10/2020 by Dr. Tim Stewart showing a 5 mm rectal polyp (pathology: hyperplastic). Follow-up was recommended for 5 years. She has a history of hypertension, treated with amlodipine, hydrochlorothiazide, and hydralazine. She has resumed exercising and she purchased an [employer] machine for her home. She denies any chest pain, shortness of breath at rest or with exertion, palpitations, lightheadedness, or edema. She also has hyperlipidemia, treated with fenofibrate and fish oil in the past, but these were discontinued and she was changed to rosuvastatin in 6/2016.  However, due to mild to moderate elevation in AST/ALT, it was discontinued in 5/2017 with resolution of abnormal liver function tests. She was subsequently started on pravastatin in 10/2018, and reports no difficulties with it today. She has a history of mild-severe obstructive sleep apnea, diagnosed by Dr. Jet Simmons, and was prescribed nasal CPAP. She states that she is no longer using her machine because it was making her headaches worse. She suffers from chronic headaches, considered to be migraines in the past as they were associated with menstruation. Since she entered menopause, the severity of her headaches has decreased and the character has changed. She has severe allergic rhinitis, and awakens with morning headaches when her allergies are severe. She is followed by Dr. Nova Gilliam and had been receiving immunotherapy with some improvement. She also has vasomotor instability, which responded to venlafaxine and she has successfully weaned from taking it. In 10/2019, she reported a 3 week history of URI with nonproductive cough. She stated that her symptoms persisted, although appeared to be gradually improving until 10/5/2019, when she developed a fever of 100.8 ° F, chills, nausea, decreased appetite, myalgias, and weakness. She also stated that her cough appeared to be  worse, and by 10/7/2019, it had become productive of yellow brown sputum. She presented to [facility] and evaluation included WBC 14.2 with 71% neutrophils, hemoglobin 40.9, and chest x-ray revealed peribronchial cuffing with an infiltrate in the right lower lobe. She was started on doxycycline 100 mg bid for 10 days, Qvar inhaler, and Tessalon pearls. She smoked 0.5 ppd for 30 years, stopping into 2011. When seen on 10/10/2019, Augmentin was added given persistent symptoms, and she was given an albuterol inhaler.  She clinically improved, and repeat chest x-ray on 11/26/2019 was normal.     She also has a history of an anterior cervical discectomy and fusion (C5-C6) in 2/2011 by Dr. Radhames Victoria for a herniated disc and right radiculopathy. She does experience intermittent neck pain, and feels that it may be exacerbating her headaches. In 6/2016, she presented with complaints of right sciatica and she underwent a lumbar MRI (7/8/2016) showing multilevel mild disc bulges, mild facet arthropathy, and multilevel mild foraminal stenosis; L3-L4 with mild to moderate spinal canal stenosis, no more than mild spinal canal stenosis elsewhere, and no cord compression. She was evaluated by Dr. Jesusita Brown on 9/1/2016 and started on gabapentin. She also was referred to physical therapy and received dry needling, which resulted in significant improvement. In 9/2020, she developed acute worsening of low back pain after picking up her granddaughter. She states that the pain gradually progressed with radiation into her right buttocks and posterior right leg. She presented to the [facility] ED on 9/26/2020. Lumbar spine x-ray showed no fracture or subluxation, but increasing degenerative disc space narrowing at L3-L4. She was treated with Skelaxin and naproxen without improvement. She was seen in the office and prescribed a Medrol dose pack and restarted on gabapentin with improvement. She has a history of a right ovarian cyst, and on surveillance ultrasound by Dr. Jacqueline Lau, it was noted that the right ovarian cyst had enlarged slightly and changed in appearance. She was referred to Dr. Ramiro Lora for evaluation, and he stated that although the cyst was likely benign, he recommended laparoscopic removal. On 2/28/2019, she underwent a laparoscopic bilateral salpingo-oophorectomy and washing. 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 Past Medical History:   Diagnosis Date    Allergic rhinitis     Arthritis     Chronic headaches     Hx of colonic polyp 6/2011    Hyperplastic; Dr. Jennifer Covarrubias.     Hyperlipidemia     Hypertension     IBS (irritable bowel syndrome)     Obstructive sleep apnea     uses cpap    S/P cervical discectomy 2011    Anterior C5-C6 discectomy for herniated disc and right radiculopathy; Dr. Nate Arthur     Past Surgical History:   Procedure Laterality Date    COLONOSCOPY N/A 10/7/2020    COLONOSCOPY with bx performed by Augusto Mckeon MD at [facility], [place], DIAGNOSTIC      HX BACK SURGERY  11    neck surgery; ruptured cervical discs    HX COLONOSCOPY  2011 2015    4/17/15, normal, recalled for 5 years    HX GYN      dilation and curretage    HX GYN          HX OOPHORECTOMY  10/2018     Current Outpatient Medications   Medication Sig    venlafaxine-SR (EFFEXOR-XR) 150 mg capsule Take 1 Cap by mouth daily.  pravastatin (PRAVACHOL) 20 mg tablet Take 1 Tab by mouth nightly.  butalbital-acetaminophen-caffeine (FIORICET, ESGIC) -40 mg per tablet Take 1 Tab by mouth every six (6) hours as needed for Headache.  hydrALAZINE (APRESOLINE) 100 mg tablet Take 1 Tab by mouth three (3) times daily.  tiZANidine (ZANAFLEX) 4 mg tablet Take 1 Tab by mouth every evening.  hydroCHLOROthiazide (HYDRODIURIL) 25 mg tablet TAKE 1 TABLET BY MOUTH EVERY DAY    amLODIPine (NORVASC) 10 mg tablet TAKE 1 TABLET BY MOUTH DAILY    TURMERIC ROOT EXTRACT PO Take 1,000 mg by mouth.  GLUC/CHONDR-MSM#7/C/DEENA/BORON (GLUCOSAMINE-CHONDR, BOSWELLIA, PO) Take 500 mg by mouth daily.  multivitamin (ONE A DAY) tablet Take 1 Tab by mouth daily.  Cholecalciferol, Vitamin D3, 2,000 unit cap Take 2,000 Units by mouth daily. No current facility-administered medications for this visit. Allergies and Intolerances: Allergies   Allergen Reactions    Other Plant, Animal, Environmental Itching and Sneezing     \"Everything but feathers and horses. \"        Family History: Her mother had hypertension, but  from a melanoma. Her father  from a DVT. Her brother has CAD and underwent CABG at the age of 40. Family History   Problem Relation Age of Onset    Heart Disease Brother     Other Mother         melanoma    Arthritis-osteo Mother     Bleeding Prob Father     Heart Disease Father     Heart Attack Father     Stroke Maternal Uncle     Heart Attack Maternal Uncle     Breast Cancer Maternal Grandmother      Social History:   She  reports that she quit smoking about 6 years ago. She smoked 0.50 packs per day. She has never used smokeless tobacco. She reports that she smoked 0.5 ppd for 30 years, stopping in 2011. She is  and lives with her . She recently retired from the [employer]; her  sells eyeglass frames to [employer]. Social History     Substance and Sexual Activity   Alcohol Use Not Currently    Alcohol/week: 0.0 standard drinks     Immunization History:  Immunization History   Administered Date(s) Administered    Influenza Vaccine 10/08/2014, 10/09/2017    Influenza Vaccine (Quad) PF (>6 Mo Flulaval, Fluarix, and >3 Yrs Afluria, Fluzone [id number]) 10/23/2018, 02/04/2020, 09/29/2020    TD Vaccine 01/01/2006    Tdap 10/07/2016    Zoster Recombinant 08/29/2019, 12/18/2019       Review of Systems:   As above included in HPI. Otherwise 11 point review of systems negative including constitutional, skin, HENT, eyes, respiratory, cardiovascular, gastrointestinal, genitourinary, musculoskeletal, endo/heme/aller, neurological.    Physical:      Visit Vitals  /74   Pulse 74   Temp 97.9 °F (36.6 °C) (Temporal)   Ht 5' 3\" (1.6 m)   Wt 132 lb (59.9 kg)   LMP 01/10/2012 (LMP Unknown)   SpO2 96%   BMI 23.38 kg/m²      Patient appears in no apparent distress. Affect is appropriate. HEENT: PERRLA, anicteric, oropharynx clear, no JVD, adenopathy or thyromegaly. No carotid bruits or radiated murmur. Lungs: clear to auscultation, no wheezes, rhonchi, or rales. Heart: regular rate and rhythm.  No murmur, rubs, gallops  Abdomen: soft, nontender, nondistended, normal bowel sounds, no hepatosplenomegaly or masses. Extremities: without edema. Review of Data:  Labs:  Hospital Outpatient Visit on 01/12/2021   Component Date Value Ref Range Status    LIPID PROFILE 01/12/2021        Final    Cholesterol, total 01/12/2021 186  <200 MG/DL Final    Triglyceride 01/12/2021 124  <150 MG/DL Final    HDL Cholesterol 01/12/2021 56  40 - 60 MG/DL Final    LDL, calculated 01/12/2021 105.2* 0 - 100 MG/DL Final    VLDL, calculated 01/12/2021 24.8  MG/DL Final    CHOL/HDL Ratio 01/12/2021 3.3  0 - 5.0   Final    Magnesium 01/12/2021 2.2  1.6 - 2.6 mg/dL Final    Sodium 01/12/2021 141  136 - 145 mmol/L Final    Potassium 01/12/2021 3.9  3.5 - 5.5 mmol/L Final    Chloride 01/12/2021 105  100 - 111 mmol/L Final    CO2 01/12/2021 30  21 - 32 mmol/L Final    Anion gap 01/12/2021 6  3.0 - 18 mmol/L Final    Glucose 01/12/2021 86  74 - 99 mg/dL Final    BUN 01/12/2021 24* 7.0 - 18 MG/DL Final    Creatinine 01/12/2021 0.67  0.6 - 1.3 MG/DL Final    BUN/Creatinine ratio 01/12/2021 36* 12 - 20   Final    GFR est AA 01/12/2021 >60  >60 ml/min/1.73m2 Final    GFR est non-AA 01/12/2021 >60  >60 ml/min/1.73m2 Final    Calcium 01/12/2021 9.3  8.5 - 10.1 MG/DL Final    Bilirubin, total 01/12/2021 0.5  0.2 - 1.0 MG/DL Final    ALT (SGPT) 01/12/2021 35  13 - 56 U/L Final    AST (SGOT) 01/12/2021 20  10 - 38 U/L Final    Alk. phosphatase 01/12/2021 96  45 - 117 U/L Final    Protein, total 01/12/2021 7.1  6.4 - 8.2 g/dL Final    Albumin 01/12/2021 3.9  3.4 - 5.0 g/dL Final    Globulin 01/12/2021 3.2  2.0 - 4.0 g/dL Final    A-G Ratio 01/12/2021 1.2  0.8 - 1.7   Final    Vitamin D 25-Hydroxy 01/12/2021 32.3  30 - 100 ng/mL Final     Health Maintenance:  Screening:    Mammogram: negative (12/2020)   PAP smear: well women exams by Dr. Yesenia Daniels (last 12/2019)   Colorectal: colonoscopy (10/2020) hyperplastic polyp. Dr. Nighat Villela. 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   Smoking: 15 pack year (stopped 2011)   Vitamin D: 32.3 (1/2021)   Medicare Wellness: N/A    Impression:  Patient Active Problem List   Diagnosis Code   • Hypertension I10   • Hyperlipidemia E78.5   • Obstructive sleep apnea G47.33   • Chronic daily headache R51.9   • IBS (irritable bowel syndrome) K58.9   • Colon polyp K63.5   • Vitamin D deficiency E55.9   • History of colitis Z87.19   • Lumbar spinal stenosis M48.061   • HNP (herniated nucleus pulposus), lumbar M51.26   • Lumbar facet arthropathy M47.816   • Neck pain M54.2   • Degenerative disc disease, cervical M50.30   • Right ovarian cyst N83.201   • Former smoker Z87.891   • Reactive depression F32.9   • Right sided sciatica M54.31       Plan:  1. Reactive depression.  Patient seen acutely in 12/2020 and described worsening depression in reaction to multiple stressors including recent snf, pandemic isolation, and discovering long term infidelity of her . She had started to receive counseling, but found depression symptoms worsened and was was started on venlafaxine SR 75 mg daily. Reports some improvement today, but still with lingering symptoms of depression. Advised to increase dose of venlafaxine to 150 mg daily. Will send update in 4 weeks and call if any worsening symptoms. Also encouraged to continue counseling.    2. Hypertension. Blood pressure well controlled today on current regimen of hydralazine 100 mg tid, amlodipine 10 mg daily, and hydrochlorothiazide 25 mg daily. Successfully discontinued treatment with captopril since unclear if abdominal attacks may have been a form of angioedema. Notable that has not had recurrence since discontinuing. Advised to continue to monitor blood pressure at home. Renal function  normal with creatinine 0.67/ eGFR >60. However, BUN/creatinine ratio remains elevated at 36, and again discussed importance of increasing fluid intake particularly given treatment with hydrochlorothiazide. 2. Hyperlipidemia. Began treatment with rosuvastatin in 6/2016, and elevation in transaminases first noted in 8/2016. Discontinued in 5/2017 due to persistent elevation of transaminases, and resolved after discontinuation. Calculated 10 year ASCVD risk off statin was 5.4 %, which did not place her in one of the four statin benefit groups as per new AHA/ACC guidelines. However, , , and HDL 50, and patient with strong family history of heart disease. Started treatment with pravastatin 10 mg daily in 10/2018, and LDL improved to 80 with HDL 54 at that time. LDL worsened again today at 105 and HDL 56, and weight is decreased 10 pounds over last year. Will increase pravastatin to 20 mg daily. Will reassess at next visit. 3. Elevated transaminases. Resolved since rosuvastatin discontinued. Review of record showed elevation noted since 8/2016 when initiated. Iron studies, hepatitis panel, KIEL, SPEP, CK, aldolase, and anti-smooth muscle antibody negative. RUQ ultrasound (10/2016) showed fatty infiltration of liver. Advised patient to avoid alcohol. 4. Chronic daily headaches. Evaluated by Dr. [facility] [employer] and weaned from daily Fioricet and Excedrin migraine. Headaches had improved with daily amitriptyline and trying to use Excedrin migraine and Fioricet only occasionally for moderate and severe headaches, respectively. However, no longer taking amitriptyline and has resumed Fioricet daily. Does have chronic neck pain related to her anterior neck fusion, and is finding Zanaflex and regular exercises to be helpful. States has not needed to follow-up with Dr. [employer] [facility] [employer]. Describing sinus issues also contributing to headaches, and now on Zyrtec and Nasocort prn.   5. Colitis.  Unclear etiology, but nonspecific bowel wall thickening in short segment of jejunum and sigmoid on repeat CT scan raised question of ischemic in origin. Hypercoaguable workup including lab studies and echocardiogram negative. Appears to have intermittent \"IBS\" episodes associated with facial flushing, dry mouth, and severe abdominal cramping followed by diarrhea +/- vomiting. Episodes lingered for several days following. No evidence of urticaria seen. Interesting is the abnormalities noted on abdominal CT scan in two of her episodes, with nonspecific short segment thickening in various locations in small and large intestines. Obtained complement studies including C4, C1 esterase inhibtor, and C1q levels which were normal. However, instructed patient that if she developed another episode, would like to check C4 level during the episode. Discontinued captopril and has not had recurrence since doing so. Evaluated by GI and had negative small bowel series, duplex ultrasound, and 24 hour urine for 5-HIAA level. No longer a significant issue since captopril discontinued. Continue to follow. 6. Right leg sciatica. Evidence of lumbar spinal stenosis and facet arthropathy, and lumbar herniated disc. Significant improvement with dry needling therapy previously. Had acute worsening in 9/2020 after picking up her granddaughter. She presented to the [facility] ED on 9/26/2020, and lumbar spine x-ray showed no fracture or subluxation, but increasing degenerative disc space narrowing at L3-L4. She was treated with Skelaxin and naproxen, as well as lidocaine patches. She presented to the office with persistent symptoms on 9/29/2020 and was treated with a Medrol dose pack and gabapentin 300 mg tid. Reports resolution of symptoms today and no longer needing to use gabapentin. Will follow. 7. Impaired fasting glucose. Fasting glucose remains normal. Most likely due to weight loss. Emphasized importance of continued lifestyle modifications. 8.  Obstructive sleep apnea. Underwent reevaluation by Dr. Curtis Cuevas, and repeat sleep study showed moderate PEGGY. Started on CPAP and noting improvement. Continue to follow. 9. Right ovarian cyst. Enlarged slightly and changed in appearance at last evaluation on ultrasound. Referred to Dr. Lynda Barraza and underwent laparoscopic bilateral salpingo-oophorectomy and washing. 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 No further treatment needed. 10. Health maintenance. Already received influenza vaccine. Completed 2/2 doses of Shingrix vaccine. Other immunizations up to date. Well woman exam with Dr. Guanakito Merlos in 2/2020. Will discuss baseline bone density study with next mammogram in 12/2021. Mammogram up to date. Completed colonoscopy with Dr. Uvaldo Mcclure and follow-up in 5 years . Continue regular eye exams with Dr. Leatha Rahman. Vitamin D level normal. Continue maintenance dose supplement. Patient understands recommendations and agrees with plan. Follow-up in 6 months.

## 2021-01-28 DIAGNOSIS — G89.29 CHRONIC NONINTRACTABLE HEADACHE, UNSPECIFIED HEADACHE TYPE: ICD-10-CM

## 2021-01-28 DIAGNOSIS — R51.9 CHRONIC NONINTRACTABLE HEADACHE, UNSPECIFIED HEADACHE TYPE: ICD-10-CM

## 2021-01-28 RX ORDER — BUTALBITAL, ACETAMINOPHEN AND CAFFEINE 50; 325; 40 MG/1; MG/1; MG/1
1 TABLET ORAL
Qty: 60 TAB | Refills: 0 | Status: SHIPPED | OUTPATIENT
Start: 2021-01-28 | End: 2021-02-26 | Stop reason: SDUPTHER

## 2021-02-26 DIAGNOSIS — R51.9 CHRONIC NONINTRACTABLE HEADACHE, UNSPECIFIED HEADACHE TYPE: ICD-10-CM

## 2021-02-26 DIAGNOSIS — G89.29 CHRONIC NONINTRACTABLE HEADACHE, UNSPECIFIED HEADACHE TYPE: ICD-10-CM

## 2021-03-01 RX ORDER — BUTALBITAL, ACETAMINOPHEN AND CAFFEINE 50; 325; 40 MG/1; MG/1; MG/1
1 TABLET ORAL
Qty: 60 TAB | Refills: 0 | Status: SHIPPED | OUTPATIENT
Start: 2021-03-01 | End: 2021-03-03 | Stop reason: SDUPTHER

## 2021-03-01 NOTE — TELEPHONE ENCOUNTER
Last Visit: 1/19/21 with MD Tanner Ends  Next Appointment: 7/22/21 with MD Tanner Ends  Previous Refill Encounter(s): 1/28/21 #60    Requested Prescriptions     Pending Prescriptions Disp Refills    butalbital-acetaminophen-caffeine (FIORICET, ESGIC) -40 mg per tablet 60 Tab 0     Sig: Take 1 Tab by mouth every six (6) hours as needed for Headache.

## 2021-03-03 RX ORDER — BUTALBITAL, ACETAMINOPHEN AND CAFFEINE 50; 325; 40 MG/1; MG/1; MG/1
1 TABLET ORAL
Qty: 60 TAB | Refills: 0 | Status: SHIPPED | OUTPATIENT
Start: 2021-03-03 | End: 2021-04-02 | Stop reason: SDUPTHER

## 2021-04-02 DIAGNOSIS — G89.29 CHRONIC NONINTRACTABLE HEADACHE, UNSPECIFIED HEADACHE TYPE: ICD-10-CM

## 2021-04-02 DIAGNOSIS — R51.9 CHRONIC NONINTRACTABLE HEADACHE, UNSPECIFIED HEADACHE TYPE: ICD-10-CM

## 2021-04-02 RX ORDER — BUTALBITAL, ACETAMINOPHEN AND CAFFEINE 50; 325; 40 MG/1; MG/1; MG/1
1 TABLET ORAL
Qty: 60 TAB | Refills: 0 | Status: SHIPPED | OUTPATIENT
Start: 2021-04-02 | End: 2021-05-03 | Stop reason: SDUPTHER

## 2021-04-14 ENCOUNTER — VIRTUAL VISIT (OUTPATIENT)
Dept: INTERNAL MEDICINE CLINIC | Age: 61
End: 2021-04-14
Payer: COMMERCIAL

## 2021-04-14 DIAGNOSIS — M47.816 LUMBAR FACET ARTHROPATHY: ICD-10-CM

## 2021-04-14 DIAGNOSIS — M48.062 SPINAL STENOSIS OF LUMBAR REGION WITH NEUROGENIC CLAUDICATION: ICD-10-CM

## 2021-04-14 DIAGNOSIS — E78.5 HYPERLIPIDEMIA, UNSPECIFIED HYPERLIPIDEMIA TYPE: ICD-10-CM

## 2021-04-14 DIAGNOSIS — F32.9 REACTIVE DEPRESSION: ICD-10-CM

## 2021-04-14 DIAGNOSIS — M54.41 ACUTE LOW BACK PAIN WITH RIGHT-SIDED SCIATICA, UNSPECIFIED BACK PAIN LATERALITY: Primary | ICD-10-CM

## 2021-04-14 DIAGNOSIS — I10 ESSENTIAL HYPERTENSION: ICD-10-CM

## 2021-04-14 PROCEDURE — 99214 OFFICE O/P EST MOD 30 MIN: CPT | Performed by: INTERNAL MEDICINE

## 2021-04-14 RX ORDER — METHYLPREDNISOLONE 4 MG/1
4 TABLET ORAL
Qty: 1 DOSE PACK | Refills: 0 | Status: SHIPPED | OUTPATIENT
Start: 2021-04-14 | End: 2021-06-08 | Stop reason: SDUPTHER

## 2021-04-14 NOTE — PATIENT INSTRUCTIONS
Low Back Pain: Exercises Introduction Here are some examples of exercises for you to try. The exercises may be suggested for a condition or for rehabilitation. Start each exercise slowly. Ease off the exercises if you start to have pain. You will be told when to start these exercises and which ones will work best for you. How to do the exercises Press-up 1. Lie on your stomach, supporting your body with your forearms. 2. Press your elbows down into the floor to raise your upper back. As you do this, relax your stomach muscles and allow your back to arch without using your back muscles. As your press up, do not let your hips or pelvis come off the floor. 3. Hold for 15 to 30 seconds, then relax. 4. Repeat 2 to 4 times. Alternate arm and leg (bird dog) exercise Do this exercise slowly. Try to keep your body straight at all times, and do not let one hip drop lower than the other. 1. Start on the floor, on your hands and knees. 2. Tighten your belly muscles. 3. Raise one leg off the floor, and hold it straight out behind you. Be careful not to let your hip drop down, because that will twist your trunk. 4. Hold for about 6 seconds, then lower your leg and switch to the other leg. 5. Repeat 8 to 12 times on each leg. 6. Over time, work up to holding for 10 to 30 seconds each time. 7. If you feel stable and secure with your leg raised, try raising the opposite arm straight out in front of you at the same time. Knee-to-chest exercise 1. Lie on your back with your knees bent and your feet flat on the floor. 2. Bring one knee to your chest, keeping the other foot flat on the floor (or keeping the other leg straight, whichever feels better on your lower back). 3. Keep your lower back pressed to the floor. Hold for at least 15 to 30 seconds. 4. Relax, and lower the knee to the starting position. 5. Repeat with the other leg. Repeat 2 to 4 times with each leg.  
6. To get more stretch, put your other leg flat on the floor while pulling your knee to your chest.   
Curl-ups 1. Lie on the floor on your back with your knees bent at a 90-degree angle. Your feet should be flat on the floor, about 12 inches from your buttocks. 2. Cross your arms over your chest. If this bothers your neck, try putting your hands behind your neck (not your head), with your elbows spread apart. 3. Slowly tighten your belly muscles and raise your shoulder blades off the floor. 4. Keep your head in line with your body, and do not press your chin to your chest. 
5. Hold this position for 1 or 2 seconds, then slowly lower yourself back down to the floor. 6. Repeat 8 to 12 times. Pelvic tilt exercise 1. Lie on your back with your knees bent. 2. \"Brace\" your stomach. This means to tighten your muscles by pulling in and imagining your belly button moving toward your spine. You should feel like your back is pressing to the floor and your hips and pelvis are rocking back. 3. Hold for about 6 seconds while you breathe smoothly. 4. Repeat 8 to 12 times. Heel dig bridging 1. Lie on your back with both knees bent and your ankles bent so that only your heels are digging into the floor. Your knees should be bent about 90 degrees. 2. Then push your heels into the floor, squeeze your buttocks, and lift your hips off the floor until your shoulders, hips, and knees are all in a straight line. 3. Hold for about 6 seconds as you continue to breathe normally, and then slowly lower your hips back down to the floor and rest for up to 10 seconds. 4. Do 8 to 12 repetitions. Hamstring stretch in doorway 1. Lie on your back in a doorway, with one leg through the open door. 2. Slide your leg up the wall to straighten your knee. You should feel a gentle stretch down the back of your leg. 3. Hold the stretch for at least 15 to 30 seconds. Do not arch your back, point your toes, or bend either knee.  Keep one heel touching the floor and the other heel touching the wall. 4. Repeat with your other leg. 5. Do 2 to 4 times for each leg. Hip flexor stretch 1. Kneel on the floor with one knee bent and one leg behind you. Place your forward knee over your foot. Keep your other knee touching the floor. 2. Slowly push your hips forward until you feel a stretch in the upper thigh of your rear leg. 3. Hold the stretch for at least 15 to 30 seconds. Repeat with your other leg. 4. Do 2 to 4 times on each side. Wall sit 1. Stand with your back 10 to 12 inches away from a wall. 2. Lean into the wall until your back is flat against it. 3. Slowly slide down until your knees are slightly bent, pressing your lower back into the wall. 4. Hold for about 6 seconds, then slide back up the wall. 5. Repeat 8 to 12 times. Follow-up care is a key part of your treatment and safety. Be sure to make and go to all appointments, and call your doctor if you are having problems. It's also a good idea to know your test results and keep a list of the medicines you take. Where can you learn more? Go to http://www.gray.com/ Enter L285 in the search box to learn more about \"Low Back Pain: Exercises. \" Current as of: March 2, 2020               Content Version: 12.6 © 8660-6674 [employer]. Care instructions adapted under license by [employer] (which disclaims liability or warranty for this information). If you have questions about a medical condition or this instruction, always ask your healthcare professional. [place] [address] any warranty or liability for your use of this information. Learning About How to Have a Healthy Back What causes back pain? Back pain is often caused by overuse, strain, or injury. For example, people often hurt their backs playing sports or working in the yard, being jolted in a car accident, or lifting something too heavy. Aging plays a part too.  Your bones and muscles tend to lose strength as you age, which makes injury more likely. The spongy discs between the bones of the spine (vertebrae) may suffer from wear and tear and no longer provide enough cushion between the bones. A disc that bulges or breaks open (herniated disc) can press on nerves, causing back pain. In some people, back pain is the result of arthritis, broken vertebrae caused by bone loss (osteoporosis), illness, or a spine problem. Although most people have back pain at one time or another, there are steps you can take to make it less likely. How can you have a healthy back? Reduce stress on your back through good posture Slumping or slouching alone may not cause low back pain. But after the back has been strained or injured, bad posture can make pain worse. · Sleep in a position that maintains your back's normal curves and on a mattress that feels comfortable. Sleep on your side with a pillow between your knees, or sleep on your back with a pillow under your knees. These positions can reduce strain on your back. · Stand and sit up straight. \"Good posture\" generally means your ears, shoulders, and hips are in a straight line. · If you must stand for a long time, put one foot on a stool, ledge, or box. Switch feet every now and then. · Sit in a chair that is low enough to let you place both feet flat on the floor with both knees nearly level with your hips. If your chair or desk is too high, use a footrest to raise your knees. Place a small pillow, a rolled-up towel, or a lumbar roll in the curve of your back if you need extra support. · Try a kneeling chair, which helps tilt your hips forward. This takes pressure off your lower back. · Try sitting on an exercise ball. It can rock from side to side, which helps keep your back loose. · When driving, keep your knees nearly level with your hips. Sit straight, and drive with both hands on the steering wheel.  Your arms should be in a slightly bent position. Reduce stress on your back through careful lifting · Squat down, bending at the hips and knees only. If you need to, put one knee to the floor and extend your other knee in front of you, bent at a right angle (half kneeling). · Press your chest straight forward. This helps keep your upper back straight while keeping a slight arch in your low back. · Hold the load as close to your body as possible, at the level of your belly button (navel). · Use your feet to change direction, taking small steps. · Lead with your hips as you change direction. Keep your shoulders in line with your hips as you move. · Set down your load carefully, squatting with your knees and hips only. Exercise and stretch your back · Do some exercise on most days of the week, if your doctor says it is okay. You can walk, run, swim, or cycle. · Stretch your back muscles. Here are a few exercises to try: 
? Lie on your back, and gently pull one bent knee to your chest. Put that foot back on the floor, and then pull the other knee to your chest. 
? Do pelvic tilts. Lie on your back with your knees bent. Tighten your stomach muscles. Pull your belly button (navel) in and up toward your ribs. You should feel like your back is pressing to the floor and your hips and pelvis are slightly lifting off the floor. Hold for 6 seconds while breathing smoothly. ? Sit with your back flat against a wall. · Keep your core muscles strong. The muscles of your back, belly (abdomen), and buttocks support your spine. ? Pull in your belly and imagine pulling your navel toward your spine. Hold this for 6 seconds, then relax. Remember to keep breathing normally as you tense your muscles. ? Do curl-ups. Always do them with your knees bent. Keep your low back on the floor, and curl your shoulders toward your knees using a smooth, slow motion.  Keep your arms folded across your chest. If this bothers your neck, try putting your hands behind your neck (not your head), with your elbows spread apart. ? Lie on your back with your knees bent and your feet flat on the floor. Tighten your belly muscles, and then push with your feet and raise your buttocks up a few inches. Hold this position 6 seconds as you continue to breathe normally, then lower yourself slowly to the floor. Repeat 8 to 12 times. ? If you like group exercise, try Pilates or yoga. These classes have poses that strengthen the core muscles. Lead a healthy lifestyle · Stay at a healthy weight to avoid strain on your back. · Do not smoke. Smoking increases the risk of osteoporosis, which weakens the spine. If you need help quitting, talk to your doctor about stop-smoking programs and medicines. These can increase your chances of quitting for good. Where can you learn more? Go to http://www.root.com/ Enter L315 in the search box to learn more about \"Learning About How to Have a Healthy Back. \" Current as of: March 2, 2020               Content Version: 12.6 © 3020-1388 [employer]. Care instructions adapted under license by [employer] (which disclaims liability or warranty for this information). If you have questions about a medical condition or this instruction, always ask your healthcare professional. [address] any warranty or liability for your use of this information.

## 2021-04-15 RX ORDER — AMLODIPINE BESYLATE 10 MG/1
10 TABLET ORAL DAILY
Qty: 90 TAB | Refills: 2 | Status: SHIPPED | OUTPATIENT
Start: 2021-04-15 | End: 2022-01-10

## 2021-04-15 RX ORDER — TIZANIDINE 4 MG/1
4 TABLET ORAL EVERY EVENING
Qty: 90 TAB | Refills: 1 | Status: SHIPPED | OUTPATIENT
Start: 2021-04-15 | End: 2021-10-12

## 2021-04-15 RX ORDER — HYDROCHLOROTHIAZIDE 25 MG/1
25 TABLET ORAL DAILY
Qty: 90 TAB | Refills: 2 | Status: SHIPPED | OUTPATIENT
Start: 2021-04-15 | End: 2022-01-10

## 2021-04-15 NOTE — TELEPHONE ENCOUNTER
Last Visit: 4/14/21 with MD Surjit Waldron  Next Appointment: 7/22/21 with MD Surjit Waldron  Previous Refill Encounter(s): 7/15/20 Norvasc & HCTZ #90 with 2 refills, 9/28/20 Zanaflex #90 with 1 refill    Requested Prescriptions     Pending Prescriptions Disp Refills    amLODIPine (NORVASC) 10 mg tablet 90 Tab 2     Sig: Take 1 Tab by mouth daily.  hydroCHLOROthiazide (HYDRODIURIL) 25 mg tablet 90 Tab 2     Sig: Take 1 Tab by mouth daily.  tiZANidine (ZANAFLEX) 4 mg tablet 90 Tab 1     Sig: Take 1 Tab by mouth every evening.

## 2021-04-18 NOTE — PROGRESS NOTES
Jamie Chapman is a 56 y.o. female who was seen by synchronous (real-time) audio-video technology on 4/14/2021. HPI:   Jamie Chapman is a 56y.o. year old female who presents today for an acute visit. She has a history of hypertension, hyperlipidemia, chronic daily headaches, obstructive sleep apnea, allergic rhinitis, vasomotor instability, and irritable bowel syndrome. She reports that she was caring for her grandchildren over the weekend, and noted the onset of acute lower back pain and right leg pain four days ago after lifting them and carrying them. She denies any focal weakness or numbness in her leg. She has noted worsening when sitting or lying in bed, and experiences improvement with movement. She has been using ibuprofen with some relief, but did not notice any change after taking gabapentin 300 mg. She is planning a trip to [place] later this week and is concerned that she may have difficulty flying due to her discomfort. She is otherwise without new complaints. In 7/2018, she was referred to Dr. Susannah Henry for her chronic daily headaches, and evaluation included brain MRI and MRA (8/8/2018) showing small nonspecific subcortical white matter hyperintensity left insula region,otherwise unremarkable MRI; and MRA negative. She underwent sleep study evaluation and was diagnosed with moderate obstructive sleep apnea, and was referred to Dr. Sy Galvez and successfully started on CPAP. She was prescribed a Medrol dose pack to allow her to withdraw from daily use of analgesics for her headaches. She was then started on amitriptyine 10 mg daily and instructed to use Excedrin migraine for moderate headaches, and Fioricet only for severe headaches. Celebrex and Zanaflex were prescribed to manage her chronic neck pain, and she continues to take one Zanaflex nightly. She also completed physical therapy, including dry needling, for her neck pain with significant improvement.  She is continuing to use Fioricet when she develops a headache, but reports that she is needing to take only one with good response. She also is continuing to take Zanaflex and amitriptyline at bedtime, but is no longer needing Celebrex. On 1/29/2018, she called the office and reported she was out having dinner when she experienced an \"IBS episode\" characterized by flushing in the face, dry mouth, and severe abdominal cramping. When she arrived home, she experienced diarrhea and a single episode of vomiting. She reported that she continued to have abdominal cramping and fatigue following this episode, although described as getting slowly better each day. She reported that she had similar episodes to this dating back 30 years, and was diagnosed with IBS at that time. She stated that the episodes used to resolve more quickly, although over the last year, she states that it now is taking several days before she recovers. She was referred to Dr. Rhina Palma, and started on Linzess for constipation. However, she states that it was not effective. She underwent evaluation by Dr. Severiano Craver and Dr. Corina Galeano and had a small bowel series (8/7/2018) and abdominal duplex scan (8/16/2018), which were negative. She also collected a 24 hour urine for 5-HIAA, which was negative. She reports that she has noted an improvement in her abdominal pain and diarrheal episodes, and reports that she has not had any episodes since discontinuing captopril. She currently denies any abdominal pain, nausea, vomiting, melena, hematochezia, or change in bowel movements. On 8/2/2016, she was evaluated by PETERSON Ahmadi for left sided crampy abdominal pain, diarrhea, and hematochezia. She was treated for presumed diverticulitis with Cipro and Flagyl, and underwent evaluation showing WBC 11.2, ALT 59, alkaline phosphatase 121, and lipase 427.  Abdominal CT scan (8/2/2016) showed localized bowel wall thickening with inflammatory change involving the splenic flexure and proximal descending colon; no colonic diverticuli are seen so the presence of diverticulitis is unlikely; would be a typical location for bowel ischemia, but the patient's vascular structures are essentially normal so bowel ischemia unlikely; favor infectious or inflammatory colitis. She was evaluated by Dr. Luis Aleman and admitted for bowel rest, IVF, and IV antibiotics. She improved significantly in 36 hours and was discharged home. She completed the course of oral antibiotics and states that her symptoms have completely resolved. In 4/2017, she was again evaluated by Dr. Luis Aleman for recurrent abdominal pain, and abdominal and pelvic CT scan was obtained (4/28/2017) showing mild nonspecific thickening of the proximal jejunum and significant improvement in the distal transverse colon adjacent to the splenic flexure with a short segment with mild wall thickening. Her discomfort resolved, but concern was raised for possible ischemia as cause. An echocardiogram was obtained (5/26/2017) showing normal LV function (EF 65%), no RWMA, grade 1 diastolic dysfunction, and wall thickening upper limits of normal. She also underwent a hypercoaguable laboratory workup, which was negative. She underwent screening colonoscopy by Dr. Agnieszka Telles in 6/2011, which found a polyp in the proximal descending colon (pathology: hyperplastic). Because she could not intubate the cecum, follow-up was recommended for 2 years. In 4/2015, she underwent repeat colonoscopy with Dr. Lesli Barney, which showed left diverticular disease, hypertrophied anal papilla, and no polyps. She had a repeat colonoscopy in 10/2020 by Dr. Luis Aleman showing a 5 mm rectal polyp (pathology: hyperplastic). Follow-up was recommended for 5 years. She has a history of hypertension, treated with amlodipine, hydrochlorothiazide, and hydralazine. She has resumed exercising and she purchased an [employer] machine for her home.  She denies any chest pain, shortness of breath at rest or with exertion, palpitations, lightheadedness, or edema. She also has hyperlipidemia, treated with fenofibrate and fish oil in the past, but these were discontinued and she was changed to rosuvastatin in 6/2016. However, due to mild to moderate elevation in AST/ALT, it was discontinued in 5/2017 with resolution of abnormal liver function tests. She was subsequently started on pravastatin in 10/2018, and reports no difficulties with it today. She has a history of mild-severe obstructive sleep apnea, diagnosed by Dr. Chalo Haque, and was prescribed nasal CPAP. She states that she is no longer using her machine because it was making her headaches worse. She suffers from chronic headaches, considered to be migraines in the past as they were associated with menstruation. Since she entered menopause, the severity of her headaches has decreased and the character has changed. She has severe allergic rhinitis, and awakens with morning headaches when her allergies are severe. She is followed by Dr. Del Ward and had been receiving immunotherapy with some improvement. She also has vasomotor instability, which responded to venlafaxine and she has successfully weaned from taking it. In 10/2019, she reported a 3 week history of URI with nonproductive cough. She stated that her symptoms persisted, although appeared to be gradually improving until 10/5/2019, when she developed a fever of 100.8 ° F, chills, nausea, decreased appetite, myalgias, and weakness. She also stated that her cough appeared to be  worse, and by 10/7/2019, it had become productive of yellow brown sputum. She presented to [facility] and evaluation included WBC 14.2 with 71% neutrophils, hemoglobin 40.9, and chest x-ray revealed peribronchial cuffing with an infiltrate in the right lower lobe. She was started on doxycycline 100 mg bid for 10 days, Qvar inhaler, and Tessalon pearls. She smoked 0.5 ppd for 30 years, stopping into 2011.  When seen on 10/10/2019, Augmentin was added given persistent symptoms, and she was given an albuterol inhaler. She clinically improved, and repeat chest x-ray on 11/26/2019 was normal.     She also has a history of an anterior cervical discectomy and fusion (C5-C6) in 2/2011 by Dr. Liberty Eisenmenger for a herniated disc and right radiculopathy. She does experience intermittent neck pain, and feels that it may be exacerbating her headaches. In 6/2016, she presented with complaints of right sciatica and she underwent a lumbar MRI (7/8/2016) showing multilevel mild disc bulges, mild facet arthropathy, and multilevel mild foraminal stenosis; L3-L4 with mild to moderate spinal canal stenosis, no more than mild spinal canal stenosis elsewhere, and no cord compression. She was evaluated by Dr. Amy Carreon on 9/1/2016 and started on gabapentin. She also was referred to physical therapy and received dry needling, which resulted in significant improvement. In 9/2020, she developed acute worsening of low back pain after picking up her granddaughter. She states that the pain gradually progressed with radiation into her right buttocks and posterior right leg. She presented to the [facility] ED on 9/26/2020. Lumbar spine x-ray showed no fracture or subluxation, but increasing degenerative disc space narrowing at L3-L4. She was treated with Skelaxin and naproxen without improvement. She was seen in the office and prescribed a Medrol dose pack and restarted on gabapentin with improvement. She has a history of a right ovarian cyst, and on surveillance ultrasound by Dr. Alvarenga Comment, it was noted that the right ovarian cyst had enlarged slightly and changed in appearance. She was referred to Dr. Cristobal Wayne for evaluation, and he stated that although the cyst was likely benign, he recommended laparoscopic removal. On 2/28/2019, she underwent a laparoscopic bilateral salpingo-oophorectomy and washing.  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 In 2020, she presented with worsening depression symptoms related to marital issues and the pandemic. She was started on venlafaxine with improvement. She was also receiving counseling. Past Medical History:   Diagnosis Date    Allergic rhinitis     Arthritis     Chronic headaches     Hx of colonic polyp 2011    Hyperplastic; Dr. Rufina Asencio.  Hyperlipidemia     Hypertension     IBS (irritable bowel syndrome)     Obstructive sleep apnea     uses cpap    S/P cervical discectomy 2011    Anterior C5-C6 discectomy for herniated disc and right radiculopathy; Dr. Geronimo Fleming     Past Surgical History:   Procedure Laterality Date    COLONOSCOPY N/A 10/7/2020    COLONOSCOPY with bx performed by Marisel Hernandez MD at [facility], COLON, DIAGNOSTIC      HX BACK SURGERY  11    neck surgery; ruptured cervical discs    HX COLONOSCOPY  2011 2015    4/17/15, normal, recalled for 5 years    HX GYN      dilation and curretage    HX GYN          HX OOPHORECTOMY  10/2018     Current Outpatient Medications   Medication Sig    methylPREDNISolone (MEDROL DOSEPACK) 4 mg tablet Take 1 Tab by mouth Specific Days and Specific Times. Indications: low back pain and right sciatica    amLODIPine (NORVASC) 10 mg tablet Take 1 Tab by mouth daily.  hydroCHLOROthiazide (HYDRODIURIL) 25 mg tablet Take 1 Tab by mouth daily.  tiZANidine (ZANAFLEX) 4 mg tablet Take 1 Tab by mouth every evening.  butalbital-acetaminophen-caffeine (FIORICET, ESGIC) -40 mg per tablet Take 1 Tab by mouth every six (6) hours as needed for Headache.  venlafaxine-SR (EFFEXOR-XR) 150 mg capsule Take 1 Cap by mouth daily.  pravastatin (PRAVACHOL) 20 mg tablet Take 1 Tab by mouth nightly.  hydrALAZINE (APRESOLINE) 100 mg tablet Take 1 Tab by mouth three (3) times daily.  TURMERIC ROOT EXTRACT PO Take 1,000 mg by mouth.     GLUC/CHONDR-[facility]/C/DEENA/BORON (GLUCOSAMINE-CHONDR, BOSWELLIA, PO) Take 500 mg by mouth daily.  multivitamin (ONE A DAY) tablet Take 1 Tab by mouth daily.  Cholecalciferol, Vitamin D3, 2,000 unit cap Take 2,000 Units by mouth daily. No current facility-administered medications for this visit. Allergies and Intolerances: Allergies   Allergen Reactions    Other Plant, Animal, Environmental Itching and Sneezing     \"Everything but feathers and horses. \"        Family History: Her mother had hypertension, but  from a melanoma. Her father  from a DVT. Her brother has CAD and underwent CABG at the age of 40. Family History   Problem Relation Age of Onset    Heart Disease Brother     Other Mother         melanoma    Arthritis-osteo Mother     Bleeding Prob Father     Heart Disease Father     Heart Attack Father     Stroke Maternal Uncle     Heart Attack Maternal Uncle     Breast Cancer Maternal Grandmother      Social History:   She  reports that she quit smoking about 6 years ago. She smoked 0.50 packs per day. She has never used smokeless tobacco. She reports that she smoked 0.5 ppd for 30 years, stopping in . She is  and lives with her . 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 Social History     Substance and Sexual Activity   Alcohol Use Not Currently    Alcohol/week: 0.0 standard drinks     Immunization History:  Immunization History   Administered Date(s) Administered    Influenza Vaccine 10/08/2014, 10/09/2017    Influenza Vaccine (Quad) PF (>6 Mo Flulaval, Fluarix, and >3 Yrs Afluria, Fluzone [id number]) 10/23/2018, 2020, 2020    TD Vaccine 2006    Tdap 10/07/2016    Zoster Recombinant 2019, 2019       Review of Systems:   As above included in HPI.   Otherwise 11 point review of systems negative including constitutional, skin, HENT, eyes, respiratory, cardiovascular, gastrointestinal, genitourinary, musculoskeletal, endo/heme/aller, neurological.    Physical:      Visit Vitals  LMP 01/10/2012 (LMP Unknown)       Constitutional: [x] Appears well-developed and well-nourished [x] No apparent distress      [] Abnormal -     Mental status: [x] Alert and awake  [x] Oriented to person/place/time [x] Able to follow commands    [] Abnormal -     Eyes:   EOM    [x]  Normal    [] Abnormal -   Sclera  [x]  Normal    [] Abnormal -          Discharge [x]  None visible   [] Abnormal -     HENT: [x] Normocephalic, atraumatic  [] Abnormal -   [x] Mouth/Throat: Mucous membranes are moist    External Ears [x] Normal  [] Abnormal -    Neck: [x] No visualized mass [] Abnormal -     Pulmonary/Chest: [x] Respiratory effort normal   [x] No visualized signs of difficulty breathing or respiratory distress        [] Abnormal -      Musculoskeletal:   [x] Normal gait with no signs of ataxia         [x] Normal range of motion of neck        [] Abnormal -     Neurological:        [x] No Facial Asymmetry (Cranial nerve 7 motor function) (limited exam due to video visit)          [x] No gaze palsy        [] Abnormal -          Skin:        [x] No significant exanthematous lesions or discoloration noted on facial skin         [] Abnormal -            Psychiatric:       [x] Normal Affect [] Abnormal -        [x] No Hallucinations        Review of Data:  Labs:  No visits with results within 1 Month(s) from this visit.    Latest known visit with results is:   Hospital Outpatient Visit on 01/12/2021   Component Date Value Ref Range Status    LIPID PROFILE 01/12/2021        Final    Cholesterol, total 01/12/2021 186  <200 MG/DL Final    Triglyceride 01/12/2021 124  <150 MG/DL Final    HDL Cholesterol 01/12/2021 56  40 - 60 MG/DL Final    LDL, calculated 01/12/2021 105.2* 0 - 100 MG/DL Final    VLDL, calculated 01/12/2021 24.8  MG/DL Final    CHOL/HDL Ratio 01/12/2021 3.3  0 - 5.0   Final    Magnesium 01/12/2021 2.2  1.6 - 2.6 mg/dL Final    Sodium 01/12/2021 141  136 - 145 mmol/L Final    Potassium 01/12/2021 3.9  3.5 - 5.5 mmol/L Final    Chloride 01/12/2021 105  100 - 111 mmol/L Final    CO2 01/12/2021 30  21 - 32 mmol/L Final    Anion gap 01/12/2021 6  3.0 - 18 mmol/L Final    Glucose 01/12/2021 86  74 - 99 mg/dL Final    BUN 01/12/2021 24* 7.0 - 18 MG/DL Final    Creatinine 01/12/2021 0.67  0.6 - 1.3 MG/DL Final    BUN/Creatinine ratio 01/12/2021 36* 12 - 20   Final    GFR est AA 01/12/2021 >60  >60 ml/min/1.73m2 Final    GFR est non-AA 01/12/2021 >60  >60 ml/min/1.73m2 Final    Calcium 01/12/2021 9.3  8.5 - 10.1 MG/DL Final    Bilirubin, total 01/12/2021 0.5  0.2 - 1.0 MG/DL Final    ALT (SGPT) 01/12/2021 35  13 - 56 U/L Final    AST (SGOT) 01/12/2021 20  10 - 38 U/L Final    Alk. phosphatase 01/12/2021 96  45 - 117 U/L Final    Protein, total 01/12/2021 7.1  6.4 - 8.2 g/dL Final    Albumin 01/12/2021 3.9  3.4 - 5.0 g/dL Final    Globulin 01/12/2021 3.2  2.0 - 4.0 g/dL Final    A-G Ratio 01/12/2021 1.2  0.8 - 1.7   Final    Vitamin D 25-Hydroxy 01/12/2021 32.3  30 - 100 ng/mL Final     Health Maintenance:  Screening:    Mammogram: negative (12/2020)   PAP smear: well women exams by Dr. Olga Hines (last 12/2019)   Colorectal: colonoscopy (10/2020) hyperplastic polyp. Dr. Samuel Soler.  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   Smoking: 15 pack year (stopped 2011)   Vitamin D: 32.3 (1/2021)   Medicare Wellness: N/A      Impression:  Patient Active Problem List   Diagnosis Code    Hypertension I10    Hyperlipidemia E78.5    Obstructive sleep apnea G47.33    Chronic daily headache R51.9    IBS (irritable bowel syndrome) K58.9    Colon polyp K63.5    Vitamin D deficiency E55.9    History of colitis Z87.19    Lumbar spinal stenosis M48.061    Lumbar facet arthropathy M47.816    Neck pain M54.2    Degenerative disc disease, cervical M50.30    Right ovarian cyst N83.201    Former smoker Z87.891    Reactive depression F32.9    Right sided sciatica M54.31       Plan:  Acute lower back pain with right sciatica. Patient reports onset of acute lower back pain and right leg pain four days ago after spending the weekend lifting her grandchildren. She denies any focal weakness or numbness in her leg. She reports worsening when sitting or lying in bed, and improvement with movement. She has been using ibuprofen with some relief and restarted gabapentin 300 mg. Will prescribe a Medrol dose pack and advised to restart gabapentin 300 mg tid. Does have tizanidine to use if needed. Given improvement with movement, encouraged stretching and exercises as instructed by physical therapy in the past. Will call if no improvement. Other issues:  1. Reactive depression. Patient seen acutely in 12/2020 and described worsening depression in reaction to multiple stressors including recent senior care, pandemic isolation, and discovering long term infidelity of her . She had started to receive counseling, but found depression symptoms worsened and was was started on venlafaxine SR 75 mg daily. Reports some improvement today, but still with lingering symptoms of depression. Advised to increased dose of venlafaxine to 150 mg daily with improvement. Also encouraged to continue counseling. 2. Hypertension. Blood pressure well controlled today on current regimen of hydralazine 100 mg tid, amlodipine 10 mg daily, and hydrochlorothiazide 25 mg daily. Successfully discontinued treatment with captopril since unclear if abdominal attacks may have been a form of angioedema. Notable that has not had recurrence since discontinuing. Advised to continue to monitor blood pressure at home.  Renal function normal with creatinine 0.67/ eGFR >60. However, BUN/creatinine ratio remains elevated at 36, and again discussed importance of increasing fluid intake particularly given treatment with hydrochlorothiazide. 2. Hyperlipidemia. Began treatment with rosuvastatin in 6/2016, and elevation in transaminases first noted in 8/2016. Discontinued in 5/2017 due to persistent elevation of transaminases, and resolved after discontinuation. Calculated 10 year ASCVD risk off statin was 5.4 %, which did not place her in one of the four statin benefit groups as per new AHA/ACC guidelines. However, , , and HDL 50, and patient with strong family history of heart disease. Started treatment with pravastatin 10 mg daily in 10/2018, and LDL improved to 80 with HDL 54 at that time. LDL worsened again today at 105 and HDL 56, and weight is decreased 10 pounds over last year. Increased pravastatin to 20 mg daily at last visit. Will reassess at next visit. 3. Elevated transaminases. Resolved since rosuvastatin discontinued. Review of record showed elevation noted since 8/2016 when initiated. Iron studies, hepatitis panel, KIEL, SPEP, CK, aldolase, and anti-smooth muscle antibody negative. RUQ ultrasound (10/2016) showed fatty infiltration of liver. Advised patient to avoid alcohol. 4. Chronic daily headaches. Evaluated by Dr. Jonny Lucas and weaned from daily Fioricet and Excedrin migraine. Headaches had improved with daily amitriptyline and trying to use Excedrin migraine and Fioricet only occasionally for moderate and severe headaches, respectively. However, no longer taking amitriptyline and has resumed Fioricet daily. Does have chronic neck pain related to her anterior neck fusion, and is finding Zanaflex and regular exercises to be helpful. States has not needed to follow-up with Dr. Jonny Lucas. Describing sinus issues also contributing to headaches, and now on Zyrtec and Nasocort prn.   5. Colitis.  Unclear etiology, but nonspecific bowel wall thickening in short segment of jejunum and sigmoid on repeat CT scan raised question of ischemic in origin. Hypercoaguable workup including lab studies and echocardiogram negative. Appears to have intermittent \"IBS\" episodes associated with facial flushing, dry mouth, and severe abdominal cramping followed by diarrhea +/- vomiting. Episodes lingered for several days following. No evidence of urticaria seen. Interesting is the abnormalities noted on abdominal CT scan in two of her episodes, with nonspecific short segment thickening in various locations in small and large intestines. Obtained complement studies including C4, C1 esterase inhibtor, and C1q levels which were normal. However, instructed patient that if she developed another episode, would like to check C4 level during the episode. Discontinued captopril and has not had recurrence since doing so. Evaluated by GI and had negative small bowel series, duplex ultrasound, and 24 hour urine for 5-HIAA level. No longer a significant issue since captopril discontinued. Continue to follow. 6. Right leg sciatica. Evidence of lumbar spinal stenosis and facet arthropathy, and lumbar herniated disc. Significant improvement with dry needling therapy previously. Had acute worsening in 9/2020 after picking up her granddaughter. She presented to the [facility] ED on 9/26/2020, and lumbar spine x-ray showed no fracture or subluxation, but increasing degenerative disc space narrowing at L3-L4. She was treated with Skelaxin and naproxen, as well as lidocaine patches. She presented to the office with persistent symptoms on 9/29/2020 and was treated with a Medrol dose pack and gabapentin 300 mg tid with resolution of symptoms. Had weaned from gabapentin and was experiencing no further difficulty until today as discussed. 7. Impaired fasting glucose. Fasting glucose remains normal. Most likely due to weight loss. Emphasized importance of continued lifestyle modifications.   8.  Obstructive sleep apnea. Underwent reevaluation by Dr. Gris Kiser, and repeat sleep study showed moderate PEGGY. Started on CPAP and noting improvement. Continue to follow. 9. Right ovarian cyst. Enlarged slightly and changed in appearance at last evaluation on ultrasound. Referred to Dr. Juliette Sosa and underwent laparoscopic bilateral salpingo-oophorectomy and washing. 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 No further treatment needed. 10. Health maintenance. Already received influenza vaccine. Completed 2/2 doses of Shingrix vaccine. Other immunizations up to date. Will address COVID 19 vaccination at next visit. Well woman exam with Dr. Mattie George in 2/2020. Will discuss baseline bone density study with next mammogram in 12/2021. Mammogram up to date. Completed colonoscopy with Dr. Jolie Sher and follow-up in 5 years . Continue regular eye exams with Dr. Pilar Lacey. Vitamin D level normal. Continue maintenance dose supplement. Patient understands recommendations and agrees with plan. Follow-up as previously scheduled. We discussed the expected course, resolution and complications of the diagnosis(es) in detail. Medication risks, benefits, costs, interactions, and alternatives were discussed as indicated. I advised her to contact the office if her condition worsens, changes or fails to improve as anticipated. She expressed understanding with the diagnosis(es) and plan. Delfino Rafael, was evaluated through a synchronous (real-time) audio-video encounter. The patient (or guardian if applicable) is aware that this is a billable service. Verbal consent to proceed has been obtained within the past 12 months. The visit was conducted pursuant to the emergency declaration under the [facility], [address] [place] [address] authority and the [employer] General Act.   Patient identification was verified, and a caregiver was present when appropriate. The patient was located in a state where the provider was credentialed to provide care.     Jovanni Oro MD

## 2021-05-03 DIAGNOSIS — G89.29 CHRONIC NONINTRACTABLE HEADACHE, UNSPECIFIED HEADACHE TYPE: ICD-10-CM

## 2021-05-03 DIAGNOSIS — R51.9 CHRONIC NONINTRACTABLE HEADACHE, UNSPECIFIED HEADACHE TYPE: ICD-10-CM

## 2021-05-03 RX ORDER — BUTALBITAL, ACETAMINOPHEN AND CAFFEINE 50; 325; 40 MG/1; MG/1; MG/1
1 TABLET ORAL
Qty: 60 TAB | Refills: 0 | Status: SHIPPED | OUTPATIENT
Start: 2021-05-03 | End: 2021-05-31 | Stop reason: SDUPTHER

## 2021-05-03 NOTE — TELEPHONE ENCOUNTER
Last Visit: 4/14/21 with MD Melquiades Rodriguez  Next Appointment: 7/22/21 with MD Melquiades Rodriguez  Previous Refill Encounter(s): 4/2/21 #60    Requested Prescriptions     Pending Prescriptions Disp Refills    butalbital-acetaminophen-caffeine (FIORICET, ESGIC) -40 mg per tablet 60 Tab 0     Sig: Take 1 Tab by mouth every six (6) hours as needed for Headache.

## 2021-05-31 DIAGNOSIS — G89.29 CHRONIC NONINTRACTABLE HEADACHE, UNSPECIFIED HEADACHE TYPE: ICD-10-CM

## 2021-05-31 DIAGNOSIS — R51.9 CHRONIC NONINTRACTABLE HEADACHE, UNSPECIFIED HEADACHE TYPE: ICD-10-CM

## 2021-06-01 RX ORDER — BUTALBITAL, ACETAMINOPHEN AND CAFFEINE 50; 325; 40 MG/1; MG/1; MG/1
1 TABLET ORAL
Qty: 60 TABLET | Refills: 0 | Status: SHIPPED | OUTPATIENT
Start: 2021-06-01 | End: 2021-06-30 | Stop reason: SDUPTHER

## 2021-06-08 RX ORDER — METHYLPREDNISOLONE 4 MG/1
4 TABLET ORAL
Qty: 1 DOSE PACK | Refills: 0 | Status: SHIPPED | OUTPATIENT
Start: 2021-06-08 | End: 2021-07-25 | Stop reason: ALTCHOICE

## 2021-06-08 NOTE — TELEPHONE ENCOUNTER
Pt returning call. She said she re-injured her lower back. Stated she was sitting on a small stool yesterday playing with her grandson for a while, when she stood up sharp pain in lower back. Stated she could not walk yesterday. Today she thought it was better. Bk Esposito was outside, bent over to look at a flowers and the pain shot thru her again.      Stated she still has gabapentin which you prescribed prior, but would like prednisone again, it really helped last time

## 2021-06-08 NOTE — TELEPHONE ENCOUNTER
Last Visit: 4/14/21 with MD Natalio Dey  Next Appointment: 7/22/21 with MD Waters  Previous Refill Encounter(s): 4/14/21 #1    Requested Prescriptions     Pending Prescriptions Disp Refills    methylPREDNISolone (MEDROL DOSEPACK) 4 mg tablet 1 Dose Pack 0     Sig: Take 1 Tablet by mouth Specific Days and Specific Times.  Indications: low back pain and right sciatica

## 2021-06-30 DIAGNOSIS — R51.9 CHRONIC NONINTRACTABLE HEADACHE, UNSPECIFIED HEADACHE TYPE: ICD-10-CM

## 2021-06-30 DIAGNOSIS — G89.29 CHRONIC NONINTRACTABLE HEADACHE, UNSPECIFIED HEADACHE TYPE: ICD-10-CM

## 2021-06-30 RX ORDER — BUTALBITAL, ACETAMINOPHEN AND CAFFEINE 50; 325; 40 MG/1; MG/1; MG/1
1 TABLET ORAL
Qty: 60 TABLET | Refills: 0 | Status: SHIPPED | OUTPATIENT
Start: 2021-06-30 | End: 2021-07-29 | Stop reason: SDUPTHER

## 2021-06-30 NOTE — TELEPHONE ENCOUNTER
Last visit 04/14/2021 MD Caitlin Levi   Next appointment 07/22/2021 MD Waters   Previous refill encounter(s)   06/01/2021 Fioricet #60     No access to      Requested Prescriptions     Pending Prescriptions Disp Refills    butalbital-acetaminophen-caffeine (FIORICET, ESGIC) -40 mg per tablet 60 Tablet 0     Sig: Take 1 Tablet by mouth every six (6) hours as needed for Headache.

## 2021-07-14 RX ORDER — HYDRALAZINE HYDROCHLORIDE 100 MG/1
TABLET, FILM COATED ORAL
Qty: 270 TABLET | Refills: 2 | Status: SHIPPED | OUTPATIENT
Start: 2021-07-14 | End: 2022-04-10

## 2021-07-15 ENCOUNTER — HOSPITAL ENCOUNTER (OUTPATIENT)
Dept: LAB | Age: 61
Discharge: HOME | End: 2021-07-15
Payer: COMMERCIAL

## 2021-07-15 ENCOUNTER — APPOINTMENT (OUTPATIENT)
Dept: INTERNAL MEDICINE CLINIC | Age: 61
End: 2021-07-15

## 2021-07-15 DIAGNOSIS — K58.1 IRRITABLE BOWEL SYNDROME WITH CONSTIPATION: ICD-10-CM

## 2021-07-15 DIAGNOSIS — R51.9 CHRONIC DAILY HEADACHE: ICD-10-CM

## 2021-07-15 DIAGNOSIS — Z87.19 HISTORY OF COLITIS: ICD-10-CM

## 2021-07-15 DIAGNOSIS — Z87.891 FORMER SMOKER: ICD-10-CM

## 2021-07-15 DIAGNOSIS — M48.062 SPINAL STENOSIS OF LUMBAR REGION WITH NEUROGENIC CLAUDICATION: ICD-10-CM

## 2021-07-15 DIAGNOSIS — E55.9 VITAMIN D DEFICIENCY: ICD-10-CM

## 2021-07-15 DIAGNOSIS — I10 ESSENTIAL HYPERTENSION: ICD-10-CM

## 2021-07-15 DIAGNOSIS — E78.5 HYPERLIPIDEMIA, UNSPECIFIED HYPERLIPIDEMIA TYPE: ICD-10-CM

## 2021-07-15 DIAGNOSIS — M50.30 DEGENERATIVE DISC DISEASE, CERVICAL: ICD-10-CM

## 2021-07-15 DIAGNOSIS — F32.9 REACTIVE DEPRESSION: ICD-10-CM

## 2021-07-15 DIAGNOSIS — G47.33 OBSTRUCTIVE SLEEP APNEA: ICD-10-CM

## 2021-07-15 DIAGNOSIS — M47.816 LUMBAR FACET ARTHROPATHY: ICD-10-CM

## 2021-07-15 DIAGNOSIS — M54.31 RIGHT SIDED SCIATICA: ICD-10-CM

## 2021-07-15 LAB
25(OH)D3 SERPL-MCNC: 26.8 NG/ML (ref 30–100)
ALBUMIN SERPL-MCNC: 3.7 G/DL (ref 3.4–5)
ALBUMIN/GLOB SERPL: 1.3 {RATIO} (ref 0.8–1.7)
ALP SERPL-CCNC: 87 U/L (ref 45–117)
ALT SERPL-CCNC: 40 U/L (ref 13–56)
ANION GAP SERPL CALC-SCNC: 6 MMOL/L (ref 3–18)
APPEARANCE UR: CLEAR
AST SERPL-CCNC: 26 U/L (ref 10–38)
BACTERIA URNS QL MICRO: NEGATIVE /HPF
BASOPHILS # BLD: 0 K/UL (ref 0–0.1)
BASOPHILS NFR BLD: 1 % (ref 0–2)
BILIRUB SERPL-MCNC: 0.2 MG/DL (ref 0.2–1)
BILIRUB UR QL: NEGATIVE
BUN SERPL-MCNC: 25 MG/DL (ref 7–18)
BUN/CREAT SERPL: 47 (ref 12–20)
CALCIUM SERPL-MCNC: 9 MG/DL (ref 8.5–10.1)
CHLORIDE SERPL-SCNC: 107 MMOL/L (ref 100–111)
CHOLEST SERPL-MCNC: 150 MG/DL
CO2 SERPL-SCNC: 31 MMOL/L (ref 21–32)
COLOR UR: YELLOW
CREAT SERPL-MCNC: 0.53 MG/DL (ref 0.6–1.3)
DIFFERENTIAL METHOD BLD: ABNORMAL
EOSINOPHIL # BLD: 0.2 K/UL (ref 0–0.4)
EOSINOPHIL NFR BLD: 3 % (ref 0–5)
EPITH CASTS URNS QL MICRO: ABNORMAL /LPF (ref 0–5)
ERYTHROCYTE [DISTWIDTH] IN BLOOD BY AUTOMATED COUNT: 13.1 % (ref 11.6–14.5)
GLOBULIN SER CALC-MCNC: 2.8 G/DL (ref 2–4)
GLUCOSE SERPL-MCNC: 82 MG/DL (ref 74–99)
GLUCOSE UR STRIP.AUTO-MCNC: NEGATIVE MG/DL
HCT VFR BLD AUTO: 39.5 % (ref 35–45)
HDLC SERPL-MCNC: 55 MG/DL (ref 40–60)
HDLC SERPL: 2.7 {RATIO} (ref 0–5)
HGB BLD-MCNC: 12.1 G/DL (ref 12–16)
HGB UR QL STRIP: NEGATIVE
KETONES UR QL STRIP.AUTO: NEGATIVE MG/DL
LDLC SERPL CALC-MCNC: 76.4 MG/DL (ref 0–100)
LEUKOCYTE ESTERASE UR QL STRIP.AUTO: ABNORMAL
LIPID PROFILE,FLP: NORMAL
LYMPHOCYTES # BLD: 3.1 K/UL (ref 0.9–3.6)
LYMPHOCYTES NFR BLD: 52 % (ref 21–52)
MAGNESIUM SERPL-MCNC: 2.5 MG/DL (ref 1.6–2.6)
MCH RBC QN AUTO: 28.1 PG (ref 24–34)
MCHC RBC AUTO-ENTMCNC: 30.6 G/DL (ref 31–37)
MCV RBC AUTO: 91.9 FL (ref 74–97)
MONOCYTES # BLD: 0.5 K/UL (ref 0.05–1.2)
MONOCYTES NFR BLD: 8 % (ref 3–10)
MUCOUS THREADS URNS QL MICRO: ABNORMAL /LPF
NEUTS SEG # BLD: 2.2 K/UL (ref 1.8–8)
NEUTS SEG NFR BLD: 37 % (ref 40–73)
NITRITE UR QL STRIP.AUTO: NEGATIVE
PH UR STRIP: 6.5 [PH] (ref 5–8)
PLATELET # BLD AUTO: 319 K/UL (ref 135–420)
PMV BLD AUTO: 9.8 FL (ref 9.2–11.8)
POTASSIUM SERPL-SCNC: 4.4 MMOL/L (ref 3.5–5.5)
PROT SERPL-MCNC: 6.5 G/DL (ref 6.4–8.2)
PROT UR STRIP-MCNC: NEGATIVE MG/DL
RBC # BLD AUTO: 4.3 M/UL (ref 4.2–5.3)
RBC #/AREA URNS HPF: ABNORMAL /HPF (ref 0–5)
SODIUM SERPL-SCNC: 144 MMOL/L (ref 136–145)
SP GR UR REFRACTOMETRY: 1.02 (ref 1–1.03)
TRIGL SERPL-MCNC: 93 MG/DL (ref ?–150)
TSH SERPL DL<=0.05 MIU/L-ACNC: 0.99 UIU/ML (ref 0.36–3.74)
UROBILINOGEN UR QL STRIP.AUTO: 0.2 EU/DL (ref 0.2–1)
VLDLC SERPL CALC-MCNC: 18.6 MG/DL
WBC # BLD AUTO: 6 K/UL (ref 4.6–13.2)
WBC URNS QL MICRO: ABNORMAL /HPF (ref 0–4)

## 2021-07-15 PROCEDURE — 81001 URINALYSIS AUTO W/SCOPE: CPT

## 2021-07-15 PROCEDURE — 80061 LIPID PANEL: CPT

## 2021-07-15 PROCEDURE — 36415 COLL VENOUS BLD VENIPUNCTURE: CPT

## 2021-07-15 PROCEDURE — 85025 COMPLETE CBC W/AUTO DIFF WBC: CPT

## 2021-07-15 PROCEDURE — 80053 COMPREHEN METABOLIC PANEL: CPT

## 2021-07-15 PROCEDURE — 83735 ASSAY OF MAGNESIUM: CPT

## 2021-07-15 PROCEDURE — 84443 ASSAY THYROID STIM HORMONE: CPT

## 2021-07-15 PROCEDURE — 82306 VITAMIN D 25 HYDROXY: CPT

## 2021-07-22 ENCOUNTER — OFFICE VISIT (OUTPATIENT)
Dept: INTERNAL MEDICINE CLINIC | Age: 61
End: 2021-07-22
Payer: COMMERCIAL

## 2021-07-22 VITALS
DIASTOLIC BLOOD PRESSURE: 68 MMHG | OXYGEN SATURATION: 97 % | RESPIRATION RATE: 16 BRPM | HEIGHT: 63 IN | TEMPERATURE: 97.5 F | WEIGHT: 135.2 LBS | HEART RATE: 75 BPM | SYSTOLIC BLOOD PRESSURE: 132 MMHG | BODY MASS INDEX: 23.96 KG/M2

## 2021-07-22 DIAGNOSIS — M47.816 LUMBAR FACET ARTHROPATHY: ICD-10-CM

## 2021-07-22 DIAGNOSIS — Z12.31 SCREENING MAMMOGRAM, ENCOUNTER FOR: ICD-10-CM

## 2021-07-22 DIAGNOSIS — F32.9 REACTIVE DEPRESSION: ICD-10-CM

## 2021-07-22 DIAGNOSIS — R51.9 CHRONIC DAILY HEADACHE: ICD-10-CM

## 2021-07-22 DIAGNOSIS — Z78.0 POSTMENOPAUSAL: ICD-10-CM

## 2021-07-22 DIAGNOSIS — E55.9 VITAMIN D DEFICIENCY: ICD-10-CM

## 2021-07-22 DIAGNOSIS — Z00.00 ENCOUNTER FOR ROUTINE HISTORY AND PHYSICAL EXAM IN FEMALE: Primary | ICD-10-CM

## 2021-07-22 DIAGNOSIS — M54.2 NECK PAIN: ICD-10-CM

## 2021-07-22 DIAGNOSIS — E78.5 HYPERLIPIDEMIA, UNSPECIFIED HYPERLIPIDEMIA TYPE: ICD-10-CM

## 2021-07-22 DIAGNOSIS — M54.31 RIGHT SIDED SCIATICA: ICD-10-CM

## 2021-07-22 DIAGNOSIS — G47.33 OBSTRUCTIVE SLEEP APNEA: ICD-10-CM

## 2021-07-22 DIAGNOSIS — I10 ESSENTIAL HYPERTENSION: ICD-10-CM

## 2021-07-22 PROCEDURE — 99396 PREV VISIT EST AGE 40-64: CPT | Performed by: INTERNAL MEDICINE

## 2021-07-22 NOTE — PATIENT INSTRUCTIONS
Heart-Healthy Diet: Care Instructions  Your Care Instructions     A heart-healthy diet has lots of vegetables, fruits, nuts, beans, and whole grains, and is low in salt. It limits foods that are high in saturated fat, such as meats, cheeses, and fried foods. It may be hard to change your diet, but even small changes can lower your risk of heart attack and heart disease. Follow-up care is a key part of your treatment and safety. Be sure to make and go to all appointments, and call your doctor if you are having problems. It's also a good idea to know your test results and keep a list of the medicines you take. How can you care for yourself at home? Watch your portions  · Learn what a serving is. A \"serving\" and a \"portion\" are not always the same thing. Make sure that you are not eating larger portions than are recommended. For example, a serving of pasta is ½ cup. A serving size of meat is 2 to 3 ounces. A 3-ounce serving is about the size of a deck of cards. Measure serving sizes until you are good at [place]" them. Keep in mind that restaurants often serve portions that are 2 or 3 times the size of one serving. · To keep your energy level up and keep you from feeling hungry, eat often but in smaller portions. · Eat only the number of calories you need to stay at a healthy weight. If you need to lose weight, eat fewer calories than your body burns (through exercise and other physical activity). Eat more fruits and vegetables  · Eat a variety of fruit and vegetables every day. Dark green, deep orange, red, or yellow fruits and vegetables are especially good for you. Examples include spinach, carrots, peaches, and berries. · Keep carrots, celery, and other veggies handy for snacks. Buy fruit that is in season and store it where you can see it so that you will be tempted to eat it. · Cook dishes that have a lot of veggies in them, such as stir-fries and soups.   Limit saturated and trans fat  · Read food labels, and try to avoid saturated and trans fats. They increase your risk of heart disease. · Use olive or canola oil when you cook. · Bake, broil, grill, or steam foods instead of frying them. · Choose lean meats instead of high-fat meats such as hot dogs and sausages. Cut off all visible fat when you prepare meat. · Eat fish, skinless poultry, and meat alternatives such as soy products instead of high-fat meats. Soy products, such as tofu, may be especially good for your heart. · Choose low-fat or fat-free milk and dairy products. Eat foods high in fiber  · Eat a variety of grain products every day. Include whole-grain foods that have lots of fiber and nutrients. Examples of whole-grain foods include oats, whole wheat bread, and brown rice. · Buy whole-grain breads and cereals, instead of white bread or pastries. Limit salt and sodium  · Limit how much salt and sodium you eat to help lower your blood pressure. · Taste food before you salt it. Add only a little salt when you think you need it. With time, your taste buds will adjust to less salt. · Eat fewer snack items, fast foods, and other high-salt, processed foods. Check food labels for the amount of sodium in packaged foods. · Choose low-sodium versions of canned goods (such as soups, vegetables, and beans). Limit sugar  · Limit drinks and foods with added sugar. These include candy, desserts, and soda pop. Limit alcohol  · Limit alcohol to no more than 2 drinks a day for men and 1 drink a day for women. Too much alcohol can cause health problems. When should you call for help? Watch closely for changes in your health, and be sure to contact your doctor if:    · You would like help planning heart-healthy meals. Where can you learn more? Go to http://www.[employer].com/  Enter V137 in the search box to learn more about \"Heart-Healthy Diet: Care Instructions. \"  Current as of: August 22, 2019               Content Version: 12.6  © 1094-5653 [employer] Care instructions adapted under license by [employer] (which disclaims liability or warranty for this information). If you have questions about a medical condition or this instruction, always ask your healthcare professional. [address] any warranty or liability for your use of this information. Learning About Low-Sodium Foods  What foods are low in sodium? The foods you eat contain nutrients, such as vitamins and minerals. Sodium is a nutrient. Your body needs the right amount to stay healthy and work as it should. You can use the list below to help you make choices about which foods to eat. Fruits  · Fresh, frozen, canned, or dried fruit  Vegetables  · Fresh or frozen vegetables, with no added salt  · Canned vegetables, low-sodium or with no added salt  Grains  · Bagels without salted tops  · Cereal with no added salt  · Corn tortillas  · Crackers with no added salt  · Oatmeal, cooked without salt  · Popcorn with no salt  · Pasta and noodles, cooked without salt  · Rice, cooked without salt  · Unsalted pretzels  Dairy and dairy alternatives  · Butter, unsalted  · Cream cheese  · Ice cream  · Milk  · Soy milk  Meats and other protein foods  · Beans and peas, canned with no salt  · Eggs  · Fresh fish (not smoked)  · Fresh meats (not smoked or cured)  · Nuts and nut butter, prepared without salt  · Poultry, not packaged with sodium solution  · Tofu, unseasoned  · Tuna, canned without salt  Seasonings  · Garlic  · Herbs and spices  · Lemon juice  · Mustard  · Olive oil  · Salt-free seasoning mixes  · Vinegar  Work with your doctor to find out how much of this nutrient you need. Depending on your health, you may need more or less of it in your diet. Where can you learn more?   Go to http://www.gray.com/  Enter S460 in the search box to learn more about \"Learning About Low-Sodium Foods.\"  Current as of: August 22, 2019               Content Version: 12.6  © 0429-3624 [employer]. Care instructions adapted under license by [employer] (which disclaims liability or warranty for this information). If you have questions about a medical condition or this instruction, always ask your healthcare professional. [address] any warranty or liability for your use of this information. Low Sodium Diet (2,000 Milligram): Care Instructions  Your Care Instructions     Too much sodium causes your body to hold on to extra water. This can raise your blood pressure and force your heart and kidneys to work harder. In very serious cases, this could cause you to be put in the hospital. It might even be life-threatening. By limiting sodium, you will feel better and lower your risk of serious problems. The most common source of sodium is salt. People get most of the salt in their diet from canned, prepared, and packaged foods. Fast food and restaurant meals also are very high in sodium. Your doctor will probably limit your sodium to less than 2,000 milligrams (mg) a day. This limit counts all the sodium in prepared and packaged foods and any salt you add to your food. Follow-up care is a key part of your treatment and safety. Be sure to make and go to all appointments, and call your doctor if you are having problems. It's also a good idea to know your test results and keep a list of the medicines you take. How can you care for yourself at home? Read food labels  · Read labels on cans and food packages. The labels tell you how much sodium is in each serving. Make sure that you look at the serving size. If you eat more than the serving size, you have eaten more sodium. · Food labels also tell you the Percent Daily Value for sodium. Choose products with low Percent Daily Values for sodium.   · Be aware that sodium can come in forms other than salt, including monosodium glutamate (MSG), sodium citrate, and sodium bicarbonate (baking soda). MSG is often added to Asian food. When you eat out, you can sometimes ask for food without MSG or added salt. Buy low-sodium foods  · Buy foods that are labeled \"unsalted\" (no salt added), \"sodium-free\" (less than 5 mg of sodium per serving), or \"low-sodium\" (less than 140 mg of sodium per serving). Foods labeled \"reduced-sodium\" and \"light sodium\" may still have too much sodium. Be sure to read the label to see how much sodium you are getting. · Buy fresh vegetables, or frozen vegetables without added sauces. Buy low-sodium versions of canned vegetables, soups, and other canned goods. Prepare low-sodium meals  · Cut back on the amount of salt you use in cooking. This will help you adjust to the taste. Do not add salt after cooking. One teaspoon of salt has about 2,300 mg of sodium. · Take the salt shaker off the table. · Flavor your food with garlic, lemon juice, onion, vinegar, herbs, and spices. Do not use soy sauce, lite soy sauce, steak sauce, onion salt, garlic salt, celery salt, mustard, or ketchup on your food. · Use low-sodium salad dressings, sauces, and ketchup. Or make your own salad dressings and sauces without adding salt. · Use less salt (or none) when recipes call for it. You can often use half the salt a recipe calls for without losing flavor. Other foods such as rice, pasta, and grains do not need added salt. · Rinse canned vegetables, and cook them in fresh water. This removes somebut not allof the salt. · Avoid water that is naturally high in sodium or that has been treated with water softeners, which add sodium. Call your local water company to find out the sodium content of your water supply. If you buy bottled water, read the label and choose a sodium-free brand. Avoid high-sodium foods  · Avoid eating:  ? Smoked, cured, salted, and canned meat, fish, and poultry.   ? Ham, monique, hot dogs, and luncheon meats.  ? Regular, hard, and processed cheese and regular peanut butter. ? Crackers with salted tops, and other salted snack foods such as pretzels, chips, and salted popcorn. ? Frozen prepared meals, unless labeled low-sodium. ? Canned and dried soups, broths, and bouillon, unless labeled sodium-free or low-sodium. ? Canned vegetables, unless labeled sodium-free or low-sodium. ? [place] fries, pizza, tacos, and other fast foods. ? Pickles, olives, ketchup, and other condiments, especially soy sauce, unless labeled sodium-free or low-sodium. Where can you learn more? Go to http://www.gray.com/  Enter V843 in the search box to learn more about \"Low Sodium Diet (2,000 Milligram): Care Instructions. \"  Current as of: August 22, 2019               Content Version: 12.6  © 2001-0797 [employer]. Care instructions adapted under license by [employer] (which disclaims liability or warranty for this information). If you have questions about a medical condition or this instruction, always ask your healthcare professional. [place] [address] any warranty or liability for your use of this information. High Cholesterol: Care Instructions  Your Care Instructions     Cholesterol is a type of fat in your blood. It is needed for many body functions, such as making new cells. Cholesterol is made by your body. It also comes from food you eat. High cholesterol means that you have too much of the fat in your blood. This raises your risk of a heart attack and stroke. LDL and HDL are part of your total cholesterol. LDL is the \"bad\" cholesterol. High LDL can raise your risk for heart disease, heart attack, and stroke. HDL is the \"good\" cholesterol. It helps clear bad cholesterol from the body. High HDL is linked with a lower risk of heart disease, heart attack, and stroke.   Your cholesterol levels help your doctor find out your risk for having a heart attack or stroke. You and your doctor can talk about whether you need to lower your risk and what treatment is best for you. A heart-healthy lifestyle along with medicines can help lower your cholesterol and your risk. The way you choose to lower your risk will depend on how high your risk is for heart attack and stroke. It will also depend on how you feel about taking medicines. Follow-up care is a key part of your treatment and safety. Be sure to make and go to all appointments, and call your doctor if you are having problems. It's also a good idea to know your test results and keep a list of the medicines you take. How can you care for yourself at home? · Eat a variety of foods every day. Good choices include fruits, vegetables, whole grains (like oatmeal), dried beans and peas, nuts and seeds, soy products (like tofu), and fat-free or low-fat dairy products. · Replace butter, margarine, and hydrogenated or partially hydrogenated oils with olive and canola oils. (Canola oil margarine without trans fat is fine.)  · Replace red meat with fish, poultry, and soy protein (like tofu). · Limit processed and packaged foods like chips, crackers, and cookies. · Bake, broil, or steam foods. Don't porter them. · Be physically active. Get at least 30 minutes of exercise on most days of the week. Walking is a good choice. You also may want to do other activities, such as running, swimming, cycling, or playing tennis or team sports. · Stay at a healthy weight or lose weight by making the changes in eating and physical activity listed above. Losing just a small amount of weight, even 5 to 10 pounds, can reduce your risk for having a heart attack or stroke. · Do not smoke. When should you call for help? Watch closely for changes in your health, and be sure to contact your doctor if:    · You need help making lifestyle changes. · You have questions about your medicine. Where can you learn more?   Go to http://www.gray.com/  Enter A332 in the search box to learn more about \"High Cholesterol: Care Instructions. \"  Current as of: December 16, 2019               Content Version: 12.6  © 3449-7217 [employer]. Care instructions adapted under license by [employer] (which disclaims liability or warranty for this information). If you have questions about a medical condition or this instruction, always ask your healthcare professional. [place] [address] any warranty or liability for your use of this information.

## 2021-07-22 NOTE — PROGRESS NOTES
1. Have you been to the ER, urgent care clinic or hospitalized since your last visit? NO.     2. Have you seen or consulted any other health care providers outside of the [address] [place] [address] since your last visit (Include any pap smears or colon screening)?  NO

## 2021-07-25 PROBLEM — N83.201 RIGHT OVARIAN CYST: Status: RESOLVED | Noted: 2019-02-02 | Resolved: 2021-07-25

## 2021-07-26 NOTE — PROGRESS NOTES
HPI:   Basilio Beltran is a 56y.o. year old female who presents today for a physical exam.. She has a history of hypertension, hyperlipidemia, chronic daily headaches, obstructive sleep apnea, allergic rhinitis, vasomotor instability, and irritable bowel syndrome. She has completed the [employer] COVID-19 vaccine series. She reports that she is doing reasonably well. She was last seen in 1/2021 with a flare of her right sciatica pain, and she reports that she responded well to the Medrol dose pack and restarting gabapentin. She states that she is no longer needing to use gabapentin and has not had a recurrence. She reports that she is now exercising every other day at her local gym and is finding it to be helpful. She is otherwise without new complaints. Summary of prior hospitalizations and medical history:  In 7/2018, she was referred to Dr. Hilton Pickard for her chronic daily headaches, and evaluation included brain MRI and MRA (8/8/2018) showing small nonspecific subcortical white matter hyperintensity left insula region,otherwise unremarkable MRI; and MRA negative. She underwent sleep study evaluation and was diagnosed with moderate obstructive sleep apnea, and was referred to Dr. Lourdes Galo and successfully started on CPAP. She was prescribed a Medrol dose pack to allow her to withdraw from daily use of analgesics for her headaches. She was then started on amitriptyine 10 mg daily and instructed to use Excedrin migraine for moderate headaches, and Fioricet only for severe headaches. Celebrex and Zanaflex were prescribed to manage her chronic neck pain, and she continues to take one Zanaflex nightly. She also completed physical therapy, including dry needling, for her neck pain with significant improvement. She is continuing to use Fioricet when she develops a headache, but reports that she is needing to take only one with good response.  She also is continuing to take Zanaflex at bedtime, but is no longer needing Celebrex. On 1/29/2018, she called the office and reported she was out having dinner when she experienced an \"IBS episode\" characterized by flushing in the face, dry mouth, and severe abdominal cramping. When she arrived home, she experienced diarrhea and a single episode of vomiting. She reported that she continued to have abdominal cramping and fatigue following this episode, although described as getting slowly better each day. She reported that she had similar episodes to this dating back 30 years, and was diagnosed with IBS at that time. She stated that the episodes used to resolve more quickly, although over the last year, she states that it now is taking several days before she recovers. She was referred to Dr. Chanelle Balbuena, and started on Linzess for constipation. However, she states that it was not effective. She underwent evaluation by Dr. Kenji Bain and Dr. Fuentes Ignacio and had a small bowel series (8/7/2018) and abdominal duplex scan (8/16/2018), which were negative. She also collected a 24 hour urine for 5-HIAA, which was negative. She reports that she has noted an improvement in her abdominal pain and diarrheal episodes, and reports that she has not had any episodes since discontinuing captopril. She currently denies any abdominal pain, nausea, vomiting, melena, hematochezia, or change in bowel movements. On 8/2/2016, she was evaluated by PETERSON Ahmadi for left sided crampy abdominal pain, diarrhea, and hematochezia. She was treated for presumed diverticulitis with Cipro and Flagyl, and underwent evaluation showing WBC 11.2, ALT 59, alkaline phosphatase 121, and lipase 427.  Abdominal CT scan (8/2/2016) showed localized bowel wall thickening with inflammatory change involving the splenic flexure and proximal descending colon; no colonic diverticuli are seen so the presence of diverticulitis is unlikely; would be a typical location for bowel ischemia, but the patient's vascular structures are essentially normal so bowel ischemia unlikely; favor infectious or inflammatory colitis. She was evaluated by Dr. Jyoti Her and admitted for bowel rest, IVF, and IV antibiotics. She improved significantly in 36 hours and was discharged home. She completed the course of oral antibiotics and states that her symptoms have completely resolved. In 4/2017, she was again evaluated by Dr. Jyoti Her for recurrent abdominal pain, and abdominal and pelvic CT scan was obtained (4/28/2017) showing mild nonspecific thickening of the proximal jejunum and significant improvement in the distal transverse colon adjacent to the splenic flexure with a short segment with mild wall thickening. Her discomfort resolved, but concern was raised for possible ischemia as cause. An echocardiogram was obtained (5/26/2017) showing normal LV function (EF 65%), no RWMA, grade 1 diastolic dysfunction, and wall thickening upper limits of normal. She also underwent a hypercoaguable laboratory workup, which was negative. She underwent screening colonoscopy by Dr. Carmen Merino in 6/2011, which found a polyp in the proximal descending colon (pathology: hyperplastic). Because she could not intubate the cecum, follow-up was recommended for 2 years. In 4/2015, she underwent repeat colonoscopy with Dr. Radha Kennedy, which showed left diverticular disease, hypertrophied anal papilla, and no polyps. She had a repeat colonoscopy in 10/2020 by Dr. Jyoti Hre showing a 5 mm rectal polyp (pathology: hyperplastic). Follow-up was recommended for 5 years. She has a history of hypertension, treated with amlodipine, hydrochlorothiazide, and hydralazine. She denies any chest pain, shortness of breath at rest or with exertion, palpitations, lightheadedness, or edema. She also has hyperlipidemia, treated with fenofibrate and fish oil in the past, but these were discontinued and she was changed to rosuvastatin in 6/2016.  However, due to mild to moderate elevation in AST/ALT, it was discontinued in 5/2017 with resolution of abnormal liver function tests. She was subsequently started on pravastatin in 10/2018, and reports no difficulties with it today. She has a history of mild-severe obstructive sleep apnea, diagnosed by Dr. Igor Porras, and was prescribed nasal CPAP. She states that she is no longer using her machine because it was making her headaches worse. She suffers from chronic headaches, considered to be migraines in the past as they were associated with menstruation. Since she entered menopause, the severity of her headaches has decreased and the character has changed. She has severe allergic rhinitis, and awakens with morning headaches when her allergies are severe. She is followed by Dr. Dolores Metcalf and had been receiving immunotherapy with some improvement. She also has a history of vasomotor instability, which responded to venlafaxine. In 10/2019, she reported a 3 week history of URI with nonproductive cough. She stated that her symptoms persisted, although appeared to be gradually improving until 10/5/2019, when she developed a fever of 100.8 ° F, chills, nausea, decreased appetite, myalgias, and weakness. She also stated that her cough appeared to be  worse, and by 10/7/2019, it had become productive of yellow brown sputum. She presented to [facility] and evaluation included WBC 14.2 with 71% neutrophils, hemoglobin 40.9, and chest x-ray revealed peribronchial cuffing with an infiltrate in the right lower lobe. She was started on doxycycline 100 mg bid for 10 days, Qvar inhaler, and Tessalon pearls. She smoked 0.5 ppd for 30 years, stopping into 2011. When seen on 10/10/2019, Augmentin was added given persistent symptoms, and she was given an albuterol inhaler. She clinically improved, and repeat chest x-ray on 11/26/2019 was normal.     She also has a history of an anterior cervical discectomy and fusion (C5-C6) in 2/2011 by Dr. Sara Peña for a herniated disc and right radiculopathy.  She does experience intermittent neck pain, and feels that it may be exacerbating her headaches. In 6/2016, she presented with complaints of right sciatica and she underwent a lumbar MRI (7/8/2016) showing multilevel mild disc bulges, mild facet arthropathy, and multilevel mild foraminal stenosis; L3-L4 with mild to moderate spinal canal stenosis, no more than mild spinal canal stenosis elsewhere, and no cord compression. She was evaluated by Dr. Celina Galvan on 9/1/2016 and started on gabapentin. She also was referred to physical therapy and received dry needling, which resulted in significant improvement. In 9/2020, she developed acute worsening of low back pain after picking up her granddaughter. She states that the pain gradually progressed with radiation into her right buttocks and posterior right leg. She presented to the [facility] ED on 9/26/2020. Lumbar spine x-ray showed no fracture or subluxation, but increasing degenerative disc space narrowing at L3-L4. She was treated with Skelaxin and naproxen without improvement. She was seen in the office and prescribed a Medrol dose pack and restarted on gabapentin with improvement. She has a history of a right ovarian cyst, and on surveillance ultrasound by Dr. Inocencia Jessica, it was noted that the right ovarian cyst had enlarged slightly and changed in appearance. She was referred to Dr. Zoila Gloria for evaluation, and he stated that although the cyst was likely benign, he recommended laparoscopic removal. On 2/28/2019, she underwent a laparoscopic bilateral salpingo-oophorectomy and washing. 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 In 12/2020, she presented with worsening depression symptoms related to marital issues and the pandemic. She was started on venlafaxine with improvement. She was also receiving counseling.      Past Medical History:   Diagnosis Date    Allergic rhinitis     Arthritis     Chronic headaches     Hx of colonic polyp 2011    Hyperplastic; Dr. Inna Askew.  Hyperlipidemia     Hypertension     IBS (irritable bowel syndrome)     Obstructive sleep apnea     uses cpap    S/P cervical discectomy 2011    Anterior C5-C6 discectomy for herniated disc and right radiculopathy; Dr. Dolly Johnson     Past Surgical History:   Procedure Laterality Date    COLONOSCOPY N/A 10/7/2020    COLONOSCOPY with bx performed by Theresa Young MD at [facility], [place], DIAGNOSTIC      HX BACK SURGERY  11    neck surgery; ruptured cervical discs    HX COLONOSCOPY  2011 2015    4/17/15, normal, recalled for 5 years    HX GYN      dilation and curretage    HX GYN          HX OOPHORECTOMY  10/2018     Current Outpatient Medications   Medication Sig    hydrALAZINE (APRESOLINE) 100 mg tablet TAKE 1 TABLET BY MOUTH THREE TIMES DAILY    butalbital-acetaminophen-caffeine (FIORICET, ESGIC) -40 mg per tablet Take 1 Tablet by mouth every six (6) hours as needed for Headache.  methylPREDNISolone (MEDROL DOSEPACK) 4 mg tablet Take 1 Tablet by mouth Specific Days and Specific Times. Indications: low back pain and right sciatica    amLODIPine (NORVASC) 10 mg tablet Take 1 Tab by mouth daily.  hydroCHLOROthiazide (HYDRODIURIL) 25 mg tablet Take 1 Tab by mouth daily.  tiZANidine (ZANAFLEX) 4 mg tablet Take 1 Tab by mouth every evening.  venlafaxine-SR (EFFEXOR-XR) 150 mg capsule Take 1 Cap by mouth daily.  pravastatin (PRAVACHOL) 20 mg tablet Take 1 Tab by mouth nightly.  TURMERIC ROOT EXTRACT PO Take 1,000 mg by mouth.  GLUC/CHONDR-MSM#7/C/DEENA/BORON (GLUCOSAMINE-CHONDR, BOSWELLIA, PO) Take 500 mg by mouth daily.  multivitamin (ONE A DAY) tablet Take 1 Tab by mouth daily.  Cholecalciferol, Vitamin D3, 2,000 unit cap Take 2,000 Units by mouth daily. No current facility-administered medications for this visit.      Allergies and Intolerances: Allergies   Allergen Reactions    Other Plant, Animal, Environmental Itching and Sneezing     \"Everything but feathers and horses. \"        Family History: Her mother had hypertension, but  from a melanoma. Her father  from a DVT. Her brother has CAD and underwent CABG at the age of 40. Family History   Problem Relation Age of Onset    Heart Disease Brother     Other Mother         melanoma    Arthritis-osteo Mother     Bleeding Prob Father     Heart Disease Father     Heart Attack Father     Stroke Maternal Uncle     Heart Attack Maternal Uncle     Breast Cancer Maternal Grandmother      Social History:   She  reports that she quit smoking about 6 years ago. She smoked 0.50 packs per day. She has never used smokeless tobacco. She reports that she smoked 0.5 ppd for 30 years, stopping in . She is  and living with her son. She recently retired from the [employer] department of [employer]; her  sells eyeglass frames to [employer]. Social History     Substance and Sexual Activity   Alcohol Use Not Currently    Alcohol/week: 0.0 standard drinks     Immunization History:  Immunization History   Administered Date(s) Administered    COVID-19, PFIZER, MRNA, LNP-S, PF, 30MCG/0.3ML DOSE 2021, 2021    Influenza Vaccine 10/08/2014, 10/09/2017    Influenza Vaccine (Quad) PF (>6 Mo Flulaval, Fluarix, and >3 Yrs Afluria, Fluzone [id number]) 10/23/2018, 2020, 2020    TD Vaccine 2006    Tdap 10/07/2016    Zoster Recombinant 2019, 2019       Review of Systems:   As above included in HPI.   Otherwise 11 point review of systems negative including constitutional, skin, HENT, eyes, respiratory, cardiovascular, gastrointestinal, genitourinary, musculoskeletal, endo/heme/aller, neurological.    Physical:      Visit Vitals  /68 (BP 1 Location: Left arm, BP Patient Position: Sitting)   Pulse 75   Temp 97.5 °F (36.4 °C) (Temporal)   Resp 16   Ht 5' 3\" (1.6 m)   Wt 135 lb 3.2 oz (61.3 kg)   LMP 01/10/2012 (LMP Unknown)   SpO2 97%   BMI 23.95 kg/m²       Patient appears in no apparent distress. Affect is appropriate. HEENT --Anicteric sclerae, tympanic membranes normal,  ear canals normal.  PERRL, EOMI, conjunctiva and lids normal.  No cervical lymphadenopathy. No thyromegaly, JVD, or bruits. Carotid pulses 2+ with normal upstroke. Lungs --Clear to auscultation. No wheezing or rales. Heart --Regular rate and rhythm, no murmurs, rubs, gallops, or clicks. Abdomen -- Soft and nontender, no hepatosplenomegaly or masses. Extremities -- Without cyanosis, clubbing, edema. Normal looking digits, ROM intact  Neuro -- CN 2-12 intact, strength 5/5 with intact soft touch in all extremities  Derm  no obvious abnormalities noted, no rash        Review of Data:  Labs:  Hospital Outpatient Visit on 07/15/2021   Component Date Value Ref Range Status    WBC 07/15/2021 6.0  4.6 - 13.2 K/uL Final    RBC 07/15/2021 4.30  4.20 - 5.30 M/uL Final    HGB 07/15/2021 12.1  12.0 - 16.0 g/dL Final    HCT 07/15/2021 39.5  35.0 - 45.0 % Final    MCV 07/15/2021 91.9  74.0 - 97.0 FL Final    MCH 07/15/2021 28.1  24.0 - 34.0 PG Final    MCHC 07/15/2021 30.6* 31.0 - 37.0 g/dL Final    RDW 07/15/2021 13.1  11.6 - 14.5 % Final    PLATELET 30/43/4341 642  135 - 420 K/uL Final    MPV 07/15/2021 9.8  9.2 - 11.8 FL Final    NEUTROPHILS 07/15/2021 37* 40 - 73 % Final    LYMPHOCYTES 07/15/2021 52  21 - 52 % Final    MONOCYTES 07/15/2021 8  3 - 10 % Final    EOSINOPHILS 07/15/2021 3  0 - 5 % Final    BASOPHILS 07/15/2021 1  0 - 2 % Final    ABS. NEUTROPHILS 07/15/2021 2.2  1.8 - 8.0 K/UL Final    ABS. LYMPHOCYTES 07/15/2021 3.1  0.9 - 3.6 K/UL Final    ABS. MONOCYTES 07/15/2021 0.5  0.05 - 1.2 K/UL Final    ABS. EOSINOPHILS 07/15/2021 0.2  0.0 - 0.4 K/UL Final    ABS.  BASOPHILS 07/15/2021 0.0  0.0 - 0.1 K/UL Final    DF 07/15/2021 AUTOMATED    Final    LIPID PROFILE 07/15/2021        Final    Cholesterol, total 07/15/2021 150  <200 MG/DL Final    Triglyceride 07/15/2021 93  <150 MG/DL Final    HDL Cholesterol 07/15/2021 55  40 - 60 MG/DL Final    LDL, calculated 07/15/2021 76.4  0 - 100 MG/DL Final    VLDL, calculated 07/15/2021 18.6  MG/DL Final    CHOL/HDL Ratio 07/15/2021 2.7  0 - 5.0   Final    Magnesium 07/15/2021 2.5  1.6 - 2.6 mg/dL Final    Sodium 07/15/2021 144  136 - 145 mmol/L Final    Potassium 07/15/2021 4.4  3.5 - 5.5 mmol/L Final    Chloride 07/15/2021 107  100 - 111 mmol/L Final    CO2 07/15/2021 31  21 - 32 mmol/L Final    Anion gap 07/15/2021 6  3.0 - 18 mmol/L Final    Glucose 07/15/2021 82  74 - 99 mg/dL Final    BUN 07/15/2021 25* 7.0 - 18 MG/DL Final    Creatinine 07/15/2021 0.53* 0.6 - 1.3 MG/DL Final    BUN/Creatinine ratio 07/15/2021 47* 12 - 20   Final    GFR est AA 07/15/2021 >60  >60 ml/min/1.73m2 Final    GFR est non-AA 07/15/2021 >60  >60 ml/min/1.73m2 Final    Calcium 07/15/2021 9.0  8.5 - 10.1 MG/DL Final    Bilirubin, total 07/15/2021 0.2  0.2 - 1.0 MG/DL Final    ALT (SGPT) 07/15/2021 40  13 - 56 U/L Final    AST (SGOT) 07/15/2021 26  10 - 38 U/L Final    Alk.  phosphatase 07/15/2021 87  45 - 117 U/L Final    Protein, total 07/15/2021 6.5  6.4 - 8.2 g/dL Final    Albumin 07/15/2021 3.7  3.4 - 5.0 g/dL Final    Globulin 07/15/2021 2.8  2.0 - 4.0 g/dL Final    A-G Ratio 07/15/2021 1.3  0.8 - 1.7   Final    TSH 07/15/2021 0.99  0.36 - 3.74 uIU/mL Final    Vitamin D 25-Hydroxy 07/15/2021 26.8* 30 - 100 ng/mL Final    Color 07/15/2021 YELLOW    Final    Appearance 07/15/2021 CLEAR    Final    Specific gravity 07/15/2021 1.024  1.005 - 1.030   Final    pH (UA) 07/15/2021 6.5  5.0 - 8.0   Final    Protein 07/15/2021 Negative  NEG mg/dL Final    Glucose 07/15/2021 Negative  NEG mg/dL Final    Ketone 07/15/2021 Negative  NEG mg/dL Final    Bilirubin 07/15/2021 Negative  NEG   Final    Blood 07/15/2021 Negative  NEG Final    Urobilinogen 07/15/2021 0.2  0.2 - 1.0 EU/dL Final    Nitrites 07/15/2021 Negative  NEG   Final    Leukocyte Esterase 07/15/2021 SMALL* NEG   Final    WBC 07/15/2021 1 to 5  0 - 4 /hpf Final    RBC 07/15/2021 NONE  0 - 5 /hpf Final    Epithelial cells 07/15/2021 2+  0 - 5 /lpf Final    Bacteria 07/15/2021 Negative  NEG /hpf Final    Mucus 07/15/2021 FEW* NEG /lpf Final     Health Maintenance:  Screening:    Mammogram: negative (12/2020)   PAP smear: well women exams by Dr. Jesus Ignacio (last 12/2020)   Colorectal: colonoscopy (10/2020) hyperplastic polyp. Dr. Felipe Thomas. 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   Smoking: 15 pack year, stopped 2011   Vitamin D: 26.8 (7/2021)   Medicare Wellness: N/A      Impression:  Patient Active Problem List   Diagnosis Code    Hypertension I10    Hyperlipidemia E78.5    Obstructive sleep apnea G47.33    Chronic daily headache R51.9    IBS (irritable bowel syndrome) K58.9    Colon polyp K63.5    Vitamin D deficiency E55.9    History of colitis Z87.19    Lumbar spinal stenosis M48.061    Lumbar facet arthropathy M47.816    Neck pain M54.2    Degenerative disc disease, cervical M50.30    Right ovarian cyst N83.201    Former smoker Z87.891    Reactive depression F32.9    Right sided sciatica M54.31       Plan:  1. Hypertension. Blood pressure remains well controlled today on current regimen of hydralazine 100 mg tid, amlodipine 10 mg daily, and hydrochlorothiazide 25 mg daily. Successfully discontinued treatment with captopril due to possibility that abdominal attacks were secondary to angioedema. Notable that has not had recurrence since discontinuing. Advised to continue to monitor blood pressure at home. Renal function remains normal with creatinine 0.53/ eGFR >60.  However, BUN/creatinine ratio remains significantly elevated at 47, and again discussed importance of increasing fluid intake particularly given treatment with hydrochlorothiazide. Will continue to monitor  2. Hyperlipidemia. On moderate intensity dose pravastatin (20 mg) with LDL 76 and HDL 55, indicative of good control. Previously treated with rosuvastatin but developed elevation of transaminases so discontinued. Will continue to monitor. 3. Chronic daily headaches. Evaluated by Dr. Eve Moore and weaned from daily Fioricet and Excedrin migraine. Headaches had improved with daily amitriptyline and trying to use Excedrin migraine and Fioricet only occasionally for moderate and severe headaches, respectively. However, no longer taking amitriptyline and has resumed Fioricet daily. Does have chronic neck pain related to her anterior neck fusion, and is finding Zanaflex and regular exercises to be helpful. Describing sinus issues also contributing to headaches, and now on Zyrtec and Nasocort prn.   4. Right leg sciatica. Lumbar MRI (7/2016) with evidence of mild to moderate lumbar spinal stenosis (L3/L4) with multilevel mild lumbar herniated disc and facet arthropathy. Significant improvement with dry needling therapy previously. Had acute worsening in 9/2020 after picking up her granddaughter, and presented to the [facility] ED on 9/26/2020. Lumbar spine x-ray showed no fracture or subluxation, but increasing degenerative disc space narrowing at L3-L4. She was treated with Skelaxin and naproxen, as well as lidocaine patches. She presented to the office with persistent symptoms on 9/29/2020 and was treated with a Medrol dose pack and gabapentin 300 mg tid with resolution of symptoms. Had weaned from gabapentin until recurrent symptoms in 1/2021 and treated again with a Medrol dose pack and gabapentin 300 mg tid.   Reports improvement today and encouraged to continue with regular stretching and exercises to prevent recurrence. Will continue to monitor. 5.  Obstructive sleep apnea. Underwent reevaluation by Dr. Francisco Glover, and repeat sleep study showed moderate PEGGY. Started on CPAP and reports continued compliance. Will discuss continued follow-up for sleep apnea at next visit. 6.  Reactive depression. Restarted on venlafaxine SR in 12/2020 and reports good control of symptoms currently on 150 mg daily. Will continue to follow. 7.  History of colitis. Unclear etiology, but nonspecific bowel wall thickening in short segment of jejunum and sigmoid on repeat CT scan in 4/2017 raised question of ischemic in origin. Hypercoaguable workup including lab studies and echocardiogram negative. Reported intermittent episodes associated with facial flushing, dry mouth, and severe abdominal cramping followed by diarrhea +/- vomiting. Episodes lingered for several days following onset. No evidence of urticaria seen. Interesting is the abnormalities noted on abdominal CT scan in two of her episodes, with nonspecific short segment thickening in various locations in small and large intestines. Obtained complement studies including C4, C1 esterase inhibtor, and C1q levels which were normal. Discussed that if she developed another episode, would like to check C4 level during the episode. Evaluated by GI and had negative small bowel series and duplex ultrasound in 8/2018, and 24 hour urine for 5-HIAA level also negative. Discontinued captopril in 6/2018 and has not had a recurrence since doing so.     8.  History of impaired fasting glucose. Fasting glucose remains normal. Most likely due to weight loss. Emphasized importance of continued lifestyle modifications. 9.  History of elevated transaminases. Resolved since rosuvastatin discontinued. Review of record showed elevation noted since 8/2016 when initiated. Iron studies, hepatitis panel, KIEL, SPEP, CK, aldolase, and anti-smooth muscle antibody negative.  RUQ ultrasound (10/2016) showed fatty infiltration of liver. Advised patient to avoid alcohol. 10. Health maintenance. Completed the [employer] COVID-19 vaccine series. Completed 2/2 doses of Shingrix vaccine. Other immunizations up to date. Well woman exam with Dr. Nafisa Burgos in 2/2020. Will obtain baseline bone density study with next mammogram in 12/2021. Completed colonoscopy with Dr. Harshad Menendez and follow-up in 5 years . Continue regular eye exams with Dr. Sallie Grant. Vitamin D level low today and patient not currently taking supplement. Advised to resume cholecalciferol 2000 units daily. .     Patient understands recommendations and agrees with plan. Follow-up in 6 months. Erick Kumar

## 2021-07-29 DIAGNOSIS — G89.29 CHRONIC NONINTRACTABLE HEADACHE, UNSPECIFIED HEADACHE TYPE: ICD-10-CM

## 2021-07-29 DIAGNOSIS — R51.9 CHRONIC NONINTRACTABLE HEADACHE, UNSPECIFIED HEADACHE TYPE: ICD-10-CM

## 2021-07-29 RX ORDER — BUTALBITAL, ACETAMINOPHEN AND CAFFEINE 50; 325; 40 MG/1; MG/1; MG/1
1 TABLET ORAL
Qty: 60 TABLET | Refills: 0 | Status: SHIPPED | OUTPATIENT
Start: 2021-07-29 | End: 2021-08-25 | Stop reason: SDUPTHER

## 2021-08-25 DIAGNOSIS — G89.29 CHRONIC NONINTRACTABLE HEADACHE, UNSPECIFIED HEADACHE TYPE: ICD-10-CM

## 2021-08-25 DIAGNOSIS — R51.9 CHRONIC NONINTRACTABLE HEADACHE, UNSPECIFIED HEADACHE TYPE: ICD-10-CM

## 2021-08-25 RX ORDER — BUTALBITAL, ACETAMINOPHEN AND CAFFEINE 50; 325; 40 MG/1; MG/1; MG/1
1 TABLET ORAL
Qty: 60 TABLET | Refills: 0 | Status: SHIPPED | OUTPATIENT
Start: 2021-08-25 | End: 2021-09-29 | Stop reason: SDUPTHER

## 2021-09-29 DIAGNOSIS — R51.9 CHRONIC NONINTRACTABLE HEADACHE, UNSPECIFIED HEADACHE TYPE: ICD-10-CM

## 2021-09-29 DIAGNOSIS — G89.29 CHRONIC NONINTRACTABLE HEADACHE, UNSPECIFIED HEADACHE TYPE: ICD-10-CM

## 2021-09-29 RX ORDER — BUTALBITAL, ACETAMINOPHEN AND CAFFEINE 50; 325; 40 MG/1; MG/1; MG/1
1 TABLET ORAL
Qty: 60 TABLET | Refills: 0 | Status: SHIPPED | OUTPATIENT
Start: 2021-09-29 | End: 2021-10-25 | Stop reason: SDUPTHER

## 2021-10-12 RX ORDER — TIZANIDINE 4 MG/1
TABLET ORAL
Qty: 90 TABLET | Refills: 1 | Status: SHIPPED | OUTPATIENT
Start: 2021-10-12 | End: 2022-02-18 | Stop reason: SDUPTHER

## 2021-10-14 ENCOUNTER — TELEPHONE (OUTPATIENT)
Dept: INTERNAL MEDICINE CLINIC | Age: 61
End: 2021-10-14

## 2021-10-14 NOTE — TELEPHONE ENCOUNTER
Pt calling says her bp is high    10/14 141/91 about 9:30 AM before medication    10/13 150/91 yesterday     146/87 after taking 2 pills. Wants to know what Dr. Jose Alfredo Reed wants her to do? Change meds? Increase dose?

## 2021-10-14 NOTE — TELEPHONE ENCOUNTER
Called and spoke with patient. Per  patient should start checking BP daily about 2-3 hours AFTER she takes her medication. Advised patient to rest for about 10min prior to checking her BP. Luis M Gu wants to hold off on making any changes until she has consistent readings to review. Patient will start checking daily and call us with readings in about 2 weeks.

## 2021-10-25 DIAGNOSIS — R51.9 CHRONIC NONINTRACTABLE HEADACHE, UNSPECIFIED HEADACHE TYPE: ICD-10-CM

## 2021-10-25 DIAGNOSIS — G89.29 CHRONIC NONINTRACTABLE HEADACHE, UNSPECIFIED HEADACHE TYPE: ICD-10-CM

## 2021-10-26 RX ORDER — BUTALBITAL, ACETAMINOPHEN AND CAFFEINE 50; 325; 40 MG/1; MG/1; MG/1
1 TABLET ORAL
Qty: 60 TABLET | Refills: 0 | Status: SHIPPED | OUTPATIENT
Start: 2021-10-26 | End: 2021-11-29 | Stop reason: SDUPTHER

## 2021-10-26 NOTE — TELEPHONE ENCOUNTER
Last Visit: 7/22/21 with MD Zoey Stewart  Next Appointment: 1/27/22 with MD Waters  Previous Refill Encounter(s): 9/29/21 #60    Requested Prescriptions     Pending Prescriptions Disp Refills    butalbital-acetaminophen-caffeine (FIORICET, ESGIC) -40 mg per tablet 60 Tablet 0     Sig: Take 1 Tablet by mouth every six (6) hours as needed for Headache.

## 2021-10-28 RX ORDER — PRAVASTATIN SODIUM 20 MG/1
TABLET ORAL
Qty: 90 TABLET | Refills: 2 | Status: SHIPPED | OUTPATIENT
Start: 2021-10-28 | End: 2022-10-26

## 2021-10-28 RX ORDER — VENLAFAXINE HYDROCHLORIDE 150 MG/1
CAPSULE, EXTENDED RELEASE ORAL
Qty: 90 CAPSULE | Refills: 2 | Status: SHIPPED | OUTPATIENT
Start: 2021-10-28 | End: 2022-10-26

## 2021-11-01 ENCOUNTER — PATIENT MESSAGE (OUTPATIENT)
Dept: INTERNAL MEDICINE CLINIC | Age: 61
End: 2021-11-01

## 2021-11-01 NOTE — TELEPHONE ENCOUNTER
Please schedule this patient for a nurse visit to confirm accuracy of her blood pressure monitor. If her home monitor is accurate, will need to send in new medication but wish to confirm accuracy of readings first.    Also please confirm that she is monitoring her blood pressure at least 2-3 hours after her medication doses. Thanks.

## 2021-11-01 NOTE — TELEPHONE ENCOUNTER
Patient confirmed she checks 2-3 hours after taking her medicine. Patient will bring her machine in on Wednesday for a BP check.

## 2021-11-03 ENCOUNTER — CLINICAL SUPPORT (OUTPATIENT)
Dept: INTERNAL MEDICINE CLINIC | Age: 61
End: 2021-11-03

## 2021-11-03 VITALS — HEART RATE: 80 BPM | DIASTOLIC BLOOD PRESSURE: 86 MMHG | SYSTOLIC BLOOD PRESSURE: 139 MMHG

## 2021-11-03 DIAGNOSIS — Z01.30 BP CHECK: Primary | ICD-10-CM

## 2021-11-03 NOTE — PROGRESS NOTES
Patient's blood pressure was checked with office's vitals machine reading   141/74 pulse 80    Her machine (wrist cuff) read  139/86 pulse 80    Manually   138/72 pulse 80

## 2021-11-29 DIAGNOSIS — G89.29 CHRONIC NONINTRACTABLE HEADACHE, UNSPECIFIED HEADACHE TYPE: ICD-10-CM

## 2021-11-29 DIAGNOSIS — R51.9 CHRONIC NONINTRACTABLE HEADACHE, UNSPECIFIED HEADACHE TYPE: ICD-10-CM

## 2021-11-30 RX ORDER — BUTALBITAL, ACETAMINOPHEN AND CAFFEINE 50; 325; 40 MG/1; MG/1; MG/1
1 TABLET ORAL
Qty: 60 TABLET | Refills: 0 | Status: SHIPPED | OUTPATIENT
Start: 2021-11-30 | End: 2021-12-29 | Stop reason: SDUPTHER

## 2021-11-30 NOTE — TELEPHONE ENCOUNTER
Last Visit: 7/22/21 with MD Pa Garcia  Next Appointment: 1/27/22 with MD Pa Garcia  Previous Refill Encounter(s): 10/26/21 #60    Requested Prescriptions     Pending Prescriptions Disp Refills    butalbital-acetaminophen-caffeine (FIORICET, ESGIC) -40 mg per tablet 60 Tablet 0     Sig: Take 1 Tablet by mouth every six (6) hours as needed for Headache.

## 2021-12-06 DIAGNOSIS — Z78.0 POSTMENOPAUSAL: ICD-10-CM

## 2021-12-06 DIAGNOSIS — Z12.31 SCREENING MAMMOGRAM, ENCOUNTER FOR: ICD-10-CM

## 2021-12-09 ENCOUNTER — HOSPITAL ENCOUNTER (OUTPATIENT)
Dept: BONE DENSITY | Age: 61
Discharge: HOME | End: 2021-12-09
Attending: INTERNAL MEDICINE
Payer: COMMERCIAL

## 2021-12-09 ENCOUNTER — HOSPITAL ENCOUNTER (OUTPATIENT)
Dept: MAMMOGRAPHY | Age: 61
Discharge: HOME | End: 2021-12-09
Attending: INTERNAL MEDICINE
Payer: COMMERCIAL

## 2021-12-09 DIAGNOSIS — Z12.31 SCREENING MAMMOGRAM, ENCOUNTER FOR: ICD-10-CM

## 2021-12-09 PROCEDURE — 77063 BREAST TOMOSYNTHESIS BI: CPT

## 2021-12-09 PROCEDURE — 77080 DXA BONE DENSITY AXIAL: CPT

## 2021-12-29 DIAGNOSIS — G89.29 CHRONIC NONINTRACTABLE HEADACHE, UNSPECIFIED HEADACHE TYPE: ICD-10-CM

## 2021-12-29 DIAGNOSIS — R51.9 CHRONIC NONINTRACTABLE HEADACHE, UNSPECIFIED HEADACHE TYPE: ICD-10-CM

## 2021-12-30 RX ORDER — BUTALBITAL, ACETAMINOPHEN AND CAFFEINE 50; 325; 40 MG/1; MG/1; MG/1
1 TABLET ORAL
Qty: 60 TABLET | Refills: 0 | Status: SHIPPED | OUTPATIENT
Start: 2021-12-30 | End: 2022-01-26 | Stop reason: SDUPTHER

## 2021-12-30 NOTE — TELEPHONE ENCOUNTER
Last Visit: 7/22/21 with MD Park More  Next Appointment: 1/27/22 with MD Park More  Previous Refill Encounter(s): 11/30/21 #60    Requested Prescriptions     Pending Prescriptions Disp Refills    butalbital-acetaminophen-caffeine (FIORICET, ESGIC) -40 mg per tablet 60 Tablet 0     Sig: Take 1 Tablet by mouth every six (6) hours as needed for Headache.

## 2022-01-03 ENCOUNTER — DOCUMENTATION ONLY (OUTPATIENT)
Dept: INTERNAL MEDICINE CLINIC | Age: 62
End: 2022-01-03

## 2022-01-04 NOTE — PROGRESS NOTES
Reviewed bone density study from 12/9/2021 showing T-scores:  femoral neck left -1.9/right -1.2 and lumbar +1.5. Calculated FRAX score estimates her 10 year risk of a major osteoporetic fracture at 9.8% and hip fracture at 1.3%, which are not an indication for biphosphonate treatment. Will let the patient know that her bone density study shows evidence of mild osteopenia (low bone density). Will recommend that she begin taking calcium 600 mg twice daily and Vitamin D. Also, will encourage her to exercise regularly, particularly weight bearing activities, which may help to prevent progression.

## 2022-01-10 RX ORDER — AMLODIPINE BESYLATE 10 MG/1
TABLET ORAL
Qty: 90 TABLET | Refills: 2 | Status: SHIPPED | OUTPATIENT
Start: 2022-01-10 | End: 2022-09-26

## 2022-01-10 RX ORDER — HYDROCHLOROTHIAZIDE 25 MG/1
TABLET ORAL
Qty: 90 TABLET | Refills: 2 | Status: SHIPPED | OUTPATIENT
Start: 2022-01-10 | End: 2022-10-26

## 2022-01-20 ENCOUNTER — HOSPITAL ENCOUNTER (OUTPATIENT)
Dept: LAB | Age: 62
Discharge: HOME | End: 2022-01-20
Payer: COMMERCIAL

## 2022-01-20 ENCOUNTER — APPOINTMENT (OUTPATIENT)
Dept: INTERNAL MEDICINE CLINIC | Age: 62
End: 2022-01-20

## 2022-01-20 DIAGNOSIS — E55.9 VITAMIN D DEFICIENCY: ICD-10-CM

## 2022-01-20 DIAGNOSIS — E78.5 HYPERLIPIDEMIA, UNSPECIFIED HYPERLIPIDEMIA TYPE: ICD-10-CM

## 2022-01-20 DIAGNOSIS — I10 ESSENTIAL HYPERTENSION: ICD-10-CM

## 2022-01-20 LAB
25(OH)D3 SERPL-MCNC: 40.8 NG/ML (ref 30–100)
ALBUMIN SERPL-MCNC: 3.7 G/DL (ref 3.4–5)
ALBUMIN/GLOB SERPL: 1.2 {RATIO} (ref 0.8–1.7)
ALP SERPL-CCNC: 126 U/L (ref 45–117)
ALT SERPL-CCNC: 50 U/L (ref 13–56)
ANION GAP SERPL CALC-SCNC: 5 MMOL/L (ref 3–18)
AST SERPL-CCNC: 28 U/L (ref 10–38)
BILIRUB SERPL-MCNC: 0.2 MG/DL (ref 0.2–1)
BUN SERPL-MCNC: 35 MG/DL (ref 7–18)
BUN/CREAT SERPL: 61 (ref 12–20)
CALCIUM SERPL-MCNC: 9.4 MG/DL (ref 8.5–10.1)
CHLORIDE SERPL-SCNC: 104 MMOL/L (ref 100–111)
CHOLEST SERPL-MCNC: 166 MG/DL
CO2 SERPL-SCNC: 29 MMOL/L (ref 21–32)
CREAT SERPL-MCNC: 0.57 MG/DL (ref 0.6–1.3)
GLOBULIN SER CALC-MCNC: 3.2 G/DL (ref 2–4)
GLUCOSE SERPL-MCNC: 99 MG/DL (ref 74–99)
HDLC SERPL-MCNC: 38 MG/DL (ref 40–60)
HDLC SERPL: 4.4 {RATIO} (ref 0–5)
LDLC SERPL CALC-MCNC: 71 MG/DL (ref 0–100)
LIPID PROFILE,FLP: ABNORMAL
MAGNESIUM SERPL-MCNC: 2.3 MG/DL (ref 1.6–2.6)
POTASSIUM SERPL-SCNC: 3.9 MMOL/L (ref 3.5–5.5)
PROT SERPL-MCNC: 6.9 G/DL (ref 6.4–8.2)
SODIUM SERPL-SCNC: 138 MMOL/L (ref 136–145)
TRIGL SERPL-MCNC: 285 MG/DL (ref ?–150)
VLDLC SERPL CALC-MCNC: 57 MG/DL

## 2022-01-20 PROCEDURE — 83735 ASSAY OF MAGNESIUM: CPT

## 2022-01-20 PROCEDURE — 36415 COLL VENOUS BLD VENIPUNCTURE: CPT

## 2022-01-20 PROCEDURE — 80053 COMPREHEN METABOLIC PANEL: CPT

## 2022-01-20 PROCEDURE — 82306 VITAMIN D 25 HYDROXY: CPT

## 2022-01-20 PROCEDURE — 80061 LIPID PANEL: CPT

## 2022-01-24 NOTE — PROGRESS NOTES
1. \"Have you been to the ER, urgent care clinic since your last visit? Hospitalized since your last visit? \" No    2. \"Have you seen or consulted any other health care providers outside of the [address] [place] [address] since your last visit? \" No     3. For patients aged 39-70: Has the patient had a colonoscopy / FIT/ Cologuard? Yes - no Care Gap present      If the patient is female:    4. For patients aged 41-77: Has the patient had a mammogram within the past 2 years? Yes - no Care Gap present  See top three    5. For patients aged 21-65: Has the patient had a pap smear?  Yes - no Care Gap present

## 2022-01-26 DIAGNOSIS — R51.9 CHRONIC NONINTRACTABLE HEADACHE, UNSPECIFIED HEADACHE TYPE: ICD-10-CM

## 2022-01-26 DIAGNOSIS — G89.29 CHRONIC NONINTRACTABLE HEADACHE, UNSPECIFIED HEADACHE TYPE: ICD-10-CM

## 2022-01-26 RX ORDER — BUTALBITAL, ACETAMINOPHEN AND CAFFEINE 50; 325; 40 MG/1; MG/1; MG/1
1 TABLET ORAL
Qty: 60 TABLET | Refills: 0 | Status: SHIPPED | OUTPATIENT
Start: 2022-01-26 | End: 2022-02-23 | Stop reason: SDUPTHER

## 2022-01-26 NOTE — TELEPHONE ENCOUNTER
Last Visit: 7/22/21 with MD Nela Flores  Next Appointment: 1/27/22 with MD Nela Flores  Previous Refill Encounter(s): 12/30/21 #60    Requested Prescriptions     Pending Prescriptions Disp Refills    butalbital-acetaminophen-caffeine (FIORICET, ESGIC) -40 mg per tablet 60 Tablet 0     Sig: Take 1 Tablet by mouth every six (6) hours as needed for Headache.

## 2022-01-27 ENCOUNTER — OFFICE VISIT (OUTPATIENT)
Dept: INTERNAL MEDICINE CLINIC | Age: 62
End: 2022-01-27
Payer: COMMERCIAL

## 2022-01-27 VITALS
DIASTOLIC BLOOD PRESSURE: 78 MMHG | TEMPERATURE: 97.7 F | SYSTOLIC BLOOD PRESSURE: 126 MMHG | HEIGHT: 63 IN | WEIGHT: 139 LBS | RESPIRATION RATE: 16 BRPM | BODY MASS INDEX: 24.63 KG/M2 | HEART RATE: 88 BPM | OXYGEN SATURATION: 97 %

## 2022-01-27 DIAGNOSIS — M85.89 OSTEOPENIA OF MULTIPLE SITES: ICD-10-CM

## 2022-01-27 DIAGNOSIS — E55.9 VITAMIN D DEFICIENCY: ICD-10-CM

## 2022-01-27 DIAGNOSIS — R51.9 CHRONIC DAILY HEADACHE: ICD-10-CM

## 2022-01-27 DIAGNOSIS — I10 PRIMARY HYPERTENSION: Primary | ICD-10-CM

## 2022-01-27 DIAGNOSIS — Z87.891 FORMER SMOKER: ICD-10-CM

## 2022-01-27 DIAGNOSIS — M47.816 LUMBAR FACET ARTHROPATHY: ICD-10-CM

## 2022-01-27 DIAGNOSIS — G47.33 OBSTRUCTIVE SLEEP APNEA: ICD-10-CM

## 2022-01-27 DIAGNOSIS — F32.9 REACTIVE DEPRESSION: ICD-10-CM

## 2022-01-27 DIAGNOSIS — Z00.00 LABORATORY TESTS ORDERED AS PART OF A COMPLETE PHYSICAL EXAM (CPE): ICD-10-CM

## 2022-01-27 DIAGNOSIS — E78.5 HYPERLIPIDEMIA, UNSPECIFIED HYPERLIPIDEMIA TYPE: ICD-10-CM

## 2022-01-27 PROCEDURE — 99214 OFFICE O/P EST MOD 30 MIN: CPT | Performed by: INTERNAL MEDICINE

## 2022-01-27 RX ORDER — LORATADINE 10 MG/1
10 TABLET ORAL DAILY
Qty: 90 TABLET | Refills: 3 | Status: SHIPPED | OUTPATIENT
Start: 2022-01-27

## 2022-01-27 RX ORDER — TRIAMCINOLONE ACETONIDE 55 UG/1
2 SPRAY, METERED NASAL
Qty: 1 EACH | Refills: 5 | Status: SHIPPED | OUTPATIENT
Start: 2022-01-27 | End: 2022-10-05

## 2022-01-27 NOTE — PATIENT INSTRUCTIONS
Heart-Healthy Diet: Care Instructions  Your Care Instructions     A heart-healthy diet has lots of vegetables, fruits, nuts, beans, and whole grains, and is low in salt. It limits foods that are high in saturated fat, such as meats, cheeses, and fried foods. It may be hard to change your diet, but even small changes can lower your risk of heart attack and heart disease. Follow-up care is a key part of your treatment and safety. Be sure to make and go to all appointments, and call your doctor if you are having problems. It's also a good idea to know your test results and keep a list of the medicines you take. How can you care for yourself at home? Watch your portions  · Learn what a serving is. A \"serving\" and a \"portion\" are not always the same thing. Make sure that you are not eating larger portions than are recommended. For example, a serving of pasta is ½ cup. A serving size of meat is 2 to 3 ounces. A 3-ounce serving is about the size of a deck of cards. Measure serving sizes until you are good at [place]" them. Keep in mind that restaurants often serve portions that are 2 or 3 times the size of one serving. · To keep your energy level up and keep you from feeling hungry, eat often but in smaller portions. · Eat only the number of calories you need to stay at a healthy weight. If you need to lose weight, eat fewer calories than your body burns (through exercise and other physical activity). Eat more fruits and vegetables  · Eat a variety of fruit and vegetables every day. Dark green, deep orange, red, or yellow fruits and vegetables are especially good for you. Examples include spinach, carrots, peaches, and berries. · Keep carrots, celery, and other veggies handy for snacks. Buy fruit that is in season and store it where you can see it so that you will be tempted to eat it. · Cook dishes that have a lot of veggies in them, such as stir-fries and soups.   Limit saturated and trans fat  · Read food labels, and try to avoid saturated and trans fats. They increase your risk of heart disease. · Use olive or canola oil when you cook. · Bake, broil, grill, or steam foods instead of frying them. · Choose lean meats instead of high-fat meats such as hot dogs and sausages. Cut off all visible fat when you prepare meat. · Eat fish, skinless poultry, and meat alternatives such as soy products instead of high-fat meats. Soy products, such as tofu, may be especially good for your heart. · Choose low-fat or fat-free milk and dairy products. Eat foods high in fiber  · Eat a variety of grain products every day. Include whole-grain foods that have lots of fiber and nutrients. Examples of whole-grain foods include oats, whole wheat bread, and brown rice. · Buy whole-grain breads and cereals, instead of white bread or pastries. Limit salt and sodium  · Limit how much salt and sodium you eat to help lower your blood pressure. · Taste food before you salt it. Add only a little salt when you think you need it. With time, your taste buds will adjust to less salt. · Eat fewer snack items, fast foods, and other high-salt, processed foods. Check food labels for the amount of sodium in packaged foods. · Choose low-sodium versions of canned goods (such as soups, vegetables, and beans). Limit sugar  · Limit drinks and foods with added sugar. These include candy, desserts, and soda pop. Limit alcohol  · Limit alcohol to no more than 2 drinks a day for men and 1 drink a day for women. Too much alcohol can cause health problems. When should you call for help? Watch closely for changes in your health, and be sure to contact your doctor if:    · You would like help planning heart-healthy meals. Where can you learn more? Go to http://www.[employer].com/  Enter V137 in the search box to learn more about \"Heart-Healthy Diet: Care Instructions. \"  Current as of: August 22, 2019               Content Version: 12.6  © 3116-1280 [employer]. Care instructions adapted under license by [employer] (which disclaims liability or warranty for this information). If you have questions about a medical condition or this instruction, always ask your healthcare professional. [address] any warranty or liability for your use of this information. Learning About Low-Sodium Foods  What foods are low in sodium? The foods you eat contain nutrients, such as vitamins and minerals. Sodium is a nutrient. Your body needs the right amount to stay healthy and work as it should. You can use the list below to help you make choices about which foods to eat. Fruits  · Fresh, frozen, canned, or dried fruit  Vegetables  · Fresh or frozen vegetables, with no added salt  · Canned vegetables, low-sodium or with no added salt  Grains  · Bagels without salted tops  · Cereal with no added salt  · Corn tortillas  · Crackers with no added salt  · Oatmeal, cooked without salt  · Popcorn with no salt  · Pasta and noodles, cooked without salt  · Rice, cooked without salt  · Unsalted pretzels  Dairy and dairy alternatives  · Butter, unsalted  · Cream cheese  · Ice cream  · Milk  · Soy milk  Meats and other protein foods  · Beans and peas, canned with no salt  · Eggs  · Fresh fish (not smoked)  · Fresh meats (not smoked or cured)  · Nuts and nut butter, prepared without salt  · Poultry, not packaged with sodium solution  · Tofu, unseasoned  · Tuna, canned without salt  Seasonings  · Garlic  · Herbs and spices  · Lemon juice  · Mustard  · Olive oil  · Salt-free seasoning mixes  · Vinegar  Work with your doctor to find out how much of this nutrient you need. Depending on your health, you may need more or less of it in your diet. Where can you learn more?   Go to http://www.gray.com/  Enter S460 in the search box to learn more about \"Learning About Low-Sodium Foods.\"  Current as of: August 22, 2019               Content Version: 12.6  © 7747-4383 [employer]. Care instructions adapted under license by [employer] (which disclaims liability or warranty for this information). If you have questions about a medical condition or this instruction, always ask your healthcare professional. [address] any warranty or liability for your use of this information. Low Sodium Diet (2,000 Milligram): Care Instructions  Your Care Instructions     Too much sodium causes your body to hold on to extra water. This can raise your blood pressure and force your heart and kidneys to work harder. In very serious cases, this could cause you to be put in the hospital. It might even be life-threatening. By limiting sodium, you will feel better and lower your risk of serious problems. The most common source of sodium is salt. People get most of the salt in their diet from canned, prepared, and packaged foods. Fast food and restaurant meals also are very high in sodium. Your doctor will probably limit your sodium to less than 2,000 milligrams (mg) a day. This limit counts all the sodium in prepared and packaged foods and any salt you add to your food. Follow-up care is a key part of your treatment and safety. Be sure to make and go to all appointments, and call your doctor if you are having problems. It's also a good idea to know your test results and keep a list of the medicines you take. How can you care for yourself at home? Read food labels  · Read labels on cans and food packages. The labels tell you how much sodium is in each serving. Make sure that you look at the serving size. If you eat more than the serving size, you have eaten more sodium. · Food labels also tell you the Percent Daily Value for sodium. Choose products with low Percent Daily Values for sodium.   · Be aware that sodium can come in forms other than salt, including monosodium glutamate (MSG), sodium citrate, and sodium bicarbonate (baking soda). MSG is often added to Asian food. When you eat out, you can sometimes ask for food without MSG or added salt. Buy low-sodium foods  · Buy foods that are labeled \"unsalted\" (no salt added), \"sodium-free\" (less than 5 mg of sodium per serving), or \"low-sodium\" (less than 140 mg of sodium per serving). Foods labeled \"reduced-sodium\" and \"light sodium\" may still have too much sodium. Be sure to read the label to see how much sodium you are getting. · Buy fresh vegetables, or frozen vegetables without added sauces. Buy low-sodium versions of canned vegetables, soups, and other canned goods. Prepare low-sodium meals  · Cut back on the amount of salt you use in cooking. This will help you adjust to the taste. Do not add salt after cooking. One teaspoon of salt has about 2,300 mg of sodium. · Take the salt shaker off the table. · Flavor your food with garlic, lemon juice, onion, vinegar, herbs, and spices. Do not use soy sauce, lite soy sauce, steak sauce, onion salt, garlic salt, celery salt, mustard, or ketchup on your food. · Use low-sodium salad dressings, sauces, and ketchup. Or make your own salad dressings and sauces without adding salt. · Use less salt (or none) when recipes call for it. You can often use half the salt a recipe calls for without losing flavor. Other foods such as rice, pasta, and grains do not need added salt. · Rinse canned vegetables, and cook them in fresh water. This removes some--but not all--of the salt. · Avoid water that is naturally high in sodium or that has been treated with water softeners, which add sodium. Call your local water company to find out the sodium content of your water supply. If you buy bottled water, read the label and choose a sodium-free brand. Avoid high-sodium foods  · Avoid eating:  ? Smoked, cured, salted, and canned meat, fish, and poultry.   ? Ham, monique, hot dogs, and luncheon meats. ? Regular, hard, and processed cheese and regular peanut butter. ? Crackers with salted tops, and other salted snack foods such as pretzels, chips, and salted popcorn. ? Frozen prepared meals, unless labeled low-sodium. ? Canned and dried soups, broths, and bouillon, unless labeled sodium-free or low-sodium. ? Canned vegetables, unless labeled sodium-free or low-sodium. ? [place] fries, pizza, tacos, and other fast foods. ? Pickles, olives, ketchup, and other condiments, especially soy sauce, unless labeled sodium-free or low-sodium. Where can you learn more? Go to http://www.gray.com/  Enter V843 in the search box to learn more about \"Low Sodium Diet (2,000 Milligram): Care Instructions. \"  Current as of: August 22, 2019               Content Version: 12.6  © 2865-9988 [employer]. Care instructions adapted under license by [employer] (which disclaims liability or warranty for this information). If you have questions about a medical condition or this instruction, always ask your healthcare professional. [place] [address] any warranty or liability for your use of this information. High Cholesterol: Care Instructions  Your Care Instructions     Cholesterol is a type of fat in your blood. It is needed for many body functions, such as making new cells. Cholesterol is made by your body. It also comes from food you eat. High cholesterol means that you have too much of the fat in your blood. This raises your risk of a heart attack and stroke. LDL and HDL are part of your total cholesterol. LDL is the \"bad\" cholesterol. High LDL can raise your risk for heart disease, heart attack, and stroke. HDL is the \"good\" cholesterol. It helps clear bad cholesterol from the body. High HDL is linked with a lower risk of heart disease, heart attack, and stroke.   Your cholesterol levels help your doctor find out your risk for having a heart attack or stroke. You and your doctor can talk about whether you need to lower your risk and what treatment is best for you. A heart-healthy lifestyle along with medicines can help lower your cholesterol and your risk. The way you choose to lower your risk will depend on how high your risk is for heart attack and stroke. It will also depend on how you feel about taking medicines. Follow-up care is a key part of your treatment and safety. Be sure to make and go to all appointments, and call your doctor if you are having problems. It's also a good idea to know your test results and keep a list of the medicines you take. How can you care for yourself at home? · Eat a variety of foods every day. Good choices include fruits, vegetables, whole grains (like oatmeal), dried beans and peas, nuts and seeds, soy products (like tofu), and fat-free or low-fat dairy products. · Replace butter, margarine, and hydrogenated or partially hydrogenated oils with olive and canola oils. (Canola oil margarine without trans fat is fine.)  · Replace red meat with fish, poultry, and soy protein (like tofu). · Limit processed and packaged foods like chips, crackers, and cookies. · Bake, broil, or steam foods. Don't porter them. · Be physically active. Get at least 30 minutes of exercise on most days of the week. Walking is a good choice. You also may want to do other activities, such as running, swimming, cycling, or playing tennis or team sports. · Stay at a healthy weight or lose weight by making the changes in eating and physical activity listed above. Losing just a small amount of weight, even 5 to 10 pounds, can reduce your risk for having a heart attack or stroke. · Do not smoke. When should you call for help? Watch closely for changes in your health, and be sure to contact your doctor if:    · You need help making lifestyle changes. · You have questions about your medicine. Where can you learn more?   Go to http://www.gray.com/  Enter A795 in the search box to learn more about \"High Cholesterol: Care Instructions. \"  Current as of: December 16, 2019               Content Version: 12.6  © 1400-9123 [employer]. Care instructions adapted under license by [employer] (which disclaims liability or warranty for this information). If you have questions about a medical condition or this instruction, always ask your healthcare professional. [address] any warranty or liability for your use of this information.

## 2022-01-31 PROBLEM — M85.89 OSTEOPENIA OF MULTIPLE SITES: Status: ACTIVE | Noted: 2022-01-31

## 2022-01-31 NOTE — PROGRESS NOTES
HPI:   Bismark Vasquez is a 59y.o. year old female who presents today for a routine visit. . She has a history of hypertension, hyperlipidemia, chronic daily headaches, obstructive sleep apnea, allergic rhinitis, vasomotor instability, and irritable bowel syndrome. She has completed the [employer] COVID-19 vaccine series but not yet received [employer] booster dose. She reports that she is doing reasonably well. She denies any recurrence of back pain or sciatica. She acknowledges that she has resumed taking one Fioricet each day for headaches and is not using allergy medications as previously prescribed. She is otherwise without new complaints. Summary of prior hospitalizations and medical history:  In 7/2018, she was referred to Dr. Jeff Prado for her chronic daily headaches, and evaluation included brain MRI and MRA (8/8/2018) showing small nonspecific subcortical white matter hyperintensity left insula region,otherwise unremarkable MRI; and MRA negative. She underwent sleep study evaluation and was diagnosed with moderate obstructive sleep apnea, and was referred to Dr. Amairani Kincaid and successfully started on CPAP. She was prescribed a Medrol dose pack to allow her to withdraw from daily use of analgesics for her headaches. She was then started on amitriptyine 10 mg daily and instructed to use Excedrin migraine for moderate headaches, and Fioricet only for severe headaches. Celebrex and Zanaflex were prescribed to manage her chronic neck pain, and she continues to take one Zanaflex nightly. She also completed physical therapy, including dry needling, for her neck pain with significant improvement. She is continuing to use Fioricet when she develops a headache, but reports that she is needing to take only one with good response. She also is continuing to take Zanaflex at bedtime, but is no longer needing Celebrex.     On 1/29/2018, she called the office and reported she was out having dinner when she experienced an \"IBS episode\" characterized by flushing in the face, dry mouth, and severe abdominal cramping. When she arrived home, she experienced diarrhea and a single episode of vomiting. She reported that she continued to have abdominal cramping and fatigue following this episode, although described as getting slowly better each day. She reported that she had similar episodes to this dating back 30 years, and was diagnosed with IBS at that time. She stated that the episodes used to resolve more quickly, although over the last year, she states that it now is taking several days before she recovers. She was referred to Dr. Khoa Vazquez, and started on Linzess for constipation. However, she states that it was not effective. She underwent evaluation by Dr. Michelet Lau and Dr. Alban Dowd and had a small bowel series (8/7/2018) and abdominal duplex scan (8/16/2018), which were negative. She also collected a 24 hour urine for 5-HIAA, which was negative. She reports that she has noted an improvement in her abdominal pain and diarrheal episodes, and reports that she has not had any episodes since discontinuing captopril. She currently denies any abdominal pain, nausea, vomiting, melena, hematochezia, or change in bowel movements. On 8/2/2016, she was evaluated by PETERSON Ahmadi for left sided crampy abdominal pain, diarrhea, and hematochezia. She was treated for presumed diverticulitis with Cipro and Flagyl, and underwent evaluation showing WBC 11.2, ALT 59, alkaline phosphatase 121, and lipase 427. Abdominal CT scan (8/2/2016) showed localized bowel wall thickening with inflammatory change involving the splenic flexure and proximal descending colon; no colonic diverticuli are seen so the presence of diverticulitis is unlikely; would be a typical location for bowel ischemia, but the patient's vascular structures are essentially normal so bowel ischemia unlikely; favor infectious or inflammatory colitis.  She was evaluated by Dr. Tereza Mares and admitted for bowel rest, IVF, and IV antibiotics. She improved significantly in 36 hours and was discharged home. She completed the course of oral antibiotics and states that her symptoms have completely resolved. In 4/2017, she was again evaluated by Dr. Hannah Otero for recurrent abdominal pain, and abdominal and pelvic CT scan was obtained (4/28/2017) showing mild nonspecific thickening of the proximal jejunum and significant improvement in the distal transverse colon adjacent to the splenic flexure with a short segment with mild wall thickening. Her discomfort resolved, but concern was raised for possible ischemia as cause. An echocardiogram was obtained (5/26/2017) showing normal LV function (EF 65%), no RWMA, grade 1 diastolic dysfunction, and wall thickening upper limits of normal. She also underwent a hypercoaguable laboratory workup, which was negative. She underwent screening colonoscopy by Dr. Ramona Jin in 6/2011, which found a polyp in the proximal descending colon (pathology: hyperplastic). Because she could not intubate the cecum, follow-up was recommended for 2 years. In 4/2015, she underwent repeat colonoscopy with Dr. Jose Eduardo Lorenzo, which showed left diverticular disease, hypertrophied anal papilla, and no polyps. She had a repeat colonoscopy in 10/2020 by Dr. Hannah Otero showing a 5 mm rectal polyp (pathology: hyperplastic). Follow-up was recommended for 5 years. She has a history of hypertension, treated with amlodipine, hydrochlorothiazide, and hydralazine. She denies any chest pain, shortness of breath at rest or with exertion, palpitations, lightheadedness, or edema. She also has hyperlipidemia, treated with fenofibrate and fish oil in the past, but these were discontinued and she was changed to rosuvastatin in 6/2016. However, due to mild to moderate elevation in AST/ALT, it was discontinued in 5/2017 with resolution of abnormal liver function tests.  She was subsequently started on pravastatin in 10/2018, and reports no difficulties with it today. She has a history of osteopenia, first diagnosed on baseline bone density study in 12/2021, with T-scores: femoral neck left -1.9/right -1.2 and lumbar +1.5. She was started on calcium and vitamin D supplementation. She has no history of pathologic fractures. She has a history of severe obstructive sleep apnea, diagnosed by Dr. Alek Murillo, and was prescribed nasal CPAP. She states that she is no longer using her machine because it was making her headaches worse. She underwent reevaluation by Dr. Arabella Allen, and repeat sleep study showed moderate PEGGY. She was restarted on CPAP and reports continued compliance. She suffers from chronic headaches, considered to be migraines in the past as they were associated with menstruation. Since she entered menopause, the severity of her headaches has decreased and the character has changed. She has severe allergic rhinitis, and awakens with morning headaches when her allergies are severe. She is followed by Dr. Aranza Lambert and had been receiving immunotherapy with some improvement. She also has a history of vasomotor instability, which responded to venlafaxine. In 10/2019, she reported a 3 week history of URI with nonproductive cough. She stated that her symptoms persisted, although appeared to be gradually improving until 10/5/2019, when she developed a fever of 100.8 ° F, chills, nausea, decreased appetite, myalgias, and weakness. She also stated that her cough appeared to be  worse, and by 10/7/2019, it had become productive of yellow brown sputum. She presented to [facility] and evaluation included WBC 14.2 with 71% neutrophils, hemoglobin 40.9, and chest x-ray revealed peribronchial cuffing with an infiltrate in the right lower lobe. She was started on doxycycline 100 mg bid for 10 days, Qvar inhaler, and Tessalon pearls. She smoked 0.5 ppd for 30 years, stopping into 2011.  When seen on 10/10/2019, Augmentin was added given persistent symptoms, and she was given an albuterol inhaler. She clinically improved, and repeat chest x-ray on 11/26/2019 was normal.     She also has a history of an anterior cervical discectomy and fusion (C5-C6) in 2/2011 by Dr. Shannon Shea for a herniated disc and right radiculopathy. She does experience intermittent neck pain, and feels that it may be exacerbating her headaches. In 6/2016, she presented with complaints of right sciatica and she underwent a lumbar MRI (7/8/2016) showing multilevel mild disc bulges, mild facet arthropathy, and multilevel mild foraminal stenosis; L3-L4 with mild to moderate spinal canal stenosis, no more than mild spinal canal stenosis elsewhere, and no cord compression. She was evaluated by Dr. Rubi Yuen on 9/1/2016 and started on gabapentin. She also was referred to physical therapy and received dry needling, which resulted in significant improvement. In 9/2020, she developed acute worsening of low back pain after picking up her granddaughter. She states that the pain gradually progressed with radiation into her right buttocks and posterior right leg. She presented to the [facility] ED on 9/26/2020. Lumbar spine x-ray showed no fracture or subluxation, but increasing degenerative disc space narrowing at L3-L4. She was treated with Skelaxin and naproxen without improvement. She was seen in the office and prescribed a Medrol dose pack and restarted on gabapentin with improvement. She has a history of a right ovarian cyst, and on surveillance ultrasound by Dr. Bharath Bains, it was noted that the right ovarian cyst had enlarged slightly and changed in appearance. She was referred to Dr. Michael Frankel for evaluation, and he stated that although the cyst was likely benign, he recommended laparoscopic removal. On 2/28/2019, she underwent a laparoscopic bilateral salpingo-oophorectomy and washing.  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 In 2020, she presented with worsening depression symptoms related to marital issues and the pandemic. She was started on venlafaxine with improvement. She was also receiving counseling. Past Medical History:   Diagnosis Date    Allergic rhinitis     Arthritis     Chronic headaches     Hx of colonic polyp 2011    Hyperplastic; Dr. Bessie Walker.  Hyperlipidemia     Hypertension     IBS (irritable bowel syndrome)     Obstructive sleep apnea     uses cpap    S/P cervical discectomy 2011    Anterior C5-C6 discectomy for herniated disc and right radiculopathy; Dr. Elizondo Notice     Past Surgical History:   Procedure Laterality Date    COLONOSCOPY N/A 10/7/2020    COLONOSCOPY with bx performed by Puma Sp[facility]y [facility] at [facility], COLON, DIAGNOSTIC      HX BACK SURGERY  11    neck surgery; ruptured cervical discs    HX COLONOSCOPY  2011 2015    4/17/15, normal, recalled for 5 years    HX GYN      dilation and curretage    HX GYN          HX OOPHORECTOMY  10/2018     Current Outpatient Medications   Medication Sig    triamcinolone (NASACORT AQ) 55 mcg nasal inhaler 2 Sprays by Both Nostrils route nightly.  loratadine (Claritin) 10 mg tablet Take 1 Tablet by mouth daily. Indications: inflammation of the nose due to an allergy    butalbital-acetaminophen-caffeine (FIORICET, ESGIC) -40 mg per tablet Take 1 Tablet by mouth every six (6) hours as needed for Headache.     hydroCHLOROthiazide (HYDRODIURIL) 25 mg tablet TAKE 1 TABLET BY MOUTH DAILY    amLODIPine (NORVASC) 10 mg tablet TAKE 1 TABLET BY MOUTH DAILY    pravastatin (PRAVACHOL) 20 mg tablet TAKE 1 TABLET BY MOUTH EVERY NIGHT    venlafaxine-SR (EFFEXOR-XR) 150 mg capsule TAKE 1 CAPSULE BY MOUTH DAILY    tiZANidine (ZANAFLEX) 4 mg tablet TAKE 1 TABLET BY MOUTH EVERY EVENING    hydrALAZINE (APRESOLINE) 100 mg tablet TAKE 1 TABLET BY MOUTH THREE TIMES DAILY    TURMERIC ROOT EXTRACT PO Take 1,000 mg by mouth.  GLUC/CHONDR-MSM#7/C/DEENA/BORON (GLUCOSAMINE-CHONDR, BOSWELLIA, PO) Take 500 mg by mouth daily.  multivitamin (ONE A DAY) tablet Take 1 Tab by mouth daily.  Cholecalciferol, Vitamin D3, 2,000 unit cap Take 2,000 Units by mouth daily. No current facility-administered medications for this visit. Allergies and Intolerances: Allergies   Allergen Reactions    Other Plant, Animal, Environmental Itching and Sneezing     \"Everything but feathers and horses. \"        Family History: Her mother had hypertension, but  from a melanoma. Her father  from a DVT. Her brother has CAD and underwent CABG at the age of 40. Family History   Problem Relation Age of Onset    Heart Disease Brother     Other Mother         melanoma    OSTEOARTHRITIS Mother     Bleeding Prob Father     Heart Disease Father     Heart Attack Father     Stroke Maternal Uncle     Heart Attack Maternal Uncle     Breast Cancer Maternal Grandmother      Social History:   She  reports that she quit smoking about 7 years ago. She smoked 0.50 packs per day. She has never used smokeless tobacco. She reports that she smoked 0.5 ppd for 30 years, stopping in . She is  and living with her son. She recently retired from the [employer] department of [employer]; her  sells eyeglass frames to [employer].       Social History     Substance and Sexual Activity   Alcohol Use Not Currently    Alcohol/week: 0.0 standard drinks     Immunization History:  Immunization History   Administered Date(s) Administered    COVID-19, Pfizer Purple top, DILUTE for use, 12+ yrs, 30mcg/0.3mL dose 2021, 2021    Influenza Vaccine 10/08/2014, 10/09/2017    Influenza Vaccine (Quad) PF (>6 Mo Flulaval, Fluarix, and >3 Yrs Afluria, Fluzone [id number]) 10/23/2018, 2020, 2020    TD Vaccine 2006    Tdap 10/07/2016    Zoster Recombinant 2019, 2019       Review of Systems:   As above included in HPI. Otherwise 11 point review of systems negative including constitutional, skin, HENT, eyes, respiratory, cardiovascular, gastrointestinal, genitourinary, musculoskeletal, endo/heme/aller, neurological.    Physical:      Visit Vitals  /78 (BP 1 Location: Left arm, BP Patient Position: Sitting)   Pulse 88   Temp 97.7 °F (36.5 °C) (Temporal)   Resp 16   Ht 5' 3\" (1.6 m)   Wt 139 lb (63 kg)   LMP 01/10/2012 (LMP Unknown)   SpO2 97%   BMI 24.62 kg/m²       Patient appears in no apparent distress. Affect is appropriate. HEENT: PERRLA, anicteric, no JVD, adenopathy or thyromegaly. No carotid bruits or radiated murmur. Lungs: clear to auscultation, no wheezes, rhonchi, or rales. Heart: regular rate and rhythm. No murmur, rubs, gallops  Abdomen: soft, nontender, nondistended, normal bowel sounds, no hepatosplenomegaly or masses. Extremities: without edema.           Review of Data:  Labs:  Hospital Outpatient Visit on 01/20/2022   Component Date Value Ref Range Status    LIPID PROFILE 01/20/2022        Final    Cholesterol, total 01/20/2022 166  <200 MG/DL Final    Triglyceride 01/20/2022 285* <150 MG/DL Final    HDL Cholesterol 01/20/2022 38* 40 - 60 MG/DL Final    LDL, calculated 01/20/2022 71  0 - 100 MG/DL Final    VLDL, calculated 01/20/2022 57  MG/DL Final    CHOL/HDL Ratio 01/20/2022 4.4  0 - 5.0   Final    Magnesium 01/20/2022 2.3  1.6 - 2.6 mg/dL Final    Sodium 01/20/2022 138  136 - 145 mmol/L Final    Potassium 01/20/2022 3.9  3.5 - 5.5 mmol/L Final    Chloride 01/20/2022 104  100 - 111 mmol/L Final    CO2 01/20/2022 29  21 - 32 mmol/L Final    Anion gap 01/20/2022 5  3.0 - 18 mmol/L Final    Glucose 01/20/2022 99  74 - 99 mg/dL Final    BUN 01/20/2022 35* 7.0 - 18 MG/DL Final    Creatinine 01/20/2022 0.57* 0.6 - 1.3 MG/DL Final    BUN/Creatinine ratio 01/20/2022 61* 12 - 20   Final    GFR est AA 01/20/2022 >60  >60 ml/min/1.73m2 Final    GFR est non-AA 01/20/2022 >60 >60 ml/min/1.73m2 Final    Calcium 01/20/2022 9.4  8.5 - 10.1 MG/DL Final    Bilirubin, total 01/20/2022 0.2  0.2 - 1.0 MG/DL Final    ALT (SGPT) 01/20/2022 50  13 - 56 U/L Final    AST (SGOT) 01/20/2022 28  10 - 38 U/L Final    Alk. phosphatase 01/20/2022 126* 45 - 117 U/L Final    Protein, total 01/20/2022 6.9  6.4 - 8.2 g/dL Final    Albumin 01/20/2022 3.7  3.4 - 5.0 g/dL Final    Globulin 01/20/2022 3.2  2.0 - 4.0 g/dL Final    A-G Ratio 01/20/2022 1.2  0.8 - 1.7   Final    Vitamin D 25-Hydroxy 01/20/2022 40.8  30 - 100 ng/mL Final     Health Maintenance:  Screening:    Mammogram: negative (12/2021)   PAP smear: well women exams by Dr. Candice Garzon (last 12/2020)   Colorectal: colonoscopy (10/2020) hyperplastic polyp. Dr. Citlali Connolly. Due 10/2025   Depression: on venlafaxine   DM (HbA1c/FPG): FPG 99 (1/2022)   Hepatitis C: negative (12/2015)   Falls: none   DEXA: osteopenia (12/2021)   Glaucoma: has regular eye exams with Dr. 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   Smoking: 15 pack year, stopped 2011   Vitamin D: 40.8 (1/2022)   Medicare Wellness: N/A      Impression:  Patient Active Problem List   Diagnosis Code    Hypertension I10    Hyperlipidemia E78.5    Obstructive sleep apnea G47.33    Chronic daily headache R51.9    IBS (irritable bowel syndrome) K58.9    Colon polyp K63.5    Vitamin D deficiency E55.9    History of colitis Z87.19    Lumbar spinal stenosis M48.061    Lumbar facet arthropathy M47.816    Neck pain M54.2    Degenerative disc disease, cervical M50.30    Former smoker Z87.891    Reactive depression F32.9    Right sided sciatica M54.31    Osteopenia of multiple sites M85.89       Plan:  1. Hypertension. Blood pressure remains well controlled today on current regimen of hydralazine 100 mg tid, amlodipine 10 mg daily, and hydrochlorothiazide 25 mg daily. Successfully discontinued treatment with captopril due to concern for angioedema.  Advised to continue to monitor blood pressure at home. Renal function remains normal with creatinine 0.57/ eGFR >60. However, BUN/creatinine ratio remains significantly elevated at 61, and again discussed importance of increasing fluid intake particularly given treatment with hydrochlorothiazide. Will continue to monitor  2. Hyperlipidemia. On moderate intensity dose pravastatin (20 mg) with LDL 71 and HDL 38, indicative of good control. However, elevated triglycerides at 285 and advised to follow a low carbohydrate diet. Previously treated with rosuvastatin but developed elevation of transaminases so discontinued. Will reassess at next visit. 3. Chronic daily headaches. Evaluated by Dr. Tommie Flor and weaned from daily Fioricet and Excedrin migraine. Headaches had improved with daily amitriptyline and trying to use Excedrin migraine and Fioricet only occasionally for moderate and severe headaches, respectively. However, no longer taking amitriptyline and has resumed Fioricet daily. Addressed today and urged to minimize use as likely contributing to \"medication headaches\". Not currently using allergy medications and acknowledged that having significant sinus issues. Will prescribe Claritin and Nasacort and urged to restart. Does also have chronic neck pain related to her anterior neck fusion and advised to continue Zanaflex and regular exercises. Will readdress at next visit. 4. Right leg sciatica. Lumbar MRI (7/2016) with evidence of mild to moderate lumbar spinal stenosis (L3/L4) with multilevel mild lumbar herniated disc and facet arthropathy. Significant improvement with dry needling therapy previously. Had acute worsening in 9/2020 and presented to the [facility] ED on 9/26/2020. Lumbar spine x-ray showed no fracture or subluxation, but increasing degenerative disc space narrowing at L3-L4. She was treated with Skelaxin and naproxen as well as lidocaine patches.  She presented with persistent symptoms on 9/29/2020 and was treated with a Medrol dose pack and gabapentin 300 mg tid with resolution. Had weaned from gabapentin until recurrent symptoms in 1/2021 and treated again with a Medrol dose pack and gabapentin 300 mg tid. Reports improvement today and no further difficulty. Encouraged to continue with regular stretching and exercises to prevent recurrence. Will continue to monitor. 5.  Obstructive sleep apnea. Underwent reevaluation by Dr. Laura Burgos, and repeat sleep study showed moderate PEGGY. Started on CPAP and reports continued compliance. Will discuss continued follow-up for sleep apnea at next visit. 6.  Reactive depression. Restarted on venlafaxine SR in 12/2020 and reports continued good control of symptoms currently on 150 mg daily. Will continue to follow. 7. Osteopenia. Newly diagnosed on baseline bone density study obtained on 12/9/2021. Calculated FRAX score estimates her 10-year risk of major osteoporotic fracture at 9.8% and hip fracture at 1.3%, which are not an indication for biphosphonate treatment. Advised today to begin calcium 600 mg twice daily and continue with vitamin D supplementation. Also encouraged her to exercise regularly, particularly weightbearing activities, which may help to prevent progression. 8. History of colitis. Unclear etiology, but nonspecific bowel wall thickening in short segment of jejunum and sigmoid on repeat CT scan in 4/2017 raised question of ischemic in origin. Hypercoaguable workup including lab studies and echocardiogram negative. Reported intermittent episodes associated with facial flushing, dry mouth, and severe abdominal cramping followed by diarrhea +/- vomiting. Episodes lingered for several days following onset. No evidence of urticaria seen. Interesting is the abnormalities noted on abdominal CT scan in two of her episodes with nonspecific short segment thickening in various locations in small and large intestines.  Obtained complement studies including C4, C1 esterase inhibtor, and C1q levels which were normal. Discussed that if she developed another episode, would like to check C4 level during the episode. Evaluated by GI and had negative small bowel series and duplex ultrasound in 8/2018, and 24 hour urine for 5-HIAA level also negative. Discontinued captopril in 6/2018 and has not had a recurrence since doing so which may be suggestive that episodes were secondary to angioedema related to ACE inhibitor use. Will continue to monitor. 9.  History of impaired fasting glucose. Fasting glucose remains normal. Most likely due to weight loss. Emphasized importance of continued lifestyle modifications. 10. NAFL/history of elevated transaminases. Resolved since rosuvastatin discontinued. Review of record showed elevation noted since 8/2016 when initiated. Iron studies, hepatitis panel, KIEL, SPEP, CK, aldolase, and anti-smooth muscle antibody negative. RUQ ultrasound (10/2016) showed fatty infiltration of liver. Advised patient to avoid alcohol. 11. Health maintenance. Completed the [employer] COVID-19 vaccine series. Advised to proceed with the [employer] booster dose as not yet received. Did not receive the influenza vaccine. Completed 2/2 doses of Shingrix vaccine. Other immunizations up to date. Well woman exam with Dr. Pollard Goes in 2/2020. Mammogram up-to-date. Completed colonoscopy with Dr. Jonna Santos and follow-up in 5 years . Continue regular eye exams with Dr. Jigna Urrutia. Vitamin D level normalized after restarting cholecalciferol 2000 units daily. .     Patient understands recommendations and agrees with plan. Follow-up in 6 months. J Carlos Ashraf

## 2022-02-18 RX ORDER — TIZANIDINE 4 MG/1
4 TABLET ORAL EVERY EVENING
Qty: 90 TABLET | Refills: 1 | Status: SHIPPED | OUTPATIENT
Start: 2022-02-18 | End: 2022-08-15 | Stop reason: SDUPTHER

## 2022-02-23 DIAGNOSIS — R51.9 CHRONIC NONINTRACTABLE HEADACHE, UNSPECIFIED HEADACHE TYPE: ICD-10-CM

## 2022-02-23 DIAGNOSIS — G89.29 CHRONIC NONINTRACTABLE HEADACHE, UNSPECIFIED HEADACHE TYPE: ICD-10-CM

## 2022-02-23 RX ORDER — BUTALBITAL, ACETAMINOPHEN AND CAFFEINE 50; 325; 40 MG/1; MG/1; MG/1
1 TABLET ORAL
Qty: 60 TABLET | Refills: 0 | Status: SHIPPED | OUTPATIENT
Start: 2022-02-23 | End: 2022-03-22 | Stop reason: SDUPTHER

## 2022-02-23 NOTE — TELEPHONE ENCOUNTER
Last Visit: 1/27/22 with MD Gregorio Hilton  Next Appointment: 7/28/22 with MD Gregorio Hilton  Previous Refill Encounter(s): 1/26/22 #60    Requested Prescriptions     Pending Prescriptions Disp Refills    butalbital-acetaminophen-caffeine (FIORICET, ESGIC) -40 mg per tablet 60 Tablet 0     Sig: Take 1 Tablet by mouth every six (6) hours as needed for Headache.

## 2022-03-18 PROBLEM — F32.9 REACTIVE DEPRESSION: Status: ACTIVE | Noted: 2020-12-23

## 2022-03-19 PROBLEM — M54.2 NECK PAIN: Status: ACTIVE | Noted: 2018-10-27

## 2022-03-19 PROBLEM — M54.31 RIGHT SIDED SCIATICA: Status: ACTIVE | Noted: 2020-12-23

## 2022-03-19 PROBLEM — Z87.891 FORMER SMOKER: Status: ACTIVE | Noted: 2019-10-14

## 2022-03-19 PROBLEM — M85.89 OSTEOPENIA OF MULTIPLE SITES: Status: ACTIVE | Noted: 2022-01-31

## 2022-03-20 PROBLEM — M50.30 DEGENERATIVE DISC DISEASE, CERVICAL: Status: ACTIVE | Noted: 2018-10-27

## 2022-03-22 DIAGNOSIS — R51.9 CHRONIC NONINTRACTABLE HEADACHE, UNSPECIFIED HEADACHE TYPE: ICD-10-CM

## 2022-03-22 DIAGNOSIS — G89.29 CHRONIC NONINTRACTABLE HEADACHE, UNSPECIFIED HEADACHE TYPE: ICD-10-CM

## 2022-03-22 RX ORDER — BUTALBITAL, ACETAMINOPHEN AND CAFFEINE 50; 325; 40 MG/1; MG/1; MG/1
1 TABLET ORAL
Qty: 60 TABLET | Refills: 0 | Status: SHIPPED | OUTPATIENT
Start: 2022-03-22 | End: 2022-06-03 | Stop reason: SDUPTHER

## 2022-03-22 NOTE — TELEPHONE ENCOUNTER
Last Visit: 1/27/22 with MD Doroteo Henriquez  Next Appointment: 7/28/22 with MD Doroteo Henriquez  Previous Refill Encounter(s): 2/23/22 #60    Requested Prescriptions     Pending Prescriptions Disp Refills    butalbital-acetaminophen-caffeine (FIORICET, ESGIC) -40 mg per tablet 60 Tablet 0     Sig: Take 1 Tablet by mouth every six (6) hours as needed for Headache.

## 2022-06-03 DIAGNOSIS — R51.9 CHRONIC NONINTRACTABLE HEADACHE, UNSPECIFIED HEADACHE TYPE: ICD-10-CM

## 2022-06-03 DIAGNOSIS — G89.29 CHRONIC NONINTRACTABLE HEADACHE, UNSPECIFIED HEADACHE TYPE: ICD-10-CM

## 2022-06-03 RX ORDER — BUTALBITAL, ACETAMINOPHEN AND CAFFEINE 50; 325; 40 MG/1; MG/1; MG/1
1 TABLET ORAL
Qty: 60 TABLET | Refills: 0 | Status: SHIPPED | OUTPATIENT
Start: 2022-06-03 | End: 2022-06-27

## 2022-06-03 NOTE — TELEPHONE ENCOUNTER
Last Visit: 1/27/22 with MD Bárbara Gauthier  Next Appointment: 7/28/22 with MD Waters  Previous Refill Encounter(s): 3/22/22 with MD Bárbara Gauthier    Requested Prescriptions     Pending Prescriptions Disp Refills    butalbital-acetaminophen-caffeine (FIORICET, ESGIC) -40 mg per tablet 60 Tablet 0     Sig: Take 1 Tablet by mouth every six (6) hours as needed for Headache.          For Ezequiel Yoder in place:    Recommendation Provided To:    Intervention Detail: New Rx: 1, reason: Patient Preference   Gap Closed?:    Intervention Accepted By:   Mookie Time Spent (min): 5

## 2022-06-27 DIAGNOSIS — R51.9 CHRONIC NONINTRACTABLE HEADACHE, UNSPECIFIED HEADACHE TYPE: ICD-10-CM

## 2022-06-27 DIAGNOSIS — G89.29 CHRONIC NONINTRACTABLE HEADACHE, UNSPECIFIED HEADACHE TYPE: ICD-10-CM

## 2022-06-27 RX ORDER — BUTALBITAL, ACETAMINOPHEN AND CAFFEINE 50; 325; 40 MG/1; MG/1; MG/1
TABLET ORAL
Qty: 60 TABLET | Refills: 0 | Status: SHIPPED | OUTPATIENT
Start: 2022-06-27 | End: 2022-07-21 | Stop reason: SDUPTHER

## 2022-06-28 RX ORDER — BUTALBITAL, ACETAMINOPHEN AND CAFFEINE 50; 325; 40 MG/1; MG/1; MG/1
1 TABLET ORAL
Qty: 60 TABLET | Refills: 0 | OUTPATIENT
Start: 2022-06-28

## 2022-06-28 NOTE — TELEPHONE ENCOUNTER
Duplicate        For Pharmacy Admin Tracking Only     CPA in place:    Recommendation Provided To:    Intervention Detail: Discontinued Rx: 1, reason: Duplicate Therapy   Gap Closed?:    Intervention Accepted By:   Abby Haney Time Spent (min): 5

## 2022-07-21 DIAGNOSIS — G89.29 CHRONIC NONINTRACTABLE HEADACHE, UNSPECIFIED HEADACHE TYPE: ICD-10-CM

## 2022-07-21 DIAGNOSIS — R51.9 CHRONIC NONINTRACTABLE HEADACHE, UNSPECIFIED HEADACHE TYPE: ICD-10-CM

## 2022-07-21 RX ORDER — BUTALBITAL, ACETAMINOPHEN AND CAFFEINE 50; 325; 40 MG/1; MG/1; MG/1
1 TABLET ORAL
Qty: 60 TABLET | Refills: 0 | Status: SHIPPED | OUTPATIENT
Start: 2022-07-21 | End: 2022-08-15 | Stop reason: SDUPTHER

## 2022-07-21 NOTE — TELEPHONE ENCOUNTER
Last Visit: 1/27/22 with MD Jose Alfredo Reed  Next Appointment: 7/28/22 with MD Jose Alfredo Reed  Previous Refill Encounter(s): 6/27/22 #60    Requested Prescriptions     Pending Prescriptions Disp Refills    butalbital-acetaminophen-caffeine (FIORICET, ESGIC) -40 mg per tablet 60 Tablet 0     Sig: Take 1 Tablet by mouth every six (6) hours as needed for Headache. For [facility] in place:   Recommendation Provided To:    Intervention Detail: New Rx: 1, reason: Patient Preference  Gap Closed?:   Intervention Accepted By:   Time Spent (min): 5

## 2022-07-22 ENCOUNTER — APPOINTMENT (OUTPATIENT)
Dept: INTERNAL MEDICINE CLINIC | Age: 62
End: 2022-07-22

## 2022-07-22 ENCOUNTER — HOSPITAL ENCOUNTER (OUTPATIENT)
Dept: LAB | Age: 62
Discharge: HOME | End: 2022-07-22
Payer: COMMERCIAL

## 2022-07-22 DIAGNOSIS — M85.89 OSTEOPENIA OF MULTIPLE SITES: ICD-10-CM

## 2022-07-22 DIAGNOSIS — E55.9 VITAMIN D DEFICIENCY: ICD-10-CM

## 2022-07-22 DIAGNOSIS — E78.5 HYPERLIPIDEMIA, UNSPECIFIED HYPERLIPIDEMIA TYPE: ICD-10-CM

## 2022-07-22 DIAGNOSIS — Z00.00 LABORATORY TESTS ORDERED AS PART OF A COMPLETE PHYSICAL EXAM (CPE): ICD-10-CM

## 2022-07-22 DIAGNOSIS — I10 PRIMARY HYPERTENSION: ICD-10-CM

## 2022-07-22 LAB
25(OH)D3 SERPL-MCNC: 63.1 NG/ML (ref 30–100)
ALBUMIN SERPL-MCNC: 3.8 G/DL (ref 3.4–5)
ALBUMIN/GLOB SERPL: 1.2 {RATIO} (ref 0.8–1.7)
ALP SERPL-CCNC: 91 U/L (ref 45–117)
ALT SERPL-CCNC: 34 U/L (ref 13–56)
ANION GAP SERPL CALC-SCNC: 5 MMOL/L (ref 3–18)
APPEARANCE UR: CLEAR
AST SERPL-CCNC: 20 U/L (ref 10–38)
BASOPHILS # BLD: 0 K/UL (ref 0–0.1)
BASOPHILS NFR BLD: 1 % (ref 0–2)
BILIRUB SERPL-MCNC: 0.4 MG/DL (ref 0.2–1)
BILIRUB UR QL: NEGATIVE
BUN SERPL-MCNC: 32 MG/DL (ref 7–18)
BUN/CREAT SERPL: 46 (ref 12–20)
CALCIUM SERPL-MCNC: 9.4 MG/DL (ref 8.5–10.1)
CHLORIDE SERPL-SCNC: 103 MMOL/L (ref 100–111)
CHOLEST SERPL-MCNC: 164 MG/DL
CO2 SERPL-SCNC: 33 MMOL/L (ref 21–32)
COLOR UR: YELLOW
CREAT SERPL-MCNC: 0.7 MG/DL (ref 0.6–1.3)
DIFFERENTIAL METHOD BLD: ABNORMAL
EOSINOPHIL # BLD: 0.3 K/UL (ref 0–0.4)
EOSINOPHIL NFR BLD: 5 % (ref 0–5)
ERYTHROCYTE [DISTWIDTH] IN BLOOD BY AUTOMATED COUNT: 14.2 % (ref 11.6–14.5)
EST. AVERAGE GLUCOSE BLD GHB EST-MCNC: 114 MG/DL
GLOBULIN SER CALC-MCNC: 3.2 G/DL (ref 2–4)
GLUCOSE SERPL-MCNC: 96 MG/DL (ref 74–99)
GLUCOSE UR STRIP.AUTO-MCNC: NEGATIVE MG/DL
HBA1C MFR BLD: 5.6 % (ref 4.2–5.6)
HCT VFR BLD AUTO: 38.9 % (ref 35–45)
HDLC SERPL-MCNC: 51 MG/DL (ref 40–60)
HDLC SERPL: 3.2 {RATIO} (ref 0–5)
HGB BLD-MCNC: 12.5 G/DL (ref 12–16)
HGB UR QL STRIP: NEGATIVE
IMM GRANULOCYTES # BLD AUTO: 0 K/UL (ref 0–0.04)
IMM GRANULOCYTES NFR BLD AUTO: 0 % (ref 0–0.5)
KETONES UR QL STRIP.AUTO: NEGATIVE MG/DL
LDLC SERPL CALC-MCNC: 86.4 MG/DL (ref 0–100)
LEUKOCYTE ESTERASE UR QL STRIP.AUTO: NEGATIVE
LIPID PROFILE,FLP: NORMAL
LYMPHOCYTES # BLD: 2.6 K/UL (ref 0.9–3.6)
LYMPHOCYTES NFR BLD: 44 % (ref 21–52)
MAGNESIUM SERPL-MCNC: 2.2 MG/DL (ref 1.6–2.6)
MCH RBC QN AUTO: 27.8 PG (ref 24–34)
MCHC RBC AUTO-ENTMCNC: 32.1 G/DL (ref 31–37)
MCV RBC AUTO: 86.4 FL (ref 78–100)
MONOCYTES # BLD: 0.6 K/UL (ref 0.05–1.2)
MONOCYTES NFR BLD: 11 % (ref 3–10)
NEUTS SEG # BLD: 2.4 K/UL (ref 1.8–8)
NEUTS SEG NFR BLD: 40 % (ref 40–73)
NITRITE UR QL STRIP.AUTO: NEGATIVE
NRBC # BLD: 0 K/UL (ref 0–0.01)
NRBC BLD-RTO: 0 PER 100 WBC
PH UR STRIP: 6.5 [PH] (ref 5–8)
PLATELET # BLD AUTO: 276 K/UL (ref 135–420)
PMV BLD AUTO: 10 FL (ref 9.2–11.8)
POTASSIUM SERPL-SCNC: 3.6 MMOL/L (ref 3.5–5.5)
PROT SERPL-MCNC: 7 G/DL (ref 6.4–8.2)
PROT UR STRIP-MCNC: NEGATIVE MG/DL
RBC # BLD AUTO: 4.5 M/UL (ref 4.2–5.3)
SODIUM SERPL-SCNC: 141 MMOL/L (ref 136–145)
SP GR UR REFRACTOMETRY: 1.03 (ref 1–1.03)
TRIGL SERPL-MCNC: 133 MG/DL (ref ?–150)
TSH SERPL DL<=0.05 MIU/L-ACNC: 1.36 UIU/ML (ref 0.36–3.74)
UROBILINOGEN UR QL STRIP.AUTO: 0.2 EU/DL (ref 0.2–1)
VLDLC SERPL CALC-MCNC: 26.6 MG/DL
WBC # BLD AUTO: 6 K/UL (ref 4.6–13.2)

## 2022-07-22 PROCEDURE — 83036 HEMOGLOBIN GLYCOSYLATED A1C: CPT

## 2022-07-22 PROCEDURE — 81003 URINALYSIS AUTO W/O SCOPE: CPT

## 2022-07-22 PROCEDURE — 82306 VITAMIN D 25 HYDROXY: CPT

## 2022-07-22 PROCEDURE — 83735 ASSAY OF MAGNESIUM: CPT

## 2022-07-22 PROCEDURE — 84443 ASSAY THYROID STIM HORMONE: CPT

## 2022-07-22 PROCEDURE — 80053 COMPREHEN METABOLIC PANEL: CPT

## 2022-07-22 PROCEDURE — 85025 COMPLETE CBC W/AUTO DIFF WBC: CPT

## 2022-07-22 PROCEDURE — 36415 COLL VENOUS BLD VENIPUNCTURE: CPT

## 2022-07-22 PROCEDURE — 80061 LIPID PANEL: CPT

## 2022-07-28 ENCOUNTER — OFFICE VISIT (OUTPATIENT)
Dept: INTERNAL MEDICINE CLINIC | Age: 62
End: 2022-07-28
Payer: COMMERCIAL

## 2022-07-28 VITALS
HEIGHT: 63 IN | DIASTOLIC BLOOD PRESSURE: 78 MMHG | HEART RATE: 76 BPM | BODY MASS INDEX: 24.63 KG/M2 | RESPIRATION RATE: 16 BRPM | TEMPERATURE: 98 F | OXYGEN SATURATION: 96 % | WEIGHT: 139 LBS | SYSTOLIC BLOOD PRESSURE: 134 MMHG

## 2022-07-28 DIAGNOSIS — M47.816 LUMBAR FACET ARTHROPATHY: ICD-10-CM

## 2022-07-28 DIAGNOSIS — M85.89 OSTEOPENIA OF MULTIPLE SITES: ICD-10-CM

## 2022-07-28 DIAGNOSIS — M50.30 DEGENERATIVE DISC DISEASE, CERVICAL: ICD-10-CM

## 2022-07-28 DIAGNOSIS — R73.01 IMPAIRED FASTING BLOOD SUGAR: ICD-10-CM

## 2022-07-28 DIAGNOSIS — G47.33 OBSTRUCTIVE SLEEP APNEA: ICD-10-CM

## 2022-07-28 DIAGNOSIS — I10 PRIMARY HYPERTENSION: ICD-10-CM

## 2022-07-28 DIAGNOSIS — R51.9 CHRONIC DAILY HEADACHE: ICD-10-CM

## 2022-07-28 DIAGNOSIS — E78.5 HYPERLIPIDEMIA, UNSPECIFIED HYPERLIPIDEMIA TYPE: ICD-10-CM

## 2022-07-28 DIAGNOSIS — E55.9 VITAMIN D DEFICIENCY: ICD-10-CM

## 2022-07-28 DIAGNOSIS — Z00.00 ENCOUNTER FOR ROUTINE HISTORY AND PHYSICAL EXAM IN FEMALE: Primary | ICD-10-CM

## 2022-07-28 DIAGNOSIS — F33.42 RECURRENT MAJOR DEPRESSIVE DISORDER, IN FULL REMISSION (HCC): ICD-10-CM

## 2022-07-28 PROCEDURE — 99396 PREV VISIT EST AGE 40-64: CPT | Performed by: INTERNAL MEDICINE

## 2022-07-28 NOTE — PROGRESS NOTES
1. \"Have you been to the ER, urgent care clinic since your last visit? Hospitalized since your last visit? \" No    2. \"Have you seen or consulted any other health care providers outside of the [address] [place] [address] since your last visit? \" No     3. For patients aged 39-70: Has the patient had a colonoscopy / FIT/ Cologuard? Yes - no Care Gap present      If the patient is female:    4. For patients aged 41-77: Has the patient had a mammogram within the past 2 years? Yes - no Care Gap present      5. For patients aged 21-65: Has the patient had a pap smear?  Yes - no Care Gap present

## 2022-07-28 NOTE — PATIENT INSTRUCTIONS
Heart-Healthy Diet: Care Instructions  Your Care Instructions     A heart-healthy diet has lots of vegetables, fruits, nuts, beans, and whole grains, and is low in salt. It limits foods that are high in saturated fat, such as meats, cheeses, and fried foods. It may be hard to change your diet, but even small changes can lower your risk of heart attack and heart disease. Follow-up care is a key part of your treatment and safety. Be sure to make and go to all appointments, and call your doctor if you are having problems. It's also a good idea to know your test results and keep a list of the medicines you take. How can you care for yourself at home? Watch your portions  Learn what a serving is. A \"serving\" and a \"portion\" are not always the same thing. Make sure that you are not eating larger portions than are recommended. For example, a serving of pasta is ½ cup. A serving size of meat is 2 to 3 ounces. A 3-ounce serving is about the size of a deck of cards. Measure serving sizes until you are good at [place]" them. Keep in mind that restaurants often serve portions that are 2 or 3 times the size of one serving. To keep your energy level up and keep you from feeling hungry, eat often but in smaller portions. Eat only the number of calories you need to stay at a healthy weight. If you need to lose weight, eat fewer calories than your body burns (through exercise and other physical activity). Eat more fruits and vegetables  Eat a variety of fruit and vegetables every day. Dark green, deep orange, red, or yellow fruits and vegetables are especially good for you. Examples include spinach, carrots, peaches, and berries. Keep carrots, celery, and other veggies handy for snacks. Buy fruit that is in season and store it where you can see it so that you will be tempted to eat it. Cook dishes that have a lot of veggies in them, such as stir-fries and soups.   Limit saturated and trans fat  Read food labels, and try to avoid saturated and trans fats. They increase your risk of heart disease. Use olive or canola oil when you cook. Bake, broil, grill, or steam foods instead of frying them. Choose lean meats instead of high-fat meats such as hot dogs and sausages. Cut off all visible fat when you prepare meat. Eat fish, skinless poultry, and meat alternatives such as soy products instead of high-fat meats. Soy products, such as tofu, may be especially good for your heart. Choose low-fat or fat-free milk and dairy products. Eat foods high in fiber  Eat a variety of grain products every day. Include whole-grain foods that have lots of fiber and nutrients. Examples of whole-grain foods include oats, whole wheat bread, and brown rice. Buy whole-grain breads and cereals, instead of white bread or pastries. Limit salt and sodium  Limit how much salt and sodium you eat to help lower your blood pressure. Taste food before you salt it. Add only a little salt when you think you need it. With time, your taste buds will adjust to less salt. Eat fewer snack items, fast foods, and other high-salt, processed foods. Check food labels for the amount of sodium in packaged foods. Choose low-sodium versions of canned goods (such as soups, vegetables, and beans). Limit sugar  Limit drinks and foods with added sugar. These include candy, desserts, and soda pop. Limit alcohol  Limit alcohol to no more than 2 drinks a day for men and 1 drink a day for women. Too much alcohol can cause health problems. When should you call for help? Watch closely for changes in your health, and be sure to contact your doctor if:    You would like help planning heart-healthy meals. Where can you learn more? Go to http://www.root.com/  Enter V137 in the search box to learn more about \"Heart-Healthy Diet: Care Instructions. \"  Current as of: August 22, 2019               Content Version: 12.6  © 7866-0717 [employer]. Care instructions adapted under license by [employer] (which disclaims liability or warranty for this information). If you have questions about a medical condition or this instruction, always ask your healthcare professional. [address] any warranty or liability for your use of this information. Learning About Low-Sodium Foods  What foods are low in sodium? The foods you eat contain nutrients, such as vitamins and minerals. Sodium is a nutrient. Your body needs the right amount to stay healthy and work as it should. You can use the list below to help you make choices about which foods to eat. Fruits  Fresh, frozen, canned, or dried fruit  Vegetables  Fresh or frozen vegetables, with no added salt  Canned vegetables, low-sodium or with no added salt  Grains  Bagels without salted tops  Cereal with no added salt  Corn tortillas  Crackers with no added salt  Oatmeal, cooked without salt  Popcorn with no salt  Pasta and noodles, cooked without salt  Rice, cooked without salt  Unsalted pretzels  Dairy and dairy alternatives  Butter, unsalted  Cream cheese  Ice cream  Milk  Soy milk  Meats and other protein foods  Beans and peas, canned with no salt  Eggs  Fresh fish (not smoked)  Fresh meats (not smoked or cured)  Nuts and nut butter, prepared without salt  Poultry, not packaged with sodium solution  Tofu, unseasoned  Tuna, canned without salt  Seasonings  Garlic  Herbs and spices  Lemon juice  Mustard  Olive oil  Salt-free seasoning mixes  Vinegar  Work with your doctor to find out how much of this nutrient you need. Depending on your health, you may need more or less of it in your diet. Where can you learn more? Go to http://www.gray.com/  Enter S460 in the search box to learn more about \"Learning About Low-Sodium Foods. \"  Current as of: August 22, 2019               Content Version: 12.6  © 2887-6657 [employer]. Care instructions adapted under license by [employer] (which disclaims liability or warranty for this information). If you have questions about a medical condition or this instruction, always ask your healthcare professional. [address] any warranty or liability for your use of this information. Low Sodium Diet (2,000 Milligram): Care Instructions  Your Care Instructions     Too much sodium causes your body to hold on to extra water. This can raise your blood pressure and force your heart and kidneys to work harder. In very serious cases, this could cause you to be put in the hospital. It might even be life-threatening. By limiting sodium, you will feel better and lower your risk of serious problems. The most common source of sodium is salt. People get most of the salt in their diet from canned, prepared, and packaged foods. Fast food and restaurant meals also are very high in sodium. Your doctor will probably limit your sodium to less than 2,000 milligrams (mg) a day. This limit counts all the sodium in prepared and packaged foods and any salt you add to your food. Follow-up care is a key part of your treatment and safety. Be sure to make and go to all appointments, and call your doctor if you are having problems. It's also a good idea to know your test results and keep a list of the medicines you take. How can you care for yourself at home? Read food labels  Read labels on cans and food packages. The labels tell you how much sodium is in each serving. Make sure that you look at the serving size. If you eat more than the serving size, you have eaten more sodium. Food labels also tell you the Percent Daily Value for sodium. Choose products with low Percent Daily Values for sodium. Be aware that sodium can come in forms other than salt, including monosodium glutamate (MSG), sodium citrate, and sodium bicarbonate (baking soda). MSG is often added to Asian food.  When you eat out, you can sometimes ask for food without MSG or added salt. Buy low-sodium foods  Buy foods that are labeled \"unsalted\" (no salt added), \"sodium-free\" (less than 5 mg of sodium per serving), or \"low-sodium\" (less than 140 mg of sodium per serving). Foods labeled \"reduced-sodium\" and \"light sodium\" may still have too much sodium. Be sure to read the label to see how much sodium you are getting. Buy fresh vegetables, or frozen vegetables without added sauces. Buy low-sodium versions of canned vegetables, soups, and other canned goods. Prepare low-sodium meals  Cut back on the amount of salt you use in cooking. This will help you adjust to the taste. Do not add salt after cooking. One teaspoon of salt has about 2,300 mg of sodium. Take the salt shaker off the table. Flavor your food with garlic, lemon juice, onion, vinegar, herbs, and spices. Do not use soy sauce, lite soy sauce, steak sauce, onion salt, garlic salt, celery salt, mustard, or ketchup on your food. Use low-sodium salad dressings, sauces, and ketchup. Or make your own salad dressings and sauces without adding salt. Use less salt (or none) when recipes call for it. You can often use half the salt a recipe calls for without losing flavor. Other foods such as rice, pasta, and grains do not need added salt. Rinse canned vegetables, and cook them in fresh water. This removes some--but not all--of the salt. Avoid water that is naturally high in sodium or that has been treated with water softeners, which add sodium. Call your local water company to find out the sodium content of your water supply. If you buy bottled water, read the label and choose a sodium-free brand. Avoid high-sodium foods  Avoid eating:  Smoked, cured, salted, and canned meat, fish, and poultry. Ham, monique, hot dogs, and luncheon meats. Regular, hard, and processed cheese and regular peanut butter.   Crackers with salted tops, and other salted snack foods such as pretzels, chips, and salted popcorn. Frozen prepared meals, unless labeled low-sodium. Canned and dried soups, broths, and bouillon, unless labeled sodium-free or low-sodium. Canned vegetables, unless labeled sodium-free or low-sodium. [place] fries, pizza, tacos, and other fast foods. Pickles, olives, ketchup, and other condiments, especially soy sauce, unless labeled sodium-free or low-sodium. Where can you learn more? Go to http://www.gray.com/  Enter V843 in the search box to learn more about \"Low Sodium Diet (2,000 Milligram): Care Instructions. \"  Current as of: August 22, 2019               Content Version: 12.6  © 3573-2226 [employer]. Care instructions adapted under license by [employer] (which disclaims liability or warranty for this information). If you have questions about a medical condition or this instruction, always ask your healthcare professional. [place] [address] any warranty or liability for your use of this information.

## 2022-07-31 PROBLEM — F33.9 RECURRENT MAJOR DEPRESSIVE DISORDER (HCC): Status: ACTIVE | Noted: 2020-12-23

## 2022-07-31 PROBLEM — M54.2 NECK PAIN: Status: RESOLVED | Noted: 2018-10-27 | Resolved: 2022-07-31

## 2022-07-31 PROBLEM — M54.31 RIGHT SIDED SCIATICA: Status: RESOLVED | Noted: 2020-12-23 | Resolved: 2022-07-31

## 2022-07-31 NOTE — PROGRESS NOTES
HPI:   Tomasa Fisher is a 59y.o. year old female who presents today for a physical exam. She has a history of hypertension, hyperlipidemia, chronic daily headaches, obstructive sleep apnea, allergic rhinitis, vasomotor instability, and irritable bowel syndrome. She has completed the Yo COVID-19 vaccine series and received one Pfizer booster dose. She reports that she is doing reasonably well. She denies any recurrence of back pain or sciatica. She continues to utilize Fioricet and Zanaflex for her chronic headaches and reports good control. She has not followed up with Dr. Ilene Regan. She is otherwise without new complaints. Summary of prior hospitalizations and medical history:  In 7/2018, she was referred to Dr. Ilene Regan for her chronic daily headaches, and evaluation included brain MRI and MRA (8/8/2018) showing small nonspecific subcortical white matter hyperintensity left insula region,otherwise unremarkable MRI; and MRA negative. She underwent sleep study evaluation and was diagnosed with moderate obstructive sleep apnea, and was referred to Dr. Jl Canales and successfully started on CPAP. She was prescribed a Medrol dose pack to allow her to withdraw from daily use of analgesics for her headaches. She was then started on amitriptyine 10 mg daily and instructed to use Excedrin migraine for moderate headaches, and Fioricet only for severe headaches. Celebrex and Zanaflex were prescribed to manage her chronic neck pain, and she continues to take one Zanaflex nightly. She also completed physical therapy, including dry needling, for her neck pain with significant improvement. She is continuing to use Fioricet when she develops a headache, but reports that she is needing to take only one with good response. She also is continuing to take Zanaflex at bedtime, but is no longer needing Celebrex.     On 1/29/2018, she called the office and reported she was out having dinner when she experienced an \"IBS episode\" characterized by flushing in the face, dry mouth, and severe abdominal cramping. When she arrived home, she experienced diarrhea and a single episode of vomiting. She reported that she continued to have abdominal cramping and fatigue following this episode, although described as getting slowly better each day. She reported that she had similar episodes to this dating back 30 years, and was diagnosed with IBS at that time. She stated that the episodes used to resolve more quickly, although over the last year, she states that it now is taking several days before she recovers. She was referred to Dr. Kg Henderson, and started on Linzess for constipation. However, she states that it was not effective. She underwent evaluation by Dr. Franci Acosta and Dr. Kyle Smith and had a small bowel series (8/7/2018) and abdominal duplex scan (8/16/2018), which were negative. She also collected a 24 hour urine for 5-HIAA, which was negative. She reports that she has noted an improvement in her abdominal pain and diarrheal episodes, and reports that she has not had any episodes since discontinuing captopril. She currently denies any abdominal pain, nausea, vomiting, melena, hematochezia, or change in bowel movements. On 8/2/2016, she was evaluated by PETERSON Ahmadi for left sided crampy abdominal pain, diarrhea, and hematochezia. She was treated for presumed diverticulitis with Cipro and Flagyl, and underwent evaluation showing WBC 11.2, ALT 59, alkaline phosphatase 121, and lipase 427. Abdominal CT scan (8/2/2016) showed localized bowel wall thickening with inflammatory change involving the splenic flexure and proximal descending colon; no colonic diverticuli are seen so the presence of diverticulitis is unlikely; would be a typical location for bowel ischemia, but the patient's vascular structures are essentially normal so bowel ischemia unlikely; favor infectious or inflammatory colitis.  She was evaluated by Dr. Nighat Villela and admitted for bowel rest, IVF, and IV antibiotics. She improved significantly in 36 hours and was discharged home. She completed the course of oral antibiotics and states that her symptoms have completely resolved. In 4/2017, she was again evaluated by Dr. Wiliam Salazar for recurrent abdominal pain, and abdominal and pelvic CT scan was obtained (4/28/2017) showing mild nonspecific thickening of the proximal jejunum and significant improvement in the distal transverse colon adjacent to the splenic flexure with a short segment with mild wall thickening. Her discomfort resolved, but concern was raised for possible ischemia as cause. An echocardiogram was obtained (5/26/2017) showing normal LV function (EF 65%), no RWMA, grade 1 diastolic dysfunction, and wall thickening upper limits of normal. She also underwent a hypercoaguable laboratory workup, which was negative. She underwent screening colonoscopy by Dr. Jessica Norman in 6/2011, which found a polyp in the proximal descending colon (pathology: hyperplastic). Because she could not intubate the cecum, follow-up was recommended for 2 years. In 4/2015, she underwent repeat colonoscopy with Dr. Colleen Kirby, which showed left diverticular disease, hypertrophied anal papilla, and no polyps. She had a repeat colonoscopy in 10/2020 by Dr. Wiliam Salazar showing a 5 mm rectal polyp (pathology: hyperplastic). Follow-up was recommended for 5 years. She has a history of hypertension, treated with amlodipine, hydrochlorothiazide, and hydralazine. She denies any chest pain, shortness of breath at rest or with exertion, palpitations, lightheadedness, or edema. She also has hyperlipidemia, treated with fenofibrate and fish oil in the past, but these were discontinued and she was changed to rosuvastatin in 6/2016. However, due to mild to moderate elevation in AST/ALT, it was discontinued in 5/2017 with resolution of abnormal liver function tests.  She was subsequently started on pravastatin in 10/2018, and reports no difficulties with it today. She has a history of osteopenia, first diagnosed on baseline bone density study in 12/2021, with T-scores: femoral neck left -1.9/right -1.2 and lumbar +1.5. She was started on calcium and vitamin D supplementation. She has no history of pathologic fractures. She has a history of severe obstructive sleep apnea, diagnosed by Dr. Rosana Romo, and was prescribed nasal CPAP. She states that she is no longer using her machine because it was making her headaches worse. She underwent reevaluation by Dr. Noemy Suarez, and repeat sleep study showed moderate PEGGY. She was restarted on CPAP and reports continued compliance. She suffers from chronic headaches, considered to be migraines in the past as they were associated with menstruation. Since she entered menopause, the severity of her headaches has decreased and the character has changed. She has severe allergic rhinitis, and awakens with morning headaches when her allergies are severe. She is followed by Dr. Ron Pappas and had been receiving immunotherapy with some improvement. She also has a history of vasomotor instability, which responded to venlafaxine. In 10/2019, she reported a 3 week history of URI with nonproductive cough. She stated that her symptoms persisted, although appeared to be gradually improving until 10/5/2019, when she developed a fever of 100.8 ° F, chills, nausea, decreased appetite, myalgias, and weakness. She also stated that her cough appeared to be  worse, and by 10/7/2019, it had become productive of yellow brown sputum. She presented to [facility] and evaluation included WBC 14.2 with 71% neutrophils, hemoglobin 40.9, and chest x-ray revealed peribronchial cuffing with an infiltrate in the right lower lobe. She was started on doxycycline 100 mg bid for 10 days, Qvar inhaler, and Tessalon pearls. She smoked 0.5 ppd for 30 years, stopping into 2011.  When seen on 10/10/2019, Augmentin was added given persistent symptoms, and she was given an albuterol inhaler. She clinically improved, and repeat chest x-ray on 11/26/2019 was normal.     She also has a history of an anterior cervical discectomy and fusion (C5-C6) in 2/2011 by Dr. Huber Pederson for a herniated disc and right radiculopathy. She does experience intermittent neck pain, and feels that it may be exacerbating her headaches. In 6/2016, she presented with complaints of right sciatica and she underwent a lumbar MRI (7/8/2016) showing multilevel mild disc bulges, mild facet arthropathy, and multilevel mild foraminal stenosis; L3-L4 with mild to moderate spinal canal stenosis, no more than mild spinal canal stenosis elsewhere, and no cord compression. She was evaluated by Dr. Avril Marvin on 9/1/2016 and started on gabapentin. She also was referred to physical therapy and received dry needling, which resulted in significant improvement. In 9/2020, she developed acute worsening of low back pain after picking up her granddaughter. She states that the pain gradually progressed with radiation into her right buttocks and posterior right leg. She presented to the [facility] ED on 9/26/2020. Lumbar spine x-ray showed no fracture or subluxation, but increasing degenerative disc space narrowing at L3-L4. She was treated with Skelaxin and naproxen without improvement. She was seen in the office and prescribed a Medrol dose pack and restarted on gabapentin with improvement. She has a history of a right ovarian cyst, and on surveillance ultrasound by Dr. Lis Jolley, it was noted that the right ovarian cyst had enlarged slightly and changed in appearance. She was referred to Dr. Cesar Anguiano for evaluation, and he stated that although the cyst was likely benign, he recommended laparoscopic removal. On 2/28/2019, she underwent a laparoscopic bilateral salpingo-oophorectomy and washing.  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 In 2020, she presented with worsening depression symptoms related to marital issues and the pandemic. She was started on venlafaxine with improvement. She was also receiving counseling. Past Medical History:   Diagnosis Date    Allergic rhinitis     Arthritis     Chronic headaches     Hx of colonic polyp 2011    Hyperplastic; Dr. Renata Gross. Hyperlipidemia     Hypertension     IBS (irritable bowel syndrome)     Obstructive sleep apnea     uses cpap    S/P cervical discectomy 2011    Anterior C5-C6 discectomy for herniated disc and right radiculopathy; Dr. Riley Negron     Past Surgical History:   Procedure Laterality Date    COLONOSCOPY N/A 10/7/2020    COLONOSCOPY with bx performed by Johnice Meigs, MD at [facility] [address],[facility], COLON, DIAGNOSTIC      HX BACK SURGERY  11    neck surgery; ruptured cervical discs    HX COLONOSCOPY  2011 2015    4/17/15, normal, recalled for 5 years    HX GYN      dilation and curretage    HX GYN          HX OOPHORECTOMY  10/2018     Current Outpatient Medications   Medication Sig    butalbital-acetaminophen-caffeine (FIORICET, ESGIC) -40 mg per tablet Take 1 Tablet by mouth every six (6) hours as needed for Headache.    hydrALAZINE (APRESOLINE) 100 mg tablet TAKE 1 TABLET BY MOUTH THREE TIMES DAILY    tiZANidine (ZANAFLEX) 4 mg tablet Take 1 Tablet by mouth every evening. triamcinolone (NASACORT AQ) 55 mcg nasal inhaler 2 Sprays by Both Nostrils route nightly. loratadine (Claritin) 10 mg tablet Take 1 Tablet by mouth daily.  Indications: inflammation of the nose due to an allergy    hydroCHLOROthiazide (HYDRODIURIL) 25 mg tablet TAKE 1 TABLET BY MOUTH DAILY    amLODIPine (NORVASC) 10 mg tablet TAKE 1 TABLET BY MOUTH DAILY    pravastatin (PRAVACHOL) 20 mg tablet TAKE 1 TABLET BY MOUTH EVERY NIGHT    venlafaxine-SR (EFFEXOR-XR) 150 mg capsule TAKE 1 CAPSULE BY MOUTH DAILY    TURMERIC ROOT EXTRACT PO Take 1,000 mg by mouth.    GLUC/CHONDR-MSM#7/C/DEENA/BORON (GLUCOSAMINE-CHONDR, BOSWELLIA, PO) Take 500 mg by mouth daily. multivitamin (ONE A DAY) tablet Take 1 Tab by mouth daily. Cholecalciferol, Vitamin D3, 2,000 unit cap Take 2,000 Units by mouth daily. No current facility-administered medications for this visit. Allergies and Intolerances: Allergies   Allergen Reactions    Other Plant, Animal, Environmental Itching and Sneezing     \"Everything but feathers and horses. \"        Family History: Her mother had hypertension, but  from a melanoma. Her father  from a DVT. Her brother has CAD and underwent CABG at the age of 40. Family History   Problem Relation Age of Onset    Heart Disease Brother     Other Mother         melanoma    OSTEOARTHRITIS Mother     Bleeding Prob Father     Heart Disease Father     Heart Attack Father     Stroke Maternal Uncle     Heart Attack Maternal Uncle     Breast Cancer Maternal Grandmother      Social History:   She  reports that she quit smoking about 7 years ago. Her smoking use included cigarettes. She smoked an average of .5 packs per day. She has never used smokeless tobacco. She reports that she smoked 0.5 ppd for 30 years, stopping in . She is  and living with her son. She recently retired from the "[employer] department of [employer]; her  sells eyeglass frames to [employer].       Social History     Substance and Sexual Activity   Alcohol Use Not Currently    Alcohol/week: 0.0 standard drinks     Immunization History:  Immunization History   Administered Date(s) Administered    COVID-19, PFIZER PURPLE top, DILUTE for use, (age 15 y+), IM, 30mcg/0.3mL 2021, 2021, 2022    Influenza Vaccine 10/08/2014, 10/09/2017    Influenza Vaccine (Quad) PF (>6 Mo Flulaval, Fluarix, and >3 Yrs Afluria, Fluzone 16507) 10/23/2018, 2020, 2020    TD Vaccine 2006    Tdap 10/07/2016    Zoster Recombinant 2019, 2019       Review of Systems:   As above included in HPI. Otherwise 11 point review of systems negative including constitutional, skin, HENT, eyes, respiratory, cardiovascular, gastrointestinal, genitourinary, musculoskeletal, endo/heme/aller, neurological.    Physical:      Visit Vitals  /78 (BP 1 Location: Left upper arm, BP Patient Position: Sitting)   Pulse 76   Temp 98 °F (36.7 °C) (Temporal)   Resp 16   Ht 5' 3\" (1.6 m)   Wt 139 lb (63 kg)   LMP 01/10/2012 (LMP Unknown)   SpO2 96%   BMI 24.62 kg/m²       Patient appears in no apparent distress. Affect is appropriate. HEENT --Anicteric sclerae, tympanic membranes normal,  ear canals normal.  PERRL, EOMI, conjunctiva and lids normal.  No cervical lymphadenopathy. No thyromegaly, JVD, or bruits. Carotid pulses 2+ with normal upstroke. Lungs --Clear to auscultation. No wheezing or rales. Heart --Regular rate and rhythm, no murmurs, rubs, gallops, or clicks. Abdomen -- Soft and nontender, no hepatosplenomegaly or masses. Extremities -- Without cyanosis, clubbing, edema. Normal looking digits, ROM intact  Neuro -- CN 2-12 intact, strength 5/5 with intact soft touch in all extremities  Derm - no obvious abnormalities noted, no rash          Review of Data:  Labs:  Hospital Outpatient Visit on 07/22/2022   Component Date Value Ref Range Status    WBC 07/22/2022 6.0  4.6 - 13.2 K/uL Final    RBC 07/22/2022 4.50  4. 20 - 5.30 M/uL Final    HGB 07/22/2022 12.5  12.0 - 16.0 g/dL Final    HCT 07/22/2022 38.9  35.0 - 45.0 % Final    MCV 07/22/2022 86.4  78.0 - 100.0 FL Final    MCH 07/22/2022 27.8  24.0 - 34.0 PG Final    MCHC 07/22/2022 32.1  31.0 - 37.0 g/dL Final    RDW 07/22/2022 14.2  11.6 - 14.5 % Final    PLATELET 86/50/0296 855  135 - 420 K/uL Final    MPV 07/22/2022 10.0  9.2 - 11.8 FL Final    NRBC 07/22/2022 0.0  0  WBC Final    ABSOLUTE NRBC 07/22/2022 0.00  0.00 - 0.01 K/uL Final    NEUTROPHILS 07/22/2022 40  40 - 73 % Final    LYMPHOCYTES 07/22/2022 44  21 - 52 % Final    MONOCYTES 07/22/2022 11 (A) 3 - 10 % Final    EOSINOPHILS 07/22/2022 5  0 - 5 % Final    BASOPHILS 07/22/2022 1  0 - 2 % Final    IMMATURE GRANULOCYTES 07/22/2022 0  0.0 - 0.5 % Final    ABS. NEUTROPHILS 07/22/2022 2.4  1.8 - 8.0 K/UL Final    ABS. LYMPHOCYTES 07/22/2022 2.6  0.9 - 3.6 K/UL Final    ABS. MONOCYTES 07/22/2022 0.6  0.05 - 1.2 K/UL Final    ABS. EOSINOPHILS 07/22/2022 0.3  0.0 - 0.4 K/UL Final    ABS. BASOPHILS 07/22/2022 0.0  0.0 - 0.1 K/UL Final    ABS. IMM. GRANS. 07/22/2022 0.0  0.00 - 0.04 K/UL Final    DF 07/22/2022 AUTOMATED    Final    Hemoglobin A1c 07/22/2022 5.6  4.2 - 5.6 % Final    Est. average glucose 07/22/2022 114  mg/dL Final    LIPID PROFILE 07/22/2022        Final    Cholesterol, total 07/22/2022 164  <200 MG/DL Final    Triglyceride 07/22/2022 133  <150 MG/DL Final    HDL Cholesterol 07/22/2022 51  40 - 60 MG/DL Final    LDL, calculated 07/22/2022 86.4  0 - 100 MG/DL Final    VLDL, calculated 07/22/2022 26.6  MG/DL Final    CHOL/HDL Ratio 07/22/2022 3.2  0 - 5.0   Final    Magnesium 07/22/2022 2.2  1.6 - 2.6 mg/dL Final    Sodium 07/22/2022 141  136 - 145 mmol/L Final    Potassium 07/22/2022 3.6  3.5 - 5.5 mmol/L Final    Chloride 07/22/2022 103  100 - 111 mmol/L Final    CO2 07/22/2022 33 (A) 21 - 32 mmol/L Final    Anion gap 07/22/2022 5  3.0 - 18 mmol/L Final    Glucose 07/22/2022 96  74 - 99 mg/dL Final    BUN 07/22/2022 32 (A) 7.0 - 18 MG/DL Final    Creatinine 07/22/2022 0.70  0.6 - 1.3 MG/DL Final    BUN/Creatinine ratio 07/22/2022 46 (A) 12 - 20   Final    GFR est AA 07/22/2022 >60  >60 ml/min/1.73m2 Final    GFR est non-AA 07/22/2022 >60  >60 ml/min/1.73m2 Final    Calcium 07/22/2022 9.4  8.5 - 10.1 MG/DL Final    Bilirubin, total 07/22/2022 0.4  0.2 - 1.0 MG/DL Final    ALT (SGPT) 07/22/2022 34  13 - 56 U/L Final    AST (SGOT) 07/22/2022 20  10 - 38 U/L Final    Alk.  phosphatase 07/22/2022 91  45 - 117 U/L Final    Protein, total 07/22/2022 7.0  6.4 - 8.2 g/dL Final    Albumin 07/22/2022 3.8  3.4 - 5.0 g/dL Final    Globulin 07/22/2022 3.2  2.0 - 4.0 g/dL Final    A-G Ratio 07/22/2022 1.2  0.8 - 1.7   Final    TSH 07/22/2022 1.36  0.36 - 3.74 uIU/mL Final    Vitamin D 25-Hydroxy 07/22/2022 63.1  30 - 100 ng/mL Final    Color 07/22/2022 YELLOW    Final    Appearance 07/22/2022 CLEAR    Final    Specific gravity 07/22/2022 1.026  1.005 - 1.030   Final    pH (UA) 07/22/2022 6.5  5.0 - 8.0   Final    Protein 07/22/2022 Negative  NEG mg/dL Final    Glucose 07/22/2022 Negative  NEG mg/dL Final    Ketone 07/22/2022 Negative  NEG mg/dL Final    Bilirubin 07/22/2022 Negative  NEG   Final    Blood 07/22/2022 Negative  NEG   Final    Urobilinogen 07/22/2022 0.2  0.2 - 1.0 EU/dL Final    Nitrites 07/22/2022 Negative  NEG   Final    Leukocyte Esterase 07/22/2022 Negative  NEG   Final     Health Maintenance:  Screening:    Mammogram: negative (12/2021)   PAP smear: well women exams by Dr. Brandon Hinojosa (last 11/2021). Scheduled for 11/2022. Colorectal: colonoscopy (10/2020) hyperplastic polyp. Dr. Cruz Fry. Due 10/2025   Depression: on venlafaxine   DM (HbA1c/FPG): FPG 96 (7/2022)   Hepatitis C: negative (12/2015)   Falls: none   DEXA: osteopenia (12/2021)   Glaucoma: has regular eye exams with Dr. Sandra Prado (last 11/2021) gluac[place]   Smoking: 15 pack year, stopped 2011   Vitamin D: 63.1 (7/2022)   Medicare Wellness: N/A      Impression:  Patient Active Problem List   Diagnosis Code    Hypertension I10    Hyperlipidemia E78.5    Obstructive sleep apnea G47.33    Chronic daily headache R51.9    IBS (irritable bowel syndrome) K58.9    Colon polyp K63.5    Vitamin D deficiency E55.9    History of colitis Z87.19    Lumbar spinal stenosis M48.061    Lumbar facet arthropathy M47.816    Neck pain M54.2    Degenerative disc disease, cervical M50.30    Former smoker Z87.891    Reactive depression F32.9    Right sided sciatica M54.31    Osteopenia of multiple sites M85.89       Plan:  1. Hypertension. Blood pressure remains well controlled today on current regimen of hydralazine 100 mg tid, amlodipine 10 mg daily, and hydrochlorothiazide 25 mg daily. Suspect captopril responsible for angioedema episodes and advised to avoid ACE inhibitor/ARB treatment. Renal function remains normal with creatinine 0.70/ eGFR >60. However, BUN/creatinine ratio remains elevated at 46 and again discussed importance of increasing fluid intake. Will continue to monitor  2. Hyperlipidemia. On moderate intensity dose pravastatin (20 mg) with LDL 86 and HDL 51, indicative of good control. Triglyceride level also improved to normal at 133. Previously treated with rosuvastatin but developed elevation of transaminases so discontinued. Will continue to monitor. 3. Chronic daily headaches. Evaluated by Dr. Shu Delarosa in 7/2018 and weaned from daily Fioricet and Excedrin migraine. Headaches had improved with daily amitriptyline and use of Excedrin migraine and Fioricet only occasionally for moderate and severe headaches, respectively. However, no longer taking amitriptyline and has resumed Fioricet daily. Also using Claritin and Nasacort with good response. Does also have chronic neck pain related to her anterior neck fusion and advised to continue Zanaflex and regular exercises. Will continue to monitor. 4. Right leg sciatica. Lumbar MRI (7/2016) with evidence of mild to moderate lumbar spinal stenosis (L3/L4) with multilevel mild lumbar herniated disc and facet arthropathy. Significant improvement with dry needling therapy previously. Had acute worsening in 9/2020 and presented to [facility] ED on 9/26/2020. Lumbar spine x-ray showed no fracture or subluxation, but increasing degenerative disc space narrowing at L3-L4. She was treated with Skelaxin and naproxen as well as lidocaine patches.  She presented with persistent symptoms on 9/29/2020 and was treated with a Medrol dose pack and gabapentin 300 mg tid with resolution. Had weaned from gabapentin until recurrent symptoms in 1/2021 and treated again with a Medrol dose pack and gabapentin 300 mg tid. Reports improvement today and no further difficulty. Encouraged to continue with regular stretching and exercises to prevent recurrence. Will continue to monitor. 5.  Obstructive sleep apnea. Underwent reevaluation by Dr. Tonie Brian, and repeat sleep study showed moderate PEGGY. Started on CPAP and reports continued compliance. Advised to schedule follow-up with Dr. Tonie Brian. 6.  Reactive depression. Restarted on venlafaxine SR in 12/2020 and reports continued good control of symptoms currently on 150 mg daily. Will continue to follow. 7. Osteopenia. Newly diagnosed on baseline bone density study obtained on 12/9/2021. Calculated FRAX score estimates her 10-year risk of major osteoporotic fracture at 9.8% and hip fracture at 1.3%, which are not an indication for biphosphonate treatment. Started on calcium 600 mg twice daily and continue with vitamin D supplementation. Also encouraged her to exercise regularly, particularly weightbearing activities, which may help to prevent progression. Will continue to monitor. 8. History of abdominal pain. Unclear etiology, but nonspecific bowel wall thickening in short segment of jejunum and sigmoid on repeat CT scan in 4/2017 raised question of ischemic in origin. Hypercoaguable workup including lab studies and echocardiogram negative. Reported intermittent episodes associated with facial flushing, dry mouth, and severe abdominal cramping followed by diarrhea +/- vomiting. Episodes lingered for several days following onset. No evidence of urticaria seen. Interesting is the abnormalities noted on abdominal CT scan in two of her episodes with nonspecific short segment thickening in various locations in small and large intestines.  Obtained complement studies including C4, C1 esterase inhibtor, and C1q levels which were normal. Discussed that if she developed another episode, would like to check C4 level during the episode. Evaluated by GI and had negative small bowel series and duplex ultrasound in 8/2018, and 24 hour urine for 5-HIAA level also negative. Discontinued captopril in 6/2018 as concerned that may be because for angioedema episodes, and has not had a recurrence since discontinued. Suggestive that episodes secondary to angioedema related to ACE inhibitor use and would avoid ACE/ARB use in the future. Will continue to monitor. 9.  History of impaired fasting glucose. Fasting glucose remains normal likely due to weight loss. HbA1c also high normal at 5.6. Emphasized importance of continued lifestyle modifications. Advised to avoid concentrated sweets and minimize carbohydrates. Will continue to monitor. 10. NAFL/history of elevated transaminases. Resolved since rosuvastatin discontinued. Review of record showed elevation noted since 8/2016 when initiated. Iron studies, hepatitis panel, KIEL, SPEP, CK, aldolase, and anti-smooth muscle antibody negative. RUQ ultrasound (10/2016) showed fatty infiltration of liver. Advised patient to avoid alcohol. 11. Health maintenance. Completed the [employer] COVID-19 vaccine series and one Pfizer booster dose. Completed 2/2 doses of Shingrix vaccine. Other immunizations up to date. Well woman exam with Dr. Lis Jolley up-to-date. Mammogram up-to-date. Completed colonoscopy with Dr. Brown Serrato and follow-up in 5 years (due 10/2025). Continue regular eye exams with Dr. Elke Mera. Vitamin D level remains normal after on cholecalciferol 2000 units daily. .     Patient understands recommendations and agrees with plan. Follow-up in 6 months. Constance Freitas

## 2022-08-15 DIAGNOSIS — R51.9 CHRONIC NONINTRACTABLE HEADACHE, UNSPECIFIED HEADACHE TYPE: ICD-10-CM

## 2022-08-15 DIAGNOSIS — G89.29 CHRONIC NONINTRACTABLE HEADACHE, UNSPECIFIED HEADACHE TYPE: ICD-10-CM

## 2022-08-15 RX ORDER — TIZANIDINE 4 MG/1
4 TABLET ORAL EVERY EVENING
Qty: 90 TABLET | Refills: 1 | Status: SHIPPED | OUTPATIENT
Start: 2022-08-15

## 2022-08-15 RX ORDER — BUTALBITAL, ACETAMINOPHEN AND CAFFEINE 50; 325; 40 MG/1; MG/1; MG/1
1 TABLET ORAL
Qty: 60 TABLET | Refills: 0 | Status: SHIPPED | OUTPATIENT
Start: 2022-08-15 | End: 2022-09-09 | Stop reason: SDUPTHER

## 2022-08-15 NOTE — TELEPHONE ENCOUNTER
Last Visit: 7/28/22 with MD Rikki Lim  Next Appointment: 1/31/23 with MD Waters  Previous Refill Encounter(s): 2/18/22 Zanaflex #90 with 1 refill, 7/21/22 Fioricet #60    Requested Prescriptions     Pending Prescriptions Disp Refills    tiZANidine (ZANAFLEX) 4 mg tablet 90 Tablet 1     Sig: Take 1 Tablet by mouth every evening. butalbital-acetaminophen-caffeine (FIORICET, ESGIC) -40 mg per tablet 60 Tablet 0     Sig: Take 1 Tablet by mouth every six (6) hours as needed for Headache. For Ezequiel Yoder in place:   Recommendation Provided To:    Intervention Detail: New Rx: 2, reason: Patient Preference  Gap Closed?:   Intervention Accepted By:   Time Spent (min): 5

## 2022-09-09 DIAGNOSIS — G89.29 CHRONIC NONINTRACTABLE HEADACHE, UNSPECIFIED HEADACHE TYPE: ICD-10-CM

## 2022-09-09 DIAGNOSIS — R51.9 CHRONIC NONINTRACTABLE HEADACHE, UNSPECIFIED HEADACHE TYPE: ICD-10-CM

## 2022-09-09 RX ORDER — BUTALBITAL, ACETAMINOPHEN AND CAFFEINE 50; 325; 40 MG/1; MG/1; MG/1
1 TABLET ORAL
Qty: 60 TABLET | Refills: 0 | Status: SHIPPED | OUTPATIENT
Start: 2022-09-09 | End: 2022-10-04 | Stop reason: SDUPTHER

## 2022-09-09 NOTE — TELEPHONE ENCOUNTER
Last Visit: 7/28/22 with MD Flor Pastrana  Next Appointment: 1/31/23 with MD Flor Pastrana  Previous Refill Encounter(s): 8/15/22 #60    Requested Prescriptions     Pending Prescriptions Disp Refills    butalbital-acetaminophen-caffeine (FIORICET, ESGIC) -40 mg per tablet 60 Tablet 0     Sig: Take 1 Tablet by mouth every six (6) hours as needed for Headache. For [address] [facility] in place:   Recommendation Provided To:    Intervention Detail: New Rx: 1, reason: Patient Preference  Gap Closed?:   Intervention Accepted By:   Time Spent (min): 5

## 2022-10-04 DIAGNOSIS — G89.29 CHRONIC NONINTRACTABLE HEADACHE, UNSPECIFIED HEADACHE TYPE: ICD-10-CM

## 2022-10-04 DIAGNOSIS — R51.9 CHRONIC NONINTRACTABLE HEADACHE, UNSPECIFIED HEADACHE TYPE: ICD-10-CM

## 2022-10-04 RX ORDER — BUTALBITAL, ACETAMINOPHEN AND CAFFEINE 50; 325; 40 MG/1; MG/1; MG/1
1 TABLET ORAL
Qty: 60 TABLET | Refills: 0 | Status: SHIPPED | OUTPATIENT
Start: 2022-10-04 | End: 2022-10-31 | Stop reason: SDUPTHER

## 2022-10-04 NOTE — TELEPHONE ENCOUNTER
Last Visit: 7/28/22 with MD Gregorio Hilton  Next Appointment: 1/31/23 with MD Waters  Previous Refill Encounter(s): 9/9/22 #60    Requested Prescriptions     Pending Prescriptions Disp Refills    butalbital-acetaminophen-caffeine (FIORICET, ESGIC) -40 mg per tablet 60 Tablet 0     Sig: Take 1 Tablet by mouth every six (6) hours as needed for Headache. For [address] [facility] in place:   Recommendation Provided To:    Intervention Detail: New Rx: 1, reason: Patient Preference  Gap Closed?:   Intervention Accepted By:   Time Spent (min): 5

## 2022-10-05 RX ORDER — TRIAMCINOLONE ACETONIDE 55 UG/1
SPRAY, METERED NASAL
Qty: 16 G | Refills: 3 | Status: SHIPPED | OUTPATIENT
Start: 2022-10-05

## 2022-10-26 RX ORDER — PRAVASTATIN SODIUM 20 MG/1
TABLET ORAL
Qty: 90 TABLET | Refills: 2 | Status: SHIPPED | OUTPATIENT
Start: 2022-10-26

## 2022-10-26 RX ORDER — VENLAFAXINE HYDROCHLORIDE 150 MG/1
CAPSULE, EXTENDED RELEASE ORAL
Qty: 90 CAPSULE | Refills: 2 | Status: SHIPPED | OUTPATIENT
Start: 2022-10-26

## 2022-10-26 RX ORDER — HYDROCHLOROTHIAZIDE 25 MG/1
TABLET ORAL
Qty: 90 TABLET | Refills: 2 | Status: SHIPPED | OUTPATIENT
Start: 2022-10-26

## 2022-10-31 DIAGNOSIS — G89.29 CHRONIC NONINTRACTABLE HEADACHE, UNSPECIFIED HEADACHE TYPE: ICD-10-CM

## 2022-10-31 DIAGNOSIS — R51.9 CHRONIC NONINTRACTABLE HEADACHE, UNSPECIFIED HEADACHE TYPE: ICD-10-CM

## 2022-10-31 RX ORDER — BUTALBITAL, ACETAMINOPHEN AND CAFFEINE 50; 325; 40 MG/1; MG/1; MG/1
1 TABLET ORAL
Qty: 60 TABLET | Refills: 0 | Status: SHIPPED | OUTPATIENT
Start: 2022-10-31 | End: 2022-11-25 | Stop reason: SDUPTHER

## 2022-10-31 NOTE — TELEPHONE ENCOUNTER
Last Visit: 7/28/22 with MD Evangelina Parikh  Next Appointment: 1/31/23 with MD Evangelina Parikh  Previous Refill Encounter(s): 10/4/22 #60    Requested Prescriptions     Pending Prescriptions Disp Refills    butalbital-acetaminophen-caffeine (FIORICET, ESGIC) -40 mg per tablet 60 Tablet 0     Sig: Take 1 Tablet by mouth every six (6) hours as needed for Headache. For [address] in place:   Recommendation Provided To:    Intervention Detail: New Rx: 1, reason: Patient Preference  Gap Closed?:   Intervention Accepted By:   Time Spent (min): 5

## 2022-11-25 DIAGNOSIS — G89.29 CHRONIC NONINTRACTABLE HEADACHE, UNSPECIFIED HEADACHE TYPE: ICD-10-CM

## 2022-11-25 DIAGNOSIS — R51.9 CHRONIC NONINTRACTABLE HEADACHE, UNSPECIFIED HEADACHE TYPE: ICD-10-CM

## 2022-11-25 RX ORDER — BUTALBITAL, ACETAMINOPHEN AND CAFFEINE 50; 325; 40 MG/1; MG/1; MG/1
1 TABLET ORAL
Qty: 60 TABLET | Refills: 0 | Status: SHIPPED | OUTPATIENT
Start: 2022-11-25

## 2022-12-19 DIAGNOSIS — G89.29 CHRONIC NONINTRACTABLE HEADACHE, UNSPECIFIED HEADACHE TYPE: ICD-10-CM

## 2022-12-19 DIAGNOSIS — R51.9 CHRONIC NONINTRACTABLE HEADACHE, UNSPECIFIED HEADACHE TYPE: ICD-10-CM

## 2022-12-19 RX ORDER — BUTALBITAL, ACETAMINOPHEN AND CAFFEINE 50; 325; 40 MG/1; MG/1; MG/1
1 TABLET ORAL
Qty: 60 TABLET | Refills: 0 | Status: SHIPPED | OUTPATIENT
Start: 2022-12-19

## 2023-01-12 DIAGNOSIS — G89.29 CHRONIC NONINTRACTABLE HEADACHE, UNSPECIFIED HEADACHE TYPE: ICD-10-CM

## 2023-01-12 DIAGNOSIS — R51.9 CHRONIC NONINTRACTABLE HEADACHE, UNSPECIFIED HEADACHE TYPE: ICD-10-CM

## 2023-01-13 RX ORDER — BUTALBITAL, ACETAMINOPHEN AND CAFFEINE 50; 325; 40 MG/1; MG/1; MG/1
1 TABLET ORAL
Qty: 60 TABLET | Refills: 0 | Status: SHIPPED | OUTPATIENT
Start: 2023-01-13

## 2023-01-19 ENCOUNTER — TRANSCRIBE ORDER (OUTPATIENT)
Dept: SCHEDULING | Age: 63
End: 2023-01-19

## 2023-01-19 DIAGNOSIS — Z12.31 ENCOUNTER FOR SCREENING MAMMOGRAM FOR BREAST CANCER: Primary | ICD-10-CM

## 2023-01-24 ENCOUNTER — HOSPITAL ENCOUNTER (OUTPATIENT)
Dept: LAB | Age: 63
Discharge: HOME | End: 2023-01-24
Payer: COMMERCIAL

## 2023-01-24 ENCOUNTER — APPOINTMENT (OUTPATIENT)
Dept: INTERNAL MEDICINE CLINIC | Age: 63
End: 2023-01-24

## 2023-01-24 DIAGNOSIS — I10 PRIMARY HYPERTENSION: ICD-10-CM

## 2023-01-24 DIAGNOSIS — E78.5 HYPERLIPIDEMIA, UNSPECIFIED HYPERLIPIDEMIA TYPE: ICD-10-CM

## 2023-01-24 DIAGNOSIS — R73.01 IMPAIRED FASTING BLOOD SUGAR: ICD-10-CM

## 2023-01-24 LAB
ALBUMIN SERPL-MCNC: 4 G/DL (ref 3.4–5)
ALBUMIN/GLOB SERPL: 1.1 (ref 0.8–1.7)
ALP SERPL-CCNC: 111 U/L (ref 45–117)
ALT SERPL-CCNC: 36 U/L (ref 13–56)
ANION GAP SERPL CALC-SCNC: 4 MMOL/L (ref 3–18)
AST SERPL-CCNC: 19 U/L (ref 10–38)
BILIRUB SERPL-MCNC: 0.3 MG/DL (ref 0.2–1)
BUN SERPL-MCNC: 23 MG/DL (ref 7–18)
BUN/CREAT SERPL: 32 (ref 12–20)
CALCIUM SERPL-MCNC: 9.5 MG/DL (ref 8.5–10.1)
CHLORIDE SERPL-SCNC: 102 MMOL/L (ref 100–111)
CHOLEST SERPL-MCNC: 207 MG/DL
CO2 SERPL-SCNC: 33 MMOL/L (ref 21–32)
CREAT SERPL-MCNC: 0.71 MG/DL (ref 0.6–1.3)
EST. AVERAGE GLUCOSE BLD GHB EST-MCNC: 105 MG/DL
GLOBULIN SER CALC-MCNC: 3.6 G/DL (ref 2–4)
GLUCOSE SERPL-MCNC: 85 MG/DL (ref 74–99)
HBA1C MFR BLD: 5.3 % (ref 4.2–5.6)
HDLC SERPL-MCNC: 61 MG/DL (ref 40–60)
HDLC SERPL: 3.4 (ref 0–5)
LDLC SERPL CALC-MCNC: 115.8 MG/DL (ref 0–100)
LIPID PROFILE,FLP: ABNORMAL
MAGNESIUM SERPL-MCNC: 2.3 MG/DL (ref 1.6–2.6)
POTASSIUM SERPL-SCNC: 3.7 MMOL/L (ref 3.5–5.5)
PROT SERPL-MCNC: 7.6 G/DL (ref 6.4–8.2)
SODIUM SERPL-SCNC: 139 MMOL/L (ref 136–145)
TRIGL SERPL-MCNC: 151 MG/DL (ref ?–150)
VLDLC SERPL CALC-MCNC: 30.2 MG/DL

## 2023-01-24 PROCEDURE — 83036 HEMOGLOBIN GLYCOSYLATED A1C: CPT

## 2023-01-24 PROCEDURE — 36415 COLL VENOUS BLD VENIPUNCTURE: CPT

## 2023-01-24 PROCEDURE — 83735 ASSAY OF MAGNESIUM: CPT

## 2023-01-24 PROCEDURE — 80061 LIPID PANEL: CPT

## 2023-01-24 PROCEDURE — 80053 COMPREHEN METABOLIC PANEL: CPT

## 2023-01-27 RX ORDER — HYDRALAZINE HYDROCHLORIDE 100 MG/1
TABLET, FILM COATED ORAL
Qty: 270 TABLET | Refills: 3 | Status: SHIPPED | OUTPATIENT
Start: 2023-01-27

## 2023-01-28 ENCOUNTER — TRANSCRIBE ORDERS (OUTPATIENT)
Facility: HOSPITAL | Age: 63
End: 2023-01-28

## 2023-01-28 DIAGNOSIS — Z12.31 VISIT FOR SCREENING MAMMOGRAM: Primary | ICD-10-CM

## 2023-01-31 ENCOUNTER — OFFICE VISIT (OUTPATIENT)
Dept: INTERNAL MEDICINE CLINIC | Age: 63
End: 2023-01-31

## 2023-01-31 VITALS
WEIGHT: 140 LBS | HEIGHT: 63 IN | HEART RATE: 73 BPM | RESPIRATION RATE: 16 BRPM | TEMPERATURE: 97.8 F | DIASTOLIC BLOOD PRESSURE: 72 MMHG | SYSTOLIC BLOOD PRESSURE: 138 MMHG | BODY MASS INDEX: 24.8 KG/M2 | OXYGEN SATURATION: 97 %

## 2023-01-31 DIAGNOSIS — R51.9 CHRONIC DAILY HEADACHE: ICD-10-CM

## 2023-01-31 DIAGNOSIS — I10 PRIMARY HYPERTENSION: Primary | ICD-10-CM

## 2023-01-31 DIAGNOSIS — Z00.00 LABORATORY TESTS ORDERED AS PART OF A COMPLETE PHYSICAL EXAM (CPE): ICD-10-CM

## 2023-01-31 DIAGNOSIS — F33.42 RECURRENT MAJOR DEPRESSIVE DISORDER, IN FULL REMISSION (HCC): ICD-10-CM

## 2023-01-31 DIAGNOSIS — E78.5 HYPERLIPIDEMIA, UNSPECIFIED HYPERLIPIDEMIA TYPE: ICD-10-CM

## 2023-01-31 DIAGNOSIS — G47.33 OBSTRUCTIVE SLEEP APNEA: ICD-10-CM

## 2023-01-31 DIAGNOSIS — R73.01 IMPAIRED FASTING BLOOD SUGAR: ICD-10-CM

## 2023-01-31 DIAGNOSIS — E55.9 VITAMIN D DEFICIENCY: ICD-10-CM

## 2023-01-31 DIAGNOSIS — M85.89 OSTEOPENIA OF MULTIPLE SITES: ICD-10-CM

## 2023-01-31 RX ORDER — LORATADINE 10 MG/1
10 TABLET ORAL DAILY
Qty: 90 TABLET | Refills: 3 | Status: SHIPPED | OUTPATIENT
Start: 2023-01-31

## 2023-01-31 NOTE — PROGRESS NOTES
1. \"Have you been to the ER, urgent care clinic since your last visit? Hospitalized since your last visit? \" No    2. \"Have you seen or consulted any other health care providers outside of the [address] [place] [address] since your last visit? \" No     3. For patients aged 39-70: Has the patient had a colonoscopy / FIT/ Cologuard? NA - based on age      If the patient is female:    4. For patients aged 41-77: Has the patient had a mammogram within the past 2 years? NA - based on age or sex      11. For patients aged 21-65: Has the patient had a pap smear?  NA - based on age or sex

## 2023-01-31 NOTE — PATIENT INSTRUCTIONS
Heart-Healthy Diet: Care Instructions  Your Care Instructions     A heart-healthy diet has lots of vegetables, fruits, nuts, beans, and whole grains, and is low in salt. It limits foods that are high in saturated fat, such as meats, cheeses, and fried foods. It may be hard to change your diet, but even small changes can lower your risk of heart attack and heart disease. Follow-up care is a key part of your treatment and safety. Be sure to make and go to all appointments, and call your doctor if you are having problems. It's also a good idea to know your test results and keep a list of the medicines you take. How can you care for yourself at home? Watch your portions  Learn what a serving is. A \"serving\" and a \"portion\" are not always the same thing. Make sure that you are not eating larger portions than are recommended. For example, a serving of pasta is ½ cup. A serving size of meat is 2 to 3 ounces. A 3-ounce serving is about the size of a deck of cards. Measure serving sizes until you are good at [place]" them. Keep in mind that restaurants often serve portions that are 2 or 3 times the size of one serving. To keep your energy level up and keep you from feeling hungry, eat often but in smaller portions. Eat only the number of calories you need to stay at a healthy weight. If you need to lose weight, eat fewer calories than your body burns (through exercise and other physical activity). Eat more fruits and vegetables  Eat a variety of fruit and vegetables every day. Dark green, deep orange, red, or yellow fruits and vegetables are especially good for you. Examples include spinach, carrots, peaches, and berries. Keep carrots, celery, and other veggies handy for snacks. Buy fruit that is in season and store it where you can see it so that you will be tempted to eat it. Cook dishes that have a lot of veggies in them, such as stir-fries and soups.   Limit saturated and trans fat  Read food labels, and try to avoid saturated and trans fats. They increase your risk of heart disease. Use olive or canola oil when you cook. Bake, broil, grill, or steam foods instead of frying them. Choose lean meats instead of high-fat meats such as hot dogs and sausages. Cut off all visible fat when you prepare meat. Eat fish, skinless poultry, and meat alternatives such as soy products instead of high-fat meats. Soy products, such as tofu, may be especially good for your heart. Choose low-fat or fat-free milk and dairy products. Eat foods high in fiber  Eat a variety of grain products every day. Include whole-grain foods that have lots of fiber and nutrients. Examples of whole-grain foods include oats, whole wheat bread, and brown rice. Buy whole-grain breads and cereals, instead of white bread or pastries. Limit salt and sodium  Limit how much salt and sodium you eat to help lower your blood pressure. Taste food before you salt it. Add only a little salt when you think you need it. With time, your taste buds will adjust to less salt. Eat fewer snack items, fast foods, and other high-salt, processed foods. Check food labels for the amount of sodium in packaged foods. Choose low-sodium versions of canned goods (such as soups, vegetables, and beans). Limit sugar  Limit drinks and foods with added sugar. These include candy, desserts, and soda pop. Limit alcohol  Limit alcohol to no more than 2 drinks a day for men and 1 drink a day for women. Too much alcohol can cause health problems. When should you call for help? Watch closely for changes in your health, and be sure to contact your doctor if:    You would like help planning heart-healthy meals. Where can you learn more? Go to http://www.root.com/  Enter V137 in the search box to learn more about \"Heart-Healthy Diet: Care Instructions. \"  Current as of: August 22, 2019               Content Version: 12.6  © 4880-3918 Healthwise, Incorporated. Care instructions adapted under license by [employer] (which disclaims liability or warranty for this information). If you have questions about a medical condition or this instruction, always ask your healthcare professional. [address] any warranty or liability for your use of this information. Learning About Low-Sodium Foods  What foods are low in sodium? The foods you eat contain nutrients, such as vitamins and minerals. Sodium is a nutrient. Your body needs the right amount to stay healthy and work as it should. You can use the list below to help you make choices about which foods to eat. Fruits  Fresh, frozen, canned, or dried fruit  Vegetables  Fresh or frozen vegetables, with no added salt  Canned vegetables, low-sodium or with no added salt  Grains  Bagels without salted tops  Cereal with no added salt  Corn tortillas  Crackers with no added salt  Oatmeal, cooked without salt  Popcorn with no salt  Pasta and noodles, cooked without salt  Rice, cooked without salt  Unsalted pretzels  Dairy and dairy alternatives  Butter, unsalted  Cream cheese  Ice cream  Milk  Soy milk  Meats and other protein foods  Beans and peas, canned with no salt  Eggs  Fresh fish (not smoked)  Fresh meats (not smoked or cured)  Nuts and nut butter, prepared without salt  Poultry, not packaged with sodium solution  Tofu, unseasoned  Tuna, canned without salt  Seasonings  Garlic  Herbs and spices  Lemon juice  Mustard  Olive oil  Salt-free seasoning mixes  Vinegar  Work with your doctor to find out how much of this nutrient you need. Depending on your health, you may need more or less of it in your diet. Where can you learn more? Go to http://www.gray.com/  Enter S460 in the search box to learn more about \"Learning About Low-Sodium Foods. \"  Current as of: August 22, 2019               Content Version: 12.6  © 6942-7104 [employer] Incorporated. Care instructions adapted under license by [employer] (which disclaims liability or warranty for this information). If you have questions about a medical condition or this instruction, always ask your healthcare professional. [address] any warranty or liability for your use of this information. Low Sodium Diet (2,000 Milligram): Care Instructions  Your Care Instructions     Too much sodium causes your body to hold on to extra water. This can raise your blood pressure and force your heart and kidneys to work harder. In very serious cases, this could cause you to be put in the hospital. It might even be life-threatening. By limiting sodium, you will feel better and lower your risk of serious problems. The most common source of sodium is salt. People get most of the salt in their diet from canned, prepared, and packaged foods. Fast food and restaurant meals also are very high in sodium. Your doctor will probably limit your sodium to less than 2,000 milligrams (mg) a day. This limit counts all the sodium in prepared and packaged foods and any salt you add to your food. Follow-up care is a key part of your treatment and safety. Be sure to make and go to all appointments, and call your doctor if you are having problems. It's also a good idea to know your test results and keep a list of the medicines you take. How can you care for yourself at home? Read food labels  Read labels on cans and food packages. The labels tell you how much sodium is in each serving. Make sure that you look at the serving size. If you eat more than the serving size, you have eaten more sodium. Food labels also tell you the Percent Daily Value for sodium. Choose products with low Percent Daily Values for sodium. Be aware that sodium can come in forms other than salt, including monosodium glutamate (MSG), sodium citrate, and sodium bicarbonate (baking soda). MSG is often added to Asian food.  When you eat out, you can sometimes ask for food without MSG or added salt. Buy low-sodium foods  Buy foods that are labeled \"unsalted\" (no salt added), \"sodium-free\" (less than 5 mg of sodium per serving), or \"low-sodium\" (less than 140 mg of sodium per serving). Foods labeled \"reduced-sodium\" and \"light sodium\" may still have too much sodium. Be sure to read the label to see how much sodium you are getting. Buy fresh vegetables, or frozen vegetables without added sauces. Buy low-sodium versions of canned vegetables, soups, and other canned goods. Prepare low-sodium meals  Cut back on the amount of salt you use in cooking. This will help you adjust to the taste. Do not add salt after cooking. One teaspoon of salt has about 2,300 mg of sodium. Take the salt shaker off the table. Flavor your food with garlic, lemon juice, onion, vinegar, herbs, and spices. Do not use soy sauce, lite soy sauce, steak sauce, onion salt, garlic salt, celery salt, mustard, or ketchup on your food. Use low-sodium salad dressings, sauces, and ketchup. Or make your own salad dressings and sauces without adding salt. Use less salt (or none) when recipes call for it. You can often use half the salt a recipe calls for without losing flavor. Other foods such as rice, pasta, and grains do not need added salt. Rinse canned vegetables, and cook them in fresh water. This removes some--but not all--of the salt. Avoid water that is naturally high in sodium or that has been treated with water softeners, which add sodium. Call your local water company to find out the sodium content of your water supply. If you buy bottled water, read the label and choose a sodium-free brand. Avoid high-sodium foods  Avoid eating:  Smoked, cured, salted, and canned meat, fish, and poultry. Ham, monique, hot dogs, and luncheon meats. Regular, hard, and processed cheese and regular peanut butter.   Crackers with salted tops, and other salted snack foods such as pretzels, chips, and salted popcorn. Frozen prepared meals, unless labeled low-sodium. Canned and dried soups, broths, and bouillon, unless labeled sodium-free or low-sodium. Canned vegetables, unless labeled sodium-free or low-sodium. [place] fries, pizza, tacos, and other fast foods. Pickles, olives, ketchup, and other condiments, especially soy sauce, unless labeled sodium-free or low-sodium. Where can you learn more? Go to http://www.gray.com/  Enter V843 in the search box to learn more about \"Low Sodium Diet (2,000 Milligram): Care Instructions. \"  Current as of: August 22, 2019               Content Version: 12.6  © 1627-2017 [employer]. Care instructions adapted under license by [employer] (which disclaims liability or warranty for this information). If you have questions about a medical condition or this instruction, always ask your healthcare professional. [place] [address] any warranty or liability for your use of this information. High Cholesterol: Care Instructions  Your Care Instructions     Cholesterol is a type of fat in your blood. It is needed for many body functions, such as making new cells. Cholesterol is made by your body. It also comes from food you eat. High cholesterol means that you have too much of the fat in your blood. This raises your risk of a heart attack and stroke. LDL and HDL are part of your total cholesterol. LDL is the \"bad\" cholesterol. High LDL can raise your risk for heart disease, heart attack, and stroke. HDL is the \"good\" cholesterol. It helps clear bad cholesterol from the body. High HDL is linked with a lower risk of heart disease, heart attack, and stroke. Your cholesterol levels help your doctor find out your risk for having a heart attack or stroke. You and your doctor can talk about whether you need to lower your risk and what treatment is best for you.   A heart-healthy lifestyle along with medicines can help lower your cholesterol and your risk. The way you choose to lower your risk will depend on how high your risk is for heart attack and stroke. It will also depend on how you feel about taking medicines. Follow-up care is a key part of your treatment and safety. Be sure to make and go to all appointments, and call your doctor if you are having problems. It's also a good idea to know your test results and keep a list of the medicines you take. How can you care for yourself at home? Eat a variety of foods every day. Good choices include fruits, vegetables, whole grains (like oatmeal), dried beans and peas, nuts and seeds, soy products (like tofu), and fat-free or low-fat dairy products. Replace butter, margarine, and hydrogenated or partially hydrogenated oils with olive and canola oils. (Canola oil margarine without trans fat is fine.)  Replace red meat with fish, poultry, and soy protein (like tofu). Limit processed and packaged foods like chips, crackers, and cookies. Bake, broil, or steam foods. Don't porter them. Be physically active. Get at least 30 minutes of exercise on most days of the week. Walking is a good choice. You also may want to do other activities, such as running, swimming, cycling, or playing tennis or team sports. Stay at a healthy weight or lose weight by making the changes in eating and physical activity listed above. Losing just a small amount of weight, even 5 to 10 pounds, can reduce your risk for having a heart attack or stroke. Do not smoke. When should you call for help? Watch closely for changes in your health, and be sure to contact your doctor if:    You need help making lifestyle changes. You have questions about your medicine. Where can you learn more? Go to http://www.gray.com/  Enter Z867 in the search box to learn more about \"High Cholesterol: Care Instructions. \"  Current as of: December 16, 2019               Content Version: 12.6  © 2647-6503 [employer]. Care instructions adapted under license by [employer] (which disclaims liability or warranty for this information). If you have questions about a medical condition or this instruction, always ask your healthcare professional. [address] any warranty or liability for your use of this information.

## 2023-01-31 NOTE — PROGRESS NOTES
HPI:   Bismark Vasquez is a 57y.o. year old female who presents today for a routine visit. She has a history of hypertension, hyperlipidemia, chronic daily headaches, obstructive sleep apnea, allergic rhinitis, vasomotor instability, and irritable bowel syndrome. She has completed the [facility] COVID-19 vaccine series and received one Pfizer booster dose and the updated bivalent booster dose. She reports that she is doing reasonably well. She reports that she has been having increasing difficulty with sinus congestion and headaches, and has scheduled an appointment with Dr. Laurie Boswell on 2/28/2023 for evaluation. She states that she had been treated with immunotherapy in the past and believes that this may need to be restarted. She is continuing to take Claritin but reports that she has stopped Nasacort due to development of epistaxis. She also continues to utilize Fioricet and Zanaflex for her chronic headaches. She reports that in 10/2022, she was evaluated at [facility] and was diagnosed with influenza A. She states that her symptoms have now completely resolved. She reports that she has started magnesium supplementation to help with constipation and has noted improvement. She states that she has not been monitoring her blood pressure at home and does admit to currently not exercising. She is otherwise without new complaints. Summary of prior hospitalizations and medical history:  In 7/2018, she was referred to Dr. Jeff Prado for her chronic daily headaches, and evaluation included brain MRI and MRA (8/8/2018) showing small nonspecific subcortical white matter hyperintensity left insula region,otherwise unremarkable MRI; and MRA negative. She underwent sleep study evaluation and was diagnosed with moderate obstructive sleep apnea, and was referred to Dr. Amairani Kincaid and successfully started on CPAP.   She was prescribed a Medrol dose pack to allow her to withdraw from daily use of analgesics for her headaches. She was then started on amitriptyine 10 mg daily and instructed to use Excedrin migraine for moderate headaches, and Fioricet only for severe headaches. Celebrex and Zanaflex were prescribed to manage her chronic neck pain, and she continues to take one Zanaflex nightly. She also completed physical therapy, including dry needling, for her neck pain with significant improvement. She is continuing to use Fioricet when she develops a headache, but reports that she is needing to take only one with good response. She also is continuing to take Zanaflex at bedtime, but is no longer needing Celebrex. On 1/29/2018, she called the office and reported she was out having dinner when she experienced an \"IBS episode\" characterized by flushing in the face, dry mouth, and severe abdominal cramping. When she arrived home, she experienced diarrhea and a single episode of vomiting. She reported that she continued to have abdominal cramping and fatigue following this episode, although described as getting slowly better each day. She reported that she had similar episodes to this dating back 30 years, and was diagnosed with IBS at that time. She stated that the episodes used to resolve more quickly, although over the last year, she states that it now is taking several days before she recovers. She was referred to Dr. Darrick Johnson, and started on Linzess for constipation. However, she states that it was not effective. She underwent evaluation by Dr. Mallory Lal and Dr. Deanne Mora and had a small bowel series (8/7/2018) and abdominal duplex scan (8/16/2018), which were negative. She also collected a 24 hour urine for 5-HIAA, which was negative. She reports that she has noted an improvement in her abdominal pain and diarrheal episodes, and reports that she has not had any episodes since discontinuing captopril. She currently denies any abdominal pain, nausea, vomiting, melena, hematochezia, or change in bowel movements.     On 8/2/2016, she was evaluated by PETERSON Cruz for left sided crampy abdominal pain, diarrhea, and hematochezia. She was treated for presumed diverticulitis with Cipro and Flagyl, and underwent evaluation showing WBC 11.2, ALT 59, alkaline phosphatase 121, and lipase 427. Abdominal CT scan (8/2/2016) showed localized bowel wall thickening with inflammatory change involving the splenic flexure and proximal descending colon; no colonic diverticuli are seen so the presence of diverticulitis is unlikely; would be a typical location for bowel ischemia, but the patient's vascular structures are essentially normal so bowel ischemia unlikely; favor infectious or inflammatory colitis. She was evaluated by Dr. Rafa Sharpe and admitted for bowel rest, IVF, and IV antibiotics. She improved significantly in 36 hours and was discharged home. She completed the course of oral antibiotics and states that her symptoms have completely resolved. In 4/2017, she was again evaluated by Dr. Rafa Sharpe for recurrent abdominal pain, and abdominal and pelvic CT scan was obtained (4/28/2017) showing mild nonspecific thickening of the proximal jejunum and significant improvement in the distal transverse colon adjacent to the splenic flexure with a short segment with mild wall thickening. Her discomfort resolved, but concern was raised for possible ischemia as cause. An echocardiogram was obtained (5/26/2017) showing normal LV function (EF 65%), no RWMA, grade 1 diastolic dysfunction, and wall thickening upper limits of normal. She also underwent a hypercoaguable laboratory workup, which was negative. She underwent screening colonoscopy by Dr. Perez Anaya in 6/2011, which found a polyp in the proximal descending colon (pathology: hyperplastic). Because she could not intubate the cecum, follow-up was recommended for 2 years. In 4/2015, she underwent repeat colonoscopy with Dr. Gisselle Oshea, which showed left diverticular disease, hypertrophied anal papilla, and no polyps.  She had a repeat colonoscopy in 10/2020 by Dr. Prisca Brown showing a 5 mm rectal polyp (pathology: hyperplastic). Follow-up was recommended for 5 years. She has a history of hypertension, treated with amlodipine, hydrochlorothiazide, and hydralazine. She denies any chest pain, shortness of breath at rest or with exertion, palpitations, lightheadedness, or edema. She also has hyperlipidemia, treated with fenofibrate and fish oil in the past, but these were discontinued and she was changed to rosuvastatin in 6/2016. However, due to mild to moderate elevation in AST/ALT, it was discontinued in 5/2017 with resolution of abnormal liver function tests. She was subsequently started on pravastatin in 10/2018, and reports no difficulties with it today. She has a history of osteopenia, first diagnosed on baseline bone density study in 12/2021, with T-scores: femoral neck left -1.9/right -1.2 and lumbar +1.5. She was started on calcium and vitamin D supplementation. She has no history of pathologic fractures. She has a history of severe obstructive sleep apnea, diagnosed by Dr. Travis Carbajal, and was prescribed nasal CPAP. She states that she is no longer using her machine because it was making her headaches worse. She underwent reevaluation by Dr. Aldo Whatley, and repeat sleep study showed moderate PEGGY. She was restarted on CPAP and reports continued compliance. She suffers from chronic headaches, considered to be migraines in the past as they were associated with menstruation. Since she entered menopause, the severity of her headaches has decreased and the character has changed. She has severe allergic rhinitis, and awakens with morning headaches when her allergies are severe. She is followed by Dr. Flores Aldana and had been receiving immunotherapy with some improvement. She also has a history of vasomotor instability, which responded to venlafaxine. In 10/2019, she reported a 3 week history of URI with nonproductive cough.  She stated that her symptoms persisted, although appeared to be gradually improving until 10/5/2019, when she developed a fever of 100.8 ° F, chills, nausea, decreased appetite, myalgias, and weakness. She also stated that her cough appeared to be  worse, and by 10/7/2019, it had become productive of yellow brown sputum. She presented to [facility] and evaluation included WBC 14.2 with 71% neutrophils, hemoglobin 40.9, and chest x-ray revealed peribronchial cuffing with an infiltrate in the right lower lobe. She was started on doxycycline 100 mg bid for 10 days, Qvar inhaler, and Tessalon pearls. She smoked 0.5 ppd for 30 years, stopping into 2011. When seen on 10/10/2019, Augmentin was added given persistent symptoms, and she was given an albuterol inhaler. She clinically improved, and repeat chest x-ray on 11/26/2019 was normal.     She also has a history of an anterior cervical discectomy and fusion (C5-C6) in 2/2011 by Dr. Hesham Arnold for a herniated disc and right radiculopathy. She does experience intermittent neck pain, and feels that it may be exacerbating her headaches. In 6/2016, she presented with complaints of right sciatica and she underwent a lumbar MRI (7/8/2016) showing multilevel mild disc bulges, mild facet arthropathy, and multilevel mild foraminal stenosis; L3-L4 with mild to moderate spinal canal stenosis, no more than mild spinal canal stenosis elsewhere, and no cord compression. She was evaluated by Dr. Jabier Partida on 9/1/2016 and started on gabapentin. She also was referred to physical therapy and received dry needling, which resulted in significant improvement. In 9/2020, she developed acute worsening of low back pain after picking up her granddaughter. She states that the pain gradually progressed with radiation into her right buttocks and posterior right leg. She presented to the [facility] ED on 9/26/2020.  Lumbar spine x-ray showed no fracture or subluxation, but increasing degenerative disc space narrowing at L3-L4. She was treated with Skelaxin and naproxen without improvement. She was seen in the office and prescribed a Medrol dose pack and restarted on gabapentin with improvement. She has a history of a right ovarian cyst, and on surveillance ultrasound by Dr. Rachele Finley, it was noted that the right ovarian cyst had enlarged slightly and changed in appearance. She was referred to Dr. Robbie Mcneil for evaluation, and he stated that although the cyst was likely benign, he recommended laparoscopic removal. On 2019, she underwent a laparoscopic bilateral salpingo-oophorectomy and washing. 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 In 2020, she presented with worsening depression symptoms related to marital issues and the pandemic. She was started on venlafaxine with improvement. She was also receiving counseling. Past Medical History:   Diagnosis Date    Allergic rhinitis     Arthritis     Chronic headaches     Hx of colonic polyp 2011    Hyperplastic; Dr. Yonas Bullard. Hyperlipidemia     Hypertension     IBS (irritable bowel syndrome)     Obstructive sleep apnea     uses cpap    S/P cervical discectomy 2011    Anterior C5-C6 discectomy for herniated disc and right radiculopathy; Dr. Mike Levine     Past Surgical History:   Procedure Laterality Date    COLONOSCOPY N/A 10/7/2020    COLONOSCOPY with bx performed by Shelby Molina MD at [facility]. [address], COLON, DIAGNOSTIC      HX BACK SURGERY  11    neck surgery; ruptured cervical discs    HX COLONOSCOPY  2011 2015    4/17/15, normal, recalled for 5 years    HX GYN      dilation and curretage    HX GYN          HX OOPHORECTOMY  10/2018     Current Outpatient Medications   Medication Sig    loratadine (Claritin) 10 mg tablet Take 1 Tablet by mouth daily.  Indications: inflammation of the nose due to an allergy    tiZANidine (Fadumo Silva) 4 mg tablet take 1 tablet by mouth every evening    hydrALAZINE (APRESOLINE) 100 mg tablet take 1 tablet by mouth three times a day    butalbital-acetaminophen-caffeine (FIORICET, ESGIC) -40 mg per tablet Take 1 Tablet by mouth every six (6) hours as needed for Headache.    venlafaxine-SR (EFFEXOR-XR) 150 mg capsule TAKE 1 CAPSULE BY MOUTH ONCE DAILY    hydroCHLOROthiazide (HYDRODIURIL) 25 mg tablet TAKE 1 TABLET BY MOUTH ONCE DAILY    pravastatin (PRAVACHOL) 20 mg tablet TAKE 1 TABLET BY MOUTH EVERY NIGHT    triamcinolone (NASACORT AQ) 55 mcg nasal inhaler INSTILL  2 SPRAYS INTO EACH NOSTRIL EVERY NIGHT    amLODIPine (NORVASC) 10 mg tablet take 1 tablet by mouth once daily    TURMERIC ROOT EXTRACT PO Take 1,000 mg by mouth. GLUC/CHONDR-MSM#7/C/DEENA/BORON (GLUCOSAMINE-CHONDR, BOSWELLIA, PO) Take 500 mg by mouth daily. multivitamin (ONE A DAY) tablet Take 1 Tab by mouth daily. Cholecalciferol, Vitamin D3, 2,000 unit cap Take 2,000 Units by mouth daily. No current facility-administered medications for this visit. Allergies and Intolerances: Allergies   Allergen Reactions    Other Plant, Animal, Environmental Itching and Sneezing     \"Everything but feathers and horses. \"        Family History: Her mother had hypertension, but  from a melanoma. Her father  from a DVT. Her brother has CAD and underwent CABG at the age of 40. Family History   Problem Relation Age of Onset    Heart Disease Brother     Other Mother         melanoma    OSTEOARTHRITIS Mother     Bleeding Prob Father     Heart Disease Father     Heart Attack Father     Stroke Maternal Uncle     Heart Attack Maternal Uncle     Breast Cancer Maternal Grandmother      Social History:   She  reports that she quit smoking about 8 years ago. Her smoking use included cigarettes. She smoked an average of .5 packs per day. She has never used smokeless tobacco. She reports that she smoked 0.5 ppd for 30 years, stopping in .  She is  and living with her son. She recently retired from the [employer] department of [id number]; her  sells eyeglass frames to opticians. Social History     Substance and Sexual Activity   Alcohol Use Not Currently    Alcohol/week: 0.0 standard drinks     Immunization History:  Immunization History   Administered Date(s) Administered     Influenza, FLUZONE (age 72 y+), High Dose 10/21/2022    COVID-19, PFIZER Bivalent BOOSTER, (age 12y+), IM, 30 mcg/0.3 mL dose 10/21/2022    COVID-19, PFIZER PURPLE top, DILUTE for use, (age 15 y+), IM, 30mcg/0.3mL 02/24/2021, 03/16/2021, 02/09/2022    Influenza Vaccine 10/08/2014, 10/09/2017    Influenza, FLUARIX, FLULAVAL, Gabrielle Balls (age 10 mo+) AND AFLURIA, (age 1 y+), PF, 0.5mL 10/23/2018, 02/04/2020, 09/29/2020    TD Vaccine 01/01/2006    Tdap 10/07/2016    Zoster Recombinant 08/29/2019, 12/18/2019       Review of Systems:   As above included in HPI. Otherwise 11 point review of systems negative including constitutional, skin, HENT, eyes, respiratory, cardiovascular, gastrointestinal, genitourinary, musculoskeletal, endo/heme/aller, neurological.    Physical:      Visit Vitals  /72   Pulse 73   Temp 97.8 °F (36.6 °C) (Temporal)   Resp 16   Ht 5' 3\" (1.6 m)   Wt 140 lb (63.5 kg)   LMP 01/10/2012 (LMP Unknown)   SpO2 97%   BMI 24.80 kg/m²       Patient appears in no apparent distress. Affect is appropriate. HEENT: PERRLA, anicteric, oropharynx clear, no JVD, adenopathy or thyromegaly. No carotid bruits or radiated murmur. Lungs: clear to auscultation, no wheezes, rhonchi, or rales. Heart: regular rate and rhythm. No murmur, rubs, gallops  Abdomen: soft, nontender, nondistended, normal bowel sounds, no hepatosplenomegaly or masses. Extremities: without edema.           Review of Data:  Labs:  Hospital Outpatient Visit on 01/24/2023   Component Date Value Ref Range Status    Hemoglobin A1c 01/24/2023 5.3  4.2 - 5.6 % Final    Est. average glucose 01/24/2023 105 mg/dL Final    LIPID PROFILE 01/24/2023        Final    Cholesterol, total 01/24/2023 207 (A)  <200 MG/DL Final    Triglyceride 01/24/2023 151 (A)  <150 MG/DL Final    HDL Cholesterol 01/24/2023 61 (A)  40 - 60 MG/DL Final    LDL, calculated 01/24/2023 115.8 (A)  0 - 100 MG/DL Final    VLDL, calculated 01/24/2023 30.2  MG/DL Final    CHOL/HDL Ratio 01/24/2023 3.4  0 - 5.0   Final    Magnesium 01/24/2023 2.3  1.6 - 2.6 mg/dL Final    Sodium 01/24/2023 139  136 - 145 mmol/L Final    Potassium 01/24/2023 3.7  3.5 - 5.5 mmol/L Final    Chloride 01/24/2023 102  100 - 111 mmol/L Final    CO2 01/24/2023 33 (A)  21 - 32 mmol/L Final    Anion gap 01/24/2023 4  3.0 - 18 mmol/L Final    Glucose 01/24/2023 85  74 - 99 mg/dL Final    BUN 01/24/2023 23 (A)  7.0 - 18 MG/DL Final    Creatinine 01/24/2023 0.71  0.6 - 1.3 MG/DL Final    BUN/Creatinine ratio 01/24/2023 32 (A)  12 - 20   Final    eGFR 01/24/2023 >60  >60 ml/min/1.73m2 Final    Calcium 01/24/2023 9.5  8.5 - 10.1 MG/DL Final    Bilirubin, total 01/24/2023 0.3  0.2 - 1.0 MG/DL Final    ALT (SGPT) 01/24/2023 36  13 - 56 U/L Final    AST (SGOT) 01/24/2023 19  10 - 38 U/L Final    Alk. phosphatase 01/24/2023 111  45 - 117 U/L Final    Protein, total 01/24/2023 7.6  6.4 - 8.2 g/dL Final    Albumin 01/24/2023 4.0  3.4 - 5.0 g/dL Final    Globulin 01/24/2023 3.6  2.0 - 4.0 g/dL Final    A-G Ratio 01/24/2023 1.1  0.8 - 1.7   Final     Health Maintenance:   Screening:    Mammogram: negative (12/2021). Scheduled 2/2023. PAP smear: well women exams by Dr. Candice Garzon (last 11/2021). Scheduled for 11/2022. Colorectal: colonoscopy (10/2020) hyperplastic polyp. Dr. Citlali Connolly.  Due 10/2025   Depression: on venlafaxine   DM (HbA1c/FPG): FPG 85 (1/2023)   Hepatitis C: negative (12/2015)   Falls: none   DEXA: osteopenia (12/2021)   Glaucoma: has regular eye exams with Dr. Dodie Felix (last 12/2022) gluacoma   Smoking: 15 pack year, stopped 2011   Vitamin D: 63.1 (7/2022)   Medicare Wellness: N/A      Impression:  Patient Active Problem List   Diagnosis Code    Hypertension I10    Hyperlipidemia E78.5    Obstructive sleep apnea G47.33    Chronic daily headache R51.9    IBS (irritable bowel syndrome) K58.9    Colon polyp K63.5    Vitamin D deficiency E55.9    History of colitis Z87.19    Lumbar spinal stenosis M48.061    Lumbar facet arthropathy M47.816    Degenerative disc disease, cervical M50.30    Former smoker Z87.891    Recurrent major depressive disorder ([facility] [address] F33.9    Osteopenia of multiple sites M85.89       Plan:  1. Hypertension. Blood pressure remains borderline controlled today on current regimen of hydralazine 100 mg tid, amlodipine 10 mg daily, and hydrochlorothiazide 25 mg daily. Suspect captopril responsible for angioedema episodes and advised to avoid ACE inhibitor/ARB treatment in the future. Renal function remains normal with creatinine 0.71/ eGFR >60. However, BUN/creatinine ratio remains elevated at 32 and again discussed importance of increasing fluid intake. Will continue to monitor  2. Hyperlipidemia. On moderate intensity dose pravastatin (20 mg) with  and HDL 61, indicative of worsening control. Triglyceride level also worsened to 151. Previously better controlled in 7/2022 with LDL 86, HDL 51, and . Patient admits to dietary noncompliance and is not currently exercising. Advised to attempt lifestyle changes. Previously treated with rosuvastatin but developed elevation of transaminases so discontinued. Will reassess at next visit. 3. Chronic daily headaches. Evaluated by Dr. Mingo Musa in 7/2018 and weaned from daily Fioricet and Excedrin migraine. Headaches had improved with daily amitriptyline and use of Excedrin migraine and Fioricet only occasionally for moderate and severe headaches, respectively. However, no longer taking amitriptyline and has resumed Fioricet daily. Also using Claritin and Nasacort with good response.  Does also have chronic neck pain related to her anterior neck fusion and advised to continue Zanaflex and regular exercises. Will continue to monitor. 4. Right leg sciatica. Lumbar MRI (7/2016) with evidence of mild to moderate lumbar spinal stenosis (L3/L4) with multilevel mild lumbar herniated disc and facet arthropathy. Significant improvement with dry needling therapy previously. Had acute worsening in 9/2020 and presented to the [facility] ED on 9/26/2020. Lumbar spine x-ray showed no fracture or subluxation, but increasing degenerative disc space narrowing at L3-L4. She was treated with Skelaxin and naproxen as well as lidocaine patches. She presented with persistent symptoms on 9/29/2020 and was treated with a Medrol dose pack and gabapentin 300 mg tid with resolution. Had weaned from gabapentin until recurrent symptoms in 1/2021 and treated again with a Medrol dose pack and gabapentin 300 mg tid. Reports improvement today and no longer requiring gabapentin. Encouraged to continue with regular stretching and exercises to prevent recurrence. Will continue to monitor. 5. Obstructive sleep apnea. Underwent reevaluation by Dr. Benito Heath, and repeat sleep study showed moderate PEGGY. Started on CPAP and reports continued compliance. Will address at next visit if being followed for CPAP therapy. 6. Reactive depression. Restarted on venlafaxine SR in 12/2020 and reports continued good control of symptoms currently on 150 mg daily. Will continue to follow. 7. Osteopenia. Newly diagnosed on baseline bone density study obtained on 12/9/2021. Calculated FRAX score estimates her 10-year risk of major osteoporotic fracture at 9.8% and hip fracture at 1.3%, which are not an indication for biphosphonate treatment. Started on calcium 600 mg twice daily and continue with vitamin D supplementation. Also encouraged her to exercise regularly, particularly weightbearing activities, which may help to prevent progression.   Will address at next visit obtaining repeat bone density study in 12/2023.  8. History of abdominal pain/angioedema. Unclear etiology, but nonspecific bowel wall thickening in short segment of jejunum and sigmoid on repeat CT scan in 4/2017 raised question of ischemic in origin. Hypercoaguable workup including lab studies and echocardiogram negative. Reported intermittent episodes associated with facial flushing, dry mouth, and severe abdominal cramping followed by diarrhea +/- vomiting. Episodes lingered for several days following onset. No evidence of urticaria seen. Interesting is the abnormalities noted on abdominal CT scan in two of her episodes with nonspecific short segment thickening in various locations in small and large intestines. Obtained complement studies including C4, C1 esterase inhibtor, and C1q levels which were normal.  Recommended evaluating C4 level during an episode. Evaluated by GI and had negative small bowel series and duplex ultrasound in 8/2018, and 24 hour urine for 5-HIAA level also negative. Discontinued captopril in 6/2018 as felt could be possible cause of symptoms, and has not had a recurrence since discontinuation. Suggestive that episodes were secondary to angioedema related to ACE inhibitor use and advised to avoid ACE/ARB use in the future. Will continue to monitor. 9.  History of impaired fasting glucose. Fasting glucose remains normal today at 85 with normal HbA1c at 5.3 likely due to weight loss. Emphasized importance of continued lifestyle modifications. Advised to avoid concentrated sweets and minimize carbohydrates. Will continue to monitor. 10. NAFL/history of elevated transaminases. Resolved since rosuvastatin discontinued. Review of record showed elevation noted since 8/2016 when initiated. Iron studies, hepatitis panel, KIEL, SPEP, CK, aldolase, and anti-smooth muscle antibody negative. RUQ ultrasound (10/2016) showed fatty infiltration of liver. Advised patient to avoid alcohol.    11. Health maintenance. Completed the [facility] COVID-19 vaccine series, one Pfizer booster dose and the updated bivalent booster dose. Already received the influenza vaccine. Completed 2/2 doses of Shingrix vaccine. Other immunizations up to date. Well woman exam with Dr. Tahira Jane due and urged to schedule. Mammogram scheduled for 2/2023. Colonoscopy with Dr. Sarah Estrada up-to-date. Continue regular eye exams with Dr. Breana Denton. Vitamin D level remains normal after on cholecalciferol 2000 units daily. .     Patient understands recommendations and agrees with plan. Follow-up in 6 months. Future orders:    ICD-10-CM ICD-9-CM    1. Primary hypertension  I10 401.9 CBC WITH AUTOMATED DIFF      MAGNESIUM      METABOLIC PANEL, COMPREHENSIVE      URINALYSIS W/MICROSCOPIC      2. Hyperlipidemia, unspecified hyperlipidemia type  E78.5 272.4 LIPID PANEL      METABOLIC PANEL, COMPREHENSIVE      3. Chronic daily headache  R51.9 784.0       4. Obstructive sleep apnea  G47.33 327.23       5. Recurrent major depressive disorder, in full remission ([facility]  F33.42 296.36       6. Osteopenia of multiple sites  M85.89 733.90 VITAMIN D, 25 HYDROXY      7. Vitamin D deficiency  E55.9 268.9 VITAMIN D, 25 HYDROXY      8. Impaired fasting blood sugar  R73.01 790.21 HEMOGLOBIN A1C WITH EAG      9.  Laboratory tests ordered as part of a complete physical exam (CPE)  Z00.00 V72.62 CBC WITH AUTOMATED DIFF      HEMOGLOBIN A1C WITH EAG      LIPID PANEL      MAGNESIUM      METABOLIC PANEL, COMPREHENSIVE      URINALYSIS W/MICROSCOPIC      VITAMIN D, 25 HYDROXY

## 2023-02-04 RX ORDER — TIZANIDINE 4 MG/1
4 TABLET ORAL EVERY EVENING
Qty: 90 TABLET | Refills: 1 | Status: SHIPPED | OUTPATIENT
Start: 2023-02-04

## 2023-02-05 DIAGNOSIS — R51.9 CHRONIC NONINTRACTABLE HEADACHE, UNSPECIFIED HEADACHE TYPE: ICD-10-CM

## 2023-02-05 DIAGNOSIS — G89.29 CHRONIC NONINTRACTABLE HEADACHE, UNSPECIFIED HEADACHE TYPE: ICD-10-CM

## 2023-02-06 RX ORDER — BUTALBITAL, ACETAMINOPHEN AND CAFFEINE 50; 325; 40 MG/1; MG/1; MG/1
1 TABLET ORAL
Qty: 60 TABLET | Refills: 0 | Status: SHIPPED | OUTPATIENT
Start: 2023-02-06

## 2023-02-14 ENCOUNTER — HOSPITAL ENCOUNTER (OUTPATIENT)
Facility: HOSPITAL | Age: 63
Discharge: HOME | End: 2023-02-17
Payer: COMMERCIAL

## 2023-02-14 DIAGNOSIS — Z12.31 VISIT FOR SCREENING MAMMOGRAM: ICD-10-CM

## 2023-02-14 PROCEDURE — 77067 SCR MAMMO BI INCL CAD: CPT

## 2023-02-23 DIAGNOSIS — Z00.00 LABORATORY TESTS ORDERED AS PART OF A COMPLETE PHYSICAL EXAM (CPE): ICD-10-CM

## 2023-02-23 DIAGNOSIS — I10 PRIMARY HYPERTENSION: Primary | ICD-10-CM

## 2023-02-23 DIAGNOSIS — E55.9 VITAMIN D DEFICIENCY: ICD-10-CM

## 2023-02-23 DIAGNOSIS — R73.01 IMPAIRED FASTING BLOOD SUGAR: ICD-10-CM

## 2023-02-23 DIAGNOSIS — M85.89 OSTEOPENIA OF MULTIPLE SITES: Primary | ICD-10-CM

## 2023-02-23 DIAGNOSIS — Z00.00 LABORATORY TESTS ORDERED AS PART OF A COMPLETE PHYSICAL EXAM (CPE): Primary | ICD-10-CM

## 2023-02-23 DIAGNOSIS — E78.5 HYPERLIPIDEMIA, UNSPECIFIED HYPERLIPIDEMIA TYPE: Primary | ICD-10-CM

## 2023-02-23 DIAGNOSIS — E78.5 HYPERLIPIDEMIA, UNSPECIFIED HYPERLIPIDEMIA TYPE: ICD-10-CM

## 2023-03-01 RX ORDER — BUTALBITAL, ACETAMINOPHEN AND CAFFEINE 50; 325; 40 MG/1; MG/1; MG/1
1 TABLET ORAL EVERY 6 HOURS PRN
Qty: 60 TABLET | Refills: 0 | Status: SHIPPED | OUTPATIENT
Start: 2023-03-01 | End: 2023-03-31

## 2023-03-01 NOTE — TELEPHONE ENCOUNTER
----- Message from Arnold Dominiquedra sent at 3/1/2023  2:14 PM EST -----  Subject: Refill Request    QUESTIONS  Name of Medication? butalbital-acetaminophen-caffeine (FIORICET, ESGIC)   -40 MG per tablet  Patient-reported dosage and instructions? as needed  How many days do you have left? 10  Preferred Pharmacy? [facility] #[id number]  Pharmacy phone number (if available)? [phone]  ---------------------------------------------------------------------------  --------------  CALL BACK INFO  What is the best way for the office to contact you? OK to leave message on   voicemail  Preferred Call Back Phone Number? [phone]  ---------------------------------------------------------------------------  --------------  SCRIPT ANSWERS  Relationship to Patient?  Self

## 2023-03-01 NOTE — TELEPHONE ENCOUNTER
PCP: Rajat Álvarez MD    Last appt: [unfilled]  Future Appointments   Date Time Provider Sobeida Torres   7/27/2023  9:05 AM IO LAB VISIT [facility] FABIOLA LEE   8/1/2023 10:30 AM Rajat Álvarez MD [facility] FABIOLA LEE       Requested Prescriptions     Pending Prescriptions Disp Refills    butalbital-acetaminophen-caffeine (FIORICET, ESGIC) -40 MG per tablet 180 tablet      Sig: Take 1 tablet by mouth every 6 hours as needed

## 2023-03-02 RX ORDER — BUTALBITAL, ACETAMINOPHEN AND CAFFEINE 50; 325; 40 MG/1; MG/1; MG/1
1 TABLET ORAL EVERY 6 HOURS PRN
Qty: 60 TABLET | Refills: 0 | Status: CANCELLED | OUTPATIENT
Start: 2023-03-02 | End: 2023-04-01

## 2023-03-29 DIAGNOSIS — R51.9 CHRONIC DAILY HEADACHE: Primary | ICD-10-CM

## 2023-03-29 RX ORDER — BUTALBITAL, ACETAMINOPHEN AND CAFFEINE 50; 325; 40 MG/1; MG/1; MG/1
1 TABLET ORAL EVERY 6 HOURS PRN
Qty: 60 TABLET | Refills: 0 | Status: SHIPPED | OUTPATIENT
Start: 2023-03-29 | End: 2023-04-28

## 2023-04-20 DIAGNOSIS — R51.9 CHRONIC DAILY HEADACHE: ICD-10-CM

## 2023-04-20 RX ORDER — BUTALBITAL, ACETAMINOPHEN AND CAFFEINE 50; 325; 40 MG/1; MG/1; MG/1
1 TABLET ORAL EVERY 6 HOURS PRN
Qty: 60 TABLET | Refills: 0 | Status: SHIPPED | OUTPATIENT
Start: 2023-04-20 | End: 2023-05-20

## 2023-04-20 NOTE — TELEPHONE ENCOUNTER
[facility] 4/20/2023    The last fill date  was 10/26/2021 for a 15 d/s qty 60      Last UDS: not on file    CSA Last signed: not on file         PCP: Ren Dyson MD    Last appt: 1/31/2023  Future Appointments   Date Time Provider Sobeida Torres   7/27/2023  9:05 AM IO LAB VISIT [facility] BS AMB   8/1/2023 10:30 AM Ren Dyson MD [facility] BS AMB       Requested Prescriptions     Pending Prescriptions Disp Refills    butalbital-acetaminophen-caffeine (FIORICET, ESGIC) -40 MG per tablet 60 tablet 0     Sig: Take 1 tablet by mouth every 6 hours as needed for Headaches

## 2023-04-21 ENCOUNTER — PATIENT MESSAGE (OUTPATIENT)
Age: 63
End: 2023-04-21

## 2023-04-21 ENCOUNTER — TELEPHONE (OUTPATIENT)
Age: 63
End: 2023-04-21

## 2023-04-21 DIAGNOSIS — G47.33 OBSTRUCTIVE SLEEP APNEA: Primary | ICD-10-CM

## 2023-04-21 NOTE — TELEPHONE ENCOUNTER
----- Message from Italia Gonzalez. Ricky Keyes sent at 4/21/2023  1:46 PM EDT -----  Regarding: Referral for my Sleep Apnea   Contact: [phone]    Could Dr Rodrigo Apodaca fax a referral to Dr Chen [facility]     Fax number [phone] or Fax number [phone]  Office #[phone]    My machine is no longer working properly and I have not been seen from a sleep apnea specialist since 2018. I called Dr Shikha Jaimes office to get an appointment but I was told I need a referral and why I need to see her.      Sincerely,  Saritha cSales   [phone]

## 2023-05-14 DIAGNOSIS — R51.9 CHRONIC DAILY HEADACHE: ICD-10-CM

## 2023-05-15 RX ORDER — BUTALBITAL, ACETAMINOPHEN AND CAFFEINE 50; 325; 40 MG/1; MG/1; MG/1
1 TABLET ORAL EVERY 6 HOURS PRN
Qty: 60 TABLET | Refills: 0 | Status: SHIPPED | OUTPATIENT
Start: 2023-05-15 | End: 2023-06-14

## 2023-05-15 NOTE — TELEPHONE ENCOUNTER
Reviewed report generated by the [facility]. Does not demonstrate aberrancies or inconsistencies with regard to the prescribing of controlled medications to this patient by other providers.

## 2023-07-06 DIAGNOSIS — R51.9 CHRONIC DAILY HEADACHE: ICD-10-CM

## 2023-07-06 RX ORDER — BUTALBITAL, ACETAMINOPHEN AND CAFFEINE 50; 325; 40 MG/1; MG/1; MG/1
1 TABLET ORAL EVERY 6 HOURS PRN
Qty: 60 TABLET | Refills: 1 | Status: SHIPPED | OUTPATIENT
Start: 2023-07-06 | End: 2023-07-30 | Stop reason: SDUPTHER

## 2023-07-06 NOTE — TELEPHONE ENCOUNTER
Please refill if appropriate or refuse medication if not appropriate.     PCP: Isi Hoskins MD     Last appt: 1/31/2023    Last refill:6/12/23      Future Appointments   Date Time Provider [address]   7/17/2023 10:00 AM Baljit Marcial DO [facility] BS AMB   7/27/2023  9:05 AM IO LAB VISIT [facility] BS AMB   8/1/2023 10:30 AM Isi Hoskins MD [facility] AMB         Requested Prescriptions     Pending Prescriptions Disp Refills    butalbital-acetaminophen-caffeine (FIORICET, ESGIC) -40 MG per tablet 60 tablet 0     Sig: Take 1 tablet by mouth every 6 hours as needed for Headaches

## 2023-07-17 ENCOUNTER — OFFICE VISIT (OUTPATIENT)
Age: 63
End: 2023-07-17
Payer: COMMERCIAL

## 2023-07-17 VITALS
SYSTOLIC BLOOD PRESSURE: 165 MMHG | WEIGHT: 144 LBS | HEIGHT: 63 IN | OXYGEN SATURATION: 97 % | TEMPERATURE: 98 F | HEART RATE: 98 BPM | BODY MASS INDEX: 25.52 KG/M2 | DIASTOLIC BLOOD PRESSURE: 75 MMHG | RESPIRATION RATE: 20 BRPM

## 2023-07-17 DIAGNOSIS — K11.7 XEROSTOMIA: ICD-10-CM

## 2023-07-17 DIAGNOSIS — R29.818 SUSPECTED SLEEP APNEA: Primary | ICD-10-CM

## 2023-07-17 DIAGNOSIS — R06.83 SNORING: ICD-10-CM

## 2023-07-17 DIAGNOSIS — F41.9 ANXIETY AND DEPRESSION: ICD-10-CM

## 2023-07-17 DIAGNOSIS — Z86.69 HISTORY OF SLEEP APNEA: ICD-10-CM

## 2023-07-17 DIAGNOSIS — F32.A ANXIETY AND DEPRESSION: ICD-10-CM

## 2023-07-17 DIAGNOSIS — R00.2 PALPITATIONS: ICD-10-CM

## 2023-07-17 DIAGNOSIS — I10 PRIMARY HYPERTENSION: ICD-10-CM

## 2023-07-17 PROCEDURE — 99203 OFFICE O/P NEW LOW 30 MIN: CPT | Performed by: INTERNAL MEDICINE

## 2023-07-17 PROCEDURE — 3077F SYST BP >= 140 MM HG: CPT | Performed by: INTERNAL MEDICINE

## 2023-07-17 PROCEDURE — 3078F DIAST BP <80 MM HG: CPT | Performed by: INTERNAL MEDICINE

## 2023-07-17 ASSESSMENT — SLEEP AND FATIGUE QUESTIONNAIRES
HOW LIKELY ARE YOU TO NOD OFF OR FALL ASLEEP IN A CAR, WHILE STOPPED FOR A FEW MINUTES IN TRAFFIC: 0
HOW LIKELY ARE YOU TO NOD OFF OR FALL ASLEEP WHILE LYING DOWN TO REST IN THE AFTERNOON WHEN CIRCUMSTANCES PERMIT: 1
HOW LIKELY ARE YOU TO NOD OFF OR FALL ASLEEP WHILE SITTING AND READING: 1
HOW LIKELY ARE YOU TO NOD OFF OR FALL ASLEEP WHILE SITTING QUIETLY AFTER LUNCH WITHOUT ALCOHOL: 0
NECK CIRCUMFERENCE (INCHES): 15
HOW LIKELY ARE YOU TO NOD OFF OR FALL ASLEEP WHILE SITTING INACTIVE IN A PUBLIC PLACE: 0
HOW LIKELY ARE YOU TO NOD OFF OR FALL ASLEEP WHEN YOU ARE A PASSENGER IN A CAR FOR AN HOUR WITHOUT A BREAK: 0
ESS TOTAL SCORE: 3
HOW LIKELY ARE YOU TO NOD OFF OR FALL ASLEEP WHILE WATCHING TV: 1
HOW LIKELY ARE YOU TO NOD OFF OR FALL ASLEEP WHILE SITTING AND TALKING TO SOMEONE: 0

## 2023-07-17 ASSESSMENT — PATIENT HEALTH QUESTIONNAIRE - PHQ9
SUM OF ALL RESPONSES TO PHQ QUESTIONS 1-9: 0
8. MOVING OR SPEAKING SO SLOWLY THAT OTHER PEOPLE COULD HAVE NOTICED. OR THE OPPOSITE, BEING SO FIGETY OR RESTLESS THAT YOU HAVE BEEN MOVING AROUND A LOT MORE THAN USUAL: 0
2. FEELING DOWN, DEPRESSED OR HOPELESS: 0
5. POOR APPETITE OR OVEREATING: 0
1. LITTLE INTEREST OR PLEASURE IN DOING THINGS: 0
SUM OF ALL RESPONSES TO PHQ QUESTIONS 1-9: 0
3. TROUBLE FALLING OR STAYING ASLEEP: 0
SUM OF ALL RESPONSES TO PHQ9 QUESTIONS 1 & 2: 0
6. FEELING BAD ABOUT YOURSELF - OR THAT YOU ARE A FAILURE OR HAVE LET YOURSELF OR YOUR FAMILY DOWN: 0
4. FEELING TIRED OR HAVING LITTLE ENERGY: 0
SUM OF ALL RESPONSES TO PHQ QUESTIONS 1-9: 0
7. TROUBLE CONCENTRATING ON THINGS, SUCH AS READING THE NEWSPAPER OR WATCHING TELEVISION: 0
SUM OF ALL RESPONSES TO PHQ QUESTIONS 1-9: 0
10. IF YOU CHECKED OFF ANY PROBLEMS, HOW DIFFICULT HAVE THESE PROBLEMS MADE IT FOR YOU TO DO YOUR WORK, TAKE CARE OF THINGS AT HOME, OR GET ALONG WITH OTHER PEOPLE: 0
9. THOUGHTS THAT YOU WOULD BE BETTER OFF DEAD, OR OF HURTING YOURSELF: 0

## 2023-07-17 NOTE — PROGRESS NOTES
Major Keith presents today for   Chief Complaint   Patient presents with    New Patient     Referred Dr. Gerber Murillo for MERRY       Is someone accompanying this pt? No    Is the patient using any DME equipment during OV? CPAP    -DME Company NA    Depression Screening:    PHQ-9 Questionaire 1/31/2023   Little interest or pleasure in doing things 0   Feeling down, depressed, or hopeless 0   PHQ-9 Total Score 0       Learning Needs Questionnaire:     Who is the primary learner? Patient    What is the preferred language for health care of the primary learner? ENGLISH    How does the primary learner prefer to learn new concepts? DEMONSTRATION    Answered By Patient    Relationship to Learner SELF          Fall Risk:     No flowsheet data found. Abuse Screening:     No flowsheet data found. Coordination of Care:    1. Have you been to the ER, urgent care clinic since your last visit? Hospitalized since your last visit? No    2. Have you seen or consulted any other health care providers outside of the [address] [place] [address] since your last visit? Include any pap smears or colon screening. No    Medication list has been update per patient.

## 2023-07-17 NOTE — PATIENT INSTRUCTIONS
Please call our clinic back at [phone] or send a message on [employer] if you have any questions or concerns or if you are experiencing any of the following: You have not received a follow up appointment within 30 days prior the recommended follow up time. If you are not tolerating treatment plan and/or not able to obtain equipment or prescribed medication(s). if you are experiencing any difficulties with the [address] [place] [address] [facility]  (DME) Company you may be using or is assigned to you. Two weeks have passed and you have not received an appointment for a scheduled procedure. Two weeks have passed since you underwent a test and/or procedure and you have not received your results. Patients on Inhaler Therapy: If you are having difficulty and/or are not able to obtain refills of your inhalers- Please contact our office as soon as possible  Please read directions carefully and use inhalers as directed  If you are not sure how to properly use your inhaler please call our office or ask your pharmacist.  Instructional video can also be found on-line as well    If you are using a CPAP/BIPAP, or Home Ventilator Device- Please note the following. Currently, many DMEs are experiencing supply chain difficulties and orders for equipment may be back logged several weeks to months. Your  Durable Medical Equipment (DME ) company is supposed to provide you with replacement filters, tubing and masks. You can either call your DME when you need new supplies or you can arrange for an automatic shipment schedule. Your need to be seen by our office at lat minimum of every 12 months in order to renew the prescription for these supplies. Please make note of who your DME company is and their phone number. Please make sure that you clean your mask and hosing on a regular basis.   Your DME can provide you with additional information regarding proper care and cleaning of your device           Safety is

## 2023-07-17 NOTE — PROGRESS NOTES
[facility]  Pulmonary, Critical Care, and Sleep Medicine    Office Progress Note- Initial Evaluation      Primary Care Physician: Dewayne Ring MD     Reason for Visit:  Evaluation for MERRY    Assessment:  History of Obstructive Sleep Apnea (MERRY): Previously did well on PAP therapy- No follow up for several years. Device is old and broken at this time. The patient is agreeable to restarting PAP therapy. Different treatment options reviewed. Patient as also curious about Lexi White which was also discussed today  Headaches: Takes daily Fioricet  Palpitations: Started after patient starting taking \"Fat Burners\". I have asked patient to stop taking and to monitor symptoms. If persists, FU with PCP  Allergic Rhinitis  Snoring  Xerostomia: possible from MERRY/ snoring  Hypertension: The patient 's blood pressure was elevated today in clinic. The patient is asymptomatic. Anxiety with Depression: Situational due to estrangement from spouse         Plan:  Attempt to obtain prior sleep records  Schedule patient for SNAP HSAT sleep study for further evaluation. Potential consequences of untreated sleep apnea, and/or excessive daytime sleepiness were discussed with the patient. Educational materials provided. Treatment options including CPAP, dental appliance, weight reduction measures, positional therapy, surgeries etc were discussed. Healthy lifestyle changes to include weight loss and exercise discussed. Healthy sleep habits were reviewed and encouraged. Patients are encouraged to obtain 7-9 hours of sleep per day.  and workplace safety reviewed and discussed as appropriate. Drowsy and/or inattentive driving should be avoided. Follow up with Primary Care Provider (PCP) as directed and for routine health care maintenance. Follow-up: after sleep testing; sooner should new symptoms or problems arise.          History of Present Illness: Ms. Alexy Larsen is a 57 y.o. female

## 2023-07-27 ENCOUNTER — HOSPITAL ENCOUNTER (OUTPATIENT)
Facility: HOSPITAL | Age: 63
Setting detail: SPECIMEN
Discharge: HOME | End: 2023-07-27
Payer: COMMERCIAL

## 2023-07-27 DIAGNOSIS — E55.9 VITAMIN D DEFICIENCY: ICD-10-CM

## 2023-07-27 DIAGNOSIS — Z00.00 LABORATORY TESTS ORDERED AS PART OF A COMPLETE PHYSICAL EXAM (CPE): ICD-10-CM

## 2023-07-27 DIAGNOSIS — M85.89 OSTEOPENIA OF MULTIPLE SITES: ICD-10-CM

## 2023-07-27 DIAGNOSIS — E78.5 HYPERLIPIDEMIA, UNSPECIFIED HYPERLIPIDEMIA TYPE: ICD-10-CM

## 2023-07-27 DIAGNOSIS — R73.01 IMPAIRED FASTING BLOOD SUGAR: ICD-10-CM

## 2023-07-27 DIAGNOSIS — I10 PRIMARY HYPERTENSION: ICD-10-CM

## 2023-07-27 LAB
25(OH)D3 SERPL-MCNC: 49.4 NG/ML (ref 30–100)
ALBUMIN SERPL-MCNC: 3.7 G/DL (ref 3.4–5)
ALBUMIN/GLOB SERPL: 1.1 (ref 0.8–1.7)
ALP SERPL-CCNC: 103 U/L (ref 45–117)
ALT SERPL-CCNC: 30 U/L (ref 13–56)
ANION GAP SERPL CALC-SCNC: 6 MMOL/L (ref 3–18)
AST SERPL-CCNC: 24 U/L (ref 10–38)
BASOPHILS # BLD: 0 K/UL (ref 0–0.1)
BASOPHILS NFR BLD: 1 % (ref 0–2)
BILIRUB SERPL-MCNC: 0.2 MG/DL (ref 0.2–1)
BUN SERPL-MCNC: 23 MG/DL (ref 7–18)
BUN/CREAT SERPL: 31 (ref 12–20)
CALCIUM SERPL-MCNC: 9.1 MG/DL (ref 8.5–10.1)
CHLORIDE SERPL-SCNC: 102 MMOL/L (ref 100–111)
CHOLEST SERPL-MCNC: 175 MG/DL
CO2 SERPL-SCNC: 31 MMOL/L (ref 21–32)
CREAT SERPL-MCNC: 0.75 MG/DL (ref 0.6–1.3)
DIFFERENTIAL METHOD BLD: ABNORMAL
EOSINOPHIL # BLD: 0.3 K/UL (ref 0–0.4)
EOSINOPHIL NFR BLD: 5 % (ref 0–5)
ERYTHROCYTE [DISTWIDTH] IN BLOOD BY AUTOMATED COUNT: 14.4 % (ref 11.6–14.5)
EST. AVERAGE GLUCOSE BLD GHB EST-MCNC: 108 MG/DL
GLOBULIN SER CALC-MCNC: 3.3 G/DL (ref 2–4)
GLUCOSE SERPL-MCNC: 100 MG/DL (ref 74–99)
HBA1C MFR BLD: 5.4 % (ref 4.2–5.6)
HCT VFR BLD AUTO: 37.2 % (ref 35–45)
HDLC SERPL-MCNC: 49 MG/DL (ref 40–60)
HDLC SERPL: 3.6 (ref 0–5)
HGB BLD-MCNC: 11.5 G/DL (ref 12–16)
IMM GRANULOCYTES # BLD AUTO: 0 K/UL (ref 0–0.04)
IMM GRANULOCYTES NFR BLD AUTO: 0 % (ref 0–0.5)
LDLC SERPL CALC-MCNC: 98.2 MG/DL (ref 0–100)
LIPID PANEL: NORMAL
LYMPHOCYTES # BLD: 2.6 K/UL (ref 0.9–3.6)
LYMPHOCYTES NFR BLD: 46 % (ref 21–52)
MAGNESIUM SERPL-MCNC: 2.1 MG/DL (ref 1.6–2.6)
MCH RBC QN AUTO: 25.4 PG (ref 24–34)
MCHC RBC AUTO-ENTMCNC: 30.9 G/DL (ref 31–37)
MCV RBC AUTO: 82.3 FL (ref 78–100)
MONOCYTES # BLD: 0.5 K/UL (ref 0.05–1.2)
MONOCYTES NFR BLD: 10 % (ref 3–10)
NEUTS SEG # BLD: 2.2 K/UL (ref 1.8–8)
NEUTS SEG NFR BLD: 39 % (ref 40–73)
NRBC # BLD: 0 K/UL (ref 0–0.01)
NRBC BLD-RTO: 0 PER 100 WBC
PLATELET # BLD AUTO: 329 K/UL (ref 135–420)
PMV BLD AUTO: 10.3 FL (ref 9.2–11.8)
POTASSIUM SERPL-SCNC: 3.6 MMOL/L (ref 3.5–5.5)
PROT SERPL-MCNC: 7 G/DL (ref 6.4–8.2)
RBC # BLD AUTO: 4.52 M/UL (ref 4.2–5.3)
SODIUM SERPL-SCNC: 139 MMOL/L (ref 136–145)
TRIGL SERPL-MCNC: 139 MG/DL
VLDLC SERPL CALC-MCNC: 27.8 MG/DL
WBC # BLD AUTO: 5.6 K/UL (ref 4.6–13.2)

## 2023-07-27 PROCEDURE — 85025 COMPLETE CBC W/AUTO DIFF WBC: CPT

## 2023-07-27 PROCEDURE — 36415 COLL VENOUS BLD VENIPUNCTURE: CPT

## 2023-07-27 PROCEDURE — 80053 COMPREHEN METABOLIC PANEL: CPT

## 2023-07-27 PROCEDURE — 80061 LIPID PANEL: CPT

## 2023-07-27 PROCEDURE — 82306 VITAMIN D 25 HYDROXY: CPT

## 2023-07-27 PROCEDURE — 83036 HEMOGLOBIN GLYCOSYLATED A1C: CPT

## 2023-07-27 PROCEDURE — 83735 ASSAY OF MAGNESIUM: CPT

## 2023-07-30 DIAGNOSIS — R51.9 CHRONIC DAILY HEADACHE: ICD-10-CM

## 2023-07-31 SDOH — ECONOMIC STABILITY: TRANSPORTATION INSECURITY
IN THE PAST 12 MONTHS, HAS LACK OF TRANSPORTATION KEPT YOU FROM MEETINGS, WORK, OR FROM GETTING THINGS NEEDED FOR DAILY LIVING?: NO

## 2023-07-31 SDOH — ECONOMIC STABILITY: FOOD INSECURITY: WITHIN THE PAST 12 MONTHS, YOU WORRIED THAT YOUR FOOD WOULD RUN OUT BEFORE YOU GOT MONEY TO BUY MORE.: NEVER TRUE

## 2023-07-31 SDOH — ECONOMIC STABILITY: HOUSING INSECURITY
IN THE LAST 12 MONTHS, WAS THERE A TIME WHEN YOU DID NOT HAVE A STEADY PLACE TO SLEEP OR SLEPT IN A SHELTER (INCLUDING NOW)?: NO

## 2023-07-31 SDOH — ECONOMIC STABILITY: INCOME INSECURITY: HOW HARD IS IT FOR YOU TO PAY FOR THE VERY BASICS LIKE FOOD, HOUSING, MEDICAL CARE, AND HEATING?: NOT HARD AT ALL

## 2023-07-31 SDOH — ECONOMIC STABILITY: FOOD INSECURITY: WITHIN THE PAST 12 MONTHS, THE FOOD YOU BOUGHT JUST DIDN'T LAST AND YOU DIDN'T HAVE MONEY TO GET MORE.: NEVER TRUE

## 2023-08-01 ENCOUNTER — OFFICE VISIT (OUTPATIENT)
Age: 63
End: 2023-08-01

## 2023-08-01 VITALS
RESPIRATION RATE: 16 BRPM | DIASTOLIC BLOOD PRESSURE: 76 MMHG | BODY MASS INDEX: 25.69 KG/M2 | HEIGHT: 63 IN | OXYGEN SATURATION: 99 % | HEART RATE: 71 BPM | SYSTOLIC BLOOD PRESSURE: 162 MMHG | WEIGHT: 145 LBS | TEMPERATURE: 98.3 F

## 2023-08-01 DIAGNOSIS — G47.33 OBSTRUCTIVE SLEEP APNEA: ICD-10-CM

## 2023-08-01 DIAGNOSIS — I10 PRIMARY HYPERTENSION: ICD-10-CM

## 2023-08-01 DIAGNOSIS — Z87.891 PERSONAL HISTORY OF TOBACCO USE: Primary | ICD-10-CM

## 2023-08-01 DIAGNOSIS — R51.9 CHRONIC DAILY HEADACHE: ICD-10-CM

## 2023-08-01 DIAGNOSIS — M85.89 OSTEOPENIA OF MULTIPLE SITES: ICD-10-CM

## 2023-08-01 DIAGNOSIS — E78.2 MIXED HYPERLIPIDEMIA: ICD-10-CM

## 2023-08-01 DIAGNOSIS — D64.9 ANEMIA, UNSPECIFIED TYPE: ICD-10-CM

## 2023-08-01 RX ORDER — METOPROLOL SUCCINATE 25 MG/1
25 TABLET, EXTENDED RELEASE ORAL DAILY
Qty: 90 TABLET | Refills: 1 | Status: SHIPPED | OUTPATIENT
Start: 2023-08-01

## 2023-08-01 RX ORDER — VENLAFAXINE HYDROCHLORIDE 150 MG/1
CAPSULE, EXTENDED RELEASE ORAL
Qty: 90 CAPSULE | Refills: 3 | Status: SHIPPED | OUTPATIENT
Start: 2023-08-01

## 2023-08-01 RX ORDER — LEVOCETIRIZINE DIHYDROCHLORIDE 5 MG/1
5 TABLET, FILM COATED ORAL NIGHTLY
COMMUNITY

## 2023-08-01 RX ORDER — BUTALBITAL, ACETAMINOPHEN AND CAFFEINE 50; 325; 40 MG/1; MG/1; MG/1
1 TABLET ORAL EVERY 6 HOURS PRN
Qty: 60 TABLET | Refills: 1 | Status: SHIPPED | OUTPATIENT
Start: 2023-08-01 | End: 2023-08-31

## 2023-08-01 RX ORDER — PRAVASTATIN SODIUM 20 MG
TABLET ORAL
Qty: 90 TABLET | Refills: 3 | Status: SHIPPED | OUTPATIENT
Start: 2023-08-01

## 2023-08-01 RX ORDER — HYDROCHLOROTHIAZIDE 25 MG/1
TABLET ORAL
Qty: 90 TABLET | Refills: 3 | Status: SHIPPED | OUTPATIENT
Start: 2023-08-01

## 2023-08-01 SDOH — ECONOMIC STABILITY: FOOD INSECURITY: WITHIN THE PAST 12 MONTHS, YOU WORRIED THAT YOUR FOOD WOULD RUN OUT BEFORE YOU GOT MONEY TO BUY MORE.: NEVER TRUE

## 2023-08-01 SDOH — ECONOMIC STABILITY: INCOME INSECURITY: HOW HARD IS IT FOR YOU TO PAY FOR THE VERY BASICS LIKE FOOD, HOUSING, MEDICAL CARE, AND HEATING?: NOT HARD AT ALL

## 2023-08-01 SDOH — ECONOMIC STABILITY: FOOD INSECURITY: WITHIN THE PAST 12 MONTHS, THE FOOD YOU BOUGHT JUST DIDN'T LAST AND YOU DIDN'T HAVE MONEY TO GET MORE.: NEVER TRUE

## 2023-08-01 ASSESSMENT — PATIENT HEALTH QUESTIONNAIRE - PHQ9
3. TROUBLE FALLING OR STAYING ASLEEP: 0
7. TROUBLE CONCENTRATING ON THINGS, SUCH AS READING THE NEWSPAPER OR WATCHING TELEVISION: 0
SUM OF ALL RESPONSES TO PHQ QUESTIONS 1-9: 0
9. THOUGHTS THAT YOU WOULD BE BETTER OFF DEAD, OR OF HURTING YOURSELF: 0
SUM OF ALL RESPONSES TO PHQ QUESTIONS 1-9: 0
8. MOVING OR SPEAKING SO SLOWLY THAT OTHER PEOPLE COULD HAVE NOTICED. OR THE OPPOSITE, BEING SO FIGETY OR RESTLESS THAT YOU HAVE BEEN MOVING AROUND A LOT MORE THAN USUAL: 0
5. POOR APPETITE OR OVEREATING: 0
2. FEELING DOWN, DEPRESSED OR HOPELESS: 0
4. FEELING TIRED OR HAVING LITTLE ENERGY: 0
1. LITTLE INTEREST OR PLEASURE IN DOING THINGS: 0
6. FEELING BAD ABOUT YOURSELF - OR THAT YOU ARE A FAILURE OR HAVE LET YOURSELF OR YOUR FAMILY DOWN: 0
SUM OF ALL RESPONSES TO PHQ QUESTIONS 1-9: 0
SUM OF ALL RESPONSES TO PHQ QUESTIONS 1-9: 0
SUM OF ALL RESPONSES TO PHQ9 QUESTIONS 1 & 2: 0

## 2023-08-01 NOTE — PROGRESS NOTES
HPI:   Mile King is a 57y.o. year old female who presents today for a routine visit. She has a history of hypertension, hyperlipidemia, chronic daily headaches, obstructive sleep apnea, allergic rhinitis, vasomotor instability, and irritable bowel syndrome. She has completed the [place] COVID-19 vaccine series and received one Pfizer booster dose and the updated bivalent booster dose. She reports that she is doing reasonably well. At her last visit on 1/31/2023, she reported that she had been having increasing difficulty with sinus congestion and headaches. She reported that she had been treated with immunotherapy in the past and believed that this may need to be restarted. She was continuing to take Allegra but reported that she had stopped Nasacort due to development of epistaxis. She also continued to utilize Fioricet and Zanaflex for her chronic headaches. She underwent evaluation by Dr. Michelle Romero on 2/28/2023, and recommendation was to proceed with allergy testing. She reports that she completed allergy testing and has been receiving immunotherapy once weekly for approximately 2 months. She states that her antihistamine therapy was changed to Xyzal and she is finding it to be more effective. On 7/17/2023, she established care with Dr. Zion Pimentel for reevaluation of her obstructive sleep apnea since her PAP therapy was no longer working. It was recommended that she undergo a repeat sleep evaluation, and she reports today that she completed the home sleep study and mailed the equipment back on 7/24/2023. She is to schedule a follow-up appointment with Dr. Zion Pimentel in 9/2023. She states that she has been having more difficulty with morning headaches and feels that it may be due to not using her CPAP equipment since it is broken. She is anxious to restart. She states that she has not been monitoring her blood pressure at home, but noted that it was elevated at her visit with Dr. Zion Pimentel.   She

## 2023-08-01 NOTE — PROGRESS NOTES
Major Keith presents today for   Chief Complaint   Patient presents with    Follow-up     6 month follow    Hypertension       1. \"Have you been to the ER, urgent care clinic since your last visit? Hospitalized since your last visit? \" no    2. \"Have you seen or consulted any other health care providers outside of the [address] [place] [address] since your last visit? \" no     3. For patients aged 43-73: Has the patient had a colonoscopy / FIT/ Cologuard? Yes - no Care Gap present      If the patient is female:    4. For patients aged 43-66: Has the patient had a mammogram within the past 2 years? Yes - no Care Gap present      5. For patients aged 21-65: Has the patient had a pap smear?  No

## 2023-08-01 NOTE — TELEPHONE ENCOUNTER
PCP: Desi Stahl MD     venlafaxine (EFFEXOR XR) 150 MG extended release capsule  Edit      Summary: TAKE 1 CAPSULE BY MOUTH ONCE DAILY   Start: 10/26/2022  Ord/Sold: 1/13/2023 (O)        pravastatin (PRAVACHOL) 20 MG tablet  Edit       Summary: TAKE 1 TABLET BY MOUTH EVERY NIGHT   Start: 10/26/2022  Ord/Sold: 1/13/2023 (O)        hydroCHLOROthiazide (HYDRODIURIL) 25 MG tablet  Edit       Summary: TAKE 1 TABLET BY MOUTH ONCE DAILY   Start: 10/26/2022  Ord/Sold: 1/13/2023 (O)          Future Appointments   Date Time Provider [address] [place] [address]   8/1/2023 10:30 AM Desi Stahl MD [facility] BS AMB

## 2023-08-01 NOTE — TELEPHONE ENCOUNTER
PCP: Az Negron MD     butalbital-acetaminophen-caffeine (FIORICET, ESGIC) -19 MG per tablet 1 tablet, Oral, EVERY 6 HOURS PRN EditCancel Reorder      Summary: Take 1 tablet by mouth every 6 hours as needed for Headaches, Disp-60 tablet, R-1  Normal   Dose, Frequency: 1 tablet, EVERY 6 HOURS PRN  Start: 7/6/2023  End: 8/5/2023  Ord/Sold: 7/6/2023             Future Appointments   Date Time Provider [address] [place] [address]   8/1/2023 10:30 AM Az Negron MD [facility] BS AMB

## 2023-08-10 RX ORDER — TIZANIDINE 4 MG/1
4 TABLET ORAL EVERY EVENING
Qty: 90 TABLET | Refills: 1 | Status: SHIPPED | OUTPATIENT
Start: 2023-08-10

## 2023-08-10 NOTE — TELEPHONE ENCOUNTER
PCP: Landry Win MD    Last refill per chart:  tiZANidine (ZANAFLEX) 4 MG tablet 4 mg, Oral, EVERY EVENING Edit       Summary: Take 1 tablet by mouth every evening   Dose, Frequency: 4 mg, EVERY EVENING  Start: 8/15/2022  Ord/Sold: 1/13/2023 (O)  Report  Taking:   Long-term:   Med Dose History       Patient Sig: Take 1 tablet by mouth every evening       Ordered on: 1/13/2023          Future Appointments   Date Time Provider [address] [place] [address] Ct   8/17/2023  1:00 PM HBV CT RM 1 [facility] [place]   8/31/2023  1:50 PM [facility] LAB VISIT [facility] BS AMB   9/7/2023  2:30 PM Landry Win MD [facility] BS AMB

## 2023-08-21 ENCOUNTER — HOSPITAL ENCOUNTER (OUTPATIENT)
Facility: HOSPITAL | Age: 63
Discharge: HOME | End: 2023-08-24
Attending: INTERNAL MEDICINE
Payer: COMMERCIAL

## 2023-08-21 DIAGNOSIS — Z87.891 PERSONAL HISTORY OF TOBACCO USE: ICD-10-CM

## 2023-08-21 PROCEDURE — 71271 CT THORAX LUNG CANCER SCR C-: CPT

## 2023-08-24 DIAGNOSIS — R51.9 CHRONIC DAILY HEADACHE: ICD-10-CM

## 2023-08-25 RX ORDER — BUTALBITAL, ACETAMINOPHEN AND CAFFEINE 50; 325; 40 MG/1; MG/1; MG/1
1 TABLET ORAL EVERY 6 HOURS PRN
Qty: 60 TABLET | Refills: 0 | Status: SHIPPED | OUTPATIENT
Start: 2023-08-25 | End: 2023-09-17 | Stop reason: SDUPTHER

## 2023-08-28 ENCOUNTER — TELEPHONE (OUTPATIENT)
Age: 63
End: 2023-08-28

## 2023-08-28 ENCOUNTER — CLINICAL DOCUMENTATION (OUTPATIENT)
Age: 63
End: 2023-08-28

## 2023-08-28 NOTE — TELEPHONE ENCOUNTER
Spoke with patient. Discussed finding on screening chest CT scan of a suspicious appearing 10 x 7 mm solid right upper lobe nodule. Informed patient that I had reached out to Dr. Lito Gomes through [address] [place] [address] for her recommendations, and she advised that the patient should follow-up with one of the pulmonologists in her office regarding further evaluation of nodule. Advised patient that pulmonary will be reaching out to schedule an appointment. Answered all questions.

## 2023-08-28 NOTE — PROGRESS NOTES
Lf  for pt to call to see if pt can come in on Wed 8/30 with Dr. Ken Hernandez at 130 per Dr Giancarlo Monson for new pulm nodule-pt was seeing Dr Paolo Ness for sleep. 8/30 at 130 blocked.

## 2023-08-28 NOTE — TELEPHONE ENCOUNTER
CT Result (most recent):  CT LUNG SCREENING 08/21/2023    Narrative  EXAM:  CT CHEST WITHOUT CONTRAST    INDICATION: Lung CT screening study requested as patient needs established age  criteria, smoking history requirements, or additional risk factor eligibility  requirementsCT screening study requested as patient meets established age  criteria, smoking history requirements, or additional risk factor at which  ability requirements (radon exposure, occupational exposure, appropriate cancer  history, family history of lung cancer, COPD, and pulmonary fibrosis) or lung CT  screening study pulmonary nodule followup per protocol    TECHNIQUE: Low-dose computed tomography acquisition performed according to the  national comprehensive cancer network guidelines space on body mass index. Axial  raw images obtained. Reconstruction on lung windows and soft tissue windows with  additional coronal and sagittally reformatted series. No intravenous contrast  administered for this exam.    All CT scans at this facility are performed using dose optimization technique as  appropriate to a performed exam, to include automated exposure control,  adjustment of the mA and/or kV according to patient size (including appropriate  matching first site-specific examinations), or use of iterative reconstruction  technique. COMPARISON: None. Foye Martinez CONTRAST: None. FINDINGS:    There is a 10 x 7 mm right upper lobe nodule on image 60. There is no other  suspicious pulmonary nodule. .    There is no pleural effusion or pneumothorax. The aorta has a normal contour with no evidence of aneurysm. No mediastinal or  hilar or axillary adenopathy is appreciated. The bones and soft tissues of the  chest wall are within normal limits. . The visualized portions of the upper  abdominal organs are normal.    Impression  10 x 7 mm solid right upper lobe nodule which is suspicious. Lung-RADS Category: 4A. Suspicious.   Management recommendation: 3 month

## 2023-08-29 PROBLEM — J30.1 ALLERGIC RHINITIS DUE TO POLLEN: Status: ACTIVE | Noted: 2023-03-17

## 2023-08-29 PROBLEM — J30.89 PERENNIAL ALLERGIC RHINITIS WITH SEASONAL VARIATION: Status: ACTIVE | Noted: 2023-03-17

## 2023-08-29 PROBLEM — J30.2 PERENNIAL ALLERGIC RHINITIS WITH SEASONAL VARIATION: Status: ACTIVE | Noted: 2023-03-17

## 2023-08-30 ENCOUNTER — OFFICE VISIT (OUTPATIENT)
Age: 63
End: 2023-08-30
Payer: COMMERCIAL

## 2023-08-30 VITALS
WEIGHT: 140.8 LBS | SYSTOLIC BLOOD PRESSURE: 121 MMHG | BODY MASS INDEX: 24.95 KG/M2 | DIASTOLIC BLOOD PRESSURE: 57 MMHG | TEMPERATURE: 99.2 F | OXYGEN SATURATION: 97 % | HEART RATE: 71 BPM | HEIGHT: 63 IN | RESPIRATION RATE: 16 BRPM

## 2023-08-30 DIAGNOSIS — G47.33 OSA ON CPAP: ICD-10-CM

## 2023-08-30 DIAGNOSIS — R91.1 LUNG NODULE: Primary | ICD-10-CM

## 2023-08-30 DIAGNOSIS — Z99.89 OSA ON CPAP: ICD-10-CM

## 2023-08-30 PROCEDURE — 3078F DIAST BP <80 MM HG: CPT | Performed by: INTERNAL MEDICINE

## 2023-08-30 PROCEDURE — 3074F SYST BP LT 130 MM HG: CPT | Performed by: INTERNAL MEDICINE

## 2023-08-30 PROCEDURE — 99204 OFFICE O/P NEW MOD 45 MIN: CPT | Performed by: INTERNAL MEDICINE

## 2023-08-30 ASSESSMENT — PATIENT HEALTH QUESTIONNAIRE - PHQ9
4. FEELING TIRED OR HAVING LITTLE ENERGY: 1
2. FEELING DOWN, DEPRESSED OR HOPELESS: 0
7. TROUBLE CONCENTRATING ON THINGS, SUCH AS READING THE NEWSPAPER OR WATCHING TELEVISION: 0
SUM OF ALL RESPONSES TO PHQ QUESTIONS 1-9: 1
SUM OF ALL RESPONSES TO PHQ QUESTIONS 1-9: 1
9. THOUGHTS THAT YOU WOULD BE BETTER OFF DEAD, OR OF HURTING YOURSELF: 0
6. FEELING BAD ABOUT YOURSELF - OR THAT YOU ARE A FAILURE OR HAVE LET YOURSELF OR YOUR FAMILY DOWN: 0
10. IF YOU CHECKED OFF ANY PROBLEMS, HOW DIFFICULT HAVE THESE PROBLEMS MADE IT FOR YOU TO DO YOUR WORK, TAKE CARE OF THINGS AT HOME, OR GET ALONG WITH OTHER PEOPLE: 0
SUM OF ALL RESPONSES TO PHQ QUESTIONS 1-9: 1
8. MOVING OR SPEAKING SO SLOWLY THAT OTHER PEOPLE COULD HAVE NOTICED. OR THE OPPOSITE, BEING SO FIGETY OR RESTLESS THAT YOU HAVE BEEN MOVING AROUND A LOT MORE THAN USUAL: 0
1. LITTLE INTEREST OR PLEASURE IN DOING THINGS: 0
5. POOR APPETITE OR OVEREATING: 0
SUM OF ALL RESPONSES TO PHQ9 QUESTIONS 1 & 2: 0
SUM OF ALL RESPONSES TO PHQ QUESTIONS 1-9: 1
3. TROUBLE FALLING OR STAYING ASLEEP: 0

## 2023-08-30 ASSESSMENT — SLEEP AND FATIGUE QUESTIONNAIRES
HOW LIKELY ARE YOU TO NOD OFF OR FALL ASLEEP WHILE SITTING AND READING: 0
HOW LIKELY ARE YOU TO NOD OFF OR FALL ASLEEP WHILE SITTING AND TALKING TO SOMEONE: 0
ESS TOTAL SCORE: 0
HOW LIKELY ARE YOU TO NOD OFF OR FALL ASLEEP WHILE LYING DOWN TO REST IN THE AFTERNOON WHEN CIRCUMSTANCES PERMIT: 0
HOW LIKELY ARE YOU TO NOD OFF OR FALL ASLEEP WHILE WATCHING TV: 0
HOW LIKELY ARE YOU TO NOD OFF OR FALL ASLEEP WHILE SITTING INACTIVE IN A PUBLIC PLACE: 0
HOW LIKELY ARE YOU TO NOD OFF OR FALL ASLEEP WHILE SITTING QUIETLY AFTER LUNCH WITHOUT ALCOHOL: 0
HOW LIKELY ARE YOU TO NOD OFF OR FALL ASLEEP IN A CAR, WHILE STOPPED FOR A FEW MINUTES IN TRAFFIC: 0
HOW LIKELY ARE YOU TO NOD OFF OR FALL ASLEEP WHEN YOU ARE A PASSENGER IN A CAR FOR AN HOUR WITHOUT A BREAK: 0

## 2023-08-30 NOTE — PATIENT INSTRUCTIONS
What is the plan?  -Complete CAT/CT scan in November, 2023  -I have placed an order for a CPAP machine for you, please start using and discuss with Dr. Dylon Laughlin (Sleep) in September, 2023.

## 2023-08-31 DIAGNOSIS — D64.9 ANEMIA, UNSPECIFIED TYPE: ICD-10-CM

## 2023-08-31 DIAGNOSIS — I10 PRIMARY HYPERTENSION: ICD-10-CM

## 2023-09-01 ENCOUNTER — TELEPHONE (OUTPATIENT)
Age: 63
End: 2023-09-01

## 2023-09-01 LAB
BASOPHILS # BLD AUTO: 0 X10E3/UL (ref 0–0.2)
BASOPHILS NFR BLD AUTO: 1 %
BUN SERPL-MCNC: 25 MG/DL (ref 8–27)
BUN/CREAT SERPL: 32 (ref 12–28)
CALCIUM SERPL-MCNC: 9.8 MG/DL (ref 8.7–10.3)
CHLORIDE SERPL-SCNC: 98 MMOL/L (ref 96–106)
CO2 SERPL-SCNC: 28 MMOL/L (ref 20–29)
CREAT SERPL-MCNC: 0.79 MG/DL (ref 0.57–1)
EGFRCR SERPLBLD CKD-EPI 2021: 83 ML/MIN/1.73
EOSINOPHIL # BLD AUTO: 0.2 X10E3/UL (ref 0–0.4)
EOSINOPHIL NFR BLD AUTO: 2 %
ERYTHROCYTE [DISTWIDTH] IN BLOOD BY AUTOMATED COUNT: 14.2 % (ref 11.7–15.4)
FERRITIN SERPL-MCNC: 22 NG/ML (ref 15–150)
FOLATE SERPL-MCNC: >20 NG/ML
GLUCOSE SERPL-MCNC: 108 MG/DL (ref 70–99)
HCT VFR BLD AUTO: 37.1 % (ref 34–46.6)
HGB BLD-MCNC: 11.8 G/DL (ref 11.1–15.9)
IMM GRANULOCYTES # BLD AUTO: 0 X10E3/UL (ref 0–0.1)
IMM GRANULOCYTES NFR BLD AUTO: 0 %
IRON SATN MFR SERPL: 17 % (ref 15–55)
IRON SERPL-MCNC: 59 UG/DL (ref 27–139)
LYMPHOCYTES # BLD AUTO: 3.4 X10E3/UL (ref 0.7–3.1)
LYMPHOCYTES NFR BLD AUTO: 51 %
MCH RBC QN AUTO: 25.8 PG (ref 26.6–33)
MCHC RBC AUTO-ENTMCNC: 31.8 G/DL (ref 31.5–35.7)
MCV RBC AUTO: 81 FL (ref 79–97)
MONOCYTES # BLD AUTO: 0.4 X10E3/UL (ref 0.1–0.9)
MONOCYTES NFR BLD AUTO: 6 %
NEUTROPHILS # BLD AUTO: 2.6 X10E3/UL (ref 1.4–7)
NEUTROPHILS NFR BLD AUTO: 40 %
PLATELET # BLD AUTO: 329 X10E3/UL (ref 150–450)
POTASSIUM SERPL-SCNC: 3.3 MMOL/L (ref 3.5–5.2)
RBC # BLD AUTO: 4.58 X10E6/UL (ref 3.77–5.28)
SODIUM SERPL-SCNC: 141 MMOL/L (ref 134–144)
TIBC SERPL-MCNC: 351 UG/DL (ref 250–450)
UIBC SERPL-MCNC: 292 UG/DL (ref 118–369)
VIT B12 SERPL-MCNC: 896 PG/ML (ref 232–1245)
WBC # BLD AUTO: 6.6 X10E3/UL (ref 3.4–10.8)

## 2023-09-01 RX ORDER — POTASSIUM CHLORIDE 20 MEQ/1
TABLET, EXTENDED RELEASE ORAL
Qty: 90 TABLET | Refills: 1 | Status: SHIPPED | OUTPATIENT
Start: 2023-09-01

## 2023-09-01 NOTE — TELEPHONE ENCOUNTER
Lab Results   Component Value Date/Time     08/31/2023 01:50 PM    K 3.3 08/31/2023 01:50 PM    CL 98 08/31/2023 01:50 PM    CO2 28 08/31/2023 01:50 PM    BUN 25 08/31/2023 01:50 PM    CREATININE 0.79 08/31/2023 01:50 PM    GLUCOSE 108 08/31/2023 01:50 PM    CALCIUM 9.8 08/31/2023 01:50 PM    LABGLOM 83 08/31/2023 01:50 PM      Please let the patient know that her potassium level was low on her previsit labs, likely due to treatment with hydrochlorothiazide. A prescription for Klor-Con 20 mEq has been sent to [facility] [place] [address] [facility]. She should begin 2 tablets x 1 dose and then start 20 mEq daily.

## 2023-09-05 LAB
ALBUMIN SERPL ELPH-MCNC: 3.6 G/DL (ref 2.9–4.4)
ALBUMIN/GLOB SERPL: 1.2 {RATIO} (ref 0.7–1.7)
ALPHA1 GLOB SERPL ELPH-MCNC: 0.3 G/DL (ref 0–0.4)
ALPHA2 GLOB SERPL ELPH-MCNC: 0.7 G/DL (ref 0.4–1)
B-GLOBULIN SERPL ELPH-MCNC: 1.3 G/DL (ref 0.7–1.3)
GAMMA GLOB SERPL ELPH-MCNC: 0.8 G/DL (ref 0.4–1.8)
GLOBULIN SER-MCNC: 3.1 G/DL (ref 2.2–3.9)
IGA SERPL-MCNC: 176 MG/DL (ref 87–352)
IGG SERPL-MCNC: 839 MG/DL (ref 586–1602)
IGM SERPL-MCNC: 255 MG/DL (ref 26–217)
INTERPRETATION SERPL IEP-IMP: ABNORMAL
KAPPA LC FREE SER-MCNC: 16.8 MG/L (ref 3.3–19.4)
KAPPA LC FREE/LAMBDA FREE SER: 0.86 {RATIO} (ref 0.26–1.65)
LABORATORY COMMENT REPORT: ABNORMAL
LAMBDA LC FREE SERPL-MCNC: 19.5 MG/L (ref 5.7–26.3)
M PROTEIN SERPL ELPH-MCNC: ABNORMAL G/DL
PROT SERPL-MCNC: 6.7 G/DL (ref 6–8.5)

## 2023-09-06 ENCOUNTER — OFFICE VISIT (OUTPATIENT)
Age: 63
End: 2023-09-06
Payer: COMMERCIAL

## 2023-09-06 VITALS
DIASTOLIC BLOOD PRESSURE: 80 MMHG | SYSTOLIC BLOOD PRESSURE: 142 MMHG | BODY MASS INDEX: 24.27 KG/M2 | TEMPERATURE: 97.1 F | HEART RATE: 82 BPM | HEIGHT: 63 IN | WEIGHT: 137 LBS | RESPIRATION RATE: 16 BRPM | OXYGEN SATURATION: 98 %

## 2023-09-06 DIAGNOSIS — D47.2 IGM LAMBDA MONOCLONAL GAMMOPATHY: ICD-10-CM

## 2023-09-06 DIAGNOSIS — G47.33 OBSTRUCTIVE SLEEP APNEA: ICD-10-CM

## 2023-09-06 DIAGNOSIS — I10 PRIMARY HYPERTENSION: Primary | ICD-10-CM

## 2023-09-06 DIAGNOSIS — K92.1 HEMATOCHEZIA: ICD-10-CM

## 2023-09-06 DIAGNOSIS — Z87.19 HISTORY OF COLITIS: ICD-10-CM

## 2023-09-06 DIAGNOSIS — J30.2 PERENNIAL ALLERGIC RHINITIS WITH SEASONAL VARIATION: ICD-10-CM

## 2023-09-06 DIAGNOSIS — E61.1 IRON DEFICIENCY: ICD-10-CM

## 2023-09-06 DIAGNOSIS — J30.89 PERENNIAL ALLERGIC RHINITIS WITH SEASONAL VARIATION: ICD-10-CM

## 2023-09-06 DIAGNOSIS — R63.4 WEIGHT LOSS, ABNORMAL: ICD-10-CM

## 2023-09-06 DIAGNOSIS — K58.9 IRRITABLE BOWEL SYNDROME, UNSPECIFIED TYPE: ICD-10-CM

## 2023-09-06 DIAGNOSIS — R91.1 RIGHT UPPER LOBE PULMONARY NODULE: ICD-10-CM

## 2023-09-06 DIAGNOSIS — R19.4 CHANGE IN BOWEL HABITS: ICD-10-CM

## 2023-09-06 PROCEDURE — 3078F DIAST BP <80 MM HG: CPT | Performed by: INTERNAL MEDICINE

## 2023-09-06 PROCEDURE — 3074F SYST BP LT 130 MM HG: CPT | Performed by: INTERNAL MEDICINE

## 2023-09-06 PROCEDURE — 99215 OFFICE O/P EST HI 40 MIN: CPT | Performed by: INTERNAL MEDICINE

## 2023-09-06 RX ORDER — METOPROLOL SUCCINATE 50 MG/1
50 TABLET, EXTENDED RELEASE ORAL DAILY
Qty: 90 TABLET | Refills: 1 | Status: SHIPPED | OUTPATIENT
Start: 2023-09-06

## 2023-09-06 NOTE — PROGRESS NOTES
Jagdeep Pete presents today for   Chief Complaint   Patient presents with    Hypertension    Follow-up     4 week follow up       1. \"Have you been to the ER, urgent care clinic since your last visit? Hospitalized since your last visit? \" no    2. \"Have you seen or consulted any other health care providers outside of the [address] [place] [address] since your last visit? \" no     3. For patients aged 43-73: Has the patient had a colonoscopy / FIT/ Cologuard? Yes - no Care Gap present      If the patient is female:    4. For patients aged 43-66: Has the patient had a mammogram within the past 2 years? Yes - no Care Gap present      5. For patients aged 21-65: Has the patient had a pap smear? Yes - Care Gap present.  Most recent result on file

## 2023-09-07 NOTE — PROGRESS NOTES
HPI:   Erickson Sexton is a 57y.o. year old female who presents today for a follow-up visit. She has a history of hypertension, hyperlipidemia, chronic daily headaches, obstructive sleep apnea, allergic rhinitis, vasomotor instability, and irritable bowel syndrome. She has completed the [place] COVID-19 vaccine series and received one Pfizer booster dose and the updated bivalent booster dose. She reports that she is doing reasonably well. On 8/21/2023, she underwent a low-dose screening chest CT scan given her prior smoking history, and it showed a 10 x 7 mm solid right upper lobe nodule which appeared suspicious and with categorized as lung RADS 4A. She was referred to Dr. Vimal Michelle and recommendation was to undergo a repeat chest CT scan in 3 months to reassess. She had previously established with Dr. Rose Berg for her obstructive sleep apnea and underwent a home sleep study (SNAP) on 7/20/2023. Given Dr. Julianna Rivera shelter from her practice, she was referred to follow-up with Dr. Mikaela Gutierrez but has not scheduled an appointment. Dr. Vimal Michelle reviewed her sleep study with her with recommendations to proceed with with APAP 9/18cwp, EPR/Flex 2, and he placed the order for her to receive new equipment while awaiting her appointment with Dr. Mikaela Gutierrez. She was found to have elevated blood pressure at her last visit on 8/1/2023, and she was started on metoprolol succinate 25 mg daily. She reports today that her blood pressure remains elevated with systolic readings ranging 145-160 mmHg. She states that she wonders if her blood pressure difficulties may be related to not using CPAP for approximately 1 year due to her malfunctioning equipment. She also reports that over the last month, she has developed difficulty with a change in her bowel habits. She states that she has experienced several episodes of severe abdominal cramping pain with hematochezia.   She states that she initially noted blood with her bowel

## 2023-09-10 PROBLEM — R91.1 RIGHT UPPER LOBE PULMONARY NODULE: Status: ACTIVE | Noted: 2023-09-10

## 2023-09-17 DIAGNOSIS — R51.9 CHRONIC DAILY HEADACHE: ICD-10-CM

## 2023-09-18 ENCOUNTER — TELEPHONE (OUTPATIENT)
Age: 63
End: 2023-09-18

## 2023-09-18 NOTE — TELEPHONE ENCOUNTER
Please advise that it would be fine to wait until October to see Dr. Saritha Muñiz in Dr. Lulú Charles. Glad she was able to get those appointments.

## 2023-09-18 NOTE — TELEPHONE ENCOUNTER
----- Message from Anisa Lala. Thea Stein sent at 9/17/2023 10:18 AM EDT -----  Regarding: Referral appointments  Contact: [phone]  Dear Dr Laisha Gaitan,  I never heard from Dr Jony Benavides and Dr Lio Arnold office to make an appointment. I was advised to call their offices. The earliest I can get is October 24th with Dr Braulio Mccoy and October 23rd with Dr Fabio Garcia. I just want to make sure that is not too long to wait for an appointment.     Sincerely,  Kassie Canavan  [phone]

## 2023-09-19 RX ORDER — BUTALBITAL, ACETAMINOPHEN AND CAFFEINE 50; 325; 40 MG/1; MG/1; MG/1
1 TABLET ORAL EVERY 6 HOURS PRN
Qty: 60 TABLET | Refills: 0 | Status: SHIPPED | OUTPATIENT
Start: 2023-09-19 | End: 2023-10-19

## 2023-09-19 NOTE — TELEPHONE ENCOUNTER
PCP: Marni Walton MD    Last refill per chart:  butalbital-acetaminophen-caffeine (FIORICET, ESGIC) -40 MG per tablet 1 tablet, Oral, EVERY 6 HOURS PRN EditCancel Reorder       Summary: Take 1 tablet by mouth every 6 hours as needed for Headaches, Disp-60 tablet, R-0  Normal   Dose, Frequency: 1 tablet, EVERY 6 HOURS PRN  Start: 2023  End: 2023  Ord/Sold: 2023 (O)  Report  Taking:   Long-term:   Pharmacy: [address] [place] [address] #[id number] - [facility], [address] [place] [address] [phone]  Med Dose History       Patient Sig: Take 1 tablet by mouth every 6 hours as needed for Headaches       Ordered on: 2023       Authorized by: Brittni Winchesterorin tablet       Refills: 0 ordered          Future Appointments   Date Time Provider [address] [place] [address]   10/5/2023 11:30 AM Marni Walton MD [facility] BS AMB

## 2023-10-03 RX ORDER — AMLODIPINE BESYLATE 10 MG/1
10 TABLET ORAL DAILY
Qty: 90 TABLET | Refills: 3 | Status: SHIPPED | OUTPATIENT
Start: 2023-10-03

## 2023-10-03 NOTE — TELEPHONE ENCOUNTER
PCP: Tomeka Castellanos MD    LAST REFILL PER CHART:  amLODIPine (NORVASC) 10 MG tablet 10 mg, Oral, DAILY Edit       Summary: Take 1 tablet by mouth daily   Dose, Frequency: 10 mg, DAILY  Start: 9/26/2022  Ord/Sold: 1/13/2023 (O)  Report  Taking:   Long-term:   Med Dose History       Patient Sig: Take 1 tablet by mouth daily       Ordered on: 1/13/2023          Future Appointments   Date Time Provider [address] [place] [address]   10/5/2023 11:30 AM Tomeka Castellanos MD [facility] BS AMB

## 2023-10-05 ENCOUNTER — OFFICE VISIT (OUTPATIENT)
Age: 63
End: 2023-10-05

## 2023-10-05 VITALS
DIASTOLIC BLOOD PRESSURE: 76 MMHG | TEMPERATURE: 98.2 F | BODY MASS INDEX: 24.98 KG/M2 | SYSTOLIC BLOOD PRESSURE: 138 MMHG | RESPIRATION RATE: 16 BRPM | WEIGHT: 141 LBS | HEART RATE: 60 BPM | OXYGEN SATURATION: 98 % | HEIGHT: 63 IN

## 2023-10-05 DIAGNOSIS — E87.6 HYPOKALEMIA: ICD-10-CM

## 2023-10-05 DIAGNOSIS — D47.2 IGM LAMBDA MONOCLONAL GAMMOPATHY: ICD-10-CM

## 2023-10-05 DIAGNOSIS — K92.1 HEMATOCHEZIA: ICD-10-CM

## 2023-10-05 DIAGNOSIS — R63.4 WEIGHT LOSS, ABNORMAL: ICD-10-CM

## 2023-10-05 DIAGNOSIS — Z00.00 LABORATORY TESTS ORDERED AS PART OF A COMPLETE PHYSICAL EXAM (CPE): ICD-10-CM

## 2023-10-05 DIAGNOSIS — G47.33 OBSTRUCTIVE SLEEP APNEA: ICD-10-CM

## 2023-10-05 DIAGNOSIS — R91.1 RIGHT UPPER LOBE PULMONARY NODULE: ICD-10-CM

## 2023-10-05 DIAGNOSIS — E55.9 VITAMIN D DEFICIENCY, UNSPECIFIED: ICD-10-CM

## 2023-10-05 DIAGNOSIS — D50.9 IRON DEFICIENCY ANEMIA, UNSPECIFIED IRON DEFICIENCY ANEMIA TYPE: ICD-10-CM

## 2023-10-05 DIAGNOSIS — E78.2 MIXED HYPERLIPIDEMIA: ICD-10-CM

## 2023-10-05 DIAGNOSIS — Z23 NEEDS FLU SHOT: ICD-10-CM

## 2023-10-05 DIAGNOSIS — Z87.891 FORMER SMOKER: ICD-10-CM

## 2023-10-05 DIAGNOSIS — I10 PRIMARY HYPERTENSION: Primary | ICD-10-CM

## 2023-10-06 RX ORDER — AMLODIPINE BESYLATE 10 MG/1
10 TABLET ORAL DAILY
Qty: 90 TABLET | Refills: 3 | OUTPATIENT
Start: 2023-10-06

## 2023-10-06 NOTE — PROGRESS NOTES
Luz Naranjo presents today for   Chief Complaint   Patient presents with    1 Month Follow-Up       1. \"Have you been to the ER, urgent care clinic since your last visit? Hospitalized since your last visit? \" no    2. \"Have you seen or consulted any other health care providers outside of the [address] [place] [address] since your last visit? \" no     3. For patients aged 43-73: Has the patient had a colonoscopy / FIT/ Cologuard? Yes - no Care Gap present      If the patient is female:    4. For patients aged 43-66: Has the patient had a mammogram within the past 2 years? Yes - no Care Gap present      5. For patients aged 21-65: Has the patient had a pap smear? Yes - Care Gap present.  Most recent result on file
monoclonal protein with lambda light chain specificity. Gammopathy panel obtained to evaluate new anemia in 7/2023 and showed elevation of IgM levels but inability to detect M spike due to co-migration of other proteins. Ever, immunofixation identified an IgM monoclonal protein with lambda light chain specificity. Discussed need for hematology referral to evaluate IgM lambda light chain monoclonal gammopathy and referred to Dr. Druscilla Boas. Reports appointment scheduled on 10/24/2023.  8. History of abdominal pain/angioedema. Unclear etiology, but nonspecific bowel wall thickening in short segment of jejunum and sigmoid on repeat CT scan in 4/2017 raised question of ischemic in origin. Hypercoaguable workup including lab studies and echocardiogram negative. Reported intermittent episodes associated with facial flushing, dry mouth, and severe abdominal cramping followed by diarrhea +/- vomiting. Episodes lingered for several days following onset. No evidence of urticaria seen. Abnormalities noted on abdominal CT scan showed nonspecific short segment thickening in various locations in small and large intestines. Obtained complement studies including C4, C1 esterase inhibtor, and C1q levels which were normal. Recommended evaluating C4 level during an episode. Evaluated by GI and had negative small bowel series and duplex ultrasound in 8/2018, and 24 hour urine for 5-HIAA level also negative. Discontinued captopril in 6/2018 and has not had a recurrence since discontinuation until episodes occurring over the last month as discussed. Unclear if prior episodes were secondary to angioedema related to ACE inhibitor use given improvement after discontinuation. Will continue to monitor. 9.  Reactive depression. Restarted on venlafaxine SR in 12/2020 and reports continued good control of symptoms currently on 150 mg daily. Will continue to follow. 10. Osteopenia.  Newly diagnosed on baseline bone density study obtained on

## 2023-10-08 PROBLEM — D47.2 IGM LAMBDA MONOCLONAL GAMMOPATHY: Status: ACTIVE | Noted: 2023-10-08

## 2023-10-08 PROBLEM — K92.1 HEMATOCHEZIA: Status: ACTIVE | Noted: 2023-10-08

## 2023-10-08 PROBLEM — D50.9 IRON DEFICIENCY ANEMIA: Status: ACTIVE | Noted: 2023-10-08

## 2023-10-16 DIAGNOSIS — R51.9 CHRONIC DAILY HEADACHE: ICD-10-CM

## 2023-10-17 RX ORDER — BUTALBITAL, ACETAMINOPHEN AND CAFFEINE 50; 325; 40 MG/1; MG/1; MG/1
1 TABLET ORAL EVERY 6 HOURS PRN
Qty: 60 TABLET | Refills: 0 | Status: SHIPPED | OUTPATIENT
Start: 2023-10-17 | End: 2023-11-16

## 2023-10-17 NOTE — TELEPHONE ENCOUNTER
PCP: Kelvin Power MD    LAST REFILL PER CHART:  butalbital-acetaminophen-caffeine (FIORICET, ESGIC) -40 MG per tablet         Summary: Take 1 tablet by mouth every 6 hours as needed for Headaches, Disp-60 tablet, R-0  Dose, Frequency: 1 tablet, EVERY 6 HOURS PRN  Start: 9/19/2023  End: 10/19/2023  Pharmacy: [address] [place] [address] #[id number] - [address] [phone]  Med Dose History       Patient Sig: Take 1 tablet by mouth every 6 hours as needed for Headaches       Ordered on: 9/19/2023       Authorized by: Emily Sanders: 60 tablet       Refills: 0 ordered          Future Appointments   Date Time Provider [address] [place] [address]   11/15/2023  9:00 AM [facility] LAB VISIT [facility] BS AMB   11/22/2023 11:30 AM Kelvin Power MD [facility] BS AMB

## 2023-10-24 ENCOUNTER — HOSPITAL ENCOUNTER (OUTPATIENT)
Facility: HOSPITAL | Age: 63
Discharge: HOME | End: 2023-10-27
Payer: COMMERCIAL

## 2023-10-24 DIAGNOSIS — D47.2 MONOCLONAL GAMMOPATHY: ICD-10-CM

## 2023-10-24 PROCEDURE — 77075 RADEX OSSEOUS SURVEY COMPL: CPT

## 2023-11-07 DIAGNOSIS — R51.9 CHRONIC DAILY HEADACHE: ICD-10-CM

## 2023-11-08 RX ORDER — BUTALBITAL, ACETAMINOPHEN AND CAFFEINE 50; 325; 40 MG/1; MG/1; MG/1
1 TABLET ORAL EVERY 6 HOURS PRN
Qty: 60 TABLET | Refills: 0 | Status: SHIPPED | OUTPATIENT
Start: 2023-11-08 | End: 2023-12-08

## 2023-11-08 NOTE — TELEPHONE ENCOUNTER
PCP: Arlie Bence, MD    LAST REFILL PER CHART:   butalbital-acetaminophen-caffeine (FIORICET, ESGIC) -40 MG per tablet        Summary: Take 1 tablet by mouth every 6 hours as needed for Headaches, Disp-60 tablet, R-0  Dose, Frequency: 1 tablet, EVERY 6 HOURS PRN  Start: 10/17/2023  End: 11/16/2023  Pharmacy: [facility] #[id number] - 1 [address] [phone]  Med Dose History       Patient Sig: Take 1 tablet by mouth every 6 hours as needed for Headaches       Ordered on: 10/17/2023       Authorized by: Giovanna Negron: 60 tablet       Refills: 0 ordered          Future Appointments   Date Time Provider [address] [place] [address] Ct   11/15/2023  9:00 AM [facility] LAB VISIT [facility] BS AMB   11/17/2023 10:00 AM [facility] CT RM 1 [facility] [place]   11/22/2023 11:30 AM Arlie Bence, MD [facility] BS AMB   12/15/2023  9:15 AM Ketan Kitchen DO [facility] BS AMB   1/31/2024 11:10 AM Freddyshamar Crane DO [facility] BS AMB

## 2023-11-15 ENCOUNTER — HOSPITAL ENCOUNTER (OUTPATIENT)
Facility: HOSPITAL | Age: 63
Setting detail: SPECIMEN
Discharge: HOME | End: 2023-11-18
Payer: COMMERCIAL

## 2023-11-15 DIAGNOSIS — D50.9 IRON DEFICIENCY ANEMIA, UNSPECIFIED IRON DEFICIENCY ANEMIA TYPE: ICD-10-CM

## 2023-11-15 DIAGNOSIS — E55.9 VITAMIN D DEFICIENCY, UNSPECIFIED: ICD-10-CM

## 2023-11-15 DIAGNOSIS — E87.6 HYPOKALEMIA: ICD-10-CM

## 2023-11-15 DIAGNOSIS — D47.2 IGM LAMBDA MONOCLONAL GAMMOPATHY: ICD-10-CM

## 2023-11-15 DIAGNOSIS — Z00.00 LABORATORY TESTS ORDERED AS PART OF A COMPLETE PHYSICAL EXAM (CPE): ICD-10-CM

## 2023-11-15 LAB
25(OH)D3 SERPL-MCNC: 54.1 NG/ML (ref 30–100)
ALBUMIN SERPL-MCNC: 3.7 G/DL (ref 3.4–5)
ALBUMIN/GLOB SERPL: 1.2 (ref 0.8–1.7)
ALP SERPL-CCNC: 114 U/L (ref 45–117)
ALT SERPL-CCNC: 43 U/L (ref 13–56)
ANION GAP SERPL CALC-SCNC: 4 MMOL/L (ref 3–18)
APPEARANCE UR: CLEAR
AST SERPL-CCNC: 33 U/L (ref 10–38)
BASOPHILS # BLD: 0 K/UL (ref 0–0.1)
BASOPHILS NFR BLD: 1 % (ref 0–2)
BILIRUB SERPL-MCNC: 0.1 MG/DL (ref 0.2–1)
BILIRUB UR QL: NEGATIVE
BUN SERPL-MCNC: 29 MG/DL (ref 7–18)
BUN/CREAT SERPL: 45 (ref 12–20)
CALCIUM SERPL-MCNC: 9.2 MG/DL (ref 8.5–10.1)
CHLORIDE SERPL-SCNC: 105 MMOL/L (ref 100–111)
CHOLEST SERPL-MCNC: 157 MG/DL
CO2 SERPL-SCNC: 31 MMOL/L (ref 21–32)
COLOR UR: ABNORMAL
CREAT SERPL-MCNC: 0.65 MG/DL (ref 0.6–1.3)
DIFFERENTIAL METHOD BLD: ABNORMAL
EOSINOPHIL # BLD: 0.2 K/UL (ref 0–0.4)
EOSINOPHIL NFR BLD: 3 % (ref 0–5)
EPITH CASTS URNS QL MICRO: ABNORMAL /LPF (ref 0–5)
ERYTHROCYTE [DISTWIDTH] IN BLOOD BY AUTOMATED COUNT: 17.2 % (ref 11.6–14.5)
EST. AVERAGE GLUCOSE BLD GHB EST-MCNC: 108 MG/DL
FERRITIN SERPL-MCNC: 16 NG/ML (ref 8–388)
GLOBULIN SER CALC-MCNC: 3 G/DL (ref 2–4)
GLUCOSE SERPL-MCNC: 93 MG/DL (ref 74–99)
GLUCOSE UR STRIP.AUTO-MCNC: NEGATIVE MG/DL
HBA1C MFR BLD: 5.4 % (ref 4.2–5.6)
HCT VFR BLD AUTO: 39.4 % (ref 35–45)
HDLC SERPL-MCNC: 43 MG/DL (ref 40–60)
HDLC SERPL: 3.7 (ref 0–5)
HGB BLD-MCNC: 12.1 G/DL (ref 12–16)
HGB UR QL STRIP: NEGATIVE
IMM GRANULOCYTES # BLD AUTO: 0 K/UL (ref 0–0.04)
IMM GRANULOCYTES NFR BLD AUTO: 0 % (ref 0–0.5)
IRON SATN MFR SERPL: 21 % (ref 20–50)
IRON SERPL-MCNC: 77 UG/DL (ref 50–175)
KETONES UR QL STRIP.AUTO: NEGATIVE MG/DL
LDLC SERPL CALC-MCNC: 76 MG/DL (ref 0–100)
LEUKOCYTE ESTERASE UR QL STRIP.AUTO: NEGATIVE
LIPID PANEL: ABNORMAL
LYMPHOCYTES # BLD: 2.8 K/UL (ref 0.9–3.6)
LYMPHOCYTES NFR BLD: 46 % (ref 21–52)
MAGNESIUM SERPL-MCNC: 2.1 MG/DL (ref 1.6–2.6)
MCH RBC QN AUTO: 27.1 PG (ref 24–34)
MCHC RBC AUTO-ENTMCNC: 30.7 G/DL (ref 31–37)
MCV RBC AUTO: 88.3 FL (ref 78–100)
MONOCYTES # BLD: 0.6 K/UL (ref 0.05–1.2)
MONOCYTES NFR BLD: 11 % (ref 3–10)
MUCOUS THREADS URNS QL MICRO: ABNORMAL /LPF
NEUTS SEG # BLD: 2.4 K/UL (ref 1.8–8)
NEUTS SEG NFR BLD: 40 % (ref 40–73)
NITRITE UR QL STRIP.AUTO: NEGATIVE
NRBC # BLD: 0 K/UL (ref 0–0.01)
NRBC BLD-RTO: 0 PER 100 WBC
PH UR STRIP: 6 (ref 5–8)
PLATELET # BLD AUTO: 286 K/UL (ref 135–420)
PMV BLD AUTO: 10.3 FL (ref 9.2–11.8)
POTASSIUM SERPL-SCNC: 4 MMOL/L (ref 3.5–5.5)
PROT SERPL-MCNC: 6.7 G/DL (ref 6.4–8.2)
PROT UR STRIP-MCNC: NEGATIVE MG/DL
RBC # BLD AUTO: 4.46 M/UL (ref 4.2–5.3)
RBC #/AREA URNS HPF: ABNORMAL /HPF (ref 0–5)
SODIUM SERPL-SCNC: 140 MMOL/L (ref 136–145)
SP GR UR REFRACTOMETRY: 1.03 (ref 1–1.03)
TIBC SERPL-MCNC: 368 UG/DL (ref 250–450)
TRIGL SERPL-MCNC: 190 MG/DL
UROBILINOGEN UR QL STRIP.AUTO: 0.2 EU/DL (ref 0.2–1)
VLDLC SERPL CALC-MCNC: 38 MG/DL
WBC # BLD AUTO: 6.1 K/UL (ref 4.6–13.2)
WBC URNS QL MICRO: ABNORMAL /HPF (ref 0–4)

## 2023-11-15 PROCEDURE — 83735 ASSAY OF MAGNESIUM: CPT

## 2023-11-15 PROCEDURE — 80061 LIPID PANEL: CPT

## 2023-11-15 PROCEDURE — 36415 COLL VENOUS BLD VENIPUNCTURE: CPT

## 2023-11-15 PROCEDURE — 83540 ASSAY OF IRON: CPT

## 2023-11-15 PROCEDURE — 81001 URINALYSIS AUTO W/SCOPE: CPT

## 2023-11-15 PROCEDURE — 82306 VITAMIN D 25 HYDROXY: CPT

## 2023-11-15 PROCEDURE — 85025 COMPLETE CBC W/AUTO DIFF WBC: CPT

## 2023-11-15 PROCEDURE — 83036 HEMOGLOBIN GLYCOSYLATED A1C: CPT

## 2023-11-15 PROCEDURE — 83550 IRON BINDING TEST: CPT

## 2023-11-15 PROCEDURE — 82728 ASSAY OF FERRITIN: CPT

## 2023-11-15 PROCEDURE — 80053 COMPREHEN METABOLIC PANEL: CPT

## 2023-11-17 ENCOUNTER — HOSPITAL ENCOUNTER (OUTPATIENT)
Facility: HOSPITAL | Age: 63
Discharge: HOME | End: 2023-11-17
Attending: INTERNAL MEDICINE
Payer: COMMERCIAL

## 2023-11-17 DIAGNOSIS — R91.1 LUNG NODULE: ICD-10-CM

## 2023-11-17 PROCEDURE — 71250 CT THORAX DX C-: CPT

## 2023-11-22 ENCOUNTER — OFFICE VISIT (OUTPATIENT)
Age: 63
End: 2023-11-22

## 2023-11-22 VITALS
TEMPERATURE: 98.2 F | WEIGHT: 144 LBS | HEIGHT: 63 IN | OXYGEN SATURATION: 96 % | RESPIRATION RATE: 16 BRPM | SYSTOLIC BLOOD PRESSURE: 136 MMHG | DIASTOLIC BLOOD PRESSURE: 74 MMHG | HEART RATE: 61 BPM | BODY MASS INDEX: 25.52 KG/M2

## 2023-11-22 DIAGNOSIS — Z00.00 ENCOUNTER FOR ROUTINE HISTORY AND PHYSICAL EXAM IN FEMALE: Primary | ICD-10-CM

## 2023-11-22 DIAGNOSIS — E78.2 MIXED HYPERLIPIDEMIA: ICD-10-CM

## 2023-11-22 DIAGNOSIS — D47.2 IGM LAMBDA MONOCLONAL GAMMOPATHY: ICD-10-CM

## 2023-11-22 DIAGNOSIS — G47.33 OBSTRUCTIVE SLEEP APNEA: ICD-10-CM

## 2023-11-22 DIAGNOSIS — Z87.891 FORMER SMOKER: ICD-10-CM

## 2023-11-22 DIAGNOSIS — R91.1 RIGHT UPPER LOBE PULMONARY NODULE: ICD-10-CM

## 2023-11-22 DIAGNOSIS — M85.89 OSTEOPENIA OF MULTIPLE SITES: ICD-10-CM

## 2023-11-22 DIAGNOSIS — R73.01 IMPAIRED FASTING GLUCOSE: ICD-10-CM

## 2023-11-22 DIAGNOSIS — I10 PRIMARY HYPERTENSION: ICD-10-CM

## 2023-11-22 DIAGNOSIS — D50.9 IRON DEFICIENCY ANEMIA, UNSPECIFIED IRON DEFICIENCY ANEMIA TYPE: ICD-10-CM

## 2023-11-22 DIAGNOSIS — K58.1 IRRITABLE BOWEL SYNDROME WITH CONSTIPATION: ICD-10-CM

## 2023-11-22 DIAGNOSIS — J30.2 PERENNIAL ALLERGIC RHINITIS WITH SEASONAL VARIATION: ICD-10-CM

## 2023-11-22 DIAGNOSIS — J30.89 PERENNIAL ALLERGIC RHINITIS WITH SEASONAL VARIATION: ICD-10-CM

## 2023-11-22 NOTE — PROGRESS NOTES
Dhruv Banks presents today for   Chief Complaint   Patient presents with    6 week follow up       1. \"Have you been to the ER, urgent care clinic since your last visit? Hospitalized since your last visit? \" no    2. \"Have you seen or consulted any other health care providers outside of the [address] [place] [address] since your last visit? \" no     3. For patients aged 43-73: Has the patient had a colonoscopy / FIT/ Cologuard? Yes - no Care Gap present      If the patient is female:    4. For patients aged 43-66: Has the patient had a mammogram within the past 2 years? Yes - no Care Gap present      5. For patients aged 21-65: Has the patient had a pap smear? Yes - Care Gap present.  Most recent result on file

## 2023-11-22 NOTE — PROGRESS NOTES
HPI:   Farheen Bradley is a 57y.o. year old female who presents today for a physical exam. She has a history of hypertension, hyperlipidemia, chronic daily headaches, obstructive sleep apnea, allergic rhinitis, vasomotor instability, and irritable bowel syndrome. She reports that she is doing reasonably well. She reports that she is continuing to monitor her blood pressure at home following the addition of metoprolol succinate 50 mg daily. She states that readings have improved ranging from 125-130/70-80. She states that she believes that her blood pressure was elevated at her last visit since the pharmacy had refilled the 25 mg dose rather than giving her the 50 mg dose. She states that this has now been corrected. On 8/21/2023, she underwent a low-dose screening chest CT scan given her prior smoking history, and it showed a 10 x 7 mm solid right upper lobe nodule which appeared suspicious and with categorized as lung RADS 4A. She was referred to Dr. Donavan Hooper and recommendation was to undergo a repeat chest CT scan in 3 months to reassess. She had previously established with Dr. Gracy Jenkins for her obstructive sleep apnea and underwent a home sleep study (SNAP) on 7/20/2023. Given Dr. Marylene Sprung prison from her practice, she was referred to follow-up with Dr. Flaca Amaya but has not scheduled an appointment. Dr. Donavan Hooper reviewed her sleep study with her with recommendations to proceed with with APAP 9/18cwp, EPR/Flex 2, and he placed the order for her to receive new equipment while awaiting her appointment with Dr. Flaca Amaya. She reports today that she has started using her new equipment and been tolerating it reasonably well. She is now scheduled with Dr. Flaca Amaya on 1/31/2024.   She had a follow-up chest CT scan on 11/17/2023 which showed that the previous right upper lobe nodular lesion was less dense and solid and appears to be resolving, and the tiny subpleural lateral RUL nodule was stable; no new lesions

## 2023-11-26 PROBLEM — J30.1 ALLERGIC RHINITIS DUE TO POLLEN: Status: RESOLVED | Noted: 2023-03-17 | Resolved: 2023-11-26

## 2023-11-26 PROBLEM — K92.1 HEMATOCHEZIA: Status: RESOLVED | Noted: 2023-10-08 | Resolved: 2023-11-26

## 2023-11-29 DIAGNOSIS — R51.9 CHRONIC DAILY HEADACHE: ICD-10-CM

## 2023-11-29 RX ORDER — BUTALBITAL, ACETAMINOPHEN AND CAFFEINE 50; 325; 40 MG/1; MG/1; MG/1
1 TABLET ORAL EVERY 6 HOURS PRN
Qty: 60 TABLET | Refills: 0 | Status: SHIPPED | OUTPATIENT
Start: 2023-11-29 | End: 2023-12-29

## 2023-11-29 NOTE — TELEPHONE ENCOUNTER
PCP: Jared Reyes MD    LAST REFILL PER CHART:  butalbital-acetaminophen-caffeine (FIORICET, ESGIC) -40 MG per tablet         Summary: Take 1 tablet by mouth every 6 hours as needed for Headaches, Disp-60 tablet, R-0  Dose, Frequency: 1 tablet, EVERY 6 HOURS PRN  Start: 11/8/2023  End: 12/8/2023  Pharmacy: [facility] #[id number] - 1 [address] [phone]  Med Dose History       Patient Sig: Take 1 tablet by mouth every 6 hours as needed for Headaches       Ordered on: 11/8/2023       Authorized by: Arie Quarles: 60 tablet       Refills: 0 ordered          Future Appointments   Date Time Provider [address] [place] [address]   12/15/2023  9:15 AM Ketan Kitchen DO [facility] BS AMB   1/31/2024 11:10 AM Benigno Laureano DO [facility] BS AMB   5/15/2024  9:00 AM IO LAB VISIT [facility] BS AMB   5/22/2024 11:30 AM Jared Reyes MD [facility] BS AMB

## 2023-12-15 ENCOUNTER — OFFICE VISIT (OUTPATIENT)
Age: 63
End: 2023-12-15
Payer: COMMERCIAL

## 2023-12-15 VITALS
WEIGHT: 146.4 LBS | HEART RATE: 66 BPM | TEMPERATURE: 98 F | SYSTOLIC BLOOD PRESSURE: 116 MMHG | RESPIRATION RATE: 16 BRPM | OXYGEN SATURATION: 98 % | BODY MASS INDEX: 25.94 KG/M2 | DIASTOLIC BLOOD PRESSURE: 55 MMHG | HEIGHT: 63 IN

## 2023-12-15 DIAGNOSIS — R91.1 LUNG NODULE: Primary | ICD-10-CM

## 2023-12-15 DIAGNOSIS — Z87.891 PERSONAL HISTORY OF TOBACCO USE: ICD-10-CM

## 2023-12-15 PROBLEM — D47.2 MONOCLONAL GAMMOPATHY: Status: ACTIVE | Noted: 2023-12-15

## 2023-12-15 PROCEDURE — 1123F ACP DISCUSS/DSCN MKR DOCD: CPT | Performed by: INTERNAL MEDICINE

## 2023-12-15 PROCEDURE — G0296 VISIT TO DETERM LDCT ELIG: HCPCS | Performed by: INTERNAL MEDICINE

## 2023-12-15 PROCEDURE — 3078F DIAST BP <80 MM HG: CPT | Performed by: INTERNAL MEDICINE

## 2023-12-15 PROCEDURE — 99213 OFFICE O/P EST LOW 20 MIN: CPT | Performed by: INTERNAL MEDICINE

## 2023-12-15 PROCEDURE — 3074F SYST BP LT 130 MM HG: CPT | Performed by: INTERNAL MEDICINE

## 2023-12-15 RX ORDER — MAGNESIUM 30 MG
30 TABLET ORAL 2 TIMES DAILY
COMMUNITY

## 2023-12-15 NOTE — PATIENT INSTRUCTIONS
scan and answer any questions you may have. If you need any follow-up, he or she will help you understand what to do next.  After a lung cancer screening, you can go back to your usual activities right away.  A lung cancer screening test can't tell if you have lung cancer. If your results are positive, your doctor can't tell whether an abnormal finding is a harmless nodule, cancer, or something else without doing more tests.  What can you do to help prevent lung cancer?  Some lung cancers can't be prevented. But if you smoke, quitting smoking is the best step you can take to prevent lung cancer. If you want to quit, your doctor can recommend medicines or other ways to help.  Follow-up care is a key part of your treatment and safety. Be sure to make and go to all appointments, and call your doctor if you are having problems. It's also a good idea to know your test results and keep a list of the medicines you take.  Where can you learn more?  Go to https://www.[employer].net/patientEd and enter Q940 to learn more about \"Learning About Lung Cancer Screening.\"  Current as of: February 28, 2023               Content Version: 13.9  © 1544-7830 [employer].   Care instructions adapted under license by [employer]. If you have questions about a medical condition or this instruction, always ask your healthcare professional. [employer] disclaims any warranty or liability for your use of this information.

## 2023-12-15 NOTE — PROGRESS NOTES
Yudith Godwin presents today for   Chief Complaint   Patient presents with    Pulmonary Nodule       Is someone accompanying this pt? No    Is the patient using any DME equipment during OV? No    -DME Company nA    Depression Screenin/30/2023     1:52 PM   PHQ-9 Questionaire   Little interest or pleasure in doing things 0   Feeling down, depressed, or hopeless 0   Trouble falling or staying asleep, or sleeping too much 0   Feeling tired or having little energy 1   Poor appetite or overeating 0   Feeling bad about yourself - or that you are a failure or have let yourself or your family down 0   Trouble concentrating on things, such as reading the newspaper or watching television 0   Moving or speaking so slowly that other people could have noticed. Or the opposite - being so fidgety or restless that you have been moving around a lot more than usual 0   Thoughts that you would be better off dead, or of hurting yourself in some way 0   PHQ-9 Total Score 1   If you checked off any problems, how difficult have these problems made it for you to do your work, take care of things at home, or get along with other people? 0       Learning Assessment:        Abuse Screening:         No data to display                Fall Risk        Coordination of Care:    1. Have you been to the ER, urgent care clinic since your last visit? Hospitalized since your last visit? No    2. Have you seen or consulted any other health care providers outside of the [facility] since your last visit? Include any pap smears or colon screening. No    Medication list has been update per patient.    
screenings and willingness to undergo diagnosis and treatment if screening scan is positive.  In addition, the patient was counseled regarding the importance of remaining smoke free and/or total smoking cessation.    Also reviewed the following if the patient has Medicare that as of February 10, 2022, Medicare only covers LDCT screening in patients aged 50-77 with at least a 20 pack-year smoking history who currently smoke or have quit in the last 15 years

## 2023-12-26 DIAGNOSIS — R51.9 CHRONIC DAILY HEADACHE: ICD-10-CM

## 2023-12-26 RX ORDER — BUTALBITAL, ACETAMINOPHEN AND CAFFEINE 50; 325; 40 MG/1; MG/1; MG/1
1 TABLET ORAL EVERY 6 HOURS PRN
Qty: 60 TABLET | Refills: 0 | Status: SHIPPED | OUTPATIENT
Start: 2023-12-26 | End: 2024-01-25

## 2024-01-22 DIAGNOSIS — R51.9 CHRONIC DAILY HEADACHE: ICD-10-CM

## 2024-01-22 RX ORDER — BUTALBITAL, ACETAMINOPHEN AND CAFFEINE 50; 325; 40 MG/1; MG/1; MG/1
1 TABLET ORAL EVERY 6 HOURS PRN
Qty: 60 TABLET | Refills: 0 | Status: SHIPPED | OUTPATIENT
Start: 2024-01-22 | End: 2024-02-21

## 2024-01-23 ENCOUNTER — TRANSCRIBE ORDERS (OUTPATIENT)
Facility: HOSPITAL | Age: 64
End: 2024-01-23

## 2024-01-23 DIAGNOSIS — Z12.31 VISIT FOR SCREENING MAMMOGRAM: Primary | ICD-10-CM

## 2024-01-26 RX ORDER — HYDRALAZINE HYDROCHLORIDE 100 MG/1
100 TABLET, FILM COATED ORAL 3 TIMES DAILY
Qty: 270 TABLET | Refills: 3 | Status: SHIPPED | OUTPATIENT
Start: 2024-01-26

## 2024-01-29 ENCOUNTER — CLINICAL DOCUMENTATION (OUTPATIENT)
Age: 64
End: 2024-01-29

## 2024-01-31 ENCOUNTER — OFFICE VISIT (OUTPATIENT)
Age: 64
End: 2024-01-31
Payer: COMMERCIAL

## 2024-01-31 ENCOUNTER — CLINICAL DOCUMENTATION (OUTPATIENT)
Age: 64
End: 2024-01-31

## 2024-01-31 VITALS
RESPIRATION RATE: 18 BRPM | HEART RATE: 60 BPM | WEIGHT: 148 LBS | DIASTOLIC BLOOD PRESSURE: 78 MMHG | SYSTOLIC BLOOD PRESSURE: 118 MMHG | TEMPERATURE: 97.4 F | HEIGHT: 63 IN | BODY MASS INDEX: 26.22 KG/M2 | OXYGEN SATURATION: 95 %

## 2024-01-31 DIAGNOSIS — D75.9 BLOOD DISORDER: ICD-10-CM

## 2024-01-31 DIAGNOSIS — R51.9 CHRONIC DAILY HEADACHE: ICD-10-CM

## 2024-01-31 DIAGNOSIS — G47.33 SEVERE OBSTRUCTIVE SLEEP APNEA: Primary | ICD-10-CM

## 2024-01-31 DIAGNOSIS — Z87.891 FORMER SMOKER: ICD-10-CM

## 2024-01-31 DIAGNOSIS — R91.1 RIGHT UPPER LOBE PULMONARY NODULE: ICD-10-CM

## 2024-01-31 DIAGNOSIS — I10 PRIMARY HYPERTENSION: ICD-10-CM

## 2024-01-31 DIAGNOSIS — D50.8 OTHER IRON DEFICIENCY ANEMIA: ICD-10-CM

## 2024-01-31 PROCEDURE — 99204 OFFICE O/P NEW MOD 45 MIN: CPT | Performed by: OTOLARYNGOLOGY

## 2024-01-31 PROCEDURE — 1123F ACP DISCUSS/DSCN MKR DOCD: CPT | Performed by: OTOLARYNGOLOGY

## 2024-01-31 PROCEDURE — 3078F DIAST BP <80 MM HG: CPT | Performed by: OTOLARYNGOLOGY

## 2024-01-31 PROCEDURE — 3074F SYST BP LT 130 MM HG: CPT | Performed by: OTOLARYNGOLOGY

## 2024-01-31 ASSESSMENT — PATIENT HEALTH QUESTIONNAIRE - PHQ9
SUM OF ALL RESPONSES TO PHQ9 QUESTIONS 1 & 2: 0
2. FEELING DOWN, DEPRESSED OR HOPELESS: 0
SUM OF ALL RESPONSES TO PHQ QUESTIONS 1-9: 0
1. LITTLE INTEREST OR PLEASURE IN DOING THINGS: 0
SUM OF ALL RESPONSES TO PHQ QUESTIONS 1-9: 0

## 2024-01-31 ASSESSMENT — SLEEP AND FATIGUE QUESTIONNAIRES
HOW LIKELY ARE YOU TO NOD OFF OR FALL ASLEEP WHILE LYING DOWN TO REST IN THE AFTERNOON WHEN CIRCUMSTANCES PERMIT: 0
HOW LIKELY ARE YOU TO NOD OFF OR FALL ASLEEP WHILE WATCHING TV: 0
HOW LIKELY ARE YOU TO NOD OFF OR FALL ASLEEP WHILE SITTING INACTIVE IN A PUBLIC PLACE: 0
NECK CIRCUMFERENCE (INCHES): 14
HOW LIKELY ARE YOU TO NOD OFF OR FALL ASLEEP WHILE SITTING AND READING: 0
HOW LIKELY ARE YOU TO NOD OFF OR FALL ASLEEP WHEN YOU ARE A PASSENGER IN A CAR FOR AN HOUR WITHOUT A BREAK: 0
HOW LIKELY ARE YOU TO NOD OFF OR FALL ASLEEP WHILE SITTING AND TALKING TO SOMEONE: 0
ESS TOTAL SCORE: 0
HOW LIKELY ARE YOU TO NOD OFF OR FALL ASLEEP WHILE SITTING QUIETLY AFTER LUNCH WITHOUT ALCOHOL: 0
HOW LIKELY ARE YOU TO NOD OFF OR FALL ASLEEP IN A CAR, WHILE STOPPED FOR A FEW MINUTES IN TRAFFIC: 0

## 2024-01-31 NOTE — PATIENT INSTRUCTIONS
Please make a follow up appointment to discuss the results of your sleep study. If this is impossible for some reason, please send me a \"My Chart\" message so that I may get back with you in a timely manner.    The [facility] Sleep Lab is located in the [employer] [facility], adjacent to [facility]. The lab is on the second floor. The direct number to call for sleep study related questions is: [phone].    Please call our clinic back at [phone] or send a message on [employer] if you have any questions or concerns or if you are experiencing any of the following:     You have not received a follow up appointment within 30 days prior the recommended follow up time.    If you are not tolerating treatment plan and/or not able to obtain equipment or prescribed medication(s).  if you are experiencing any difficulties with the Durable Medical Equipment  (DME) Company you may be using or is assigned to you.  Two weeks have passed and you have not received an appointment for a scheduled procedure.  Two weeks have passed since you underwent a test and/or procedure and you have not received your results.     If you are using a CPAP/BIPAP, or Home Ventilator Device- Please note the following.  Currently, many DMEs are experiencing supply chain difficulties and orders for equipment may be back logged several weeks.     Your  Durable Medical Equipment (DME ) company is supposed to provide you with replacement filters, tubing and masks. You can either call your DME when you need new supplies or you can arrange for an automatic shipment schedule.    Your need to be seen by our office at lat minimum of every 12 months in order to renew the prescription for these supplies.   Please make note of who your DME company is and their phone number.   Please make sure that you clean your mask and hosing on a regular basis.  Your DME can provide you with additional information regarding proper care and cleaning of your

## 2024-01-31 NOTE — PROGRESS NOTES
Yudithmohsen Godwin presents today for   Chief Complaint   Patient presents with    Sleep Apnea    Follow-up     CPAP       Is someone accompanying this pt? no    Is the patient using any DME equipment during OV? no    -DME Company: [employer]    Have you ever had a sleep study done before? yes,     Depression Screenin/31/2024    11:12 AM   PHQ-9    Little interest or pleasure in doing things 0   Feeling down, depressed, or hopeless 0   PHQ-2 Score 0   PHQ-9 Total Score 0        [place] Sleepiness Scale:      2024    11:16 AM   Sleep Medicine   Sitting and reading 0   Watching TV 0   Sitting, inactive in a public place (e.g. a theatre or a meeting) 0   As a passenger in a car for an hour without a break 0   Lying down to rest in the afternoon when circumstances permit 0   Sitting and talking to someone 0   Sitting quietly after a lunch without alcohol 0   In a car, while stopped for a few minutes in traffic 0   [place] Sleepiness Score 0   Neck circumference (Inches) 14       Stop-Ban/31/2024    11:00 AM   STOP-BANG QUESTIONNAIRE   Are you a loud and/or regular snorer? 1   Do you often feel tired or groggy upon awakening or do you awaken with a headache? 1   Have you been observed to gasp or stop breathing during sleep? 1   Are you often tired or fatigued during wake time hours?  0   Do you fall asleep sitting, reading, watching TV or driving? 0   Do you often have problems with memory or concentration? 0   Do you have or are you being treated for high blood pressure? 1   Recent BMI (Calculated) 26   Is BMI greater than 35 kg/m2? 0=No   Age older than 50 years old? 1=Yes   Is your neck circumference greater than 17 inches (Male) or 16 inches (Female)? 0   Gender - Male 0=No   STOP-Bang Total Score 31         Coordination of Care:  1. Have you been to the ER, urgent care clinic since your last visit? Hospitalized since your last visit? no    2. Have you seen or consulted any other health care

## 2024-01-31 NOTE — ASSESSMENT & PLAN NOTE
Monitored by specialist- no acute findings meriting change in the plan, sees Dr. Kitchen in [facility] who is monitoring

## 2024-01-31 NOTE — ASSESSMENT & PLAN NOTE
Monitored by specialist- no acute findings meriting change in the plan, has been a longstanding problem   - with left chest wall open wound  - draining purulent fluid  - wound cx with [facility]PC  - monitor off abx at this time  - CTsx note appreciated  - possible surgical repair next week with plastics to assist

## 2024-01-31 NOTE — PROGRESS NOTES
[facility]  Sleep Medicine    Office Progress Note - Initial Evaluation      Primary Care Physician: Ro Negrete MD     Reason for Visit: Ongoing care of  obstructive sleep apnea/sleep disordered breathing    Assessment:  1. Severe obstructive sleep apnea  Assessment & Plan:   Well-controlled, continue current medications and continue current treatment plan    AHI 62.7 - severe Obstructive Sleep Apnea.  Orders:  -     DME - DURABLE MEDICAL EQUIPMENT  2. Primary hypertension  Assessment & Plan:   Well-controlled, continue current medications, was actually low today at 118/78.  I told her that if she starts feeling lightheaded or dizzy at some point, she should contact her primary care doctor and see if she can cut back or discontinue one of her medications.  She does have a follow-up coming in a few months with her primary care doctor to discuss.  3. Chronic daily headache  Assessment & Plan:   Monitored by specialist- no acute findings meriting change in the plan, has been a longstanding problem  4. Other iron deficiency anemia  5. Blood disorder  Assessment & Plan:    patient stated that she saw hematology/oncology in December for what sounds like possible \"leukemia or monoclonal gammopathy\" but I cannot see or evaluate those notes for some reason.  She does have follow-up for this condition with the specialist who is monitoring.  6. Former smoker  7. Right upper lobe pulmonary nodule  Assessment & Plan:   Monitored by specialist- no acute findings meriting change in the plan, sees Dr. Kitchen in [facility] who is monitoring       Patient reports she is doing well.    No complaints today.    She continues to due very well with PAP therapy and things she is getting clinical benefit (blood pressure is lower perhaps).  Did not really have a lot of complaints before about sleep so is not noticing a big daytime difference overall.    No aerophagia, or bloating    Mask- No skin irritation

## 2024-01-31 NOTE — ASSESSMENT & PLAN NOTE
Well-controlled, continue current medications, was actually low today at 118/78.  I told her that if she starts feeling lightheaded or dizzy at some point, she should contact her primary care doctor and see if she can cut back or discontinue one of her medications.  She does have a follow-up coming in a few months with her primary care doctor to discuss.

## 2024-01-31 NOTE — ASSESSMENT & PLAN NOTE
Well-controlled, continue current medications and continue current treatment plan    AHI 62.7 - severe Obstructive Sleep Apnea.

## 2024-01-31 NOTE — ASSESSMENT & PLAN NOTE
patient stated that she saw hematology/oncology in December for what sounds like possible \"leukemia\" but I cannot see or evaluate those notes for some reason.  She does have follow-up for this condition with the specialist who is monitoring.

## 2024-02-05 RX ORDER — TIZANIDINE 4 MG/1
4 TABLET ORAL EVERY EVENING
Qty: 90 TABLET | Refills: 1 | Status: SHIPPED | OUTPATIENT
Start: 2024-02-05

## 2024-02-19 DIAGNOSIS — R51.9 CHRONIC DAILY HEADACHE: ICD-10-CM

## 2024-02-19 RX ORDER — BUTALBITAL, ACETAMINOPHEN AND CAFFEINE 50; 325; 40 MG/1; MG/1; MG/1
1 TABLET ORAL EVERY 6 HOURS PRN
Qty: 60 TABLET | Refills: 0 | Status: SHIPPED | OUTPATIENT
Start: 2024-02-19 | End: 2024-03-20

## 2024-02-26 RX ORDER — METOPROLOL SUCCINATE 50 MG/1
50 TABLET, EXTENDED RELEASE ORAL DAILY
Qty: 90 TABLET | Refills: 3 | Status: SHIPPED | OUTPATIENT
Start: 2024-02-26

## 2024-02-28 RX ORDER — POTASSIUM CHLORIDE 20 MEQ/1
20 TABLET, EXTENDED RELEASE ORAL DAILY
Qty: 90 TABLET | Refills: 3 | Status: SHIPPED | OUTPATIENT
Start: 2024-02-28

## 2024-02-28 NOTE — TELEPHONE ENCOUNTER
PCP: Ro Negrete MD    LAST REFILL PER CHART:  Medication:potassium chloride (KLOR-CON M) 20 MEQ extended release tablet   Ordered On:09/01/2023  Instructions:take 2 tablets (40 mEq) x1 dose, and then start 1 tablet (20 mEq) daily   Dispense:90 tablets  Refills:1      Future Appointments   Date Time Provider Department Center   3/5/2024 10:00 AM [facility] 3 [facility]   5/15/2024  9:00 AM [facility] LAB VISIT  [facility]   5/28/2024  9:30 AM Ro Negrete MD BS AMB

## 2024-03-05 ENCOUNTER — HOSPITAL ENCOUNTER (OUTPATIENT)
Facility: HOSPITAL | Age: 64
Discharge: HOME | End: 2024-03-08
Payer: COMMERCIAL

## 2024-03-05 VITALS — WEIGHT: 147.93 LBS | BODY MASS INDEX: 26.21 KG/M2 | HEIGHT: 63 IN

## 2024-03-05 DIAGNOSIS — Z12.31 VISIT FOR SCREENING MAMMOGRAM: ICD-10-CM

## 2024-03-05 PROCEDURE — 77063 BREAST TOMOSYNTHESIS BI: CPT

## 2024-03-10 DIAGNOSIS — R51.9 CHRONIC DAILY HEADACHE: ICD-10-CM

## 2024-03-11 RX ORDER — BUTALBITAL, ACETAMINOPHEN AND CAFFEINE 50; 325; 40 MG/1; MG/1; MG/1
1 TABLET ORAL EVERY 6 HOURS PRN
Qty: 60 TABLET | Refills: 0 | Status: SHIPPED | OUTPATIENT
Start: 2024-03-11 | End: 2024-04-10

## 2024-04-03 DIAGNOSIS — R51.9 CHRONIC DAILY HEADACHE: ICD-10-CM

## 2024-04-03 RX ORDER — BUTALBITAL, ACETAMINOPHEN AND CAFFEINE 50; 325; 40 MG/1; MG/1; MG/1
1 TABLET ORAL EVERY 6 HOURS PRN
Qty: 60 TABLET | Refills: 0 | Status: SHIPPED | OUTPATIENT
Start: 2024-04-03 | End: 2024-05-03

## 2024-04-03 NOTE — TELEPHONE ENCOUNTER
PCP: Ro Negrete MD    LAST REFILL PER CHART:  Medication:butalbital-acetaminophen-caffeine (FIORICET, ESGIC) -40 MG per tablet   Ordered On:03/11/2024  Instructions: Take 1 tablet by mouth every 6 hours as needed for Headaches   Dispense:60 tablets  Refills:0      Future Appointments   Date Time Provider Department Center   5/15/2024  9:00 AM [facility] LAB VISIT [facility] BS AMB   5/28/2024  9:30 AM Ro Negrete MD [facility] BS AMB

## 2024-04-25 DIAGNOSIS — R51.9 CHRONIC DAILY HEADACHE: ICD-10-CM

## 2024-04-25 RX ORDER — BUTALBITAL, ACETAMINOPHEN AND CAFFEINE 50; 325; 40 MG/1; MG/1; MG/1
1 TABLET ORAL EVERY 6 HOURS PRN
Qty: 60 TABLET | Refills: 0 | Status: SHIPPED | OUTPATIENT
Start: 2024-04-25 | End: 2024-05-25

## 2024-05-10 ENCOUNTER — TELEPHONE (OUTPATIENT)
Age: 64
End: 2024-05-10

## 2024-05-10 NOTE — TELEPHONE ENCOUNTER
Ms. Godwin called in today and states she was working out in the yard and blew her back out. She states she would like for the steroid pack that Dr. Negrete normally calls in to be sent to the pharmacy [facility] #[id number] - [facility] [address] - P [phone] - F [phone] [[id number]]   She states right now she is currently stuck in her chair unable to move due to this.     Please advise.  CB#: [phone]

## 2024-05-10 NOTE — TELEPHONE ENCOUNTER
Patient is aware Dr. Negrete is not in the office today and will need to go to the Urgent  care for evaluation.

## 2024-05-15 ENCOUNTER — HOSPITAL ENCOUNTER (OUTPATIENT)
Facility: HOSPITAL | Age: 64
Setting detail: SPECIMEN
Discharge: HOME | End: 2024-05-18
Payer: COMMERCIAL

## 2024-05-15 DIAGNOSIS — E78.2 MIXED HYPERLIPIDEMIA: ICD-10-CM

## 2024-05-15 DIAGNOSIS — D50.9 IRON DEFICIENCY ANEMIA, UNSPECIFIED IRON DEFICIENCY ANEMIA TYPE: ICD-10-CM

## 2024-05-15 DIAGNOSIS — R73.01 IMPAIRED FASTING GLUCOSE: ICD-10-CM

## 2024-05-15 DIAGNOSIS — D47.2 IGM LAMBDA MONOCLONAL GAMMOPATHY: ICD-10-CM

## 2024-05-15 DIAGNOSIS — I10 PRIMARY HYPERTENSION: ICD-10-CM

## 2024-05-15 LAB
ALBUMIN SERPL-MCNC: 3.8 G/DL (ref 3.4–5)
ALBUMIN/GLOB SERPL: 1.1 (ref 0.8–1.7)
ALP SERPL-CCNC: 116 U/L (ref 45–117)
ALT SERPL-CCNC: 87 U/L (ref 13–56)
ANION GAP SERPL CALC-SCNC: 2 MMOL/L (ref 3–18)
AST SERPL-CCNC: 40 U/L (ref 10–38)
BASOPHILS # BLD: 0 K/UL (ref 0–0.1)
BASOPHILS NFR BLD: 0 % (ref 0–2)
BILIRUB SERPL-MCNC: 0.2 MG/DL (ref 0.2–1)
BUN SERPL-MCNC: 31 MG/DL (ref 7–18)
BUN/CREAT SERPL: 44 (ref 12–20)
CALCIUM SERPL-MCNC: 9.7 MG/DL (ref 8.5–10.1)
CHLORIDE SERPL-SCNC: 103 MMOL/L (ref 100–111)
CHOLEST SERPL-MCNC: 165 MG/DL
CO2 SERPL-SCNC: 33 MMOL/L (ref 21–32)
CREAT SERPL-MCNC: 0.7 MG/DL (ref 0.6–1.3)
CREAT UR-MCNC: 155 MG/DL (ref 30–125)
DIFFERENTIAL METHOD BLD: ABNORMAL
EOSINOPHIL # BLD: 0.1 K/UL (ref 0–0.4)
EOSINOPHIL NFR BLD: 1 % (ref 0–5)
ERYTHROCYTE [DISTWIDTH] IN BLOOD BY AUTOMATED COUNT: 12.4 % (ref 11.6–14.5)
EST. AVERAGE GLUCOSE BLD GHB EST-MCNC: 103 MG/DL
GLOBULIN SER CALC-MCNC: 3.5 G/DL (ref 2–4)
GLUCOSE SERPL-MCNC: 99 MG/DL (ref 74–99)
HBA1C MFR BLD: 5.2 % (ref 4.2–5.6)
HCT VFR BLD AUTO: 47.4 % (ref 35–45)
HDLC SERPL-MCNC: 53 MG/DL (ref 40–60)
HDLC SERPL: 3.1 (ref 0–5)
HGB BLD-MCNC: 15.5 G/DL (ref 12–16)
IMM GRANULOCYTES # BLD AUTO: 0 K/UL (ref 0–0.04)
IMM GRANULOCYTES NFR BLD AUTO: 0 % (ref 0–0.5)
LDLC SERPL CALC-MCNC: 87.8 MG/DL (ref 0–100)
LIPID PANEL: NORMAL
LYMPHOCYTES # BLD: 3 K/UL (ref 0.9–3.6)
LYMPHOCYTES NFR BLD: 39 % (ref 21–52)
MAGNESIUM SERPL-MCNC: 2.3 MG/DL (ref 1.6–2.6)
MCH RBC QN AUTO: 30.5 PG (ref 24–34)
MCHC RBC AUTO-ENTMCNC: 32.7 G/DL (ref 31–37)
MCV RBC AUTO: 93.3 FL (ref 78–100)
MICROALBUMIN UR-MCNC: 1.25 MG/DL (ref 0–3)
MICROALBUMIN/CREAT UR-RTO: 8 MG/G (ref 0–30)
MONOCYTES # BLD: 0.6 K/UL (ref 0.05–1.2)
MONOCYTES NFR BLD: 8 % (ref 3–10)
NEUTS SEG # BLD: 4 K/UL (ref 1.8–8)
NEUTS SEG NFR BLD: 52 % (ref 40–73)
NRBC # BLD: 0 K/UL (ref 0–0.01)
NRBC BLD-RTO: 0 PER 100 WBC
PLATELET # BLD AUTO: 276 K/UL (ref 135–420)
PMV BLD AUTO: 10.5 FL (ref 9.2–11.8)
POTASSIUM SERPL-SCNC: 4.2 MMOL/L (ref 3.5–5.5)
PROT SERPL-MCNC: 7.3 G/DL (ref 6.4–8.2)
RBC # BLD AUTO: 5.08 M/UL (ref 4.2–5.3)
SODIUM SERPL-SCNC: 138 MMOL/L (ref 136–145)
TRIGL SERPL-MCNC: 121 MG/DL
VLDLC SERPL CALC-MCNC: 24.2 MG/DL
WBC # BLD AUTO: 7.7 K/UL (ref 4.6–13.2)

## 2024-05-15 PROCEDURE — 83735 ASSAY OF MAGNESIUM: CPT

## 2024-05-15 PROCEDURE — 80053 COMPREHEN METABOLIC PANEL: CPT

## 2024-05-15 PROCEDURE — 80061 LIPID PANEL: CPT

## 2024-05-15 PROCEDURE — 83036 HEMOGLOBIN GLYCOSYLATED A1C: CPT

## 2024-05-15 PROCEDURE — 82043 UR ALBUMIN QUANTITATIVE: CPT

## 2024-05-15 PROCEDURE — 85025 COMPLETE CBC W/AUTO DIFF WBC: CPT

## 2024-05-15 PROCEDURE — 82570 ASSAY OF URINE CREATININE: CPT

## 2024-05-15 PROCEDURE — 36415 COLL VENOUS BLD VENIPUNCTURE: CPT

## 2024-05-21 DIAGNOSIS — R51.9 CHRONIC DAILY HEADACHE: ICD-10-CM

## 2024-05-21 RX ORDER — BUTALBITAL, ACETAMINOPHEN AND CAFFEINE 50; 325; 40 MG/1; MG/1; MG/1
1 TABLET ORAL EVERY 6 HOURS PRN
Qty: 60 TABLET | Refills: 0 | Status: SHIPPED | OUTPATIENT
Start: 2024-05-21 | End: 2024-06-20

## 2024-05-28 ENCOUNTER — OFFICE VISIT (OUTPATIENT)
Age: 64
End: 2024-05-28
Payer: COMMERCIAL

## 2024-05-28 ENCOUNTER — TELEPHONE (OUTPATIENT)
Age: 64
End: 2024-05-28

## 2024-05-28 VITALS
HEART RATE: 78 BPM | SYSTOLIC BLOOD PRESSURE: 148 MMHG | OXYGEN SATURATION: 97 % | TEMPERATURE: 98.6 F | DIASTOLIC BLOOD PRESSURE: 72 MMHG | RESPIRATION RATE: 16 BRPM | WEIGHT: 156 LBS | HEIGHT: 63 IN | BODY MASS INDEX: 27.64 KG/M2

## 2024-05-28 DIAGNOSIS — Z87.891 FORMER SMOKER: ICD-10-CM

## 2024-05-28 DIAGNOSIS — G47.33 SEVERE OBSTRUCTIVE SLEEP APNEA: ICD-10-CM

## 2024-05-28 DIAGNOSIS — R79.89 ELEVATED LFTS: ICD-10-CM

## 2024-05-28 DIAGNOSIS — E66.3 OVERWEIGHT (BMI 25.0-29.9): ICD-10-CM

## 2024-05-28 DIAGNOSIS — K58.1 IRRITABLE BOWEL SYNDROME WITH CONSTIPATION: ICD-10-CM

## 2024-05-28 DIAGNOSIS — E78.2 MIXED HYPERLIPIDEMIA: ICD-10-CM

## 2024-05-28 DIAGNOSIS — Z23 NEED FOR PROPHYLACTIC VACCINATION AGAINST STREPTOCOCCUS PNEUMONIAE (PNEUMOCOCCUS): ICD-10-CM

## 2024-05-28 DIAGNOSIS — G47.33 OBSTRUCTIVE SLEEP APNEA: ICD-10-CM

## 2024-05-28 DIAGNOSIS — F33.42 RECURRENT MAJOR DEPRESSIVE DISORDER, IN FULL REMISSION (HCC): ICD-10-CM

## 2024-05-28 DIAGNOSIS — R91.1 RIGHT UPPER LOBE PULMONARY NODULE: ICD-10-CM

## 2024-05-28 DIAGNOSIS — D47.2 IGM LAMBDA MONOCLONAL GAMMOPATHY: ICD-10-CM

## 2024-05-28 DIAGNOSIS — K76.0 NAFLD (NONALCOHOLIC FATTY LIVER DISEASE): ICD-10-CM

## 2024-05-28 DIAGNOSIS — I10 PRIMARY HYPERTENSION: Primary | ICD-10-CM

## 2024-05-28 PROCEDURE — 90677 PCV20 VACCINE IM: CPT | Performed by: INTERNAL MEDICINE

## 2024-05-28 PROCEDURE — 1123F ACP DISCUSS/DSCN MKR DOCD: CPT | Performed by: INTERNAL MEDICINE

## 2024-05-28 PROCEDURE — 3077F SYST BP >= 140 MM HG: CPT | Performed by: INTERNAL MEDICINE

## 2024-05-28 PROCEDURE — 99215 OFFICE O/P EST HI 40 MIN: CPT | Performed by: INTERNAL MEDICINE

## 2024-05-28 PROCEDURE — 90471 IMMUNIZATION ADMIN: CPT | Performed by: INTERNAL MEDICINE

## 2024-05-28 PROCEDURE — 3078F DIAST BP <80 MM HG: CPT | Performed by: INTERNAL MEDICINE

## 2024-05-28 RX ORDER — CETIRIZINE HYDROCHLORIDE 10 MG/1
10 TABLET ORAL DAILY
COMMUNITY

## 2024-05-28 RX ORDER — CARVEDILOL 6.25 MG/1
6.25 TABLET ORAL 2 TIMES DAILY
Qty: 180 TABLET | Refills: 1 | Status: SHIPPED | OUTPATIENT
Start: 2024-05-28

## 2024-05-28 NOTE — PROGRESS NOTES
Yudith Godwin presents today for   Chief Complaint   Patient presents with    Follow-up       \"Have you been to the ER, urgent care clinic since your last visit?  Hospitalized since your last visit?\"    YES. Urgent care, [facility] for back pain 2 weeks ago.    “Have you seen or consulted any other health care providers outside of [facility] since your last visit?”    NO        “Have you had a pap smear?”    YES. Dr. Zamudio.    Date of last Cervical Cancer screen (HPV or PAP): 2/19/2020          
Cholecalciferol 50 MCG ( UT) CAPS Take 1 capsule by mouth daily    cetirizine (ZYRTEC) 10 MG tablet Take 1 tablet by mouth daily     No current facility-administered medications for this visit.     Allergies and Intolerances:   Allergies   Allergen Reactions    Seasonal Itching     Other reaction(s): Sneezing  \"Everything but feathers and horses.\"       Family History: Her mother had hypertension, but  from a melanoma. Her father  from a DVT. Her brother has CAD and underwent CABG at the age of 37.   Family History   Problem Relation Age of Onset    Heart Disease Brother     Other Mother         melanoma    Osteoarthritis Mother     Bleeding Prob Father     Heart Attack Father     Breast Cancer Maternal Grandmother     Heart Attack Maternal Uncle     Stroke Maternal Uncle     Heart Disease Father      Social History:   She  reports that she quit smoking about 9 years ago. Her smoking use included cigarettes. She started smoking about 57 years ago. She has a 24.0 pack-year smoking history. She has never been exposed to tobacco smoke. She has never used smokeless tobacco. She reports that she smoked 0.5 packs/day for over 40 years and states that she stopped smoking in 2015.  She is  and living with her son. She recently retired from the [employer] department of [email or web address].  Social History     Substance and Sexual Activity   Alcohol Use Not Currently    Alcohol/week: 0.0 standard drinks of alcohol     Immunization History:  Immunization History   Administered Date(s) Administered    COVID-19, PFIZER Bivalent, DO NOT Dilute, (age 12y+), IM, 30 mcg/0.3 mL 10/21/2022    COVID-19, PFIZER PURPLE top, DILUTE for use, (age 12 y+), 30mcg/0.3mL 2021, 2021, 2022    Influenza Virus Vaccine 10/08/2014, 10/09/2017    Influenza, FLUARIX, FLULAVAL, FLUZONE (age 6 mo+) AND AFLURIA, (age 3 y+), PF, 0.5mL 10/23/2018, 2020, 2020    Influenza, FLUCELVAX, (age 6 mo+), MDCK, PF, 0.5mL 10/05/2023

## 2024-05-28 NOTE — TELEPHONE ENCOUNTER
Please request copy of last well woman exam and Pap smear performed in 12/2023 by Dr. Zamudio at [place].

## 2024-05-28 NOTE — PATIENT INSTRUCTIONS
Heart-Healthy Diet: Care Instructions  Your Care Instructions     A heart-healthy diet has lots of vegetables, fruits, nuts, beans, and whole grains, and is low in salt. It limits foods that are high in saturated fat, such as meats, cheeses, and fried foods. It may be hard to change your diet, but even small changes can lower your risk of heart attack and heart disease.  Follow-up care is a key part of your treatment and safety. Be sure to make and go to all appointments, and call your doctor if you are having problems. It's also a good idea to know your test results and keep a list of the medicines you take.  How can you care for yourself at home?  Watch your portions  Learn what a serving is. A \"serving\" and a \"portion\" are not always the same thing. Make sure that you are not eating larger portions than are recommended. For example, a serving of pasta is ½ cup. A serving size of meat is 2 to 3 ounces. A 3-ounce serving is about the size of a deck of cards. Measure serving sizes until you are good at \"eyeballing\" them. Keep in mind that restaurants often serve portions that are 2 or 3 times the size of one serving.  To keep your energy level up and keep you from feeling hungry, eat often but in smaller portions.  Eat only the number of calories you need to stay at a healthy weight. If you need to lose weight, eat fewer calories than your body burns (through exercise and other physical activity).  Eat more fruits and vegetables  Eat a variety of fruit and vegetables every day. Dark green, deep orange, red, or yellow fruits and vegetables are especially good for you. Examples include spinach, carrots, peaches, and berries.  Keep carrots, celery, and other veggies handy for snacks. Buy fruit that is in season and store it where you can see it so that you will be tempted to eat it.  Cook dishes that have a lot of veggies in them, such as stir-fries and soups.  Limit saturated and trans fat  Read food labels, and try

## 2024-06-01 PROBLEM — D75.9 BLOOD DISORDER: Status: RESOLVED | Noted: 2024-01-31 | Resolved: 2024-06-01

## 2024-06-01 PROBLEM — K76.0 NAFLD (NONALCOHOLIC FATTY LIVER DISEASE): Status: ACTIVE | Noted: 2024-06-01

## 2024-06-01 PROBLEM — D47.2 MONOCLONAL GAMMOPATHY: Status: RESOLVED | Noted: 2023-12-15 | Resolved: 2024-06-01

## 2024-06-05 ENCOUNTER — HOSPITAL ENCOUNTER (OUTPATIENT)
Facility: HOSPITAL | Age: 64
Discharge: HOME | End: 2024-06-08
Attending: INTERNAL MEDICINE
Payer: COMMERCIAL

## 2024-06-05 DIAGNOSIS — R79.89 ELEVATED LFTS: ICD-10-CM

## 2024-06-05 PROCEDURE — 76705 ECHO EXAM OF ABDOMEN: CPT

## 2024-06-12 DIAGNOSIS — R51.9 CHRONIC DAILY HEADACHE: ICD-10-CM

## 2024-06-13 ENCOUNTER — HOSPITAL ENCOUNTER (OUTPATIENT)
Facility: HOSPITAL | Age: 64
Setting detail: SPECIMEN
Discharge: HOME | End: 2024-06-13
Payer: COMMERCIAL

## 2024-06-13 DIAGNOSIS — R79.89 ELEVATED LFTS: ICD-10-CM

## 2024-06-13 DIAGNOSIS — K76.0 NAFLD (NONALCOHOLIC FATTY LIVER DISEASE): ICD-10-CM

## 2024-06-13 LAB
ALBUMIN SERPL-MCNC: 3.8 G/DL (ref 3.4–5)
ALBUMIN/GLOB SERPL: 1.2 (ref 0.8–1.7)
ALP SERPL-CCNC: 105 U/L (ref 45–117)
ALT SERPL-CCNC: 36 U/L (ref 13–56)
ANION GAP SERPL CALC-SCNC: 7 MMOL/L (ref 3–18)
AST SERPL-CCNC: 25 U/L (ref 10–38)
BILIRUB SERPL-MCNC: 0.4 MG/DL (ref 0.2–1)
BUN SERPL-MCNC: 28 MG/DL (ref 7–18)
BUN/CREAT SERPL: 39 (ref 12–20)
CALCIUM SERPL-MCNC: 9.6 MG/DL (ref 8.5–10.1)
CHLORIDE SERPL-SCNC: 102 MMOL/L (ref 100–111)
CO2 SERPL-SCNC: 29 MMOL/L (ref 21–32)
CREAT SERPL-MCNC: 0.72 MG/DL (ref 0.6–1.3)
GLOBULIN SER CALC-MCNC: 3.2 G/DL (ref 2–4)
GLUCOSE SERPL-MCNC: 85 MG/DL (ref 74–99)
POTASSIUM SERPL-SCNC: 4.1 MMOL/L (ref 3.5–5.5)
PROT SERPL-MCNC: 7 G/DL (ref 6.4–8.2)
SODIUM SERPL-SCNC: 138 MMOL/L (ref 136–145)

## 2024-06-13 PROCEDURE — 36415 COLL VENOUS BLD VENIPUNCTURE: CPT

## 2024-06-13 PROCEDURE — 80053 COMPREHEN METABOLIC PANEL: CPT

## 2024-06-13 RX ORDER — BUTALBITAL, ACETAMINOPHEN AND CAFFEINE 50; 325; 40 MG/1; MG/1; MG/1
1 TABLET ORAL EVERY 6 HOURS PRN
Qty: 60 TABLET | Refills: 0 | Status: SHIPPED | OUTPATIENT
Start: 2024-06-13 | End: 2024-07-13

## 2024-06-13 NOTE — TELEPHONE ENCOUNTER
PCP: Ro Negrete MD    LAST REFILL PER CHART:  Medication:butalbital-acetaminophen-caffeine (FIORICET, ESGIC) -40 MG per tablet   Ordered On:05/21/2024  Instructions:Take 1 tablet by mouth every 6 hours as needed for Headaches   Dispense:60 tablets  Refills:0      Future Appointments   Date Time Provider Department Center   6/13/2024  1:00 PM [facility] LAB VISIT [facility]   7/2/2024  3:00 PM Ro Negrete MD [facility] AM[facility]

## 2024-06-20 DIAGNOSIS — R51.9 CHRONIC DAILY HEADACHE: ICD-10-CM

## 2024-06-20 RX ORDER — BUTALBITAL, ACETAMINOPHEN AND CAFFEINE 50; 325; 40 MG/1; MG/1; MG/1
TABLET ORAL
Qty: 60 TABLET | Refills: 0 | OUTPATIENT
Start: 2024-06-20

## 2024-07-02 ENCOUNTER — OFFICE VISIT (OUTPATIENT)
Facility: CLINIC | Age: 64
End: 2024-07-02
Payer: COMMERCIAL

## 2024-07-02 VITALS
TEMPERATURE: 98.5 F | HEIGHT: 63 IN | HEART RATE: 76 BPM | WEIGHT: 147 LBS | SYSTOLIC BLOOD PRESSURE: 136 MMHG | BODY MASS INDEX: 26.05 KG/M2 | DIASTOLIC BLOOD PRESSURE: 78 MMHG | OXYGEN SATURATION: 97 %

## 2024-07-02 DIAGNOSIS — E66.3 OVERWEIGHT (BMI 25.0-29.9): ICD-10-CM

## 2024-07-02 DIAGNOSIS — K76.0 NAFLD (NONALCOHOLIC FATTY LIVER DISEASE): ICD-10-CM

## 2024-07-02 DIAGNOSIS — R91.1 RIGHT UPPER LOBE PULMONARY NODULE: ICD-10-CM

## 2024-07-02 DIAGNOSIS — R51.9 CHRONIC DAILY HEADACHE: ICD-10-CM

## 2024-07-02 DIAGNOSIS — G47.33 SEVERE OBSTRUCTIVE SLEEP APNEA: ICD-10-CM

## 2024-07-02 DIAGNOSIS — K58.1 IRRITABLE BOWEL SYNDROME WITH CONSTIPATION: ICD-10-CM

## 2024-07-02 DIAGNOSIS — E78.2 MIXED HYPERLIPIDEMIA: ICD-10-CM

## 2024-07-02 DIAGNOSIS — I10 PRIMARY HYPERTENSION: Primary | ICD-10-CM

## 2024-07-02 DIAGNOSIS — R73.01 IMPAIRED FASTING GLUCOSE: ICD-10-CM

## 2024-07-02 DIAGNOSIS — D47.2 IGM LAMBDA MONOCLONAL GAMMOPATHY: ICD-10-CM

## 2024-07-02 DIAGNOSIS — Z87.891 FORMER SMOKER: ICD-10-CM

## 2024-07-02 PROCEDURE — 3075F SYST BP GE 130 - 139MM HG: CPT | Performed by: INTERNAL MEDICINE

## 2024-07-02 PROCEDURE — 99214 OFFICE O/P EST MOD 30 MIN: CPT | Performed by: INTERNAL MEDICINE

## 2024-07-02 PROCEDURE — 1123F ACP DISCUSS/DSCN MKR DOCD: CPT | Performed by: INTERNAL MEDICINE

## 2024-07-02 PROCEDURE — 3078F DIAST BP <80 MM HG: CPT | Performed by: INTERNAL MEDICINE

## 2024-07-02 RX ORDER — CARVEDILOL 12.5 MG/1
12.5 TABLET ORAL 2 TIMES DAILY
Qty: 180 TABLET | Refills: 3 | Status: SHIPPED | OUTPATIENT
Start: 2024-07-02

## 2024-07-02 NOTE — PROGRESS NOTES
Yudith Godwin presents today for   Chief Complaint   Patient presents with    Follow-up                 1. \"Have you been to the ER, urgent care clinic since your last visit?  Hospitalized since your last visit?\" no    2. \"Have you seen or consulted any other health care providers outside of the [facility] since your last visit?\" no     3. For patients aged 45-75: Has the patient had a colonoscopy / FIT/ Cologuard? Yes - no Care Gap present      If the patient is female:    4. For patients aged 40-74: Has the patient had a mammogram within the past 2 years? Yes - no Care Gap present      5. For patients aged 21-65: Has the patient had a pap smear? Yes - no Care Gap present    
HbA1c 5.2 (5/2024).  Emphasized importance of continued lifestyle modifications.  Advised to avoid concentrated sweets and minimize carbohydrates.  Will continue to monitor.  8. NAFL/ elevated transaminases. Resolved since rosuvastatin discontinued. Review of record showed elevation noted since 8/2016 when initiated. Iron studies, hepatitis panel, LLOYD, SPEP, CK, aldolase, and anti-smooth muscle antibody negative. RUQ ultrasound (10/2016) showed fatty infiltration of liver.  Noted increase transaminases in 5/2024 with ALT 87 and AST 40, and weight had increased 10 pounds since 12/2023.  Completed abdominal ultrasound (6-5/24) which showed increased hepatic echogenicity suggestive of hepatic steatosis without solid mass, and normal hepatopetal direction of flow in the portal vein.  Transaminases normalized today.  Patient reports that she does not drink alcohol.  Advised to continue with lifestyle changes.  9. History of abdominal pain/angioedema. Unclear etiology, but nonspecific bowel wall thickening in short segment of jejunum and sigmoid on repeat CT scan in 4/2017 raised question of ischemic in origin. Hypercoaguable workup including lab studies and echocardiogram negative. Reported intermittent episodes associated with facial flushing, dry mouth, and severe abdominal cramping followed by diarrhea +/- vomiting. Episodes lingered for several days following onset. No evidence of urticaria seen. Abnormalities noted on abdominal CT scan showed nonspecific short segment thickening in various locations in small and large intestines. Obtained complement studies including C4, C1 esterase inhibtor, and C1q levels which were normal. Recommended evaluating C4 level during an episode. Evaluated by GI and had negative small bowel series and duplex ultrasound in 8/2018, and 24 hour urine for 5-HIAA level also negative. Discontinued captopril in 6/2018 and has not had a recurrence since discontinuation until episodes occurring

## 2024-07-02 NOTE — PATIENT INSTRUCTIONS
bicarbonate (baking soda). MSG is often added to Asian food. When you eat out, you can sometimes ask for food without MSG or added salt.  Buy low-sodium foods  Buy foods that are labeled \"unsalted\" (no salt added), \"sodium-free\" (less than 5 mg of sodium per serving), or \"low-sodium\" (less than 140 mg of sodium per serving). Foods labeled \"reduced-sodium\" and \"light sodium\" may still have too much sodium. Be sure to read the label to see how much sodium you are getting.  Buy fresh vegetables, or frozen vegetables without added sauces. Buy low-sodium versions of canned vegetables, soups, and other canned goods.  Prepare low-sodium meals  Cut back on the amount of salt you use in cooking. This will help you adjust to the taste. Do not add salt after cooking. One teaspoon of salt has about 2,300 mg of sodium.  Take the salt shaker off the table.  Flavor your food with garlic, lemon juice, onion, vinegar, herbs, and spices. Do not use soy sauce, lite soy sauce, steak sauce, onion salt, garlic salt, celery salt, mustard, or ketchup on your food.  Use low-sodium salad dressings, sauces, and ketchup. Or make your own salad dressings and sauces without adding salt.  Use less salt (or none) when recipes call for it. You can often use half the salt a recipe calls for without losing flavor. Other foods such as rice, pasta, and grains do not need added salt.  Rinse canned vegetables, and cook them in fresh water. This removes some--but not all--of the salt.  Avoid water that is naturally high in sodium or that has been treated with water softeners, which add sodium. Call your local water company to find out the sodium content of your water supply. If you buy bottled water, read the label and choose a sodium-free brand.  Avoid high-sodium foods  Avoid eating:  Smoked, cured, salted, and canned meat, fish, and poultry.  Ham, muñoz, hot dogs, and luncheon meats.  Regular, hard, and processed cheese and regular peanut

## 2024-07-03 DIAGNOSIS — R51.9 CHRONIC DAILY HEADACHE: ICD-10-CM

## 2024-07-03 RX ORDER — BUTALBITAL, ACETAMINOPHEN AND CAFFEINE 50; 325; 40 MG/1; MG/1; MG/1
1 TABLET ORAL EVERY 6 HOURS PRN
Qty: 60 TABLET | Refills: 0 | Status: SHIPPED | OUTPATIENT
Start: 2024-07-03 | End: 2024-08-02

## 2024-07-08 RX ORDER — PRAVASTATIN SODIUM 20 MG
TABLET ORAL
Qty: 90 TABLET | Refills: 3 | Status: SHIPPED | OUTPATIENT
Start: 2024-07-08

## 2024-07-10 DIAGNOSIS — R51.9 CHRONIC DAILY HEADACHE: ICD-10-CM

## 2024-07-10 RX ORDER — BUTALBITAL, ACETAMINOPHEN AND CAFFEINE 50; 325; 40 MG/1; MG/1; MG/1
TABLET ORAL
Qty: 60 TABLET | Refills: 0 | OUTPATIENT
Start: 2024-07-10

## 2024-07-28 DIAGNOSIS — R51.9 CHRONIC DAILY HEADACHE: ICD-10-CM

## 2024-07-29 RX ORDER — VENLAFAXINE HYDROCHLORIDE 150 MG/1
CAPSULE, EXTENDED RELEASE ORAL
Qty: 90 CAPSULE | Refills: 3 | Status: SHIPPED | OUTPATIENT
Start: 2024-07-29

## 2024-07-29 RX ORDER — BUTALBITAL, ACETAMINOPHEN AND CAFFEINE 50; 325; 40 MG/1; MG/1; MG/1
1 TABLET ORAL EVERY 6 HOURS PRN
Qty: 60 TABLET | Refills: 0 | Status: SHIPPED | OUTPATIENT
Start: 2024-07-29 | End: 2024-08-28

## 2024-08-05 RX ORDER — HYDROCHLOROTHIAZIDE 25 MG/1
TABLET ORAL
Qty: 90 TABLET | Refills: 3 | Status: SHIPPED | OUTPATIENT
Start: 2024-08-05

## 2024-08-06 DIAGNOSIS — R51.9 CHRONIC DAILY HEADACHE: ICD-10-CM

## 2024-08-06 RX ORDER — TIZANIDINE 4 MG/1
4 TABLET ORAL EVERY EVENING
Qty: 90 TABLET | Refills: 1 | Status: SHIPPED | OUTPATIENT
Start: 2024-08-06

## 2024-08-06 RX ORDER — BUTALBITAL, ACETAMINOPHEN AND CAFFEINE 50; 325; 40 MG/1; MG/1; MG/1
TABLET ORAL
Qty: 60 TABLET | Refills: 0 | OUTPATIENT
Start: 2024-08-06

## 2024-08-06 NOTE — TELEPHONE ENCOUNTER
PCP: Ro Negrete MD    LAST REFILL PER CHART:  Medication:tiZANidine (ZANAFLEX) 4 MG tablet   Ordered On:02/05/2024  Instructions:take 1 tablet by mouth every evening   Dispense:90 tablets  Refills:1      Future Appointments   Date Time Provider Department Center   9/4/2024  8:15 AM [facility] LAB VISIT [facility] AM[facility]   9/12/2024  9:00 AM Ro Negrete MD [facility] AMB

## 2024-08-21 DIAGNOSIS — R51.9 CHRONIC DAILY HEADACHE: ICD-10-CM

## 2024-08-22 RX ORDER — BUTALBITAL, ACETAMINOPHEN AND CAFFEINE 50; 325; 40 MG/1; MG/1; MG/1
1 TABLET ORAL EVERY 6 HOURS PRN
Qty: 60 TABLET | Refills: 0 | Status: SHIPPED | OUTPATIENT
Start: 2024-08-22 | End: 2024-09-21

## 2024-08-22 NOTE — TELEPHONE ENCOUNTER
PCP: Ro Negrete MD    LAST REFILL PER CHART:  Medication:butalbital-acetaminophen-caffeine (FIORICET, ESGIC) -40 MG per tablet   Ordered On:07/29/2024  Instructions:Take 1 tablet by mouth every 6 hours as needed for Headaches   Dispense:60 tablets  Refills:0      Future Appointments   Date Time Provider Department Center   9/4/2024  8:15 AM C LAB VISIT [facility] DEP   9/12/2024  9:00 AM oR Negrete MD [facility] DEP

## 2024-09-01 ENCOUNTER — PATIENT MESSAGE (OUTPATIENT)
Facility: CLINIC | Age: 64
End: 2024-09-01

## 2024-09-04 ENCOUNTER — HOSPITAL ENCOUNTER (OUTPATIENT)
Facility: HOSPITAL | Age: 64
Setting detail: SPECIMEN
Discharge: HOME | End: 2024-09-07
Payer: COMMERCIAL

## 2024-09-04 DIAGNOSIS — D47.2 IGM LAMBDA MONOCLONAL GAMMOPATHY: ICD-10-CM

## 2024-09-04 DIAGNOSIS — E78.2 MIXED HYPERLIPIDEMIA: ICD-10-CM

## 2024-09-04 DIAGNOSIS — R73.01 IMPAIRED FASTING GLUCOSE: ICD-10-CM

## 2024-09-04 DIAGNOSIS — I10 PRIMARY HYPERTENSION: ICD-10-CM

## 2024-09-04 LAB
ALBUMIN SERPL-MCNC: 3.7 G/DL (ref 3.4–5)
ALBUMIN/GLOB SERPL: 1.3 (ref 0.8–1.7)
ALP SERPL-CCNC: 97 U/L (ref 45–117)
ALT SERPL-CCNC: 32 U/L (ref 13–56)
ANION GAP SERPL CALC-SCNC: 8 MMOL/L (ref 3–18)
AST SERPL-CCNC: 22 U/L (ref 10–38)
BASOPHILS # BLD: 0 K/UL (ref 0–0.1)
BASOPHILS NFR BLD: 1 % (ref 0–2)
BILIRUB SERPL-MCNC: 0.3 MG/DL (ref 0.2–1)
BUN SERPL-MCNC: 25 MG/DL (ref 7–18)
BUN/CREAT SERPL: 33 (ref 12–20)
CALCIUM SERPL-MCNC: 9.6 MG/DL (ref 8.5–10.1)
CHLORIDE SERPL-SCNC: 106 MMOL/L (ref 100–111)
CHOLEST SERPL-MCNC: 179 MG/DL
CO2 SERPL-SCNC: 26 MMOL/L (ref 21–32)
CREAT SERPL-MCNC: 0.75 MG/DL (ref 0.6–1.3)
CREAT UR-MCNC: 168 MG/DL (ref 30–125)
DIFFERENTIAL METHOD BLD: NORMAL
EOSINOPHIL # BLD: 0.3 K/UL (ref 0–0.4)
EOSINOPHIL NFR BLD: 4 % (ref 0–5)
ERYTHROCYTE [DISTWIDTH] IN BLOOD BY AUTOMATED COUNT: 12.7 % (ref 11.6–14.5)
EST. AVERAGE GLUCOSE BLD GHB EST-MCNC: 111 MG/DL
GLOBULIN SER CALC-MCNC: 2.9 G/DL (ref 2–4)
GLUCOSE SERPL-MCNC: 114 MG/DL (ref 74–99)
HBA1C MFR BLD: 5.5 % (ref 4.2–5.6)
HCT VFR BLD AUTO: 42.1 % (ref 35–45)
HDLC SERPL-MCNC: 50 MG/DL (ref 40–60)
HDLC SERPL: 3.6 (ref 0–5)
HGB BLD-MCNC: 13.5 G/DL (ref 12–16)
IMM GRANULOCYTES # BLD AUTO: 0 K/UL (ref 0–0.04)
IMM GRANULOCYTES NFR BLD AUTO: 0 % (ref 0–0.5)
LDLC SERPL CALC-MCNC: 74.8 MG/DL (ref 0–100)
LIPID PANEL: ABNORMAL
LYMPHOCYTES # BLD: 2.3 K/UL (ref 0.9–3.6)
LYMPHOCYTES NFR BLD: 40 % (ref 21–52)
MAGNESIUM SERPL-MCNC: 2.2 MG/DL (ref 1.6–2.6)
MCH RBC QN AUTO: 29.7 PG (ref 24–34)
MCHC RBC AUTO-ENTMCNC: 32.1 G/DL (ref 31–37)
MCV RBC AUTO: 92.7 FL (ref 78–100)
MICROALBUMIN UR-MCNC: 1.29 MG/DL (ref 0–3)
MICROALBUMIN/CREAT UR-RTO: 8 MG/G (ref 0–30)
MONOCYTES # BLD: 0.5 K/UL (ref 0.05–1.2)
MONOCYTES NFR BLD: 8 % (ref 3–10)
NEUTS SEG # BLD: 2.6 K/UL (ref 1.8–8)
NEUTS SEG NFR BLD: 47 % (ref 40–73)
NRBC # BLD: 0 K/UL (ref 0–0.01)
NRBC BLD-RTO: 0 PER 100 WBC
PLATELET # BLD AUTO: 256 K/UL (ref 135–420)
PMV BLD AUTO: 11.2 FL (ref 9.2–11.8)
POTASSIUM SERPL-SCNC: 3.5 MMOL/L (ref 3.5–5.5)
PROT SERPL-MCNC: 6.6 G/DL (ref 6.4–8.2)
RBC # BLD AUTO: 4.54 M/UL (ref 4.2–5.3)
SODIUM SERPL-SCNC: 140 MMOL/L (ref 136–145)
TRIGL SERPL-MCNC: 271 MG/DL
VLDLC SERPL CALC-MCNC: 54.2 MG/DL
WBC # BLD AUTO: 5.7 K/UL (ref 4.6–13.2)

## 2024-09-04 PROCEDURE — 80053 COMPREHEN METABOLIC PANEL: CPT

## 2024-09-04 PROCEDURE — 85025 COMPLETE CBC W/AUTO DIFF WBC: CPT

## 2024-09-04 PROCEDURE — 82043 UR ALBUMIN QUANTITATIVE: CPT

## 2024-09-04 PROCEDURE — 83735 ASSAY OF MAGNESIUM: CPT

## 2024-09-04 PROCEDURE — 82570 ASSAY OF URINE CREATININE: CPT

## 2024-09-04 PROCEDURE — 80061 LIPID PANEL: CPT

## 2024-09-04 PROCEDURE — 36415 COLL VENOUS BLD VENIPUNCTURE: CPT

## 2024-09-04 PROCEDURE — 83036 HEMOGLOBIN GLYCOSYLATED A1C: CPT

## 2024-09-06 RX ORDER — AMLODIPINE BESYLATE 10 MG/1
10 TABLET ORAL DAILY
Qty: 90 TABLET | Refills: 3 | Status: SHIPPED | OUTPATIENT
Start: 2024-09-06

## 2024-09-12 ENCOUNTER — OFFICE VISIT (OUTPATIENT)
Facility: CLINIC | Age: 64
End: 2024-09-12

## 2024-09-12 VITALS
HEIGHT: 63 IN | BODY MASS INDEX: 25.69 KG/M2 | SYSTOLIC BLOOD PRESSURE: 124 MMHG | HEART RATE: 69 BPM | WEIGHT: 145 LBS | OXYGEN SATURATION: 96 % | RESPIRATION RATE: 14 BRPM | DIASTOLIC BLOOD PRESSURE: 60 MMHG | TEMPERATURE: 98.3 F

## 2024-09-12 DIAGNOSIS — R91.1 RIGHT UPPER LOBE PULMONARY NODULE: ICD-10-CM

## 2024-09-12 DIAGNOSIS — I10 PRIMARY HYPERTENSION: Primary | ICD-10-CM

## 2024-09-12 DIAGNOSIS — J30.89 PERENNIAL ALLERGIC RHINITIS WITH SEASONAL VARIATION: ICD-10-CM

## 2024-09-12 DIAGNOSIS — E78.2 MIXED HYPERLIPIDEMIA: ICD-10-CM

## 2024-09-12 DIAGNOSIS — R73.01 IMPAIRED FASTING GLUCOSE: ICD-10-CM

## 2024-09-12 DIAGNOSIS — K76.0 NAFLD (NONALCOHOLIC FATTY LIVER DISEASE): ICD-10-CM

## 2024-09-12 DIAGNOSIS — D47.2 IGM LAMBDA MONOCLONAL GAMMOPATHY: ICD-10-CM

## 2024-09-12 DIAGNOSIS — R51.9 CHRONIC DAILY HEADACHE: ICD-10-CM

## 2024-09-12 DIAGNOSIS — E61.1 IRON DEFICIENCY: ICD-10-CM

## 2024-09-12 DIAGNOSIS — J30.2 PERENNIAL ALLERGIC RHINITIS WITH SEASONAL VARIATION: ICD-10-CM

## 2024-09-12 DIAGNOSIS — E66.3 OVERWEIGHT (BMI 25.0-29.9): ICD-10-CM

## 2024-09-12 DIAGNOSIS — G47.33 SEVERE OBSTRUCTIVE SLEEP APNEA: ICD-10-CM

## 2024-09-12 DIAGNOSIS — E55.9 VITAMIN D DEFICIENCY, UNSPECIFIED: ICD-10-CM

## 2024-09-12 SDOH — ECONOMIC STABILITY: FOOD INSECURITY: WITHIN THE PAST 12 MONTHS, THE FOOD YOU BOUGHT JUST DIDN'T LAST AND YOU DIDN'T HAVE MONEY TO GET MORE.: NEVER TRUE

## 2024-09-12 SDOH — ECONOMIC STABILITY: FOOD INSECURITY: WITHIN THE PAST 12 MONTHS, YOU WORRIED THAT YOUR FOOD WOULD RUN OUT BEFORE YOU GOT MONEY TO BUY MORE.: NEVER TRUE

## 2024-09-12 SDOH — ECONOMIC STABILITY: INCOME INSECURITY: HOW HARD IS IT FOR YOU TO PAY FOR THE VERY BASICS LIKE FOOD, HOUSING, MEDICAL CARE, AND HEATING?: NOT HARD AT ALL

## 2024-09-12 ASSESSMENT — PATIENT HEALTH QUESTIONNAIRE - PHQ9
10. IF YOU CHECKED OFF ANY PROBLEMS, HOW DIFFICULT HAVE THESE PROBLEMS MADE IT FOR YOU TO DO YOUR WORK, TAKE CARE OF THINGS AT HOME, OR GET ALONG WITH OTHER PEOPLE: NOT DIFFICULT AT ALL
SUM OF ALL RESPONSES TO PHQ9 QUESTIONS 1 & 2: 0
3. TROUBLE FALLING OR STAYING ASLEEP: NOT AT ALL
2. FEELING DOWN, DEPRESSED OR HOPELESS: NOT AT ALL
SUM OF ALL RESPONSES TO PHQ QUESTIONS 1-9: 0
1. LITTLE INTEREST OR PLEASURE IN DOING THINGS: NOT AT ALL
SUM OF ALL RESPONSES TO PHQ QUESTIONS 1-9: 0
SUM OF ALL RESPONSES TO PHQ QUESTIONS 1-9: 0
5. POOR APPETITE OR OVEREATING: NOT AT ALL
8. MOVING OR SPEAKING SO SLOWLY THAT OTHER PEOPLE COULD HAVE NOTICED. OR THE OPPOSITE, BEING SO FIGETY OR RESTLESS THAT YOU HAVE BEEN MOVING AROUND A LOT MORE THAN USUAL: NOT AT ALL
7. TROUBLE CONCENTRATING ON THINGS, SUCH AS READING THE NEWSPAPER OR WATCHING TELEVISION: NOT AT ALL
9. THOUGHTS THAT YOU WOULD BE BETTER OFF DEAD, OR OF HURTING YOURSELF: NOT AT ALL
6. FEELING BAD ABOUT YOURSELF - OR THAT YOU ARE A FAILURE OR HAVE LET YOURSELF OR YOUR FAMILY DOWN: NOT AT ALL
SUM OF ALL RESPONSES TO PHQ QUESTIONS 1-9: 0
4. FEELING TIRED OR HAVING LITTLE ENERGY: NOT AT ALL

## 2024-09-15 DIAGNOSIS — R51.9 CHRONIC DAILY HEADACHE: ICD-10-CM

## 2024-09-15 PROBLEM — E66.3 OVERWEIGHT (BMI 25.0-29.9): Status: ACTIVE | Noted: 2024-09-15

## 2024-09-15 PROBLEM — E61.1 IRON DEFICIENCY: Status: ACTIVE | Noted: 2023-10-08

## 2024-09-15 PROBLEM — R73.01 IMPAIRED FASTING GLUCOSE: Status: ACTIVE | Noted: 2024-09-15

## 2024-09-16 RX ORDER — BUTALBITAL, ACETAMINOPHEN AND CAFFEINE 50; 325; 40 MG/1; MG/1; MG/1
1 TABLET ORAL EVERY 6 HOURS PRN
Qty: 60 TABLET | Refills: 0 | Status: SHIPPED | OUTPATIENT
Start: 2024-09-16 | End: 2024-10-16

## 2024-10-14 DIAGNOSIS — R51.9 CHRONIC DAILY HEADACHE: ICD-10-CM

## 2024-10-14 RX ORDER — BUTALBITAL, ACETAMINOPHEN AND CAFFEINE 50; 325; 40 MG/1; MG/1; MG/1
1 TABLET ORAL EVERY 6 HOURS PRN
Qty: 60 TABLET | Refills: 0 | Status: SHIPPED | OUTPATIENT
Start: 2024-10-14 | End: 2024-11-13

## 2024-10-14 NOTE — TELEPHONE ENCOUNTER
PCP: Ro Negrete MD    LAST OFFICE VISIT: 09/12/2024    LAST REFILL PER CHART:  Medication:butalbital-acetaminophen-caffeine (FIORICET, ESGIC) -40 MG per tablet   Ordered On:09/16/2024  Instructions:Take 1 tablet by mouth every 6 hours as needed for Headaches   Dispense:60 tablets  Refills:0      Future Appointments   Date Time Provider Department Center   3/6/2025  8:30 AM IOC LAB VISIT [facility] DEP   3/13/2025  9:00 AM Ro Negrete MD [facility] DEP

## 2024-11-04 DIAGNOSIS — R51.9 CHRONIC DAILY HEADACHE: ICD-10-CM

## 2024-11-04 RX ORDER — BUTALBITAL, ACETAMINOPHEN AND CAFFEINE 50; 325; 40 MG/1; MG/1; MG/1
1 TABLET ORAL EVERY 6 HOURS PRN
Qty: 60 TABLET | Refills: 0 | Status: SHIPPED | OUTPATIENT
Start: 2024-11-04 | End: 2024-12-04

## 2024-11-04 NOTE — TELEPHONE ENCOUNTER
PCP: Ro Negrete MD    LAST OFFICE VISIT: 09/12/2024    LAST REFILL PER CHART:  Medication:butalbital-acetaminophen-caffeine (FIORICET, ESGIC) -40 MG per tablet   Ordered On:10/14/2024  Instructions:Take 1 tablet by mouth every 6 hours as needed for Headaches   Dispense:60 tablets  Refills:0      Future Appointments   Date Time Provider Department Center   3/6/2025  8:30 AM IOC LAB VISIT [facility] DEP   3/13/2025  9:00 AM Ro Negrete MD [facility] DEP

## 2024-11-29 DIAGNOSIS — R51.9 CHRONIC DAILY HEADACHE: ICD-10-CM

## 2024-11-29 RX ORDER — BUTALBITAL, ACETAMINOPHEN AND CAFFEINE 50; 325; 40 MG/1; MG/1; MG/1
1 TABLET ORAL EVERY 6 HOURS PRN
Qty: 60 TABLET | Refills: 0 | Status: SHIPPED | OUTPATIENT
Start: 2024-11-29 | End: 2024-12-29

## 2024-12-13 ENCOUNTER — TELEPHONE (OUTPATIENT)
Age: 64
End: 2024-12-13

## 2024-12-13 NOTE — TELEPHONE ENCOUNTER
Pt called regarding a chest CT scan order. She needs the orders sent to [facility] on [address] in order for her insurance to pay for it. Please fax to [phone]

## 2024-12-19 DIAGNOSIS — R51.9 CHRONIC DAILY HEADACHE: ICD-10-CM

## 2024-12-19 RX ORDER — BUTALBITAL, ACETAMINOPHEN AND CAFFEINE 50; 325; 40 MG/1; MG/1; MG/1
1 TABLET ORAL EVERY 6 HOURS PRN
Qty: 60 TABLET | Refills: 0 | Status: SHIPPED | OUTPATIENT
Start: 2024-12-19 | End: 2025-01-18

## 2025-01-12 DIAGNOSIS — R51.9 CHRONIC DAILY HEADACHE: ICD-10-CM

## 2025-01-13 RX ORDER — BUTALBITAL, ACETAMINOPHEN AND CAFFEINE 50; 325; 40 MG/1; MG/1; MG/1
1 TABLET ORAL EVERY 6 HOURS PRN
Qty: 60 TABLET | Refills: 0 | Status: SHIPPED | OUTPATIENT
Start: 2025-01-13 | End: 2025-02-12

## 2025-01-13 NOTE — TELEPHONE ENCOUNTER
PCP: Ro Negrete MD    LAST OFFICE VISIT: 09/12/2024    LAST REFILL PER CHART:  Medication:butalbital-acetaminophen-caffeine (FIORICET, ESGIC) -40 MG per tablet   Ordered On:12/19/2024  Instructions:Take 1 tablet by mouth every 6 hours as needed for Headaches   Dispense:60 tablets  Refills:0      Future Appointments   Date Time Provider Department Center   3/6/2025  8:30 AM IOC LAB VISIT [facility] DEP   3/13/2025  9:00 AM Ro Negrete MD [facility] DEP   4/1/2025  3:45 PM Ketan Kitchen,  [facility] BS AMB

## 2025-01-19 RX ORDER — HYDRALAZINE HYDROCHLORIDE 100 MG/1
100 TABLET, FILM COATED ORAL 3 TIMES DAILY
Qty: 270 TABLET | Refills: 3 | Status: SHIPPED | OUTPATIENT
Start: 2025-01-19

## 2025-02-05 ENCOUNTER — TELEPHONE (OUTPATIENT)
Age: 65
End: 2025-02-05

## 2025-02-05 NOTE — TELEPHONE ENCOUNTER
Spoke with the patient and advised I dont see where we have received the results she is going to contact [facility] where she had it done at and have them fax it over

## 2025-02-06 ENCOUNTER — TRANSCRIBE ORDERS (OUTPATIENT)
Facility: HOSPITAL | Age: 65
End: 2025-02-06

## 2025-02-06 DIAGNOSIS — R51.9 CHRONIC DAILY HEADACHE: ICD-10-CM

## 2025-02-06 DIAGNOSIS — Z12.31 VISIT FOR SCREENING MAMMOGRAM: Primary | ICD-10-CM

## 2025-02-06 RX ORDER — BUTALBITAL, ACETAMINOPHEN AND CAFFEINE 50; 325; 40 MG/1; MG/1; MG/1
1 TABLET ORAL EVERY 6 HOURS PRN
Qty: 60 TABLET | Refills: 0 | Status: SHIPPED | OUTPATIENT
Start: 2025-02-06 | End: 2025-03-08

## 2025-02-06 NOTE — TELEPHONE ENCOUNTER
PCP: Ro Negrete MD    LAST OFFICE VISIT: 09/12/2024    LAST REFILL PER CHART:  Medication:butalbital-acetaminophen-caffeine (FIORICET, ESGIC) -40 MG per tablet   Ordered On:01/13/2025  Instructions:Take 1 tablet by mouth every 6 hours as needed for Headaches   Dispense:60 tablets  Refills:0      Future Appointments   Date Time Provider Department Center   3/6/2025  8:30 AM IOC LAB VISIT [facility] DEP   3/13/2025  9:00 AM Ro Negrete MD [facility] DEP   3/19/2025 11:00 AM [facility] RM 3 3D [facility]   4/1/2025  3:45 PM Ketan Kitchen,  [facility] BS AMB

## 2025-02-10 NOTE — TELEPHONE ENCOUNTER
PCP: Ro Negrete MD    LAST OFFICE VISIT: 09/12/2024    LAST REFILL PER CHART:  Medication:tiZANidine (ZANAFLEX) 4 MG tablet   Ordered On:8/06/2024  Instructions:Take 1 tablet by mouth every evening   Dispense:90 tablets  Refills:1      Future Appointments   Date Time Provider Department Center   3/6/2025  8:30 AM IOC LAB VISIT [facility] DEP   3/13/2025  9:00 AM Ro Negrete MD [facility] DEP   3/19/2025 11:00 AM [facility] RM 3 3D [facility]   4/1/2025  3:45 PM Ketan Kitchen,  [facility] BS AMB

## 2025-03-05 RX ORDER — POTASSIUM CHLORIDE 1500 MG/1
20 TABLET, EXTENDED RELEASE ORAL DAILY
Qty: 90 TABLET | Refills: 3 | Status: SHIPPED | OUTPATIENT
Start: 2025-03-05

## 2025-03-06 ENCOUNTER — HOSPITAL ENCOUNTER (OUTPATIENT)
Facility: HOSPITAL | Age: 65
Setting detail: SPECIMEN
Discharge: HOME | End: 2025-03-09
Payer: COMMERCIAL

## 2025-03-06 DIAGNOSIS — E55.9 VITAMIN D DEFICIENCY, UNSPECIFIED: ICD-10-CM

## 2025-03-06 DIAGNOSIS — E78.2 MIXED HYPERLIPIDEMIA: ICD-10-CM

## 2025-03-06 DIAGNOSIS — I10 PRIMARY HYPERTENSION: ICD-10-CM

## 2025-03-06 DIAGNOSIS — R51.9 CHRONIC DAILY HEADACHE: ICD-10-CM

## 2025-03-06 DIAGNOSIS — R73.01 IMPAIRED FASTING GLUCOSE: ICD-10-CM

## 2025-03-06 LAB
25(OH)D3 SERPL-MCNC: 60.2 NG/ML (ref 30–100)
ALBUMIN SERPL-MCNC: 3.6 G/DL (ref 3.4–5)
ALBUMIN/GLOB SERPL: 1 (ref 0.8–1.7)
ALP SERPL-CCNC: 123 U/L (ref 45–117)
ALT SERPL-CCNC: 51 U/L (ref 13–56)
ANION GAP SERPL CALC-SCNC: 8 MMOL/L (ref 3–18)
APPEARANCE UR: CLEAR
AST SERPL-CCNC: 29 U/L (ref 10–38)
BACTERIA URNS QL MICRO: NEGATIVE /HPF
BASOPHILS # BLD: 0.03 K/UL (ref 0–0.1)
BASOPHILS NFR BLD: 0.5 % (ref 0–2)
BILIRUB SERPL-MCNC: 0.4 MG/DL (ref 0.2–1)
BILIRUB UR QL: NEGATIVE
BUN SERPL-MCNC: 32 MG/DL (ref 7–18)
BUN/CREAT SERPL: 33 (ref 12–20)
CALCIUM SERPL-MCNC: 9.8 MG/DL (ref 8.5–10.1)
CHLORIDE SERPL-SCNC: 104 MMOL/L (ref 100–111)
CHOLEST SERPL-MCNC: 198 MG/DL
CO2 SERPL-SCNC: 26 MMOL/L (ref 21–32)
COLOR UR: YELLOW
CREAT SERPL-MCNC: 0.98 MG/DL (ref 0.6–1.3)
CREAT UR-MCNC: 158 MG/DL (ref 30–125)
DIFFERENTIAL METHOD BLD: NORMAL
EOSINOPHIL # BLD: 0.14 K/UL (ref 0–0.4)
EOSINOPHIL NFR BLD: 2.2 % (ref 0–5)
EPITH CASTS URNS QL MICRO: ABNORMAL /LPF (ref 0–5)
ERYTHROCYTE [DISTWIDTH] IN BLOOD BY AUTOMATED COUNT: 12.3 % (ref 11.6–14.5)
EST. AVERAGE GLUCOSE BLD GHB EST-MCNC: 108 MG/DL
GLOBULIN SER CALC-MCNC: 3.5 G/DL (ref 2–4)
GLUCOSE SERPL-MCNC: 115 MG/DL (ref 74–99)
GLUCOSE UR STRIP.AUTO-MCNC: NEGATIVE MG/DL
HBA1C MFR BLD: 5.4 % (ref 4.2–5.6)
HCT VFR BLD AUTO: 44.1 % (ref 35–45)
HDLC SERPL-MCNC: 44 MG/DL (ref 40–60)
HDLC SERPL: 4.5 (ref 0–5)
HGB BLD-MCNC: 14.3 G/DL (ref 12–16)
HGB UR QL STRIP: NEGATIVE
IMM GRANULOCYTES # BLD AUTO: 0.03 K/UL (ref 0–0.04)
IMM GRANULOCYTES NFR BLD AUTO: 0.5 % (ref 0–0.5)
KETONES UR QL STRIP.AUTO: NEGATIVE MG/DL
LDLC SERPL CALC-MCNC: ABNORMAL MG/DL (ref 0–100)
LEUKOCYTE ESTERASE UR QL STRIP.AUTO: NEGATIVE
LIPID PANEL: ABNORMAL
LYMPHOCYTES # BLD: 2.58 K/UL (ref 0.9–3.6)
LYMPHOCYTES NFR BLD: 41.4 % (ref 21–52)
MAGNESIUM SERPL-MCNC: 2.1 MG/DL (ref 1.6–2.6)
MCH RBC QN AUTO: 30 PG (ref 24–34)
MCHC RBC AUTO-ENTMCNC: 32.4 G/DL (ref 31–37)
MCV RBC AUTO: 92.6 FL (ref 78–100)
MICROALBUMIN UR-MCNC: 1.1 MG/DL (ref 0–3)
MICROALBUMIN/CREAT UR-RTO: 7 MG/G (ref 0–30)
MONOCYTES # BLD: 0.61 K/UL (ref 0.05–1.2)
MONOCYTES NFR BLD: 9.8 % (ref 3–10)
MUCOUS THREADS URNS QL MICRO: ABNORMAL /LPF
NEUTS SEG # BLD: 2.84 K/UL (ref 1.8–8)
NEUTS SEG NFR BLD: 45.6 % (ref 40–73)
NITRITE UR QL STRIP.AUTO: NEGATIVE
NRBC # BLD: 0 K/UL (ref 0–0.01)
NRBC BLD-RTO: 0 PER 100 WBC
PH UR STRIP: 5.5 (ref 5–8)
PLATELET # BLD AUTO: 271 K/UL (ref 135–420)
PMV BLD AUTO: 10.8 FL (ref 9.2–11.8)
POTASSIUM SERPL-SCNC: 3.7 MMOL/L (ref 3.5–5.5)
PROT SERPL-MCNC: 7.1 G/DL (ref 6.4–8.2)
PROT UR STRIP-MCNC: NEGATIVE MG/DL
RBC # BLD AUTO: 4.76 M/UL (ref 4.2–5.3)
RBC #/AREA URNS HPF: ABNORMAL /HPF (ref 0–5)
SODIUM SERPL-SCNC: 138 MMOL/L (ref 136–145)
SP GR UR REFRACTOMETRY: 1.03 (ref 1–1.03)
TRIGL SERPL-MCNC: 440 MG/DL
UROBILINOGEN UR QL STRIP.AUTO: 0.2 EU/DL (ref 0.2–1)
VLDLC SERPL CALC-MCNC: ABNORMAL MG/DL
WBC # BLD AUTO: 6.2 K/UL (ref 4.6–13.2)
WBC URNS QL MICRO: ABNORMAL /HPF (ref 0–5)

## 2025-03-06 PROCEDURE — 82570 ASSAY OF URINE CREATININE: CPT

## 2025-03-06 PROCEDURE — 81001 URINALYSIS AUTO W/SCOPE: CPT

## 2025-03-06 PROCEDURE — 83735 ASSAY OF MAGNESIUM: CPT

## 2025-03-06 PROCEDURE — 80061 LIPID PANEL: CPT

## 2025-03-06 PROCEDURE — 36415 COLL VENOUS BLD VENIPUNCTURE: CPT

## 2025-03-06 PROCEDURE — 80053 COMPREHEN METABOLIC PANEL: CPT

## 2025-03-06 PROCEDURE — 83036 HEMOGLOBIN GLYCOSYLATED A1C: CPT

## 2025-03-06 PROCEDURE — 82043 UR ALBUMIN QUANTITATIVE: CPT

## 2025-03-06 PROCEDURE — 85025 COMPLETE CBC W/AUTO DIFF WBC: CPT

## 2025-03-06 PROCEDURE — 82306 VITAMIN D 25 HYDROXY: CPT

## 2025-03-06 RX ORDER — BUTALBITAL, ACETAMINOPHEN AND CAFFEINE 50; 325; 40 MG/1; MG/1; MG/1
1 TABLET ORAL EVERY 6 HOURS PRN
Qty: 60 TABLET | Refills: 0 | Status: SHIPPED | OUTPATIENT
Start: 2025-03-06 | End: 2025-04-05

## 2025-03-06 NOTE — TELEPHONE ENCOUNTER
PCP: Ro Negrete MD    LAST OFFICE VISIT: 09/12/2024    LAST REFILL PER CHART:  Medication:butalbital-acetaminophen-caffeine (FIORICET, ESGIC) -40 MG per tablet   Ordered On:02/06/2025  Instructions:Take 1 tablet by mouth every 6 hours as needed for Headaches   Dispense:60 tablets  Refills:0      Future Appointments   Date Time Provider Department Center   3/13/2025  9:00 AM Ro Negrete MD [facility]   3/19/2025 11:00 AM [facility] RM 3 3D [facility]   4/1/2025  3:45 PM Ketan Kitchen, DO [facility] BS AMB

## 2025-03-13 ENCOUNTER — OFFICE VISIT (OUTPATIENT)
Facility: CLINIC | Age: 65
End: 2025-03-13

## 2025-03-13 VITALS
DIASTOLIC BLOOD PRESSURE: 65 MMHG | BODY MASS INDEX: 27.11 KG/M2 | WEIGHT: 153 LBS | HEIGHT: 63 IN | TEMPERATURE: 98.2 F | SYSTOLIC BLOOD PRESSURE: 130 MMHG | RESPIRATION RATE: 16 BRPM | HEART RATE: 72 BPM | OXYGEN SATURATION: 96 %

## 2025-03-13 DIAGNOSIS — R73.01 IMPAIRED FASTING GLUCOSE: ICD-10-CM

## 2025-03-13 DIAGNOSIS — K76.0 NAFLD (NONALCOHOLIC FATTY LIVER DISEASE): ICD-10-CM

## 2025-03-13 DIAGNOSIS — I10 PRIMARY HYPERTENSION: Primary | ICD-10-CM

## 2025-03-13 DIAGNOSIS — R74.8 ELEVATED ALKALINE PHOSPHATASE LEVEL: ICD-10-CM

## 2025-03-13 DIAGNOSIS — Z87.891 FORMER SMOKER: ICD-10-CM

## 2025-03-13 DIAGNOSIS — G47.33 SEVERE OBSTRUCTIVE SLEEP APNEA: ICD-10-CM

## 2025-03-13 DIAGNOSIS — R51.9 CHRONIC DAILY HEADACHE: ICD-10-CM

## 2025-03-13 DIAGNOSIS — D47.2 IGM LAMBDA MONOCLONAL GAMMOPATHY: ICD-10-CM

## 2025-03-13 DIAGNOSIS — J30.2 PERENNIAL ALLERGIC RHINITIS WITH SEASONAL VARIATION: ICD-10-CM

## 2025-03-13 DIAGNOSIS — J30.89 PERENNIAL ALLERGIC RHINITIS WITH SEASONAL VARIATION: ICD-10-CM

## 2025-03-13 DIAGNOSIS — E61.1 IRON DEFICIENCY: ICD-10-CM

## 2025-03-13 DIAGNOSIS — E78.2 MIXED HYPERLIPIDEMIA: ICD-10-CM

## 2025-03-13 DIAGNOSIS — E66.3 OVERWEIGHT (BMI 25.0-29.9): ICD-10-CM

## 2025-03-13 RX ORDER — LEVOCETIRIZINE DIHYDROCHLORIDE 5 MG/1
5 TABLET, FILM COATED ORAL NIGHTLY
COMMUNITY
End: 2025-03-16 | Stop reason: DRUGHIGH

## 2025-03-13 SDOH — ECONOMIC STABILITY: FOOD INSECURITY: WITHIN THE PAST 12 MONTHS, YOU WORRIED THAT YOUR FOOD WOULD RUN OUT BEFORE YOU GOT MONEY TO BUY MORE.: NEVER TRUE

## 2025-03-13 SDOH — ECONOMIC STABILITY: FOOD INSECURITY: WITHIN THE PAST 12 MONTHS, THE FOOD YOU BOUGHT JUST DIDN'T LAST AND YOU DIDN'T HAVE MONEY TO GET MORE.: NEVER TRUE

## 2025-03-13 ASSESSMENT — PATIENT HEALTH QUESTIONNAIRE - PHQ9
1. LITTLE INTEREST OR PLEASURE IN DOING THINGS: NOT AT ALL
5. POOR APPETITE OR OVEREATING: NOT AT ALL
9. THOUGHTS THAT YOU WOULD BE BETTER OFF DEAD, OR OF HURTING YOURSELF: NOT AT ALL
6. FEELING BAD ABOUT YOURSELF - OR THAT YOU ARE A FAILURE OR HAVE LET YOURSELF OR YOUR FAMILY DOWN: NOT AT ALL
3. TROUBLE FALLING OR STAYING ASLEEP: NOT AT ALL
2. FEELING DOWN, DEPRESSED OR HOPELESS: NOT AT ALL
SUM OF ALL RESPONSES TO PHQ QUESTIONS 1-9: 0
4. FEELING TIRED OR HAVING LITTLE ENERGY: NOT AT ALL
SUM OF ALL RESPONSES TO PHQ QUESTIONS 1-9: 0
8. MOVING OR SPEAKING SO SLOWLY THAT OTHER PEOPLE COULD HAVE NOTICED. OR THE OPPOSITE, BEING SO FIGETY OR RESTLESS THAT YOU HAVE BEEN MOVING AROUND A LOT MORE THAN USUAL: NOT AT ALL
7. TROUBLE CONCENTRATING ON THINGS, SUCH AS READING THE NEWSPAPER OR WATCHING TELEVISION: NOT AT ALL
10. IF YOU CHECKED OFF ANY PROBLEMS, HOW DIFFICULT HAVE THESE PROBLEMS MADE IT FOR YOU TO DO YOUR WORK, TAKE CARE OF THINGS AT HOME, OR GET ALONG WITH OTHER PEOPLE: NOT DIFFICULT AT ALL
SUM OF ALL RESPONSES TO PHQ QUESTIONS 1-9: 0
SUM OF ALL RESPONSES TO PHQ QUESTIONS 1-9: 0

## 2025-03-13 NOTE — PROGRESS NOTES
Yudith Godwin presents today for   Chief Complaint   Patient presents with    6 Month Follow-Up       \"Have you been to the ER, urgent care clinic since your last visit?  Hospitalized since your last visit?\"    NO    “Have you seen or consulted any other health care providers outside of [facility] since your last visit?”    NO       Have you had a mammogram?”   Scheduled    Date of last Mammogram: 3/5/2024          
nonspecific short segment thickening in various locations in small and large intestines. Obtained complement studies including C4, C1 esterase inhibtor, and C1q levels which were normal. Recommended evaluating C4 level during an episode. Evaluated by GI and had negative small bowel series and duplex ultrasound in 8/2018, and 24 hour urine for 5-HIAA level also negative. Discontinued captopril in 6/2018 and has not had a recurrence since discontinuation.  Suspect prior episodes secondary to angioedema related to ACE inhibitor use given improvement after discontinuation. Will continue to monitor.  10. Reactive depression.  Restarted on venlafaxine SR in 12/2020 and reports continued good control of symptoms currently on venlafaxine  mg daily. Will continue to follow.  11. Osteopenia. Newly diagnosed on baseline bone density study obtained on 12/9/2021. Calculated FRAX score estimates her 10-year risk of major osteoporotic fracture at 9.8% and hip fracture at 1.3%, which are not an indication for biphosphonate treatment.  Started on calcium 600 mg twice daily and continue with vitamin D supplementation. Also encouraged her to exercise regularly, particularly weightbearing activities, which may help to prevent progression.  Will discuss obtaining repeat bone density study to reassess.  12. Chronic daily headaches. Evaluated by Dr. Belcher in 7/2018 and weaned from daily Fioricet and Excedrin migraine. Headaches had improved with daily amitriptyline and use of Excedrin migraine and Fioricet only occasionally for moderate and severe headaches, respectively. However, no longer taking amitriptyline and has resumed Fioricet daily.  Also has chronic neck pain related to her anterior neck fusion and advised to continue Zanaflex and regular exercises.  Overall stable symptoms today.  Will continue to monitor.  13. Right leg sciatica.  Lumbar MRI (7/2016) with evidence of mild to moderate lumbar spinal stenosis (L3/L4)

## 2025-03-16 ENCOUNTER — TELEPHONE (OUTPATIENT)
Facility: CLINIC | Age: 65
End: 2025-03-16

## 2025-03-16 PROBLEM — R74.8 ELEVATED ALKALINE PHOSPHATASE LEVEL: Status: ACTIVE | Noted: 2025-03-16

## 2025-03-16 RX ORDER — LEVOCETIRIZINE DIHYDROCHLORIDE 5 MG/1
5 TABLET, FILM COATED ORAL NIGHTLY PRN
Status: SHIPPED | COMMUNITY
Start: 2025-03-16

## 2025-03-16 RX ORDER — CETIRIZINE HYDROCHLORIDE 10 MG/1
10 TABLET ORAL DAILY PRN
Qty: 1 TABLET | Refills: 0 | Status: SHIPPED
Start: 2025-03-16

## 2025-03-18 ENCOUNTER — CLINICAL DOCUMENTATION (OUTPATIENT)
Facility: CLINIC | Age: 65
End: 2025-03-18

## 2025-03-19 ENCOUNTER — HOSPITAL ENCOUNTER (OUTPATIENT)
Facility: HOSPITAL | Age: 65
Discharge: HOME | End: 2025-03-22
Payer: COMMERCIAL

## 2025-03-19 VITALS — WEIGHT: 150 LBS | BODY MASS INDEX: 25.61 KG/M2 | HEIGHT: 64 IN

## 2025-03-19 DIAGNOSIS — Z87.891 PERSONAL HISTORY OF TOBACCO USE: ICD-10-CM

## 2025-03-19 DIAGNOSIS — Z12.31 VISIT FOR SCREENING MAMMOGRAM: ICD-10-CM

## 2025-03-19 PROCEDURE — 77063 BREAST TOMOSYNTHESIS BI: CPT

## 2025-03-19 NOTE — PROGRESS NOTES
Low-Dose screening chest CT scan performed at [facility] on 12/20/2024.    Findings:  2.6 x 2.7 x 2.6 cm subpleural well-defined groundglass opacity in the lingula and 1.0 x 0.9 cm subpleural groundglass nodule in the close proximity fat in the lingula.  Given finding of two groundglass nodules < 3 cm in size, classified as Lung RADS 2 and recommendation would be for repeat imaging in 1 year (due 12/20/2025).

## 2025-04-01 ENCOUNTER — OFFICE VISIT (OUTPATIENT)
Age: 65
End: 2025-04-01
Payer: COMMERCIAL

## 2025-04-01 VITALS
TEMPERATURE: 98.4 F | BODY MASS INDEX: 25.76 KG/M2 | HEIGHT: 65 IN | OXYGEN SATURATION: 93 % | SYSTOLIC BLOOD PRESSURE: 134 MMHG | WEIGHT: 154.6 LBS | DIASTOLIC BLOOD PRESSURE: 73 MMHG | HEART RATE: 75 BPM | RESPIRATION RATE: 18 BRPM

## 2025-04-01 DIAGNOSIS — Z87.891 PERSONAL HISTORY OF TOBACCO USE: ICD-10-CM

## 2025-04-01 DIAGNOSIS — R91.1 LUNG NODULE: Primary | ICD-10-CM

## 2025-04-01 DIAGNOSIS — R51.9 CHRONIC DAILY HEADACHE: ICD-10-CM

## 2025-04-01 PROCEDURE — 3075F SYST BP GE 130 - 139MM HG: CPT | Performed by: INTERNAL MEDICINE

## 2025-04-01 PROCEDURE — 3078F DIAST BP <80 MM HG: CPT | Performed by: INTERNAL MEDICINE

## 2025-04-01 PROCEDURE — 99213 OFFICE O/P EST LOW 20 MIN: CPT | Performed by: INTERNAL MEDICINE

## 2025-04-01 PROCEDURE — 1123F ACP DISCUSS/DSCN MKR DOCD: CPT | Performed by: INTERNAL MEDICINE

## 2025-04-01 RX ORDER — BUTALBITAL, ACETAMINOPHEN AND CAFFEINE 50; 325; 40 MG/1; MG/1; MG/1
1 TABLET ORAL EVERY 6 HOURS PRN
Qty: 60 TABLET | Refills: 0 | Status: SHIPPED | OUTPATIENT
Start: 2025-04-01 | End: 2025-04-04 | Stop reason: SDUPTHER

## 2025-04-01 NOTE — PROGRESS NOTES
[facility]                                                       Pulmonary, Critical Care, and Sleep Medicine      Pulmonary Office Follow-up.    Name: Yudith Godwin     : 1960     Date: 2025        Subjective:   Chief complaint: lung nodule  Patient is a 64 y.o. female with a PMHx of HLD, HTN, Sleep apnea, Allergic rhinitis, and IBS.    HPI(25):  Interim events: completed LDCT.    Today in clinic, she states taht she is doing well.   She reports rare occasions or chest discomfrot and inability to take in a full breaths.   Notes that sometimes accompanied by her heart fluttering.  Hemoptysis: none  Tobacco - none  Notes weight gain of 15Lbs since last visit.   She exercises by walking on her treadmil 5 days per week.    Current inhalers and/or respiratory treatments:  -none      HPI(12/15/23):  Today in clinic, she states that she is doing okay overall.   Completed CT chest. No second-hand smoke exposure.  Has noted some slight right-sided chest, sharp, a few seconds and no other associated symptoms. Resolves spontaneously.  Tobacco - not smoking.  States she was apparently diagnosed with Monoclonal gammopathy - following with Dr. Heredia.    Currently on:  -none    Flu vaccine - reports she has completed.      HPI (23):  Today in clinic, she states she is doing well and has no complaints at this time.  She is here to follow-up after a CT chest was notable for a right lung nodule.  She states that the LDCT was performed for lung cancer screening given her history of tobacco abuse. Prior to this, she was feeling well and had no symptoms.  At this time, she denies shortness of breath, cough, hemoptysis, fever, chills, night sweats.  States she stays very active and exercises every other day. No family history of lung cancer.    Tobacco/Substance/Vape/Hookah: former smoker - smoked 1/2 ppd x46 years; quit 5 years

## 2025-04-01 NOTE — PROGRESS NOTES
Yudith Godwin presents today for   Chief Complaint   Patient presents with    Pulmonary Nodule       Is someone accompanying this pt? No    Is the patient using any DME equipment during OV? Cpap ( dr burkett)    -DME Company No    Depression Screening:        3/13/2025     8:52 AM   PHQ-9 Questionaire   Little interest or pleasure in doing things 0   Feeling down, depressed, or hopeless 0   Trouble falling or staying asleep, or sleeping too much 0   Feeling tired or having little energy 0   Poor appetite or overeating 0   Feeling bad about yourself - or that you are a failure or have let yourself or your family down 0   Trouble concentrating on things, such as reading the newspaper or watching television 0   Moving or speaking so slowly that other people could have noticed. Or the opposite - being so fidgety or restless that you have been moving around a lot more than usual 0   Thoughts that you would be better off dead, or of hurting yourself in some way 0   PHQ-9 Total Score 0   If you checked off any problems, how difficult have these problems made it for you to do your work, take care of things at home, or get along with other people? 0       Learning Assessment:    Failed to redirect to the Timeline version of the [employer] SmartLink.    Abuse Screening:         No data to display                Fall Risk    Failed to redirect to the Timeline version of the [employer] SmartLink.    Coordination of Care:    1. Have you been to the ER, urgent care clinic since your last visit? Hospitalized since your last visit? No    2. Have you seen or consulted any other health care providers outside of the [facility] since your last visit? Include any pap smears or colon screening. No    Medication list has been update per patient.

## 2025-04-01 NOTE — PATIENT INSTRUCTIONS
What is the plan?  -Complete CAT/CT scan in June 2025    -Please see Cardiology concerning your heart fluttering and chest discomfort     -Please bring a disc with CT scan images to your next apt

## 2025-04-01 NOTE — TELEPHONE ENCOUNTER
PCP: Ro Negrete MD    LAST OFFICE VISIT: 03/13/2025    LAST REFILL PER CHART:  Medication:butalbital-acetaminophen-caffeine (FIORICET, ESGIC) -40 MG per tablet   Ordered On:03/06/2025  Instructions:Take 1 tablet by mouth every 6 hours as needed for Headaches   Dispense:60 tablets  Refills:0      Future Appointments   Date Time Provider Department Center   4/1/2025  3:45 PM Ketan Kitchen DO [facility]   6/19/2025  9:00 AM [facility] LAB VISIT [facility] DEP   6/26/2025 10:30 AM Ro Negrete MD [facility] DEP

## 2025-04-02 ENCOUNTER — TELEPHONE (OUTPATIENT)
Age: 65
End: 2025-04-02

## 2025-04-02 NOTE — TELEPHONE ENCOUNTER
Pt stated that she need her CT order to go to [facility]. Fax# [phone]. For further information please call [phone]

## 2025-04-04 RX ORDER — BUTALBITAL, ACETAMINOPHEN AND CAFFEINE 50; 325; 40 MG/1; MG/1; MG/1
1 TABLET ORAL EVERY 6 HOURS PRN
Qty: 60 TABLET | Refills: 0 | Status: SHIPPED | OUTPATIENT
Start: 2025-04-04 | End: 2025-05-04

## 2025-04-04 NOTE — TELEPHONE ENCOUNTER
Patient called stating pharmacy did not receive script. Called pharmacy and confirmed. Please re-send prescription.

## 2025-04-09 ENCOUNTER — PATIENT MESSAGE (OUTPATIENT)
Facility: CLINIC | Age: 65
End: 2025-04-09

## 2025-04-09 DIAGNOSIS — R51.9 CHRONIC DAILY HEADACHE: ICD-10-CM

## 2025-04-10 RX ORDER — BUTALBITAL, ACETAMINOPHEN AND CAFFEINE 50; 325; 40 MG/1; MG/1; MG/1
1 TABLET ORAL EVERY 6 HOURS PRN
Qty: 60 TABLET | Refills: 0 | Status: SHIPPED | OUTPATIENT
Start: 2025-04-10 | End: 2025-05-10

## 2025-04-10 NOTE — TELEPHONE ENCOUNTER
[facility] reviewed 4/10/2025     The last fill date for Azmgjc-Gfyhljfc-Djds -40  was 2/11/2025 for a 15 d/s qty 60        PCP: Ro Negrete MD    Last appt:  3/13/2025    Future Appointments   Date Time Provider Department Center   6/19/2025  9:00 AM IOC LAB VISIT [facility] DEP   6/26/2025 10:30 AM Ro Negrete MD [facility] DEP   7/30/2025  4:00 PM Ketan Kitchen, DO [facility] BS [facility]       Requested Prescriptions     Pending Prescriptions Disp Refills    butalbital-acetaminophen-caffeine (FIORICET, ESGIC) -40 MG per tablet 60 tablet 0     Sig: Take 1 tablet by mouth every 6 hours as needed for Headaches

## 2025-05-06 DIAGNOSIS — R51.9 CHRONIC DAILY HEADACHE: ICD-10-CM

## 2025-05-06 RX ORDER — BUTALBITAL, ACETAMINOPHEN AND CAFFEINE 50; 325; 40 MG/1; MG/1; MG/1
1 TABLET ORAL EVERY 6 HOURS PRN
Qty: 60 TABLET | Refills: 0 | Status: SHIPPED | OUTPATIENT
Start: 2025-05-06 | End: 2025-06-05

## 2025-05-06 NOTE — TELEPHONE ENCOUNTER
PCP: Ro Negrete MD    LAST OFFICE VISIT: 03/13/2025    [facility]  report reviewed  The last fill date was 02/11/2025 for a 15 d/s qty 60    LAST REFILL PER CHART:  Medication:butalbital-acetaminophen-caffeine (FIORICET, ESGIC) -40 MG per tablet   Ordered On:04/10/2025  Instructions:Take 1 tablet by mouth every 6 hours as needed for Headaches   Dispense:60 tablets  Refills:0    Future Appointments   Date Time Provider Department Center   6/19/2025  9:00 AM IOC LAB VISIT [facility] DEP   6/26/2025 10:30 AM oR Negrete MD [facility] DEP   7/30/2025  4:00 PM Ketan Kitchen DO [facility] BS AMB

## 2025-05-31 NOTE — TELEPHONE ENCOUNTER
Postop call  UP [facility] [place]
Admit/Transfer to Inpatient Psychiatry
Admit/Transfer to Inpatient Psychiatry

## 2025-06-01 DIAGNOSIS — R51.9 CHRONIC DAILY HEADACHE: ICD-10-CM

## 2025-06-02 RX ORDER — BUTALBITAL, ACETAMINOPHEN AND CAFFEINE 50; 325; 40 MG/1; MG/1; MG/1
1 TABLET ORAL EVERY 6 HOURS PRN
Qty: 60 TABLET | Refills: 0 | Status: SHIPPED | OUTPATIENT
Start: 2025-06-02 | End: 2025-07-02

## 2025-06-02 NOTE — TELEPHONE ENCOUNTER
PCP: Ro Negrete MD    LAST OFFICE VISIT: 03/13/2025    LAST REFILL PER CHART:  Medication:butalbital-acetaminophen-caffeine (FIORICET, ESGIC) -40 MG per tablet   Ordered On:05/06/2025  Instructions: Take 1 tablet by mouth every 6 hours as needed for Headaches   Dispense:60 tablets  Refills:0    Future Appointments   Date Time Provider Department Center   6/19/2025  9:00 AM IOC LAB VISIT [facility] DEP   6/26/2025 10:30 AM Ro Negrete MD [facility] DEP   7/30/2025  4:00 PM Ketan Kitchen DO [facility] BS AMB

## 2025-06-10 DIAGNOSIS — Z87.891 PERSONAL HISTORY OF TOBACCO USE: ICD-10-CM

## 2025-06-10 DIAGNOSIS — R91.1 LUNG NODULE: ICD-10-CM

## 2025-06-19 ENCOUNTER — HOSPITAL ENCOUNTER (OUTPATIENT)
Facility: HOSPITAL | Age: 65
Setting detail: SPECIMEN
Discharge: HOME | End: 2025-06-22
Payer: COMMERCIAL

## 2025-06-19 DIAGNOSIS — R74.8 ELEVATED ALKALINE PHOSPHATASE LEVEL: ICD-10-CM

## 2025-06-19 DIAGNOSIS — E61.1 IRON DEFICIENCY: ICD-10-CM

## 2025-06-19 DIAGNOSIS — R73.01 IMPAIRED FASTING GLUCOSE: ICD-10-CM

## 2025-06-19 DIAGNOSIS — D47.2 IGM LAMBDA MONOCLONAL GAMMOPATHY: ICD-10-CM

## 2025-06-19 DIAGNOSIS — K76.0 NAFLD (NONALCOHOLIC FATTY LIVER DISEASE): ICD-10-CM

## 2025-06-19 DIAGNOSIS — E78.2 MIXED HYPERLIPIDEMIA: ICD-10-CM

## 2025-06-19 LAB
ALBUMIN SERPL-MCNC: 3.9 G/DL (ref 3.4–5)
ALBUMIN/GLOB SERPL: 1.4 (ref 0.8–1.7)
ALP SERPL-CCNC: 115 U/L (ref 45–117)
ALT SERPL-CCNC: 41 U/L (ref 10–35)
ANION GAP SERPL CALC-SCNC: 13 MMOL/L (ref 3–18)
AST SERPL-CCNC: 30 U/L (ref 10–38)
BASOPHILS # BLD: 0.02 K/UL (ref 0–0.1)
BASOPHILS NFR BLD: 0.4 % (ref 0–2)
BILIRUB SERPL-MCNC: 0.4 MG/DL (ref 0.2–1)
BUN SERPL-MCNC: 22 MG/DL (ref 6–23)
BUN/CREAT SERPL: 29 (ref 12–20)
CALCIUM SERPL-MCNC: 10 MG/DL (ref 8.5–10.1)
CHLORIDE SERPL-SCNC: 99 MMOL/L (ref 98–107)
CHOLEST SERPL-MCNC: 218 MG/DL
CO2 SERPL-SCNC: 28 MMOL/L (ref 21–32)
CREAT SERPL-MCNC: 0.77 MG/DL (ref 0.6–1.3)
CREAT UR-MCNC: 319 MG/DL (ref 30–125)
DIFFERENTIAL METHOD BLD: ABNORMAL
EOSINOPHIL # BLD: 0.07 K/UL (ref 0–0.4)
EOSINOPHIL NFR BLD: 1.4 % (ref 0–5)
ERYTHROCYTE [DISTWIDTH] IN BLOOD BY AUTOMATED COUNT: 12.4 % (ref 11.6–14.5)
EST. AVERAGE GLUCOSE BLD GHB EST-MCNC: 107 MG/DL
FERRITIN SERPL-MCNC: 139 NG/ML (ref 13–400)
GLOBULIN SER CALC-MCNC: 2.8 G/DL (ref 2–4)
GLUCOSE SERPL-MCNC: 100 MG/DL (ref 74–108)
HBA1C MFR BLD: 5.3 % (ref 4.2–5.6)
HCT VFR BLD AUTO: 43.6 % (ref 35–45)
HDLC SERPL-MCNC: 36 MG/DL (ref 40–60)
HDLC SERPL: 6 (ref 0–5)
HGB BLD-MCNC: 14 G/DL (ref 12–16)
IMM GRANULOCYTES # BLD AUTO: 0.01 K/UL (ref 0–0.04)
IMM GRANULOCYTES NFR BLD AUTO: 0.2 % (ref 0–0.5)
LDLC SERPL CALC-MCNC: 111 MG/DL (ref 0–100)
LYMPHOCYTES # BLD: 2.53 K/UL (ref 0.9–3.6)
LYMPHOCYTES NFR BLD: 51.7 % (ref 21–52)
MCH RBC QN AUTO: 29.8 PG (ref 24–34)
MCHC RBC AUTO-ENTMCNC: 32.1 G/DL (ref 31–37)
MCV RBC AUTO: 92.8 FL (ref 78–100)
MICROALBUMIN UR-MCNC: 3.7 MG/DL (ref 0–3)
MICROALBUMIN/CREAT UR-RTO: 12 MG/G (ref 0–30)
MONOCYTES # BLD: 0.52 K/UL (ref 0.05–1.2)
MONOCYTES NFR BLD: 10.6 % (ref 3–10)
NEUTS SEG # BLD: 1.74 K/UL (ref 1.8–8)
NEUTS SEG NFR BLD: 35.7 % (ref 40–73)
NRBC # BLD: 0 K/UL (ref 0–0.01)
NRBC BLD-RTO: 0 PER 100 WBC
PLATELET # BLD AUTO: 271 K/UL (ref 135–420)
PMV BLD AUTO: 10.4 FL (ref 9.2–11.8)
POTASSIUM SERPL-SCNC: 4.4 MMOL/L (ref 3.5–5.5)
PROT SERPL-MCNC: 6.7 G/DL (ref 6.4–8.2)
RBC # BLD AUTO: 4.7 M/UL (ref 4.2–5.3)
SODIUM SERPL-SCNC: 140 MMOL/L (ref 136–145)
TRIGL SERPL-MCNC: 355 MG/DL (ref 0–150)
VLDLC SERPL CALC-MCNC: 71 MG/DL
WBC # BLD AUTO: 4.9 K/UL (ref 4.6–13.2)

## 2025-06-19 PROCEDURE — 82728 ASSAY OF FERRITIN: CPT

## 2025-06-19 PROCEDURE — 82570 ASSAY OF URINE CREATININE: CPT

## 2025-06-19 PROCEDURE — 85025 COMPLETE CBC W/AUTO DIFF WBC: CPT

## 2025-06-19 PROCEDURE — 83036 HEMOGLOBIN GLYCOSYLATED A1C: CPT

## 2025-06-19 PROCEDURE — 80053 COMPREHEN METABOLIC PANEL: CPT

## 2025-06-19 PROCEDURE — 80061 LIPID PANEL: CPT

## 2025-06-19 PROCEDURE — 36415 COLL VENOUS BLD VENIPUNCTURE: CPT

## 2025-06-19 PROCEDURE — 82043 UR ALBUMIN QUANTITATIVE: CPT

## 2025-06-19 PROCEDURE — 82977 ASSAY OF GGT: CPT

## 2025-06-20 LAB — GGT SERPL-CCNC: 59 U/L (ref 5–55)

## 2025-06-23 DIAGNOSIS — R51.9 CHRONIC DAILY HEADACHE: ICD-10-CM

## 2025-06-23 RX ORDER — CARVEDILOL 12.5 MG/1
12.5 TABLET ORAL 2 TIMES DAILY
Qty: 180 TABLET | Refills: 3 | Status: SHIPPED | OUTPATIENT
Start: 2025-06-23

## 2025-06-23 RX ORDER — BUTALBITAL, ACETAMINOPHEN AND CAFFEINE 50; 325; 40 MG/1; MG/1; MG/1
1 TABLET ORAL EVERY 6 HOURS PRN
Qty: 60 TABLET | Refills: 0 | Status: SHIPPED | OUTPATIENT
Start: 2025-06-23 | End: 2025-07-23

## 2025-06-23 NOTE — TELEPHONE ENCOUNTER
PCP: Ro Negrete MD    LAST OFFICE VISIT: 03/13/2025    LAST REFILL PER CHART:  Medication:carvedilol (COREG) 12.5 MG tablet   Ordered On:07/02/2024  Instructions:Take 1 tablet by mouth 2 times daily   Dispense:180 tablets  Refills:3    Future Appointments   Date Time Provider Department Center   6/26/2025 10:30 AM Ro Negrete MD [facility]   7/30/2025  4:00 PM Ketan Kitchen DO [facility] BS AMB

## 2025-06-23 NOTE — TELEPHONE ENCOUNTER
PCP: Ro Negrete MD    LAST OFFICE VISIT: 03/13/2025    LAST REFILL PER CHART:  Medication:butalbital-acetaminophen-caffeine (FIORICET, ESGIC) -40 MG per tablet   Ordered On:06/02/2025  Instructions:Take 1 tablet by mouth every 6 hours as needed for Headaches   Dispense:60 tablets  Refills:0    Future Appointments   Date Time Provider Department Center   6/26/2025 10:30 AM Ro Negrete MD [facility]   7/30/2025  4:00 PM Ketan Kitchen DO [facility] BS AMB

## 2025-06-26 ENCOUNTER — OFFICE VISIT (OUTPATIENT)
Facility: CLINIC | Age: 65
End: 2025-06-26
Payer: COMMERCIAL

## 2025-06-26 VITALS
SYSTOLIC BLOOD PRESSURE: 118 MMHG | HEIGHT: 63 IN | HEART RATE: 62 BPM | WEIGHT: 142 LBS | RESPIRATION RATE: 16 BRPM | OXYGEN SATURATION: 96 % | TEMPERATURE: 98.2 F | DIASTOLIC BLOOD PRESSURE: 64 MMHG | BODY MASS INDEX: 25.16 KG/M2

## 2025-06-26 DIAGNOSIS — E78.2 MIXED HYPERLIPIDEMIA: ICD-10-CM

## 2025-06-26 DIAGNOSIS — D47.2 IGM LAMBDA MONOCLONAL GAMMOPATHY: ICD-10-CM

## 2025-06-26 DIAGNOSIS — R91.8 MULTIPLE PULMONARY NODULES: ICD-10-CM

## 2025-06-26 DIAGNOSIS — I10 PRIMARY HYPERTENSION: Primary | ICD-10-CM

## 2025-06-26 DIAGNOSIS — R73.01 IMPAIRED FASTING GLUCOSE: ICD-10-CM

## 2025-06-26 DIAGNOSIS — Z87.891 FORMER SMOKER: ICD-10-CM

## 2025-06-26 DIAGNOSIS — R79.89 ELEVATED LFTS: ICD-10-CM

## 2025-06-26 DIAGNOSIS — G47.33 SEVERE OBSTRUCTIVE SLEEP APNEA: ICD-10-CM

## 2025-06-26 DIAGNOSIS — R51.9 CHRONIC DAILY HEADACHE: ICD-10-CM

## 2025-06-26 DIAGNOSIS — E66.3 OVERWEIGHT (BMI 25.0-29.9): ICD-10-CM

## 2025-06-26 DIAGNOSIS — K76.0 METABOLIC DYSFUNCTION-ASSOCIATED STEATOTIC LIVER DISEASE (MASLD): ICD-10-CM

## 2025-06-26 PROCEDURE — 99215 OFFICE O/P EST HI 40 MIN: CPT | Performed by: INTERNAL MEDICINE

## 2025-06-26 PROCEDURE — 3074F SYST BP LT 130 MM HG: CPT | Performed by: INTERNAL MEDICINE

## 2025-06-26 PROCEDURE — 1123F ACP DISCUSS/DSCN MKR DOCD: CPT | Performed by: INTERNAL MEDICINE

## 2025-06-26 PROCEDURE — 3078F DIAST BP <80 MM HG: CPT | Performed by: INTERNAL MEDICINE

## 2025-06-26 RX ORDER — NICOTINAMIDE 100; 2; 850; 750; 50; 27 UG/1; MG/1; UG/1; MG/1; UG/1; MG/1
1 TABLET ORAL DAILY
COMMUNITY
End: 2025-06-26 | Stop reason: ALTCHOICE

## 2025-06-26 RX ORDER — MAGNESIUM GLUCONATE 27 MG(500)
500 TABLET ORAL DAILY
COMMUNITY

## 2025-06-26 RX ORDER — PERPHENAZINE/AMITRIPTYLINE HCL 4 MG-50 MG
1 TABLET ORAL DAILY
COMMUNITY
End: 2025-06-26 | Stop reason: ALTCHOICE

## 2025-06-26 RX ORDER — EZETIMIBE 10 MG/1
10 TABLET ORAL DAILY
Qty: 90 TABLET | Refills: 3 | Status: SHIPPED | OUTPATIENT
Start: 2025-06-26

## 2025-06-26 NOTE — PROGRESS NOTES
Yudith Godwin presents today for   Chief Complaint   Patient presents with    3 Month Follow-Up       \"Have you been to the ER, urgent care clinic since your last visit?  Hospitalized since your last visit?\"    NO    “Have you seen or consulted any other health care providers outside of [facility] since your last visit?”    NO            
her 10-year risk of major osteoporotic fracture at 9.8% and hip fracture at 1.3%, which are not an indication for biphosphonate treatment.  Started on calcium 600 mg twice daily and continue with vitamin D supplementation. Also encouraged her to exercise regularly, particularly weightbearing activities, which may help to prevent progression.  Will discuss obtaining repeat bone density study to reassess.  12. Chronic daily headaches. Evaluated by Dr. Belcher in 7/2018 and weaned from daily Fioricet and Excedrin migraine. Headaches had improved with daily amitriptyline and use of Excedrin migraine and Fioricet only occasionally for moderate and severe headaches, respectively. However, no longer taking amitriptyline and has resumed Fioricet daily.  Advised today that would recommend minimizing use of Fioricet to only 2-3 days/week to avoid medication headache.  Also has chronic neck pain related to her anterior neck fusion and advised to continue Zanaflex and regular exercises.  Overall stable symptoms today.  Will continue to monitor.  13. Right leg sciatica.  Lumbar MRI (7/2016) with evidence of mild to moderate lumbar spinal stenosis (L3/L4) with multilevel mild lumbar herniated disc and facet arthropathy.  Significant improvement with dry needling therapy previously. Had acute worsening in 9/2020 and presented to the [facility] ED on 9/26/2020. Lumbar spine x-ray showed no fracture or subluxation, but increasing degenerative disc space narrowing at L3-L4. She was treated with Skelaxin and naproxen as well as lidocaine patches. She presented with persistent symptoms on 9/29/2020 and was treated with a Medrol dose pack and gabapentin 300 mg tid with resolution. Had weaned from gabapentin until recurrent symptoms in 1/2021 and treated again with a Medrol dose pack and gabapentin 300 mg tid.  Pain resolved and gabapentin discontinued.  Had flare in 5/2024 and prescribed Medrol Dosepak by urgent care with improvement.

## 2025-06-26 NOTE — PATIENT INSTRUCTIONS
to avoid saturated and trans fats. They increase your risk of heart disease.  Use olive or canola oil when you cook.  Bake, broil, grill, or steam foods instead of frying them.  Choose lean meats instead of high-fat meats such as hot dogs and sausages. Cut off all visible fat when you prepare meat.  Eat fish, skinless poultry, and meat alternatives such as soy products instead of high-fat meats. Soy products, such as tofu, may be especially good for your heart.  Choose low-fat or fat-free milk and dairy products.  Eat foods high in fiber  Eat a variety of grain products every day. Include whole-grain foods that have lots of fiber and nutrients. Examples of whole-grain foods include oats, whole wheat bread, and brown rice.  Buy whole-grain breads and cereals, instead of white bread or pastries.  Limit salt and sodium  Limit how much salt and sodium you eat to help lower your blood pressure.  Taste food before you salt it. Add only a little salt when you think you need it. With time, your taste buds will adjust to less salt.  Eat fewer snack items, fast foods, and other high-salt, processed foods. Check food labels for the amount of sodium in packaged foods.  Choose low-sodium versions of canned goods (such as soups, vegetables, and beans).  Limit sugar  Limit drinks and foods with added sugar. These include candy, desserts, and soda pop.  Limit alcohol  Limit alcohol to no more than 2 drinks a day for men and 1 drink a day for women. Too much alcohol can cause health problems.  When should you call for help?  Watch closely for changes in your health, and be sure to contact your doctor if:    You would like help planning heart-healthy meals.   Where can you learn more?  Go to https://www.healthMobile2Me.net/GoodHelpConnections  Enter V137 in the search box to learn more about \"Heart-Healthy Diet: Care Instructions.\"  Current as of: August 22, 2019               Content Version: 12.6  © 8935-4066 [employer].

## 2025-06-27 ENCOUNTER — PATIENT MESSAGE (OUTPATIENT)
Facility: CLINIC | Age: 65
End: 2025-06-27

## 2025-06-29 PROBLEM — E61.1 IRON DEFICIENCY: Status: RESOLVED | Noted: 2023-10-08 | Resolved: 2025-06-29

## 2025-07-09 RX ORDER — CARVEDILOL 12.5 MG/1
12.5 TABLET ORAL 2 TIMES DAILY
Qty: 180 TABLET | Refills: 3 | Status: SHIPPED | OUTPATIENT
Start: 2025-07-09

## 2025-07-09 RX ORDER — VENLAFAXINE HYDROCHLORIDE 150 MG/1
150 CAPSULE, EXTENDED RELEASE ORAL DAILY
Qty: 90 CAPSULE | Refills: 3 | Status: SHIPPED | OUTPATIENT
Start: 2025-07-09

## 2025-07-09 NOTE — TELEPHONE ENCOUNTER
PCP: Ro Negrete MD    LAST OFFICE VISIT: 06/26/2025    LAST REFILL PER CHART:  Medication:carvedilol (COREG) 12.5 MG tablet   Ordered On:06/23/2025  Instructions:take 1 tablet by mouth twice a day   Dispense:180 tablets  Refills:3    LAST REFILL PER CHART:  Medication:tiZANidine (ZANAFLEX) 4 MG tablet   Ordered On:02/10/2025  Instructions: take 1 tablet by mouth every evening   Dispense:90 tablets  Refills:1    LAST REFILL PER CHART:  Medication:venlafaxine (EFFEXOR XR) 150 MG extended release capsule   Ordered On:07/29/2024  Instructions:TAKE 1 CAPSULE BY MOUTH ONCE DAILY   Dispense:90 capsules  Refills:3    Future Appointments   Date Time Provider Department Center   7/30/2025  4:00 PM Ketan Kitchen DO [facility]   9/26/2025 10:15 AM [facility] LAB VISIT [facility] DEP   9/30/2025  9:30 AM Ro Negrete MD [facility] DEP

## 2025-07-25 ENCOUNTER — TELEPHONE (OUTPATIENT)
Age: 65
End: 2025-07-25

## 2025-07-25 NOTE — TELEPHONE ENCOUNTER
Left v/m for patient to return call, pt needs to get ct disk from [place] before appt on 07/30/25 at 4:00pm    Pt return phone call she had disk and will bring to her appt on 07/30/25, no other questions or concerns

## 2025-07-30 ENCOUNTER — OFFICE VISIT (OUTPATIENT)
Age: 65
End: 2025-07-30
Payer: COMMERCIAL

## 2025-07-30 VITALS
BODY MASS INDEX: 25.59 KG/M2 | HEART RATE: 72 BPM | OXYGEN SATURATION: 98 % | HEIGHT: 63 IN | RESPIRATION RATE: 18 BRPM | SYSTOLIC BLOOD PRESSURE: 127 MMHG | WEIGHT: 144.4 LBS | TEMPERATURE: 98.1 F | DIASTOLIC BLOOD PRESSURE: 66 MMHG

## 2025-07-30 DIAGNOSIS — Z87.891 PERSONAL HISTORY OF TOBACCO USE: ICD-10-CM

## 2025-07-30 DIAGNOSIS — R91.1 LUNG NODULE: Primary | ICD-10-CM

## 2025-07-30 PROCEDURE — 99213 OFFICE O/P EST LOW 20 MIN: CPT | Performed by: INTERNAL MEDICINE

## 2025-07-30 PROCEDURE — 3074F SYST BP LT 130 MM HG: CPT | Performed by: INTERNAL MEDICINE

## 2025-07-30 PROCEDURE — 1123F ACP DISCUSS/DSCN MKR DOCD: CPT | Performed by: INTERNAL MEDICINE

## 2025-07-30 PROCEDURE — 3078F DIAST BP <80 MM HG: CPT | Performed by: INTERNAL MEDICINE

## 2025-07-30 NOTE — PROGRESS NOTES
[facility]                                                       Pulmonary, Critical Care, and Sleep Medicine      Pulmonary Office Follow-up.    Name: Yudith Godwin     : 1960     Date: 2025        Subjective:   Chief complaint: lung nodule  Patient is a 64 y.o. female with a PMHx of HLD, HTN, Sleep apnea, Allergic rhinitis, and IBS.    HPI(25):  Today in clinic, she states that she is doing well.  Denies being in any respiratory distress.  Denies cough, fever, chills, night sweats, chest pain or pressure.  States she is exercising without issues.  Exercise - weight and heavy weights. Treadmil about 20  minutes. Exercises 5 days per week.  She did complete the repeat CT chest and is here to discuss findings.    Current inhalers and/or respiratory treatments:  -none      HPI(25):  Interim events: completed LDCT.    Today in clinic, she states taht she is doing well.   She reports rare occasions or chest discomfrot and inability to take in a full breaths.   Notes that sometimes accompanied by her heart fluttering.  Hemoptysis: none  Tobacco - none  Notes weight gain of 15Lbs since last visit.   She exercises by walking on her treadmil 5 days per week.    Current inhalers and/or respiratory treatments:  -none      HPI(12/15/23):  Today in clinic, she states that she is doing okay overall.   Completed CT chest. No second-hand smoke exposure.  Has noted some slight right-sided chest, sharp, a few seconds and no other associated symptoms. Resolves spontaneously.  Tobacco - not smoking.  States she was apparently diagnosed with Monoclonal gammopathy - following with Dr. Heredia.    Currently on:  -none    Flu vaccine - reports she has completed.      HPI (23):  Today in clinic, she states she is doing well and has no complaints at this time.  She is here to follow-up after a CT chest was notable for a right lung

## 2025-07-30 NOTE — PROGRESS NOTES
Yudith Godwin presents today for   Chief Complaint   Patient presents with    Pulmonary Nodule       Is someone accompanying this pt? No    Is the patient using any DME equipment during OV? Cpap ( Dr Turner)    -DME Company NA    Depression Screening:        3/13/2025     8:52 AM   PHQ-9 Questionaire   Little interest or pleasure in doing things 0   Feeling down, depressed, or hopeless 0   Trouble falling or staying asleep, or sleeping too much 0   Feeling tired or having little energy 0   Poor appetite or overeating 0   Feeling bad about yourself - or that you are a failure or have let yourself or your family down 0   Trouble concentrating on things, such as reading the newspaper or watching television 0   Moving or speaking so slowly that other people could have noticed. Or the opposite - being so fidgety or restless that you have been moving around a lot more than usual 0   Thoughts that you would be better off dead, or of hurting yourself in some way 0   PHQ-9 Total Score 0   If you checked off any problems, how difficult have these problems made it for you to do your work, take care of things at home, or get along with other people? 0       Learning Assessment:    Failed to redirect to the Timeline version of the [employer].    Abuse Screening:         No data to display                Fall Risk    Failed to redirect to the Timeline version of the [employer].    Coordination of Care:    1. Have you been to the ER, urgent care clinic since your last visit? Hospitalized since your last visit? No    2. Have you seen or consulted any other health care providers outside of the [facility] since your last visit? Include any pap smears or colon screening. No    Medication list has been update per patient.

## 2025-08-11 DIAGNOSIS — R51.9 CHRONIC DAILY HEADACHE: ICD-10-CM

## 2025-08-11 RX ORDER — PRAVASTATIN SODIUM 20 MG
20 TABLET ORAL NIGHTLY
Qty: 90 TABLET | Refills: 3 | Status: SHIPPED | OUTPATIENT
Start: 2025-08-11

## 2025-08-11 RX ORDER — AMLODIPINE BESYLATE 10 MG/1
10 TABLET ORAL DAILY
Qty: 90 TABLET | Refills: 3 | Status: SHIPPED | OUTPATIENT
Start: 2025-08-11

## 2025-08-11 RX ORDER — HYDROCHLOROTHIAZIDE 25 MG/1
25 TABLET ORAL DAILY
Qty: 90 TABLET | Refills: 3 | Status: SHIPPED | OUTPATIENT
Start: 2025-08-11

## 2025-08-11 RX ORDER — BUTALBITAL, ACETAMINOPHEN AND CAFFEINE 50; 325; 40 MG/1; MG/1; MG/1
1 TABLET ORAL EVERY 6 HOURS PRN
Qty: 60 TABLET | Refills: 0 | Status: SHIPPED | OUTPATIENT
Start: 2025-08-11 | End: 2025-09-10

## (undated) DEVICE — CYSTO/BLADDER IRRIGATION SET, REGULATING CLAMP

## (undated) DEVICE — KENDALL SCD EXPRESS SLEEVES, KNEE LENGTH, MEDIUM: Brand: KENDALL SCD

## (undated) DEVICE — (D)PREP SKN CHLRAPRP APPL 26ML -- CONVERT TO ITEM 371833

## (undated) DEVICE — TRAP MUCUS SPECIMEN 40ML -- MEDICHOICE

## (undated) DEVICE — BLUNT TROCAR WITH THREADED ANCHOR: Brand: VERSAONE

## (undated) DEVICE — LEGGINGS, PAIR, 31X48, STERILE: Brand: MEDLINE

## (undated) DEVICE — WINGED PERI-PAD,MODERATE: Brand: CURITY

## (undated) DEVICE — 4-PORT MANIFOLD: Brand: NEPTUNE 2

## (undated) DEVICE — APPLICATOR BNDG 1MM ADH PREMIERPRO EXOFIN

## (undated) DEVICE — X-RAY SPONGES,12 PLY: Brand: DERMACEA

## (undated) DEVICE — DRAPE TWL SURG 16X26IN BLU ORB04] ALLCARE INC]

## (undated) DEVICE — FORCEPS BX L240CM JAW DIA2.8MM L CAP W/ NDL MIC MESH TOOTH

## (undated) DEVICE — STERILE POLYISOPRENE POWDER-FREE SURGICAL GLOVES: Brand: PROTEXIS

## (undated) DEVICE — BAG SPEC RETRV 275ML 10ML DISPOSABLE RELIACATCH

## (undated) DEVICE — SOLUTION LACTATED RINGERS INJECTION USP

## (undated) DEVICE — TRAY CATH OD16FR SIL URIN M STATLOK STBL DEV SURSTP

## (undated) DEVICE — COVER LT HNDL BLU STRL -- MEDICHOICE

## (undated) DEVICE — GOWN,AURORA,NONRNF,XL,30/CS: Brand: MEDLINE

## (undated) DEVICE — MAYO STAND COVER: Brand: CONVERTORS

## (undated) DEVICE — TRAY PREP DRY W/ PREM GLV 2 APPL 6 SPNG 2 UNDPD 1 OVERWRAP

## (undated) DEVICE — SOFT SILICONE HYDROCELLULAR SACRUM DRESSING WITH LOCK AWAY LAYER: Brand: ALLEVYN LIFE SACRUM (LARGE) PACK OF 10

## (undated) DEVICE — Device

## (undated) DEVICE — TIP MANIP UTER RUMI 6.7MMX6CM --

## (undated) DEVICE — DISPOSABLE SUCTION/IRRIGATOR TUBE SET WITH TIP: Brand: AHTO

## (undated) DEVICE — BLADELESS OPTICAL TROCAR WITH FIXATION CANNULA: Brand: VERSAPORT

## (undated) DEVICE — CATH IV SAFE STR 22GX1IN BLU -- PROTECTIV PLUS

## (undated) DEVICE — REM POLYHESIVE ADULT PATIENT RETURN ELECTRODE: Brand: VALLEYLAB

## (undated) DEVICE — INTENDED FOR TISSUE SEPARATION, AND OTHER PROCEDURES THAT REQUIRE A SHARP SURGICAL BLADE TO PUNCTURE OR CUT.: Brand: BARD-PARKER ® DISPOSABLE SCALPELS

## (undated) DEVICE — Z INACTIVE USE 2527070 DRAPE SURG W40XL44IN UNDERBUTTOCK SMS POLYPR W/ PCH BK DISP

## (undated) DEVICE — SYR 50ML LR LCK 1ML GRAD NSAF --

## (undated) DEVICE — SOLUTION IV 1000ML 0.9% SOD CHL

## (undated) DEVICE — TRAY PHAR SYR 10ML CLR PLAS STD N CTRL LUERLOCK TIP